# Patient Record
Sex: MALE | Race: WHITE | Employment: OTHER | URBAN - NONMETROPOLITAN AREA
[De-identification: names, ages, dates, MRNs, and addresses within clinical notes are randomized per-mention and may not be internally consistent; named-entity substitution may affect disease eponyms.]

---

## 2017-02-14 ENCOUNTER — OFFICE VISIT (OUTPATIENT)
Dept: PRIMARY CARE CLINIC | Age: 67
End: 2017-02-14
Payer: COMMERCIAL

## 2017-02-14 VITALS
OXYGEN SATURATION: 97 % | HEART RATE: 95 BPM | DIASTOLIC BLOOD PRESSURE: 82 MMHG | SYSTOLIC BLOOD PRESSURE: 122 MMHG | WEIGHT: 251 LBS | HEIGHT: 71 IN | TEMPERATURE: 98.4 F | BODY MASS INDEX: 35.14 KG/M2

## 2017-02-14 DIAGNOSIS — E11.9 TYPE 2 DIABETES MELLITUS WITHOUT COMPLICATION, WITHOUT LONG-TERM CURRENT USE OF INSULIN (HCC): ICD-10-CM

## 2017-02-14 DIAGNOSIS — J01.01 ACUTE RECURRENT MAXILLARY SINUSITIS: Primary | ICD-10-CM

## 2017-02-14 DIAGNOSIS — H66.005 RECURRENT ACUTE SUPPURATIVE OTITIS MEDIA WITHOUT SPONTANEOUS RUPTURE OF LEFT TYMPANIC MEMBRANE: ICD-10-CM

## 2017-02-14 PROCEDURE — 99213 OFFICE O/P EST LOW 20 MIN: CPT | Performed by: PEDIATRICS

## 2017-02-14 RX ORDER — CEFDINIR 300 MG/1
300 CAPSULE ORAL 2 TIMES DAILY
Qty: 20 CAPSULE | Refills: 0 | Status: SHIPPED | OUTPATIENT
Start: 2017-02-14 | End: 2017-02-24

## 2017-02-14 ASSESSMENT — ENCOUNTER SYMPTOMS
VOMITING: 1
VOICE CHANGE: 0
DIARRHEA: 0
COUGH: 1
NAUSEA: 0
SHORTNESS OF BREATH: 0
RHINORRHEA: 1
ABDOMINAL PAIN: 0
SINUS PRESSURE: 1
BACK PAIN: 0

## 2017-03-20 DIAGNOSIS — I10 ESSENTIAL HYPERTENSION: ICD-10-CM

## 2017-03-20 RX ORDER — SPIRONOLACTONE 25 MG/1
25 TABLET ORAL DAILY
Qty: 90 TABLET | Refills: 3 | Status: SHIPPED | OUTPATIENT
Start: 2017-03-20 | End: 2018-06-02 | Stop reason: SDUPTHER

## 2017-03-24 DIAGNOSIS — E78.5 HYPERLIPIDEMIA WITH TARGET LDL LESS THAN 100: ICD-10-CM

## 2017-03-24 DIAGNOSIS — I10 ESSENTIAL HYPERTENSION: ICD-10-CM

## 2017-03-24 DIAGNOSIS — E11.9 TYPE 2 DIABETES MELLITUS WITHOUT COMPLICATION, WITHOUT LONG-TERM CURRENT USE OF INSULIN (HCC): ICD-10-CM

## 2017-03-24 DIAGNOSIS — Z00.00 ROUTINE GENERAL MEDICAL EXAMINATION AT A HEALTH CARE FACILITY: ICD-10-CM

## 2017-03-24 DIAGNOSIS — Z00.00 ROUTINE GENERAL MEDICAL EXAMINATION AT A HEALTH CARE FACILITY: Primary | ICD-10-CM

## 2017-03-24 LAB
ALBUMIN SERPL-MCNC: 4.5 G/DL (ref 3.5–5.2)
ALP BLD-CCNC: 51 U/L (ref 40–130)
ALT SERPL-CCNC: 15 U/L (ref 5–41)
ANION GAP SERPL CALCULATED.3IONS-SCNC: 17 MMOL/L (ref 7–19)
AST SERPL-CCNC: 19 U/L (ref 5–40)
BASOPHILS ABSOLUTE: 0.1 K/UL (ref 0–0.2)
BASOPHILS RELATIVE PERCENT: 1.2 % (ref 0–1)
BILIRUB SERPL-MCNC: 0.4 MG/DL (ref 0.2–1.2)
BUN BLDV-MCNC: 18 MG/DL (ref 8–23)
CALCIUM SERPL-MCNC: 9.7 MG/DL (ref 8.8–10.2)
CHLORIDE BLD-SCNC: 100 MMOL/L (ref 98–111)
CHOLESTEROL, TOTAL: 122 MG/DL (ref 160–199)
CO2: 20 MMOL/L (ref 22–29)
CREAT SERPL-MCNC: 1 MG/DL (ref 0.5–1.2)
CREATININE URINE: 131 MG/DL (ref 4.2–622)
EOSINOPHILS ABSOLUTE: 1.2 K/UL (ref 0–0.6)
EOSINOPHILS RELATIVE PERCENT: 11.8 % (ref 0–5)
GFR NON-AFRICAN AMERICAN: >60
GLOBULIN: 2.8 G/DL
GLUCOSE BLD-MCNC: 136 MG/DL (ref 74–109)
HBA1C MFR BLD: 5.5 %
HCT VFR BLD CALC: 39.1 % (ref 42–52)
HDLC SERPL-MCNC: 28 MG/DL (ref 55–121)
HEMOGLOBIN: 13.8 G/DL (ref 14–18)
LDL CHOLESTEROL CALCULATED: 65 MG/DL
LYMPHOCYTES ABSOLUTE: 2.7 K/UL (ref 1.1–4.5)
LYMPHOCYTES RELATIVE PERCENT: 26.8 % (ref 20–40)
MCH RBC QN AUTO: 33.3 PG (ref 27–31)
MCHC RBC AUTO-ENTMCNC: 35.3 G/DL (ref 33–37)
MCV RBC AUTO: 94.2 FL (ref 80–94)
MICROALBUMIN UR-MCNC: 2.1 MG/DL (ref 0–19)
MICROALBUMIN/CREAT UR-RTO: 16 MG/G
MONOCYTES ABSOLUTE: 0.7 K/UL (ref 0–0.9)
MONOCYTES RELATIVE PERCENT: 6.8 % (ref 0–10)
NEUTROPHILS ABSOLUTE: 5.3 K/UL (ref 1.5–7.5)
NEUTROPHILS RELATIVE PERCENT: 53.4 % (ref 50–65)
PDW BLD-RTO: 13.7 % (ref 11.5–14.5)
PLATELET # BLD: 249 K/UL (ref 130–400)
PMV BLD AUTO: 11.2 FL (ref 7.4–10.4)
POTASSIUM SERPL-SCNC: 4.9 MMOL/L (ref 3.5–5)
RBC # BLD: 4.15 M/UL (ref 4.7–6.1)
SODIUM BLD-SCNC: 137 MMOL/L (ref 136–145)
TOTAL PROTEIN: 7.3 G/DL (ref 6.6–8.7)
TRIGL SERPL-MCNC: 147 MG/DL (ref 150–199)
WBC # BLD: 9.9 K/UL (ref 4.8–10.8)

## 2017-03-25 LAB
T4 FREE: 1 NG/ML (ref 0.9–1.7)
TSH SERPL DL<=0.05 MIU/L-ACNC: 4.44 UIU/ML (ref 0.27–4.2)

## 2017-03-27 ENCOUNTER — TELEPHONE (OUTPATIENT)
Dept: PRIMARY CARE CLINIC | Age: 67
End: 2017-03-27

## 2017-03-28 ENCOUNTER — OFFICE VISIT (OUTPATIENT)
Dept: PRIMARY CARE CLINIC | Age: 67
End: 2017-03-28
Payer: COMMERCIAL

## 2017-03-28 VITALS
SYSTOLIC BLOOD PRESSURE: 102 MMHG | DIASTOLIC BLOOD PRESSURE: 70 MMHG | OXYGEN SATURATION: 98 % | HEIGHT: 71 IN | HEART RATE: 84 BPM | WEIGHT: 254.8 LBS | BODY MASS INDEX: 35.67 KG/M2 | TEMPERATURE: 98.6 F

## 2017-03-28 DIAGNOSIS — E11.9 TYPE 2 DIABETES MELLITUS WITHOUT COMPLICATION, WITHOUT LONG-TERM CURRENT USE OF INSULIN (HCC): ICD-10-CM

## 2017-03-28 DIAGNOSIS — J30.2 SEASONAL ALLERGIC RHINITIS, UNSPECIFIED ALLERGIC RHINITIS TRIGGER: ICD-10-CM

## 2017-03-28 DIAGNOSIS — I65.29 CAROTID ARTERY OCCLUSION WITHOUT INFARCTION, UNSPECIFIED LATERALITY: ICD-10-CM

## 2017-03-28 DIAGNOSIS — E78.5 HYPERLIPIDEMIA WITH TARGET LDL LESS THAN 100: ICD-10-CM

## 2017-03-28 DIAGNOSIS — I25.10 CORONARY ARTERY DISEASE INVOLVING NATIVE HEART WITHOUT ANGINA PECTORIS, UNSPECIFIED VESSEL OR LESION TYPE: ICD-10-CM

## 2017-03-28 DIAGNOSIS — I10 ESSENTIAL HYPERTENSION: Primary | ICD-10-CM

## 2017-03-28 PROCEDURE — 99214 OFFICE O/P EST MOD 30 MIN: CPT | Performed by: PEDIATRICS

## 2017-03-28 ASSESSMENT — ENCOUNTER SYMPTOMS
COUGH: 0
NAUSEA: 0
RHINORRHEA: 0
ABDOMINAL PAIN: 0
VOICE CHANGE: 0
SHORTNESS OF BREATH: 0
BACK PAIN: 0
SINUS PRESSURE: 0
DIARRHEA: 0
VOMITING: 0

## 2017-05-30 RX ORDER — ATORVASTATIN CALCIUM 40 MG/1
40 TABLET, FILM COATED ORAL DAILY
Qty: 90 TABLET | Refills: 3 | Status: SHIPPED | OUTPATIENT
Start: 2017-05-30 | End: 2018-05-14 | Stop reason: SDUPTHER

## 2017-10-08 DIAGNOSIS — Z79.4 TYPE 2 DIABETES MELLITUS WITHOUT COMPLICATION, WITH LONG-TERM CURRENT USE OF INSULIN (HCC): ICD-10-CM

## 2017-10-08 DIAGNOSIS — E11.9 TYPE 2 DIABETES MELLITUS WITHOUT COMPLICATION, WITH LONG-TERM CURRENT USE OF INSULIN (HCC): ICD-10-CM

## 2017-10-09 NOTE — TELEPHONE ENCOUNTER
Received request from pt for refill on the following medication. Pt was last seen in the office on 9/14/17 and has a follow up appointment scheduled for 10/13/17. Will send request to physician for authorization.        Requested Prescriptions     Pending Prescriptions Disp Refills    metFORMIN (GLUCOPHAGE) 1000 MG tablet [Pharmacy Med Name: METFORMIN TAB 1000MG] 180 tablet 3     Sig: Take 1 tablet by mouth 2 times daily (with meals)

## 2017-10-23 ENCOUNTER — OFFICE VISIT (OUTPATIENT)
Dept: PRIMARY CARE CLINIC | Age: 67
End: 2017-10-23
Payer: COMMERCIAL

## 2017-10-23 VITALS
SYSTOLIC BLOOD PRESSURE: 118 MMHG | TEMPERATURE: 97.7 F | HEIGHT: 71 IN | DIASTOLIC BLOOD PRESSURE: 60 MMHG | BODY MASS INDEX: 35.56 KG/M2 | HEART RATE: 71 BPM | WEIGHT: 254 LBS | OXYGEN SATURATION: 97 %

## 2017-10-23 DIAGNOSIS — I10 ESSENTIAL HYPERTENSION: Primary | ICD-10-CM

## 2017-10-23 DIAGNOSIS — I25.10 CORONARY ARTERY DISEASE INVOLVING NATIVE HEART WITHOUT ANGINA PECTORIS, UNSPECIFIED VESSEL OR LESION TYPE: ICD-10-CM

## 2017-10-23 DIAGNOSIS — R39.11 BENIGN PROSTATIC HYPERPLASIA WITH URINARY HESITANCY: ICD-10-CM

## 2017-10-23 DIAGNOSIS — E11.9 TYPE 2 DIABETES MELLITUS WITHOUT COMPLICATION, WITHOUT LONG-TERM CURRENT USE OF INSULIN (HCC): ICD-10-CM

## 2017-10-23 DIAGNOSIS — E78.5 HYPERLIPIDEMIA WITH TARGET LDL LESS THAN 100: ICD-10-CM

## 2017-10-23 DIAGNOSIS — Z12.5 ENCOUNTER FOR PROSTATE CANCER SCREENING: ICD-10-CM

## 2017-10-23 DIAGNOSIS — N40.1 BENIGN PROSTATIC HYPERPLASIA WITH URINARY HESITANCY: ICD-10-CM

## 2017-10-23 PROCEDURE — G8417 CALC BMI ABV UP PARAM F/U: HCPCS | Performed by: PEDIATRICS

## 2017-10-23 PROCEDURE — 4040F PNEUMOC VAC/ADMIN/RCVD: CPT | Performed by: PEDIATRICS

## 2017-10-23 PROCEDURE — 1036F TOBACCO NON-USER: CPT | Performed by: PEDIATRICS

## 2017-10-23 PROCEDURE — G8427 DOCREV CUR MEDS BY ELIG CLIN: HCPCS | Performed by: PEDIATRICS

## 2017-10-23 PROCEDURE — 3017F COLORECTAL CA SCREEN DOC REV: CPT | Performed by: PEDIATRICS

## 2017-10-23 PROCEDURE — G8598 ASA/ANTIPLAT THER USED: HCPCS | Performed by: PEDIATRICS

## 2017-10-23 PROCEDURE — 3044F HG A1C LEVEL LT 7.0%: CPT | Performed by: PEDIATRICS

## 2017-10-23 PROCEDURE — 99214 OFFICE O/P EST MOD 30 MIN: CPT | Performed by: PEDIATRICS

## 2017-10-23 PROCEDURE — 1123F ACP DISCUSS/DSCN MKR DOCD: CPT | Performed by: PEDIATRICS

## 2017-10-23 PROCEDURE — G8484 FLU IMMUNIZE NO ADMIN: HCPCS | Performed by: PEDIATRICS

## 2017-10-23 RX ORDER — NITROGLYCERIN 400 UG/1
1 SPRAY ORAL EVERY 5 MIN PRN
Qty: 1 BOTTLE | Refills: 0 | Status: SHIPPED | OUTPATIENT
Start: 2017-10-23 | End: 2017-10-26 | Stop reason: SDUPTHER

## 2017-10-23 ASSESSMENT — ENCOUNTER SYMPTOMS
NAUSEA: 0
BACK PAIN: 0
DIARRHEA: 0
ABDOMINAL PAIN: 0
VOMITING: 0
CHEST TIGHTNESS: 0
COUGH: 0
WHEEZING: 0
SHORTNESS OF BREATH: 0
SORE THROAT: 0

## 2017-10-23 NOTE — PROGRESS NOTES
2017    LDLCALC 65 2017    LDLDIRECT 111 2013      Lab Results   Component Value Date    ALT 15 2017    AST 19 2017       Barriers To Success: none       height is 5' 11\" (1.803 m) and weight is 254 lb (115.2 kg). His temporal temperature is 97.7 °F (36.5 °C). His blood pressure is 118/60 and his pulse is 71. His oxygen saturation is 97%. Body mass index is 35.43 kg/m². Notes Recorded by Gigi Bhandari DO on 3/26/2017 at 6:31 AM  Thyroid is borderline on the low side.  Will need to recheck next blood draw. BG is 136.  Blood is slightly acidic, but otherwise normal.  No concern unless other abnormalities. Cholesterol is controlled with the following exceptions: Your HDL cholesterol is too low.  Exercise is the best way to get this level up.  The recommendations are to get the HDL >40 in males and >50 in females.  Niacin is an otc. B vitamin that can also increase the HDL.  Fish oil has also been shown to result in an increase of HDL cholesterol. I recommend that we recheck cholesterol in 3-6 months. A1C is 5.5 which indicates that BG has been excellently controlled over the past 3 months. There is a minimal amt. Of protein in the urine.  We can follow this.  It is not enough to be alarmed, but need to ensure no progression. Cbc shows borderline anemia.  The allergy marker is also elevated indicating the presence of allergies. I have reviewed the following with the Mr. Ballard    Lab Review   No visits with results within 6 Month(s) from this visit.    Latest known visit with results is:   Orders Only on 2017   Component Date Value    Sodium 2017 137     Potassium 2017 4.9     Chloride 2017 100     CO2 2017 20*    Anion Gap 2017 17     Glucose 2017 136*    BUN 2017 18     CREATININE 2017 1.0     GFR Non- 2017 >60     Calcium 2017 9.7     Total Protein 2017 7.3     Alb (PLAVIX) 75 MG tablet Take 1 tablet by mouth daily 90 tablet 3    triamcinolone (KENALOG) 0.1 % cream Apply topically 2 times daily. 80 g 5    Omega-3 Fatty Acids (FISH OIL) 1000 MG CAPS Take 3,000 mg by mouth daily      diphenhydrAMINE (BENADRYL) 25 MG tablet Take 50 mg by mouth nightly as needed for Itching      aspirin 325 MG tablet Take 325 mg by mouth daily.  niacin (NIASPAN) 1000 MG CR tablet Take 1,000 mg by mouth nightly.  cyanocobalamin 1000 MCG/ML injection Inject 1 mL into the muscle once for 1 dose 1 mL 5     No current facility-administered medications for this visit. Allergies: Codeine    Past Medical History:   Diagnosis Date    CAD (coronary artery disease) 1997 2009    MI    Cancer ([facility] [address]     skin BCC    Hyperlipidemia     Hypertension     Type II or unspecified type diabetes mellitus without mention of complication, not stated as uncontrolled     Vertebral artery insufficiency        Past Surgical History:   Procedure Laterality Date    CARPAL TUNNEL RELEASE      bilateral    CATARACT REMOVAL Bilateral     CHOLECYSTECTOMY      CORONARY ARTERY BYPASS GRAFT  1997    3v    HEEL SPUR SURGERY      left    PTCA  2009 [employer]    with stents    TONSILLECTOMY         Social History   Substance Use Topics    Smoking status: Former Smoker     Packs/day: 1.00     Years: 30.00     Types: Cigars     Quit date: 3/3/2009    Smokeless tobacco: Never Used      Comment: cigars occasionally    Alcohol use 0.0 oz/week      Comment: occasional       Review of Systems   Constitutional: Negative for chills, fatigue and fever. HENT: Negative for congestion, ear pain and sore throat. Eyes: Negative for visual disturbance. Respiratory: Negative for cough, chest tightness, shortness of breath and wheezing. Cardiovascular: Negative for chest pain, palpitations and leg swelling. Gastrointestinal: Negative for abdominal pain, diarrhea, nausea and vomiting.    Endocrine: Negative for polyuria. Genitourinary: Negative for frequency and urgency. Musculoskeletal: Negative for back pain and neck pain. Skin: Negative for rash. Neurological: Positive for dizziness and light-headedness. Negative for headaches. Psychiatric/Behavioral: Negative for self-injury. The patient is not nervous/anxious. Physical Exam   Constitutional: He is oriented to person, place, and time. He appears well-developed and well-nourished. No distress. HENT:   Head: Normocephalic and atraumatic. Right Ear: External ear normal.   Left Ear: External ear normal.   Nose: Nose normal.   Mouth/Throat: Oropharynx is clear and moist.   Eyes: Conjunctivae and EOM are normal. Pupils are equal, round, and reactive to light. No scleral icterus. Neck: Normal range of motion. Neck supple. No JVD present. Carotid bruit is not present. No thyromegaly present. Cardiovascular: Normal rate, regular rhythm, S1 normal, S2 normal and normal heart sounds. No extrasystoles are present. PMI is not displaced. Exam reveals no gallop and no friction rub. No murmur heard. Pulmonary/Chest: Effort normal and breath sounds normal. No respiratory distress. He has no wheezes. He has no rhonchi. He has no rales. Abdominal: Soft. Bowel sounds are normal. There is no hepatosplenomegaly. There is no tenderness. There is no rebound, no guarding and no CVA tenderness. Genitourinary:   Genitourinary Comments: Exam deferred   Musculoskeletal: Normal range of motion. He exhibits no edema or tenderness. Lymphadenopathy:     He has no cervical adenopathy. Neurological: He is alert and oriented to person, place, and time. He has normal strength. No sensory deficit (no numbness or tingling). Skin: Skin is warm and dry. No rash noted. Psychiatric: He has a normal mood and affect. ASSESSMENT      ICD-10-CM ICD-9-CM    1. Essential hypertension I10 401.9 CBC Auto Differential      Comprehensive Metabolic Panel   2.  Type 2 diabetes mellitus without complication, without long-term current use of insulin (HCC) E11.9 250.00 Hemoglobin A1C   3. Hyperlipidemia with target LDL less than 100 E78.5 272.4 Lipid Panel   4. Coronary artery disease involving native heart without angina pectoris, unspecified vessel or lesion type I25.10 414.01 nitroGLYCERIN (NITROLINGUAL) 0.4 MG/SPRAY 0.4 mg spray   5. Benign prostatic hyperplasia with urinary hesitancy N40.1 600.01 Psa screening    R39.11 788.64    6. Encounter for prostate cancer screening Z12.5 V76.44 Psa screening       PLAN      ICD-10-CM ICD-9-CM    1. Essential hypertension I10 401.9 CBC Auto Differential      Comprehensive Metabolic Panel   2. Type 2 diabetes mellitus without complication, without long-term current use of insulin (HCC) E11.9 250.00 Hemoglobin A1C   3. Hyperlipidemia with target LDL less than 100 E78.5 272.4 Lipid Panel   4. Coronary artery disease involving native heart without angina pectoris, unspecified vessel or lesion type I25.10 414.01 nitroGLYCERIN (NITROLINGUAL) 0.4 MG/SPRAY 0.4 mg spray   5. Benign prostatic hyperplasia with urinary hesitancy N40.1 600.01 Psa screening    R39.11 788.64    6. Encounter for prostate cancer screening Z12.5 V76.44        Orders Placed This Encounter   Procedures    CBC Auto Differential    Comprehensive Metabolic Panel    Hemoglobin A1C    Lipid Panel    Psa screening        Return in about 6 months (around 4/23/2018). There are no Patient Instructions on file for this visit. Additional Instructions: As always, patient is advised to bring in medication bottles in order to correctly reconcile with our current list.    Jody Hainer received counseling on the following healthy behaviors: take medications as rx. Patient given educational materials on diabetic diet. I have instructed Jodyantoine Tuttle to complete a self tracking handout on bp and bg and instructed them to bring it with them to his next appointment. Discussed use, benefit, and side effects of prescribed medications. Barriers to medication compliance addressed. All patient questions answered. Pt voiced understanding. If you need to reach us for an appointment or any other urgent   scheduling issue,   please press the (*) sign at the beginning of the phone tree prompt after   Dialing the normal office number.     Susi Houston, DO

## 2017-10-25 RX ORDER — CYANOCOBALAMIN 1000 UG/ML
1000 INJECTION INTRAMUSCULAR; SUBCUTANEOUS ONCE
Qty: 1 ML | Refills: 5 | Status: SHIPPED | OUTPATIENT
Start: 2017-10-25 | End: 2018-05-26 | Stop reason: SDUPTHER

## 2017-10-26 DIAGNOSIS — I25.10 CORONARY ARTERY DISEASE INVOLVING NATIVE HEART WITHOUT ANGINA PECTORIS, UNSPECIFIED VESSEL OR LESION TYPE: ICD-10-CM

## 2017-10-26 NOTE — TELEPHONE ENCOUNTER
Received fax from pharmacy requesting refill on pts medication(s). Pt was last seen in office on 10/23/2017 and has a follow up scheduled for 4/24/2017. [facility]

## 2017-10-27 RX ORDER — NITROGLYCERIN 400 UG/1
1 SPRAY ORAL EVERY 5 MIN PRN
Qty: 1 BOTTLE | Refills: 0 | Status: SHIPPED | OUTPATIENT
Start: 2017-10-27

## 2017-10-27 RX ORDER — CYANOCOBALAMIN 1000 UG/ML
1000 INJECTION INTRAMUSCULAR; SUBCUTANEOUS ONCE
Qty: 1 ML | Refills: 0 | Status: SHIPPED | OUTPATIENT
Start: 2017-10-27 | End: 2018-04-24 | Stop reason: SDUPTHER

## 2017-11-03 DIAGNOSIS — I10 ESSENTIAL HYPERTENSION: ICD-10-CM

## 2017-11-03 RX ORDER — RAMIPRIL 10 MG/1
CAPSULE ORAL
Qty: 180 CAPSULE | Refills: 3 | Status: SHIPPED | OUTPATIENT
Start: 2017-11-03 | End: 2018-10-25 | Stop reason: ALTCHOICE

## 2017-11-13 DIAGNOSIS — I25.10 CORONARY ARTERY DISEASE INVOLVING NATIVE HEART WITHOUT ANGINA PECTORIS, UNSPECIFIED VESSEL OR LESION TYPE: ICD-10-CM

## 2017-11-13 DIAGNOSIS — I10 ESSENTIAL HYPERTENSION: ICD-10-CM

## 2017-11-13 RX ORDER — METOPROLOL TARTRATE 50 MG/1
50 TABLET, FILM COATED ORAL 2 TIMES DAILY
Qty: 180 TABLET | Refills: 3 | Status: ON HOLD | OUTPATIENT
Start: 2017-11-13 | End: 2018-10-18 | Stop reason: SDUPTHER

## 2017-11-13 RX ORDER — CLOPIDOGREL BISULFATE 75 MG/1
75 TABLET ORAL DAILY
Qty: 90 TABLET | Refills: 3 | Status: ON HOLD | OUTPATIENT
Start: 2017-11-13 | End: 2018-10-18 | Stop reason: SDUPTHER

## 2017-12-14 ENCOUNTER — OFFICE VISIT (OUTPATIENT)
Dept: PRIMARY CARE CLINIC | Age: 67
End: 2017-12-14
Payer: COMMERCIAL

## 2017-12-14 VITALS
DIASTOLIC BLOOD PRESSURE: 78 MMHG | HEART RATE: 70 BPM | WEIGHT: 251.75 LBS | SYSTOLIC BLOOD PRESSURE: 110 MMHG | TEMPERATURE: 96.3 F | BODY MASS INDEX: 35.24 KG/M2 | OXYGEN SATURATION: 95 % | HEIGHT: 71 IN

## 2017-12-14 DIAGNOSIS — J01.00 ACUTE NON-RECURRENT MAXILLARY SINUSITIS: Primary | ICD-10-CM

## 2017-12-14 DIAGNOSIS — J40 BRONCHITIS: ICD-10-CM

## 2017-12-14 PROCEDURE — G8427 DOCREV CUR MEDS BY ELIG CLIN: HCPCS | Performed by: PEDIATRICS

## 2017-12-14 PROCEDURE — 3017F COLORECTAL CA SCREEN DOC REV: CPT | Performed by: PEDIATRICS

## 2017-12-14 PROCEDURE — 99213 OFFICE O/P EST LOW 20 MIN: CPT | Performed by: PEDIATRICS

## 2017-12-14 PROCEDURE — G8417 CALC BMI ABV UP PARAM F/U: HCPCS | Performed by: PEDIATRICS

## 2017-12-14 PROCEDURE — 4040F PNEUMOC VAC/ADMIN/RCVD: CPT | Performed by: PEDIATRICS

## 2017-12-14 PROCEDURE — G8598 ASA/ANTIPLAT THER USED: HCPCS | Performed by: PEDIATRICS

## 2017-12-14 PROCEDURE — G8484 FLU IMMUNIZE NO ADMIN: HCPCS | Performed by: PEDIATRICS

## 2017-12-14 PROCEDURE — 1036F TOBACCO NON-USER: CPT | Performed by: PEDIATRICS

## 2017-12-14 PROCEDURE — 1123F ACP DISCUSS/DSCN MKR DOCD: CPT | Performed by: PEDIATRICS

## 2017-12-14 RX ORDER — AMOXICILLIN AND CLAVULANATE POTASSIUM 875; 125 MG/1; MG/1
1 TABLET, FILM COATED ORAL 2 TIMES DAILY
Qty: 20 TABLET | Refills: 0 | Status: SHIPPED | OUTPATIENT
Start: 2017-12-14 | End: 2017-12-24

## 2017-12-14 ASSESSMENT — ENCOUNTER SYMPTOMS
BACK PAIN: 0
NAUSEA: 0
COUGH: 1
CHEST TIGHTNESS: 0
SORE THROAT: 0
WHEEZING: 0
DIARRHEA: 0
VOMITING: 0
SHORTNESS OF BREATH: 0
ABDOMINAL PAIN: 0

## 2017-12-14 NOTE — PATIENT INSTRUCTIONS
? · You are not getting better as expected. Where can you learn more? Go to [email or web address] and sign in to your [employer] account. Enter H333 in the [employer] box to learn more about \"Bronchitis: Care Instructions. \"     If you do not have an account, please click on the \"Sign Up Now\" link. Current as of: May 12, 2017  Content Version: 11.4  © 3682-1489 [employer], [employer]orporated. Care instructions adapted under license by [facility] ([facility]). If you have questions about a medical condition or this instruction, always ask your healthcare professional. [address] any warranty or liability for your use of this information.

## 2017-12-14 NOTE — PROGRESS NOTES
visit. Latest known visit with results is:   Orders Only on 03/24/2017   Component Date Value    Sodium 03/24/2017 137     Potassium 03/24/2017 4.9     Chloride 03/24/2017 100     CO2 03/24/2017 20*    Anion Gap 03/24/2017 17     Glucose 03/24/2017 136*    BUN 03/24/2017 18     CREATININE 03/24/2017 1.0     GFR Non- 03/24/2017 >60     Calcium 03/24/2017 9.7     Total Protein 03/24/2017 7.3     Alb 03/24/2017 4.5     Total Bilirubin 03/24/2017 0.4     Alkaline Phosphatase 03/24/2017 51     ALT 03/24/2017 15     AST 03/24/2017 19     Globulin 03/24/2017 2.8     Hemoglobin A1C 03/24/2017 5.5     Cholesterol, Total 03/24/2017 122*    Triglycerides 03/24/2017 147*    HDL 03/24/2017 28*    LDL Calculated 03/24/2017 65     T4 Free 03/25/2017 1.0     TSH 03/25/2017 4.44*    WBC 03/24/2017 9.9     RBC 03/24/2017 4.15*    Hemoglobin 03/24/2017 13.8*    Hematocrit 03/24/2017 39.1*    MCV 03/24/2017 94.2*    MCH 03/24/2017 33.3*    MCHC 03/24/2017 35.3     RDW 03/24/2017 13.7     Platelets 73/98/1628 249     MPV 03/24/2017 11.2*    Neutrophils % 03/24/2017 53.4     Lymphocytes % 03/24/2017 26.8     Monocytes % 03/24/2017 6.8     Eosinophils % 03/24/2017 11.8*    Basophils % 03/24/2017 1.2*    Neutrophils # 03/24/2017 5.3     Lymphocytes # 03/24/2017 2.7     Monocytes # 03/24/2017 0.70     Eosinophils # 03/24/2017 1.20*    Basophils # 03/24/2017 0.10     Microalbumin, Random Uri* 03/24/2017 2.10     Creatinine, Ur 03/24/2017 131.0     Microalbumin Creatinine * 03/24/2017 16.0      Copies of these are in the chart.     Current Outpatient Prescriptions   Medication Sig Dispense Refill    amoxicillin-clavulanate (AUGMENTIN) 875-125 MG per tablet Take 1 tablet by mouth 2 times daily for 10 days 20 tablet 0    clopidogrel (PLAVIX) 75 MG tablet Take 1 tablet by mouth daily 90 tablet 3    metoprolol tartrate (LOPRESSOR) 50 MG tablet Take 1 tablet by mouth 2 times daily 180 tablet 3    ramipril (ALTACE) 10 MG capsule TAKE 2 CAPSULES DAILY 180 capsule 3    nitroGLYCERIN (NITROLINGUAL) 0.4 MG/SPRAY 0.4 mg spray Place 1 spray under the tongue every 5 minutes as needed for Chest pain 1 Bottle 0    atorvastatin (LIPITOR) 40 MG tablet Take 1 tablet by mouth daily 90 tablet 3    spironolactone (ALDACTONE) 25 MG tablet Take 1 tablet by mouth daily 90 tablet 3    Dulaglutide 0.75 MG/0.5ML SOPN Inject 0.75 mg into the skin every 7 days 12 Pen 3    metFORMIN (GLUCOPHAGE) 1000 MG tablet Take 1 tablet by mouth 2 times daily (with meals) 180 tablet 3    triamcinolone (KENALOG) 0.1 % cream Apply topically 2 times daily. 80 g 5    Omega-3 Fatty Acids (FISH OIL) 1000 MG CAPS Take 3,000 mg by mouth daily      diphenhydrAMINE (BENADRYL) 25 MG tablet Take 50 mg by mouth nightly as needed for Itching      aspirin 325 MG tablet Take 325 mg by mouth daily.  niacin (NIASPAN) 1000 MG CR tablet Take 1,000 mg by mouth nightly.  cyanocobalamin 1000 MCG/ML injection Inject 1 mL into the muscle once for 1 dose 1 mL 0    cyanocobalamin 1000 MCG/ML injection Inject 1 mL into the muscle once for 1 dose 1 mL 5     No current facility-administered medications for this visit.         Allergies: Codeine    Past Medical History:   Diagnosis Date    CAD (coronary artery disease) 1997 2009    MI    Cancer ([facility] [address]     skin BCC    Hyperlipidemia     Hypertension     Type II or unspecified type diabetes mellitus without mention of complication, not stated as uncontrolled     Vertebral artery insufficiency        Past Surgical History:   Procedure Laterality Date    CARPAL TUNNEL RELEASE      bilateral    CATARACT REMOVAL Bilateral     CHOLECYSTECTOMY      CORONARY ARTERY BYPASS GRAFT  1997    3v    HEEL SPUR SURGERY      left    PTCA  2009 [employer]    with stents    TONSILLECTOMY         Social History   Substance Use Topics    Smoking status: Former Smoker     Packs/day: 1.00     Years: Motrin), or naproxen (Aleve). Read and follow all instructions on the label. · If the doctor prescribed antibiotics, take them as directed. Do not stop taking them just because you feel better. You need to take the full course of antibiotics. · Be careful when taking over-the-counter cold or flu medicines and Tylenol at the same time. Many of these medicines have acetaminophen, which is Tylenol. Read the labels to make sure that you are not taking more than the recommended dose. Too much acetaminophen (Tylenol) can be harmful. · Breathe warm, moist air from a steamy shower, a hot bath, or a sink filled with hot water. Avoid cold, dry air. Using a humidifier in your home may help. Follow the directions for cleaning the machine. · Use saline (saltwater) nasal washes to help keep your nasal passages open and wash out mucus and bacteria. You can buy saline nose drops at a grocery store or drugstore. Or you can make your own at home by adding 1 teaspoon of salt and 1 teaspoon of baking soda to 2 cups of distilled water. If you make your own, fill a bulb syringe with the solution, insert the tip into your nostril, and squeeze gently. [place] your nose. · Put a hot, wet towel or a warm gel pack on your face 3 or 4 times a day for 5 to 10 minutes each time. · Try a decongestant nasal spray like oxymetazoline (Afrin). Do not use it for more than 3 days in a row. Using it for more than 3 days can make your congestion worse. When should you call for help? Call your doctor now or seek immediate medical care if:  ? · You have new or worse swelling or redness in your face or around your eyes. ? · You have a new or higher fever. ? Watch closely for changes in your health, and be sure to contact your doctor if:  ? · You have new or worse facial pain. ? · The mucus from your nose becomes thicker (like pus) or has new blood in it. ? · You are not getting better as expected. Where can you learn more?   Go to [email or web address] and sign in to your [employer] account. Enter K084 in the [facility] box to learn more about \"Sinusitis: Care Instructions. \"     If you do not have an account, please click on the \"Sign Up Now\" link. Current as of: May 12, 2017  Content Version: 11.4  © 0122-7458 [employer]. Care instructions adapted under license by [facility] ([facility]). If you have questions about a medical condition or this instruction, always ask your healthcare professional. [address] any warranty or liability for your use of this information. Patient Education        Bronchitis: Care Instructions  Your Care Instructions    Bronchitis is inflammation of the bronchial tubes, which carry air to the lungs. The tubes swell and produce mucus, or phlegm. The mucus and inflamed bronchial tubes make you cough. You may have trouble breathing. Most cases of bronchitis are caused by viruses like those that cause colds. Antibiotics usually do not help and they may be harmful. Bronchitis usually develops rapidly and lasts about 2 to 3 weeks in otherwise healthy people. Follow-up care is a key part of your treatment and safety. Be sure to make and go to all appointments, and call your doctor if you are having problems. It's also a good idea to know your test results and keep a list of the medicines you take. How can you care for yourself at home? · Take all medicines exactly as prescribed. Call your doctor if you think you are having a problem with your medicine. · Get some extra rest.  · Take an over-the-counter pain medicine, such as acetaminophen (Tylenol), ibuprofen (Advil, Motrin), or naproxen (Aleve) to reduce fever and relieve body aches. Read and follow all instructions on the label. · Do not take two or more pain medicines at the same time unless the doctor told you to. Many pain medicines have acetaminophen, which is Tylenol.  Too much acetaminophen in medication bottles in order to correctly reconcile with our current list.    Saundra Curtis received counseling on the following healthy behaviors: Take antibodies to completion    Patient given educational materials on sinusitis and bronchitis    I have instructed Saundra Curtis to complete a self tracking handout on bp and instructed them to bring it with them to his next appointment. Discussed use, benefit, and side effects of prescribed medications. Barriers to medication compliance addressed. All patient questions answered. Pt voiced understanding. If you need to reach us for an appointment or any other urgent   scheduling issue,   please press the (*) sign at the beginning of the phone tree prompt after   Dialing the normal office number.     Warden Guerrero, DO

## 2018-01-15 DIAGNOSIS — E11.9 TYPE 2 DIABETES MELLITUS WITHOUT COMPLICATION, WITHOUT LONG-TERM CURRENT USE OF INSULIN (HCC): ICD-10-CM

## 2018-01-15 NOTE — TELEPHONE ENCOUNTER
Adelaida Fields would like a refill of the following medications:   Dulaglutide 0.75 MG/0.5ML SOPN [David Lopez DO]     Preferred pharmacy: [address] [place] [address] [phone] F [phone]     Comment:

## 2018-01-31 ENCOUNTER — TELEPHONE (OUTPATIENT)
Dept: PRIMARY CARE CLINIC | Age: 68
End: 2018-01-31

## 2018-02-02 ENCOUNTER — TELEPHONE (OUTPATIENT)
Dept: PRIMARY CARE CLINIC | Age: 68
End: 2018-02-02

## 2018-02-05 ENCOUNTER — TELEPHONE (OUTPATIENT)
Dept: PRIMARY CARE CLINIC | Age: 68
End: 2018-02-05

## 2018-02-05 DIAGNOSIS — R06.02 SHORTNESS OF BREATH: ICD-10-CM

## 2018-02-05 DIAGNOSIS — I25.10 CORONARY ARTERY DISEASE INVOLVING NATIVE HEART WITHOUT ANGINA PECTORIS, UNSPECIFIED VESSEL OR LESION TYPE: ICD-10-CM

## 2018-02-05 DIAGNOSIS — E78.5 HYPERLIPIDEMIA WITH TARGET LDL LESS THAN 100: ICD-10-CM

## 2018-02-05 DIAGNOSIS — I10 ESSENTIAL HYPERTENSION: ICD-10-CM

## 2018-02-05 DIAGNOSIS — E11.9 TYPE 2 DIABETES MELLITUS WITHOUT COMPLICATION, WITHOUT LONG-TERM CURRENT USE OF INSULIN (HCC): ICD-10-CM

## 2018-02-05 DIAGNOSIS — I65.29 OBSTRUCTION OF CAROTID ARTERY WITHOUT CEREBRAL INFARCTION, UNSPECIFIED LATERALITY: ICD-10-CM

## 2018-02-06 NOTE — TELEPHONE ENCOUNTER
Pt has an appt tomorrow am. He has a copy of his ECHO and knows his results.  Will discuss at his appt tomorrow per call to pt yesterday

## 2018-02-07 ENCOUNTER — OFFICE VISIT (OUTPATIENT)
Dept: PRIMARY CARE CLINIC | Age: 68
End: 2018-02-07
Payer: COMMERCIAL

## 2018-02-07 VITALS
OXYGEN SATURATION: 98 % | HEIGHT: 71 IN | WEIGHT: 245.5 LBS | DIASTOLIC BLOOD PRESSURE: 78 MMHG | HEART RATE: 76 BPM | SYSTOLIC BLOOD PRESSURE: 118 MMHG | BODY MASS INDEX: 34.37 KG/M2 | TEMPERATURE: 97.3 F

## 2018-02-07 DIAGNOSIS — I10 ESSENTIAL HYPERTENSION: ICD-10-CM

## 2018-02-07 DIAGNOSIS — E78.5 HYPERLIPIDEMIA WITH TARGET LDL LESS THAN 100: ICD-10-CM

## 2018-02-07 DIAGNOSIS — E11.9 TYPE 2 DIABETES MELLITUS WITHOUT COMPLICATION, WITHOUT LONG-TERM CURRENT USE OF INSULIN (HCC): ICD-10-CM

## 2018-02-07 DIAGNOSIS — I25.10 CORONARY ARTERY DISEASE INVOLVING NATIVE HEART WITHOUT ANGINA PECTORIS, UNSPECIFIED VESSEL OR LESION TYPE: ICD-10-CM

## 2018-02-07 DIAGNOSIS — I50.23 ACUTE ON CHRONIC SYSTOLIC CONGESTIVE HEART FAILURE (HCC): Primary | ICD-10-CM

## 2018-02-07 PROCEDURE — 1036F TOBACCO NON-USER: CPT | Performed by: PEDIATRICS

## 2018-02-07 PROCEDURE — G8484 FLU IMMUNIZE NO ADMIN: HCPCS | Performed by: PEDIATRICS

## 2018-02-07 PROCEDURE — 4040F PNEUMOC VAC/ADMIN/RCVD: CPT | Performed by: PEDIATRICS

## 2018-02-07 PROCEDURE — 1123F ACP DISCUSS/DSCN MKR DOCD: CPT | Performed by: PEDIATRICS

## 2018-02-07 PROCEDURE — 3017F COLORECTAL CA SCREEN DOC REV: CPT | Performed by: PEDIATRICS

## 2018-02-07 PROCEDURE — G8427 DOCREV CUR MEDS BY ELIG CLIN: HCPCS | Performed by: PEDIATRICS

## 2018-02-07 PROCEDURE — 99214 OFFICE O/P EST MOD 30 MIN: CPT | Performed by: PEDIATRICS

## 2018-02-07 PROCEDURE — 3046F HEMOGLOBIN A1C LEVEL >9.0%: CPT | Performed by: PEDIATRICS

## 2018-02-07 PROCEDURE — G8598 ASA/ANTIPLAT THER USED: HCPCS | Performed by: PEDIATRICS

## 2018-02-07 PROCEDURE — G8417 CALC BMI ABV UP PARAM F/U: HCPCS | Performed by: PEDIATRICS

## 2018-02-07 RX ORDER — METOLAZONE 2.5 MG/1
2.5 TABLET ORAL DAILY
Qty: 30 TABLET | Refills: 5 | Status: SHIPPED | OUTPATIENT
Start: 2018-02-07 | End: 2018-04-24

## 2018-02-07 ASSESSMENT — ENCOUNTER SYMPTOMS
NAUSEA: 0
COUGH: 0
BACK PAIN: 0
SORE THROAT: 0
DIARRHEA: 0
CHEST TIGHTNESS: 0
ABDOMINAL PAIN: 0
SHORTNESS OF BREATH: 0
VOMITING: 0
WHEEZING: 0

## 2018-02-07 NOTE — PROGRESS NOTES
visit. Latest known visit with results is:   Orders Only on 03/24/2017   Component Date Value    Sodium 03/24/2017 137     Potassium 03/24/2017 4.9     Chloride 03/24/2017 100     CO2 03/24/2017 20*    Anion Gap 03/24/2017 17     Glucose 03/24/2017 136*    BUN 03/24/2017 18     CREATININE 03/24/2017 1.0     GFR Non- 03/24/2017 >60     Calcium 03/24/2017 9.7     Total Protein 03/24/2017 7.3     Alb 03/24/2017 4.5     Total Bilirubin 03/24/2017 0.4     Alkaline Phosphatase 03/24/2017 51     ALT 03/24/2017 15     AST 03/24/2017 19     Globulin 03/24/2017 2.8     Hemoglobin A1C 03/24/2017 5.5     Cholesterol, Total 03/24/2017 122*    Triglycerides 03/24/2017 147*    HDL 03/24/2017 28*    LDL Calculated 03/24/2017 65     T4 Free 03/25/2017 1.0     TSH 03/25/2017 4.44*    WBC 03/24/2017 9.9     RBC 03/24/2017 4.15*    Hemoglobin 03/24/2017 13.8*    Hematocrit 03/24/2017 39.1*    MCV 03/24/2017 94.2*    MCH 03/24/2017 33.3*    MCHC 03/24/2017 35.3     RDW 03/24/2017 13.7     Platelets 27/69/2599 249     MPV 03/24/2017 11.2*    Neutrophils % 03/24/2017 53.4     Lymphocytes % 03/24/2017 26.8     Monocytes % 03/24/2017 6.8     Eosinophils % 03/24/2017 11.8*    Basophils % 03/24/2017 1.2*    Neutrophils # 03/24/2017 5.3     Lymphocytes # 03/24/2017 2.7     Monocytes # 03/24/2017 0.70     Eosinophils # 03/24/2017 1.20*    Basophils # 03/24/2017 0.10     Microalbumin, Random Uri* 03/24/2017 2.10     Creatinine, Ur 03/24/2017 131.0     Microalbumin Creatinine * 03/24/2017 16.0      Copies of these are in the chart.     Current Outpatient Prescriptions   Medication Sig Dispense Refill    metolazone (ZAROXOLYN) 2.5 MG tablet Take 1 tablet by mouth daily 1/2 hr before lasix 30 tablet 5    clopidogrel (PLAVIX) 75 MG tablet Take 1 tablet by mouth daily 90 tablet 3    metoprolol tartrate (LOPRESSOR) 50 MG tablet Take 1 tablet by mouth 2 times daily 180 tablet 3   

## 2018-02-07 NOTE — PATIENT INSTRUCTIONS
Patient Education        Learning About Heart Failure Zones  What are heart failure zones? Heart failure zones give you an easy way to see changes in your heart failure symptoms. They also tell you when you need to get help. Check every day to see which zone you are in. Green zone. You are doing well. This is where you want to be. · Your weight is stable. This means it is not going up or down. · You breathe easily. · You are sleeping well. You are able to lie flat without shortness of breath. · You can do your usual activities. Yellow zone. Be careful. Your symptoms are changing. Call your doctor. · You have new or increased shortness of breath. · You are dizzy or lightheaded, or you feel like you may faint. · You have sudden weight gain, such as more than 2 to 3 pounds in a day or 5 pounds in a week. (Your doctor may suggest a different range of weight gain.)  · You have increased swelling in your legs, ankles, or feet. · You are so tired or weak that you cannot do your usual activities. · You are not sleeping well. Shortness of breath wakes you up at night. You need extra pillows. Your doctor's name: ____________________________________________________________  Your doctor's contact information: _________________________________________________  Red zone. This is an emergency. Call 911. You have symptoms of sudden heart failure, such as:  · You have severe trouble breathing. · You cough up pink, foamy mucus. · You have a new irregular or fast heartbeat. You have symptoms of a heart attack. These may include:  · Chest pain or pressure, or a strange feeling in the chest.  · Sweating. · Shortness of breath. · Nausea or vomiting. · Pain, pressure, or a strange feeling in the back, neck, jaw, or upper belly or in one or both shoulders or arms. · Lightheadedness or sudden weakness. · A fast or irregular heartbeat.   If you have symptoms of a heart attack: After you call 911, the  may tell you to chew 1 adult-strength or 2 to 4 low-dose aspirin. Wait for an ambulance. Do not try to drive yourself. Follow-up care is a key part of your treatment and safety. Be sure to make and go to all appointments, and call your doctor if you are having problems. It's also a good idea to know your test results and keep a list of the medicines you take. Where can you learn more? Go to https://MayvennpeGiveo.1Ring. org and sign in to your [employer] account. Enter T174 in the [employer] Decisions box to learn more about \"Learning About Heart Failure Zones. \"     If you do not have an account, please click on the \"Sign Up Now\" link. Current as of: September 21, 2016  Content Version: 11.5  © 0918-8642 [employer] Care instructions adapted under license by [facility] ([facility]). If you have questions about a medical condition or this instruction, always ask your healthcare professional. [place] [address] any warranty or liability for your use of this information. Patient Education        Learning About Heart Failure  What is heart failure? Heart failure means that your heart muscle does not pump as much blood as your body needs. Failure does not mean that your heart has stopped. It means that your heart is not pumping as well as it should. Your body has an amazing ability to make up for heart failure. It may do such a good job that you don't know you have a disease. But at some point, your heart and body will no longer be able to keep up. Then fluid starts to build up in your lungs and other parts of your body. What can you expect when you have heart failure? Heart failure is a lifelong (chronic) disease. Treatment may be able to slow the disease and help you feel better. But heart failure tends to get worse over time. Despite this, there are many steps you can take to feel better and stay healthy longer. Early on, your symptoms may not be too bad.  As heart failure gets cooking. Take the salt shaker off the table. · Flavor your food with garlic, lemon juice, onion, vinegar, herbs, and spices instead of salt. Do not use soy sauce, steak sauce, onion salt, garlic salt, mustard, or ketchup on your food. · Make your own salad dressings, sauces, and ketchup without adding salt. · Use less salt (or none) when recipes call for it. You can often use half the salt a recipe calls for without losing flavor. Other dishes like rice, pasta, and grains do not need added salt. · Rinse canned vegetables. This removes some-but not all-of the salt. · Avoid water that has a naturally high sodium content or that has been treated with water softeners, which add sodium. Call your local water company to find out the sodium content of your water supply. If you buy bottled water, read the label and choose a sodium-free brand. Avoid high-sodium foods, such as:  · Smoked, cured, salted, and canned meat, fish, and poultry. · Ham, mejias, hot dogs, and luncheon meats. · Regular, hard, and processed cheese and regular peanut butter. · Crackers with salted tops. · Frozen prepared meals. · Canned and dried soups, broths, and bouillon, unless labeled sodium-free or low-sodium. · Canned vegetables, unless labeled sodium-free or low-sodium. · Salted snack foods such as chips and pretzels. · [place] fries, pizza, tacos, and other fast foods. · Pickles, olives, ketchup, and other condiments, especially soy sauce, unless labeled sodium-free or low-sodium. If you cannot cook for yourself  · Have family members or friends help you, or have someone cook low-sodium meals. · Check with your local senior nutrition program to find out where meals are served and whether they offer a low-sodium option. You can often find these programs through your local health department or hospital.  · Have meals delivered to your home.  Most [facility] have a Meals on [employer]. These programs provide one hot meal a day for older adults, delivered to their homes. Ask whether these meals are low-sodium. Let them know that you are on a low-sodium diet. Limiting fluid intake  · Find a method that works for you. You might simply write down how much you drink every time you do. Some people keep a container filled with the amount of fluid allowed for that day. If they drink from a source other than the container, then they pour out that amount. · Measure your regular drinking glasses to find out how much fluid each one holds. Once you know this, you will not have to measure every time. · Besides water, milk, juices, and other drinks, some foods have a lot of fluid. Count any foods that will melt (such as ice cream or gelatin dessert) or liquid foods (such as soup) as part of your fluid intake for the day. Where can you learn more? Go to https://DigitalMRperylaneweb.ViewReple. org and sign in to your [employer] account. Enter A166 in the [employer] box to learn more about \"Limiting Sodium and Fluids With Heart Failure: Care Instructions. \"     If you do not have an account, please click on the \"Sign Up Now\" link. Current as of: September 21, 2016  Content Version: 11.5  © 9060-3207 [employer]. Care instructions adapted under license by [facility] ([facility]). If you have questions about a medical condition or this instruction, always ask your healthcare professional. [place] [address] any warranty or liability for your use of this information.

## 2018-03-24 DIAGNOSIS — E11.9 TYPE 2 DIABETES MELLITUS WITHOUT COMPLICATION, WITHOUT LONG-TERM CURRENT USE OF INSULIN (HCC): ICD-10-CM

## 2018-03-24 DIAGNOSIS — R60.9 FLUID RETENTION: ICD-10-CM

## 2018-03-24 DIAGNOSIS — R06.02 SHORTNESS OF BREATH: ICD-10-CM

## 2018-03-24 DIAGNOSIS — I25.10 CORONARY ARTERY DISEASE INVOLVING NATIVE HEART WITHOUT ANGINA PECTORIS, UNSPECIFIED VESSEL OR LESION TYPE: ICD-10-CM

## 2018-03-24 DIAGNOSIS — I65.29 OBSTRUCTION OF CAROTID ARTERY WITHOUT CEREBRAL INFARCTION, UNSPECIFIED LATERALITY: ICD-10-CM

## 2018-03-24 DIAGNOSIS — E78.5 HYPERLIPIDEMIA WITH TARGET LDL LESS THAN 100: ICD-10-CM

## 2018-03-24 DIAGNOSIS — I10 ESSENTIAL HYPERTENSION: ICD-10-CM

## 2018-03-26 RX ORDER — FUROSEMIDE 40 MG/1
40 TABLET ORAL 2 TIMES DAILY
Qty: 60 TABLET | Refills: 2 | Status: SHIPPED | OUTPATIENT
Start: 2018-03-26 | End: 2018-07-13 | Stop reason: SDUPTHER

## 2018-04-20 DIAGNOSIS — I10 ESSENTIAL HYPERTENSION: ICD-10-CM

## 2018-04-20 DIAGNOSIS — R39.11 BENIGN PROSTATIC HYPERPLASIA WITH URINARY HESITANCY: ICD-10-CM

## 2018-04-20 DIAGNOSIS — N40.1 BENIGN PROSTATIC HYPERPLASIA WITH URINARY HESITANCY: ICD-10-CM

## 2018-04-20 DIAGNOSIS — Z12.5 ENCOUNTER FOR PROSTATE CANCER SCREENING: ICD-10-CM

## 2018-04-20 DIAGNOSIS — E78.5 HYPERLIPIDEMIA WITH TARGET LDL LESS THAN 100: ICD-10-CM

## 2018-04-20 DIAGNOSIS — E11.9 TYPE 2 DIABETES MELLITUS WITHOUT COMPLICATION, WITHOUT LONG-TERM CURRENT USE OF INSULIN (HCC): ICD-10-CM

## 2018-04-20 LAB
ALBUMIN SERPL-MCNC: 4.3 G/DL (ref 3.5–5.2)
ALP BLD-CCNC: 66 U/L (ref 40–130)
ALT SERPL-CCNC: 16 U/L (ref 5–41)
ANION GAP SERPL CALCULATED.3IONS-SCNC: 19 MMOL/L (ref 7–19)
AST SERPL-CCNC: 18 U/L (ref 5–40)
BASOPHILS ABSOLUTE: 0.1 K/UL (ref 0–0.2)
BASOPHILS RELATIVE PERCENT: 1 % (ref 0–1)
BILIRUB SERPL-MCNC: 0.4 MG/DL (ref 0.2–1.2)
BUN BLDV-MCNC: 18 MG/DL (ref 8–23)
CALCIUM SERPL-MCNC: 9.8 MG/DL (ref 8.8–10.2)
CHLORIDE BLD-SCNC: 99 MMOL/L (ref 98–111)
CHOLESTEROL, TOTAL: 119 MG/DL (ref 160–199)
CO2: 22 MMOL/L (ref 22–29)
CREAT SERPL-MCNC: 1.1 MG/DL (ref 0.5–1.2)
EOSINOPHILS ABSOLUTE: 0.8 K/UL (ref 0–0.6)
EOSINOPHILS RELATIVE PERCENT: 8.1 % (ref 0–5)
GFR NON-AFRICAN AMERICAN: >60
GLUCOSE BLD-MCNC: 122 MG/DL (ref 74–109)
HBA1C MFR BLD: 5.6 %
HCT VFR BLD CALC: 37.4 % (ref 42–52)
HDLC SERPL-MCNC: 27 MG/DL (ref 55–121)
HEMOGLOBIN: 12.6 G/DL (ref 14–18)
LDL CHOLESTEROL CALCULATED: 62 MG/DL
LYMPHOCYTES ABSOLUTE: 2.8 K/UL (ref 1.1–4.5)
LYMPHOCYTES RELATIVE PERCENT: 29.9 % (ref 20–40)
MCH RBC QN AUTO: 32.4 PG (ref 27–31)
MCHC RBC AUTO-ENTMCNC: 33.7 G/DL (ref 33–37)
MCV RBC AUTO: 96.1 FL (ref 80–94)
MONOCYTES ABSOLUTE: 0.8 K/UL (ref 0–0.9)
MONOCYTES RELATIVE PERCENT: 8.7 % (ref 0–10)
NEUTROPHILS ABSOLUTE: 4.8 K/UL (ref 1.5–7.5)
NEUTROPHILS RELATIVE PERCENT: 52 % (ref 50–65)
PDW BLD-RTO: 13.8 % (ref 11.5–14.5)
PLATELET # BLD: 270 K/UL (ref 130–400)
PMV BLD AUTO: 10.6 FL (ref 9.4–12.4)
POTASSIUM SERPL-SCNC: 5 MMOL/L (ref 3.5–5)
PROSTATE SPECIFIC ANTIGEN: 0.88 NG/ML (ref 0–4)
RBC # BLD: 3.89 M/UL (ref 4.7–6.1)
SODIUM BLD-SCNC: 140 MMOL/L (ref 136–145)
TOTAL PROTEIN: 7.2 G/DL (ref 6.6–8.7)
TRIGL SERPL-MCNC: 150 MG/DL (ref 0–149)
WBC # BLD: 9.3 K/UL (ref 4.8–10.8)

## 2018-04-24 ENCOUNTER — OFFICE VISIT (OUTPATIENT)
Dept: PRIMARY CARE CLINIC | Age: 68
End: 2018-04-24
Payer: COMMERCIAL

## 2018-04-24 ENCOUNTER — TELEPHONE (OUTPATIENT)
Dept: PRIMARY CARE CLINIC | Age: 68
End: 2018-04-24

## 2018-04-24 VITALS
OXYGEN SATURATION: 98 % | WEIGHT: 246.12 LBS | SYSTOLIC BLOOD PRESSURE: 118 MMHG | DIASTOLIC BLOOD PRESSURE: 70 MMHG | HEIGHT: 71 IN | BODY MASS INDEX: 34.46 KG/M2 | TEMPERATURE: 98.2 F | HEART RATE: 72 BPM

## 2018-04-24 DIAGNOSIS — I50.23 ACUTE ON CHRONIC SYSTOLIC CONGESTIVE HEART FAILURE (HCC): ICD-10-CM

## 2018-04-24 DIAGNOSIS — I10 ESSENTIAL HYPERTENSION: ICD-10-CM

## 2018-04-24 DIAGNOSIS — E11.9 TYPE 2 DIABETES MELLITUS WITHOUT COMPLICATION, WITHOUT LONG-TERM CURRENT USE OF INSULIN (HCC): Primary | ICD-10-CM

## 2018-04-24 DIAGNOSIS — E78.5 HYPERLIPIDEMIA WITH TARGET LDL LESS THAN 100: ICD-10-CM

## 2018-04-24 DIAGNOSIS — I25.10 CORONARY ARTERY DISEASE INVOLVING NATIVE HEART WITHOUT ANGINA PECTORIS, UNSPECIFIED VESSEL OR LESION TYPE: ICD-10-CM

## 2018-04-24 DIAGNOSIS — I65.29 OBSTRUCTION OF CAROTID ARTERY WITHOUT CEREBRAL INFARCTION, UNSPECIFIED LATERALITY: ICD-10-CM

## 2018-04-24 PROCEDURE — G8598 ASA/ANTIPLAT THER USED: HCPCS | Performed by: PEDIATRICS

## 2018-04-24 PROCEDURE — 99214 OFFICE O/P EST MOD 30 MIN: CPT | Performed by: PEDIATRICS

## 2018-04-24 PROCEDURE — 3044F HG A1C LEVEL LT 7.0%: CPT | Performed by: PEDIATRICS

## 2018-04-24 PROCEDURE — 4040F PNEUMOC VAC/ADMIN/RCVD: CPT | Performed by: PEDIATRICS

## 2018-04-24 PROCEDURE — 1123F ACP DISCUSS/DSCN MKR DOCD: CPT | Performed by: PEDIATRICS

## 2018-04-24 PROCEDURE — 2022F DILAT RTA XM EVC RTNOPTHY: CPT | Performed by: PEDIATRICS

## 2018-04-24 PROCEDURE — G8417 CALC BMI ABV UP PARAM F/U: HCPCS | Performed by: PEDIATRICS

## 2018-04-24 PROCEDURE — 3017F COLORECTAL CA SCREEN DOC REV: CPT | Performed by: PEDIATRICS

## 2018-04-24 PROCEDURE — 1036F TOBACCO NON-USER: CPT | Performed by: PEDIATRICS

## 2018-04-24 PROCEDURE — G8427 DOCREV CUR MEDS BY ELIG CLIN: HCPCS | Performed by: PEDIATRICS

## 2018-04-24 ASSESSMENT — PATIENT HEALTH QUESTIONNAIRE - PHQ9
SUM OF ALL RESPONSES TO PHQ QUESTIONS 1-9: 0
2. FEELING DOWN, DEPRESSED OR HOPELESS: 0
SUM OF ALL RESPONSES TO PHQ9 QUESTIONS 1 & 2: 0
1. LITTLE INTEREST OR PLEASURE IN DOING THINGS: 0

## 2018-04-24 ASSESSMENT — ENCOUNTER SYMPTOMS
CHEST TIGHTNESS: 0
COUGH: 0
DIARRHEA: 0
SORE THROAT: 0
BACK PAIN: 0
VOMITING: 0
WHEEZING: 0
NAUSEA: 0
ABDOMINAL PAIN: 0
SHORTNESS OF BREATH: 0

## 2018-05-15 RX ORDER — ATORVASTATIN CALCIUM 40 MG/1
40 TABLET, FILM COATED ORAL DAILY
Qty: 90 TABLET | Refills: 3 | Status: SHIPPED | OUTPATIENT
Start: 2018-05-15 | End: 2020-08-27 | Stop reason: ALTCHOICE

## 2018-05-29 RX ORDER — CYANOCOBALAMIN 1000 UG/ML
1000 INJECTION INTRAMUSCULAR; SUBCUTANEOUS
Qty: 1 ML | Refills: 2 | Status: SHIPPED | OUTPATIENT
Start: 2018-05-29 | End: 2018-09-11 | Stop reason: SDUPTHER

## 2018-06-02 DIAGNOSIS — I10 ESSENTIAL HYPERTENSION: ICD-10-CM

## 2018-06-04 RX ORDER — SPIRONOLACTONE 25 MG/1
TABLET ORAL
Qty: 90 TABLET | Refills: 3 | Status: SHIPPED | OUTPATIENT
Start: 2018-06-04 | End: 2018-10-31 | Stop reason: SDUPTHER

## 2018-07-13 DIAGNOSIS — R60.9 FLUID RETENTION: ICD-10-CM

## 2018-07-13 DIAGNOSIS — R06.02 SHORTNESS OF BREATH: ICD-10-CM

## 2018-07-13 DIAGNOSIS — I25.10 CORONARY ARTERY DISEASE INVOLVING NATIVE HEART WITHOUT ANGINA PECTORIS, UNSPECIFIED VESSEL OR LESION TYPE: ICD-10-CM

## 2018-07-13 DIAGNOSIS — I65.29 OBSTRUCTION OF CAROTID ARTERY WITHOUT CEREBRAL INFARCTION, UNSPECIFIED LATERALITY: ICD-10-CM

## 2018-07-13 DIAGNOSIS — E78.5 HYPERLIPIDEMIA WITH TARGET LDL LESS THAN 100: ICD-10-CM

## 2018-07-13 DIAGNOSIS — I10 ESSENTIAL HYPERTENSION: ICD-10-CM

## 2018-07-13 DIAGNOSIS — E11.9 TYPE 2 DIABETES MELLITUS WITHOUT COMPLICATION, WITHOUT LONG-TERM CURRENT USE OF INSULIN (HCC): ICD-10-CM

## 2018-07-13 RX ORDER — FUROSEMIDE 40 MG/1
TABLET ORAL
Qty: 60 TABLET | Refills: 5 | Status: SHIPPED | OUTPATIENT
Start: 2018-07-13 | End: 2018-10-25 | Stop reason: ALTCHOICE

## 2018-09-11 RX ORDER — CYANOCOBALAMIN 1000 UG/ML
1000 INJECTION INTRAMUSCULAR; SUBCUTANEOUS
Qty: 1 ML | Refills: 5 | Status: SHIPPED | OUTPATIENT
Start: 2018-09-11 | End: 2019-05-12 | Stop reason: SDUPTHER

## 2018-09-11 NOTE — TELEPHONE ENCOUNTER
Received fax from pharmacy requesting refill on pts medication(s). Pt was last seen in office on 4/24/2018  and has a follow up scheduled for 10/24/2018. Will send request to  [facility]  for authorization.      Requested Prescriptions     Pending Prescriptions Disp Refills    cyanocobalamin 1000 MCG/ML injection [Pharmacy Med Name: Taiwo Seals 1000MCG/ML INJ, 1ML] 1 mL 5     Sig: Inject 1 mL into the muscle every 30 days

## 2018-09-23 DIAGNOSIS — Z79.4 TYPE 2 DIABETES MELLITUS WITHOUT COMPLICATION, WITH LONG-TERM CURRENT USE OF INSULIN (HCC): ICD-10-CM

## 2018-09-23 DIAGNOSIS — E11.9 TYPE 2 DIABETES MELLITUS WITHOUT COMPLICATION, WITH LONG-TERM CURRENT USE OF INSULIN (HCC): ICD-10-CM

## 2018-10-01 ENCOUNTER — PATIENT MESSAGE (OUTPATIENT)
Dept: PRIMARY CARE CLINIC | Age: 68
End: 2018-10-01

## 2018-10-01 DIAGNOSIS — Z00.00 ROUTINE GENERAL MEDICAL EXAMINATION AT A HEALTH CARE FACILITY: Primary | ICD-10-CM

## 2018-10-01 DIAGNOSIS — I10 ESSENTIAL HYPERTENSION: ICD-10-CM

## 2018-10-01 DIAGNOSIS — E11.9 TYPE 2 DIABETES MELLITUS WITHOUT COMPLICATION, WITHOUT LONG-TERM CURRENT USE OF INSULIN (HCC): ICD-10-CM

## 2018-10-01 DIAGNOSIS — Z79.899 MEDICATION MANAGEMENT: ICD-10-CM

## 2018-10-01 DIAGNOSIS — E78.5 HYPERLIPIDEMIA WITH TARGET LDL LESS THAN 100: ICD-10-CM

## 2018-10-16 ENCOUNTER — ANESTHESIA EVENT (OUTPATIENT)
Dept: ENDOSCOPY | Age: 68
DRG: 378 | End: 2018-10-16
Payer: COMMERCIAL

## 2018-10-16 ENCOUNTER — HOSPITAL ENCOUNTER (INPATIENT)
Age: 68
LOS: 8 days | Discharge: HOME | DRG: 378 | End: 2018-10-24
Attending: EMERGENCY MEDICINE | Admitting: INTERNAL MEDICINE
Payer: COMMERCIAL

## 2018-10-16 ENCOUNTER — ANESTHESIA (OUTPATIENT)
Dept: ENDOSCOPY | Age: 68
DRG: 378 | End: 2018-10-16
Payer: COMMERCIAL

## 2018-10-16 ENCOUNTER — APPOINTMENT (OUTPATIENT)
Dept: GENERAL RADIOLOGY | Age: 68
DRG: 378 | End: 2018-10-16
Payer: COMMERCIAL

## 2018-10-16 VITALS
OXYGEN SATURATION: 100 % | RESPIRATION RATE: 17 BRPM | DIASTOLIC BLOOD PRESSURE: 61 MMHG | SYSTOLIC BLOOD PRESSURE: 123 MMHG

## 2018-10-16 DIAGNOSIS — N17.9 ACUTE RENAL FAILURE, UNSPECIFIED ACUTE RENAL FAILURE TYPE (HCC): ICD-10-CM

## 2018-10-16 DIAGNOSIS — I95.9 HYPOTENSION, UNSPECIFIED HYPOTENSION TYPE: ICD-10-CM

## 2018-10-16 DIAGNOSIS — K92.2 GASTROINTESTINAL HEMORRHAGE, UNSPECIFIED GASTROINTESTINAL HEMORRHAGE TYPE: ICD-10-CM

## 2018-10-16 DIAGNOSIS — E87.1 HYPONATREMIA: ICD-10-CM

## 2018-10-16 DIAGNOSIS — R55 SYNCOPE AND COLLAPSE: Primary | ICD-10-CM

## 2018-10-16 DIAGNOSIS — R07.9 CHEST PAIN, UNSPECIFIED TYPE: ICD-10-CM

## 2018-10-16 DIAGNOSIS — R79.89 ELEVATED LACTIC ACID LEVEL: ICD-10-CM

## 2018-10-16 PROBLEM — D62 ACUTE BLOOD LOSS ANEMIA: Status: ACTIVE | Noted: 2018-10-16

## 2018-10-16 PROBLEM — E86.1 HYPOTENSION DUE TO HYPOVOLEMIA: Status: ACTIVE | Noted: 2018-10-16

## 2018-10-16 PROBLEM — Z79.1 NSAID LONG-TERM USE: Status: ACTIVE | Noted: 2018-10-16

## 2018-10-16 PROBLEM — E87.20 LACTIC ACIDOSIS: Status: ACTIVE | Noted: 2018-10-16

## 2018-10-16 PROBLEM — I95.89 HYPOTENSION DUE TO HYPOVOLEMIA: Status: ACTIVE | Noted: 2018-10-16

## 2018-10-16 LAB
ABO/RH: NORMAL
ALBUMIN SERPL-MCNC: 3.6 G/DL (ref 3.5–5.2)
ALP BLD-CCNC: 54 U/L (ref 40–130)
ALT SERPL-CCNC: 10 U/L (ref 5–41)
ANION GAP SERPL CALCULATED.3IONS-SCNC: 15 MMOL/L (ref 7–19)
ANION GAP SERPL CALCULATED.3IONS-SCNC: 18 MMOL/L (ref 7–19)
ANTIBODY SCREEN: NORMAL
APTT: 29 SEC (ref 26–36.2)
AST SERPL-CCNC: 12 U/L (ref 5–40)
BASOPHILS ABSOLUTE: 0.1 K/UL (ref 0–0.2)
BASOPHILS RELATIVE PERCENT: 0.5 % (ref 0–1)
BILIRUB SERPL-MCNC: <0.2 MG/DL (ref 0.2–1.2)
BILIRUBIN URINE: NEGATIVE
BLOOD BANK DISPENSE STATUS: NORMAL
BLOOD BANK PRODUCT CODE: NORMAL
BLOOD, URINE: NEGATIVE
BPU ID: NORMAL
BUN BLDV-MCNC: 51 MG/DL (ref 8–23)
BUN BLDV-MCNC: 59 MG/DL (ref 8–23)
CALCIUM SERPL-MCNC: 9.5 MG/DL (ref 8.8–10.2)
CALCIUM SERPL-MCNC: 9.5 MG/DL (ref 8.8–10.2)
CHLORIDE BLD-SCNC: 92 MMOL/L (ref 98–111)
CHLORIDE BLD-SCNC: 99 MMOL/L (ref 98–111)
CLARITY: CLEAR
CO2: 18 MMOL/L (ref 22–29)
CO2: 19 MMOL/L (ref 22–29)
COLOR: YELLOW
CREAT SERPL-MCNC: 1.1 MG/DL (ref 0.5–1.2)
CREAT SERPL-MCNC: 1.3 MG/DL (ref 0.5–1.2)
DESCRIPTION BLOOD BANK: NORMAL
EOSINOPHILS ABSOLUTE: 0.1 K/UL (ref 0–0.6)
EOSINOPHILS RELATIVE PERCENT: 1.1 % (ref 0–5)
FERRITIN: 21.1 NG/ML (ref 30–400)
FOLATE: >20 NG/ML (ref 4.5–32.2)
GFR NON-AFRICAN AMERICAN: 55
GFR NON-AFRICAN AMERICAN: >60
GLUCOSE BLD-MCNC: 146 MG/DL (ref 74–109)
GLUCOSE BLD-MCNC: 155 MG/DL (ref 74–109)
GLUCOSE URINE: NEGATIVE MG/DL
HCT VFR BLD CALC: 19.5 % (ref 42–52)
HCT VFR BLD CALC: 23.5 % (ref 42–52)
HCT VFR BLD CALC: 24.6 % (ref 42–52)
HEMOGLOBIN: 6.2 G/DL (ref 14–18)
HEMOGLOBIN: 7.5 G/DL (ref 14–18)
HEMOGLOBIN: 7.8 G/DL (ref 14–18)
INR BLD: 1.17 (ref 0.88–1.18)
IRON SATURATION: 10 % (ref 14–50)
IRON: 35 UG/DL (ref 59–158)
KETONES, URINE: ABNORMAL MG/DL
LACTIC ACID: 2.1 MMOL/L (ref 0.5–1.9)
LACTIC ACID: 5 MMOL/L (ref 0.5–1.9)
LACTIC ACID: 6.3 MMOL/L (ref 0.5–1.9)
LEUKOCYTE ESTERASE, URINE: NEGATIVE
LIPASE: 58 U/L (ref 13–60)
LV EF: 20 %
LVEF MODALITY: NORMAL
LYMPHOCYTES ABSOLUTE: 2.6 K/UL (ref 1.1–4.5)
LYMPHOCYTES RELATIVE PERCENT: 21.3 % (ref 20–40)
MAGNESIUM: 1.8 MG/DL (ref 1.6–2.4)
MCH RBC QN AUTO: 29.1 PG (ref 27–31)
MCH RBC QN AUTO: 29.5 PG (ref 27–31)
MCH RBC QN AUTO: 29.9 PG (ref 27–31)
MCHC RBC AUTO-ENTMCNC: 31.7 G/DL (ref 33–37)
MCHC RBC AUTO-ENTMCNC: 31.8 G/DL (ref 33–37)
MCHC RBC AUTO-ENTMCNC: 31.9 G/DL (ref 33–37)
MCV RBC AUTO: 91.1 FL (ref 80–94)
MCV RBC AUTO: 92.9 FL (ref 80–94)
MCV RBC AUTO: 94.3 FL (ref 80–94)
MONOCYTES ABSOLUTE: 1 K/UL (ref 0–0.9)
MONOCYTES RELATIVE PERCENT: 7.8 % (ref 0–10)
NEUTROPHILS ABSOLUTE: 8.5 K/UL (ref 1.5–7.5)
NEUTROPHILS RELATIVE PERCENT: 68.9 % (ref 50–65)
NITRITE, URINE: NEGATIVE
PDW BLD-RTO: 14.5 % (ref 11.5–14.5)
PDW BLD-RTO: 14.7 % (ref 11.5–14.5)
PDW BLD-RTO: 15.1 % (ref 11.5–14.5)
PH UA: 5.5
PLATELET # BLD: 233 K/UL (ref 130–400)
PLATELET # BLD: 255 K/UL (ref 130–400)
PLATELET # BLD: 264 K/UL (ref 130–400)
PMV BLD AUTO: 10.3 FL (ref 9.4–12.4)
PMV BLD AUTO: 10.5 FL (ref 9.4–12.4)
PMV BLD AUTO: 9.9 FL (ref 9.4–12.4)
POTASSIUM SERPL-SCNC: 4.3 MMOL/L (ref 3.5–5)
POTASSIUM SERPL-SCNC: 4.5 MMOL/L (ref 3.5–5)
PROTEIN UA: NEGATIVE MG/DL
PROTHROMBIN TIME: 14.8 SEC (ref 12–14.6)
RBC # BLD: 2.1 M/UL (ref 4.7–6.1)
RBC # BLD: 2.58 M/UL (ref 4.7–6.1)
RBC # BLD: 2.61 M/UL (ref 4.7–6.1)
SODIUM BLD-SCNC: 128 MMOL/L (ref 136–145)
SODIUM BLD-SCNC: 133 MMOL/L (ref 136–145)
SPECIFIC GRAVITY UA: 1.02
TOTAL IRON BINDING CAPACITY: 339 UG/DL (ref 250–400)
TOTAL PROTEIN: 6.4 G/DL (ref 6.6–8.7)
TROPONIN: 0.17 NG/ML (ref 0–0.03)
TROPONIN: 0.52 NG/ML (ref 0–0.03)
TROPONIN: <0.01 NG/ML (ref 0–0.03)
UROBILINOGEN, URINE: 0.2 E.U./DL
VITAMIN B-12: 284 PG/ML (ref 211–946)
WBC # BLD: 12.4 K/UL (ref 4.8–10.8)
WBC # BLD: 12.5 K/UL (ref 4.8–10.8)
WBC # BLD: 17.3 K/UL (ref 4.8–10.8)

## 2018-10-16 PROCEDURE — 74022 RADEX COMPL AQT ABD SERIES: CPT

## 2018-10-16 PROCEDURE — 83735 ASSAY OF MAGNESIUM: CPT

## 2018-10-16 PROCEDURE — 86901 BLOOD TYPING SEROLOGIC RH(D): CPT

## 2018-10-16 PROCEDURE — C9113 INJ PANTOPRAZOLE SODIUM, VIA: HCPCS | Performed by: EMERGENCY MEDICINE

## 2018-10-16 PROCEDURE — 82746 ASSAY OF FOLIC ACID SERUM: CPT

## 2018-10-16 PROCEDURE — 2580000003 HC RX 258: Performed by: HOSPITALIST

## 2018-10-16 PROCEDURE — C8929 TTE W OR WO FOL WCON,DOPPLER: HCPCS

## 2018-10-16 PROCEDURE — 83690 ASSAY OF LIPASE: CPT

## 2018-10-16 PROCEDURE — 83605 ASSAY OF LACTIC ACID: CPT

## 2018-10-16 PROCEDURE — P9040 RBC LEUKOREDUCED IRRADIATED: HCPCS

## 2018-10-16 PROCEDURE — 99291 CRITICAL CARE FIRST HOUR: CPT | Performed by: EMERGENCY MEDICINE

## 2018-10-16 PROCEDURE — 2580000003 HC RX 258: Performed by: NURSE ANESTHETIST, CERTIFIED REGISTERED

## 2018-10-16 PROCEDURE — 36415 COLL VENOUS BLD VENIPUNCTURE: CPT

## 2018-10-16 PROCEDURE — 6360000002 HC RX W HCPCS: Performed by: HOSPITALIST

## 2018-10-16 PROCEDURE — 0W3P8ZZ CONTROL BLEEDING IN GASTROINTESTINAL TRACT, VIA NATURAL OR ARTIFICIAL OPENING ENDOSCOPIC: ICD-10-PCS | Performed by: INTERNAL MEDICINE

## 2018-10-16 PROCEDURE — 6370000000 HC RX 637 (ALT 250 FOR IP): Performed by: HOSPITALIST

## 2018-10-16 PROCEDURE — 99223 1ST HOSP IP/OBS HIGH 75: CPT | Performed by: INTERNAL MEDICINE

## 2018-10-16 PROCEDURE — 93005 ELECTROCARDIOGRAM TRACING: CPT

## 2018-10-16 PROCEDURE — 3609012400 HC EGD TRANSORAL BIOPSY SINGLE/MULTIPLE: Performed by: INTERNAL MEDICINE

## 2018-10-16 PROCEDURE — 86900 BLOOD TYPING SEROLOGIC ABO: CPT

## 2018-10-16 PROCEDURE — 2500000003 HC RX 250 WO HCPCS: Performed by: NURSE ANESTHETIST, CERTIFIED REGISTERED

## 2018-10-16 PROCEDURE — 6360000002 HC RX W HCPCS: Performed by: NURSE ANESTHETIST, CERTIFIED REGISTERED

## 2018-10-16 PROCEDURE — 3700000001 HC ADD 15 MINUTES (ANESTHESIA): Performed by: INTERNAL MEDICINE

## 2018-10-16 PROCEDURE — 87086 URINE CULTURE/COLONY COUNT: CPT

## 2018-10-16 PROCEDURE — 85730 THROMBOPLASTIN TIME PARTIAL: CPT

## 2018-10-16 PROCEDURE — 99291 CRITICAL CARE FIRST HOUR: CPT

## 2018-10-16 PROCEDURE — 83540 ASSAY OF IRON: CPT

## 2018-10-16 PROCEDURE — 80053 COMPREHEN METABOLIC PANEL: CPT

## 2018-10-16 PROCEDURE — 81003 URINALYSIS AUTO W/O SCOPE: CPT

## 2018-10-16 PROCEDURE — 96375 TX/PRO/DX INJ NEW DRUG ADDON: CPT

## 2018-10-16 PROCEDURE — 36430 TRANSFUSION BLD/BLD COMPNT: CPT

## 2018-10-16 PROCEDURE — 3700000000 HC ANESTHESIA ATTENDED CARE: Performed by: INTERNAL MEDICINE

## 2018-10-16 PROCEDURE — 87040 BLOOD CULTURE FOR BACTERIA: CPT

## 2018-10-16 PROCEDURE — 86850 RBC ANTIBODY SCREEN: CPT

## 2018-10-16 PROCEDURE — 2720000010 HC SURG SUPPLY STERILE: Performed by: INTERNAL MEDICINE

## 2018-10-16 PROCEDURE — 6360000002 HC RX W HCPCS: Performed by: EMERGENCY MEDICINE

## 2018-10-16 PROCEDURE — APPSS60 APP SPLIT SHARED TIME 46-60 MINUTES: Performed by: PHYSICIAN ASSISTANT

## 2018-10-16 PROCEDURE — 2580000003 HC RX 258: Performed by: EMERGENCY MEDICINE

## 2018-10-16 PROCEDURE — 84484 ASSAY OF TROPONIN QUANT: CPT

## 2018-10-16 PROCEDURE — 85027 COMPLETE CBC AUTOMATED: CPT

## 2018-10-16 PROCEDURE — 83550 IRON BINDING TEST: CPT

## 2018-10-16 PROCEDURE — 82607 VITAMIN B-12: CPT

## 2018-10-16 PROCEDURE — 2100000000 HC CCU R&B

## 2018-10-16 PROCEDURE — 82728 ASSAY OF FERRITIN: CPT

## 2018-10-16 PROCEDURE — 85025 COMPLETE CBC W/AUTO DIFF WBC: CPT

## 2018-10-16 PROCEDURE — 96374 THER/PROPH/DIAG INJ IV PUSH: CPT

## 2018-10-16 PROCEDURE — 6360000004 HC RX CONTRAST MEDICATION: Performed by: HOSPITALIST

## 2018-10-16 PROCEDURE — 85610 PROTHROMBIN TIME: CPT

## 2018-10-16 PROCEDURE — 43255 EGD CONTROL BLEEDING ANY: CPT | Performed by: INTERNAL MEDICINE

## 2018-10-16 PROCEDURE — 99223 1ST HOSP IP/OBS HIGH 75: CPT | Performed by: HOSPITALIST

## 2018-10-16 RX ORDER — 0.9 % SODIUM CHLORIDE 0.9 %
1000 INTRAVENOUS SOLUTION INTRAVENOUS ONCE
Status: DISCONTINUED | OUTPATIENT
Start: 2018-10-16 | End: 2018-10-20

## 2018-10-16 RX ORDER — MORPHINE SULFATE/0.9% NACL/PF 1 MG/ML
2 SYRINGE (ML) INJECTION
Status: DISCONTINUED | OUTPATIENT
Start: 2018-10-16 | End: 2018-10-24 | Stop reason: HOSPADM

## 2018-10-16 RX ORDER — MORPHINE SULFATE/0.9% NACL/PF 1 MG/ML
2 SYRINGE (ML) INJECTION ONCE
Status: COMPLETED | OUTPATIENT
Start: 2018-10-16 | End: 2018-10-16

## 2018-10-16 RX ORDER — PROPOFOL 10 MG/ML
INJECTION, EMULSION INTRAVENOUS PRN
Status: DISCONTINUED | OUTPATIENT
Start: 2018-10-16 | End: 2018-10-16 | Stop reason: SDUPTHER

## 2018-10-16 RX ORDER — ONDANSETRON 2 MG/ML
4 INJECTION INTRAMUSCULAR; INTRAVENOUS EVERY 6 HOURS PRN
Status: DISCONTINUED | OUTPATIENT
Start: 2018-10-16 | End: 2018-10-24 | Stop reason: HOSPADM

## 2018-10-16 RX ORDER — 0.9 % SODIUM CHLORIDE 0.9 %
1000 INTRAVENOUS SOLUTION INTRAVENOUS ONCE
Status: COMPLETED | OUTPATIENT
Start: 2018-10-16 | End: 2018-10-16

## 2018-10-16 RX ORDER — SODIUM CHLORIDE, SODIUM LACTATE, POTASSIUM CHLORIDE, CALCIUM CHLORIDE 600; 310; 30; 20 MG/100ML; MG/100ML; MG/100ML; MG/100ML
INJECTION, SOLUTION INTRAVENOUS CONTINUOUS PRN
Status: DISCONTINUED | OUTPATIENT
Start: 2018-10-16 | End: 2018-10-16 | Stop reason: SDUPTHER

## 2018-10-16 RX ORDER — LABETALOL HYDROCHLORIDE 5 MG/ML
5 INJECTION, SOLUTION INTRAVENOUS EVERY 4 HOURS PRN
Status: DISCONTINUED | OUTPATIENT
Start: 2018-10-16 | End: 2018-10-24 | Stop reason: HOSPADM

## 2018-10-16 RX ORDER — PANTOPRAZOLE SODIUM 40 MG/10ML
80 INJECTION, POWDER, LYOPHILIZED, FOR SOLUTION INTRAVENOUS ONCE
Status: COMPLETED | OUTPATIENT
Start: 2018-10-16 | End: 2018-10-16

## 2018-10-16 RX ORDER — ACETAMINOPHEN 650 MG/1
650 SUPPOSITORY RECTAL EVERY 4 HOURS PRN
Status: DISCONTINUED | OUTPATIENT
Start: 2018-10-16 | End: 2018-10-24 | Stop reason: HOSPADM

## 2018-10-16 RX ORDER — PROMETHAZINE HYDROCHLORIDE 25 MG/ML
6.25 INJECTION, SOLUTION INTRAMUSCULAR; INTRAVENOUS EVERY 6 HOURS PRN
Status: DISCONTINUED | OUTPATIENT
Start: 2018-10-16 | End: 2018-10-24 | Stop reason: HOSPADM

## 2018-10-16 RX ORDER — SODIUM CHLORIDE 9 MG/ML
INJECTION, SOLUTION INTRAVENOUS CONTINUOUS
Status: DISCONTINUED | OUTPATIENT
Start: 2018-10-16 | End: 2018-10-16

## 2018-10-16 RX ORDER — 0.9 % SODIUM CHLORIDE 0.9 %
500 INTRAVENOUS SOLUTION INTRAVENOUS ONCE
Status: COMPLETED | OUTPATIENT
Start: 2018-10-16 | End: 2018-10-16

## 2018-10-16 RX ORDER — 0.9 % SODIUM CHLORIDE 0.9 %
250 INTRAVENOUS SOLUTION INTRAVENOUS ONCE
Status: COMPLETED | OUTPATIENT
Start: 2018-10-16 | End: 2018-10-16

## 2018-10-16 RX ORDER — SODIUM CHLORIDE 9 MG/ML
INJECTION, SOLUTION INTRAVENOUS CONTINUOUS
Status: DISCONTINUED | OUTPATIENT
Start: 2018-10-16 | End: 2018-10-17

## 2018-10-16 RX ORDER — LIDOCAINE HYDROCHLORIDE 10 MG/ML
INJECTION, SOLUTION INFILTRATION; PERINEURAL PRN
Status: DISCONTINUED | OUTPATIENT
Start: 2018-10-16 | End: 2018-10-16 | Stop reason: SDUPTHER

## 2018-10-16 RX ORDER — 0.9 % SODIUM CHLORIDE 0.9 %
500 INTRAVENOUS SOLUTION INTRAVENOUS ONCE
Status: DISCONTINUED | OUTPATIENT
Start: 2018-10-16 | End: 2018-10-20

## 2018-10-16 RX ORDER — CYANOCOBALAMIN 1000 UG/ML
1000 INJECTION INTRAMUSCULAR; SUBCUTANEOUS ONCE
Status: COMPLETED | OUTPATIENT
Start: 2018-10-16 | End: 2018-10-16

## 2018-10-16 RX ADMIN — SODIUM CHLORIDE 250 ML: 9 INJECTION, SOLUTION INTRAVENOUS at 09:29

## 2018-10-16 RX ADMIN — Medication 2 MG: at 09:16

## 2018-10-16 RX ADMIN — MEROPENEM 1 G: 1 INJECTION, POWDER, FOR SOLUTION INTRAVENOUS at 19:32

## 2018-10-16 RX ADMIN — CYANOCOBALAMIN 1000 MCG: 1000 INJECTION INTRAMUSCULAR; SUBCUTANEOUS at 16:05

## 2018-10-16 RX ADMIN — PERFLUTREN 2.2 MG: 6.52 INJECTION, SUSPENSION INTRAVENOUS at 16:10

## 2018-10-16 RX ADMIN — METOPROLOL TARTRATE 25 MG: 25 TABLET ORAL at 21:42

## 2018-10-16 RX ADMIN — SODIUM CHLORIDE 8 MG/HR: 9 INJECTION, SOLUTION INTRAVENOUS at 07:52

## 2018-10-16 RX ADMIN — SODIUM CHLORIDE: 9 INJECTION, SOLUTION INTRAVENOUS at 09:39

## 2018-10-16 RX ADMIN — Medication 2 MG: at 16:05

## 2018-10-16 RX ADMIN — SODIUM CHLORIDE 1000 ML: 9 INJECTION, SOLUTION INTRAVENOUS at 07:45

## 2018-10-16 RX ADMIN — PROPOFOL 50 MG: 10 INJECTION, EMULSION INTRAVENOUS at 17:24

## 2018-10-16 RX ADMIN — SODIUM CHLORIDE 8 MG/HR: 9 INJECTION, SOLUTION INTRAVENOUS at 19:24

## 2018-10-16 RX ADMIN — IRON SUCROSE 200 MG: 20 INJECTION, SOLUTION INTRAVENOUS at 14:54

## 2018-10-16 RX ADMIN — MEROPENEM 1 G: 1 INJECTION, POWDER, FOR SOLUTION INTRAVENOUS at 12:59

## 2018-10-16 RX ADMIN — ONDANSETRON HYDROCHLORIDE 4 MG: 2 INJECTION, SOLUTION INTRAMUSCULAR; INTRAVENOUS at 19:23

## 2018-10-16 RX ADMIN — PANTOPRAZOLE SODIUM 80 MG: 40 INJECTION, POWDER, FOR SOLUTION INTRAVENOUS at 07:42

## 2018-10-16 RX ADMIN — SODIUM CHLORIDE, SODIUM LACTATE, POTASSIUM CHLORIDE, AND CALCIUM CHLORIDE: 600; 310; 30; 20 INJECTION, SOLUTION INTRAVENOUS at 17:24

## 2018-10-16 RX ADMIN — LIDOCAINE HYDROCHLORIDE 50 MG: 10 INJECTION, SOLUTION INFILTRATION; PERINEURAL at 17:24

## 2018-10-16 RX ADMIN — SODIUM CHLORIDE: 9 INJECTION, SOLUTION INTRAVENOUS at 19:33

## 2018-10-16 RX ADMIN — SODIUM CHLORIDE 500 ML: 9 INJECTION, SOLUTION INTRAVENOUS at 16:18

## 2018-10-16 ASSESSMENT — PAIN DESCRIPTION - ONSET: ONSET: ON-GOING

## 2018-10-16 ASSESSMENT — PAIN DESCRIPTION - FREQUENCY: FREQUENCY: INTERMITTENT

## 2018-10-16 ASSESSMENT — PAIN DESCRIPTION - PROGRESSION
CLINICAL_PROGRESSION: GRADUALLY WORSENING
CLINICAL_PROGRESSION: GRADUALLY WORSENING

## 2018-10-16 ASSESSMENT — ENCOUNTER SYMPTOMS
BLOOD IN STOOL: 1
SHORTNESS OF BREATH: 0
ABDOMINAL PAIN: 0
EYE PAIN: 0
NAUSEA: 1
DIARRHEA: 0
RECTAL PAIN: 0
VOMITING: 1

## 2018-10-16 ASSESSMENT — PAIN SCALES - GENERAL
PAINLEVEL_OUTOF10: 6
PAINLEVEL_OUTOF10: 7
PAINLEVEL_OUTOF10: 2
PAINLEVEL_OUTOF10: 2

## 2018-10-16 ASSESSMENT — PAIN DESCRIPTION - ORIENTATION: ORIENTATION: ANTERIOR

## 2018-10-16 ASSESSMENT — PAIN DESCRIPTION - PAIN TYPE: TYPE: ACUTE PAIN

## 2018-10-16 ASSESSMENT — LIFESTYLE VARIABLES: SMOKING_STATUS: 1

## 2018-10-16 ASSESSMENT — PAIN DESCRIPTION - LOCATION: LOCATION: HEAD

## 2018-10-16 ASSESSMENT — PAIN DESCRIPTION - DESCRIPTORS: DESCRIPTORS: ACHING

## 2018-10-16 NOTE — CONSULTS
Last EGD:  Never  Last Colonoscopy:  7-8 years ago, colon polyps noted. Past Medical History:        Diagnosis Date    Basilar artery stenosis     CAD (coronary artery disease) 1997 2009    MI    Cancer ([address]     skin BCC    Heel spur     Hyperlipidemia     Hypertension     Type II or unspecified type diabetes mellitus without mention of complication, not stated as uncontrolled     Vertebral artery insufficiency      Past Surgical History:        Procedure Laterality Date    ANGIOPLASTY Bilateral     basilar artery angioplasty    CARPAL TUNNEL RELEASE      bilateral    CATARACT REMOVAL Bilateral     CHOLECYSTECTOMY      CORONARY ARTERY BYPASS GRAFT  1997    3v    HEEL SPUR SURGERY      left    PTCA  2009 [place]    with stents    TONSILLECTOMY       Allergies:  Codeine  Home Meds:  Prior to Admission medications    Medication Sig Start Date End Date Taking?  Authorizing Provider   metFORMIN (GLUCOPHAGE) 1000 MG tablet Take 1 tablet by mouth 2 times daily (with meals) 9/24/18  Yes Codi Ponce APRJOY   cyanocobalamin 1000 MCG/ML injection Inject 1 mL into the muscle every 30 days 9/11/18  Yes RSOS Paul DO   furosemide (LASIX) 40 MG tablet TAKE 1 TABLET BY MOUTH TWICE DAILY 7/13/18  Yes JOSE ELIAS Lovell   spironolactone (ALDACTONE) 25 MG tablet TAKE 1 TABLET DAILY 6/4/18  Yes JOSE ELIAS Mathur   atorvastatin (LIPITOR) 40 MG tablet Take 1 tablet by mouth daily 5/15/18  Yes JOSE ELIAS Lovell   Dulaglutide 0.75 MG/0.5ML SOPN Inject 0.75 mg into the skin every 7 days 1/15/18  Yes ROSS Paul DO   clopidogrel (PLAVIX) 75 MG tablet Take 1 tablet by mouth daily 11/13/17  Yes ROSS Paul DO   metoprolol tartrate (LOPRESSOR) 50 MG tablet Take 1 tablet by mouth 2 times daily 11/13/17  Yes ROSS Paul DO   ramipril (ALTACE) 10 MG capsule TAKE 2 CAPSULES DAILY  Patient taking differently: ONE TABLET NIGHTLY 11/3/17  Yes JOSE ELIAS Mathur chills. NEURO: No headache or seizure. EYES: No diplopia or vision loss. CARDIOVASCULAR: No chest pain or palpitations. RESPIRATORY: No dyspnea or cough. GI: + melena, hematemesis. No hematochezia, No abdominal pain, + nausea/vomiting  : No dysuria or hematuria. MUSCULOSKELETAL: No new arthralgias or myalgias  DERM: No rash or jaundice. ENDOCRINE: No polyuria or polydipsia. PSYCH: No anxiety or depression. Physical Exam:  VITALS:   Vitals:    10/16/18 0942 10/16/18 0943 10/16/18 0952 10/16/18 1002   BP: (!) 87/53 (!) 85/52 97/60 (!) 91/49   Pulse: 105 104 103 106   Resp:    19   Temp:    98.4 °F (36.9 °C)   TempSrc:    Temporal   SpO2: 100% 100% 100% 99%   Weight:       Height:           General appearance: alert and cooperative with exam, appears stated age, no acute distress   Head: normal cephalic, atraumatic. EOMI bilaterally, no neck lymphadenopathy appreciated, no carotid bruits noted  Lungs: clear to auscultation bilaterally  Heart: regular rate and rhythm, S1, S2 normal, no murmur, click, rub or gallop  Abdomen: soft, non-tender; bowel sounds normal; no masses,  no organomegaly  Skin: warm, pale. Extremities: extremities normal, atraumatic, no cyanosis or edema  Neurologic: No obvious focal neurologic deficits.  CN II-XII grossly intact      Labs:     Recent Labs      10/16/18   0725   WBC  12.4*   RBC  2.10*   HGB  6.2*   HCT  19.5*   MCV  92.9   MCH  29.5   MCHC  31.8*   PLT  255     Recent Labs      10/16/18   0725   NA  128*   K  4.3   ANIONGAP  18   CL  92*   CO2  18*   BUN  59*   CREATININE  1.3*   GLUCOSE  155*   CALCIUM  9.5       Recent Labs      10/16/18   0725   AST  12   ALT  10   BILITOT  <0.2   ALKPHOS  54     FLP:    Lab Results   Component Value Date    TRIG 150 04/20/2018    HDL 27 04/20/2018    LDLCALC 62 04/20/2018    LDLDIRECT 111 02/14/2013     TSH:    Lab Results   Component Value Date    TSH 4.44 03/24/2017     Troponin T:   Recent Labs      10/16/18   0725

## 2018-10-16 NOTE — PROGRESS NOTES
Nutrition Assessment    Type and Reason for Visit: Initial, Positive Nutrition Screen    Nutrition Recommendations: follow for increased diet    Malnutrition Assessment:  · Malnutrition Status: At risk for malnutrition  · Context: Acute illness or injury    Nutrition Diagnosis:   · Problem: Inadequate oral intake  · Etiology: related to Alteration in GI function     Signs and symptoms:  as evidenced by NPO status due to medical condition    Nutrition Assessment:  · Subjective Assessment: Positive nutrition screen for decreased weight and po intake. At present time pt is NPO. Aware for EGD  · Nutrition-Focused Physical Findings:    · Wound Type: None  · Current Nutrition Therapies:  · Oral Diet Orders: NPO   · Oral Diet intake: NPO  · Oral Nutrition Supplement (ONS) Orders: None  · ONS intake: NPO     · Anthropometric Measures:  · Ht: 5' 11\" (180.3 cm)   · Current Body Wt: 237 lb 7 oz (107.7 kg)  · Admission Body Wt: 220 lb (99.8 kg)  · Ideal Body Wt: 172 lb (78 kg),   · BMI Classification: BMI 30.0 - 34.9 Obese Class I    Estimated Intake vs Estimated Needs: Intake Less Than Needs    Nutrition Risk Level: High    Nutrition Interventions:   Continue NPO  Continued Inpatient Monitoring    Nutrition Evaluation:   · Evaluation: Goals set   · Goals: meet nutritional needs through po intake    · Monitoring: NPO Status, Diet Progression, Diet Tolerance, Skin Integrity, Weight, Pertinent Labs    See Adult Nutrition Doc Flowsheet for more detail.      Electronically signed by Jorge Vogel MS, RD, LD on 10/16/18 at 1:51 PM    Contact Number: [phone]

## 2018-10-16 NOTE — PROGRESS NOTES
Report given to jojo face to face on the floor simultaniously as a nurse was loudy complaining while sitting at the desk about not being called.   Family notified

## 2018-10-17 ENCOUNTER — TELEPHONE (OUTPATIENT)
Dept: GASTROENTEROLOGY | Age: 68
End: 2018-10-17

## 2018-10-17 ENCOUNTER — APPOINTMENT (OUTPATIENT)
Dept: GENERAL RADIOLOGY | Age: 68
DRG: 378 | End: 2018-10-17
Payer: COMMERCIAL

## 2018-10-17 PROBLEM — Z51.5 PALLIATIVE CARE PATIENT: Status: ACTIVE | Noted: 2018-10-17

## 2018-10-17 LAB
ALBUMIN SERPL-MCNC: 3.7 G/DL (ref 3.5–5.2)
ALP BLD-CCNC: 58 U/L (ref 40–130)
ALT SERPL-CCNC: 169 U/L (ref 5–41)
ANION GAP SERPL CALCULATED.3IONS-SCNC: 19 MMOL/L (ref 7–19)
AST SERPL-CCNC: 289 U/L (ref 5–40)
BILIRUB SERPL-MCNC: 0.6 MG/DL (ref 0.2–1.2)
BLOOD BANK DISPENSE STATUS: NORMAL
BLOOD BANK DISPENSE STATUS: NORMAL
BLOOD BANK PRODUCT CODE: NORMAL
BLOOD BANK PRODUCT CODE: NORMAL
BPU ID: NORMAL
BPU ID: NORMAL
BUN BLDV-MCNC: 38 MG/DL (ref 8–23)
CALCIUM SERPL-MCNC: 8 MG/DL (ref 8.8–10.2)
CHLORIDE BLD-SCNC: 98 MMOL/L (ref 98–111)
CO2: 14 MMOL/L (ref 22–29)
CREAT SERPL-MCNC: 1.2 MG/DL (ref 0.5–1.2)
DESCRIPTION BLOOD BANK: NORMAL
DESCRIPTION BLOOD BANK: NORMAL
EKG P AXIS: 72 DEGREES
EKG P AXIS: 72 DEGREES
EKG P AXIS: 85 DEGREES
EKG P-R INTERVAL: 196 MS
EKG P-R INTERVAL: 200 MS
EKG P-R INTERVAL: 208 MS
EKG Q-T INTERVAL: 350 MS
EKG Q-T INTERVAL: 364 MS
EKG Q-T INTERVAL: 380 MS
EKG QRS DURATION: 128 MS
EKG QRS DURATION: 132 MS
EKG QRS DURATION: 140 MS
EKG QTC CALCULATION (BAZETT): 425 MS
EKG QTC CALCULATION (BAZETT): 457 MS
EKG QTC CALCULATION (BAZETT): 460 MS
EKG T AXIS: 103 DEGREES
EKG T AXIS: 105 DEGREES
EKG T AXIS: 97 DEGREES
GFR NON-AFRICAN AMERICAN: >60
GLUCOSE BLD-MCNC: 209 MG/DL (ref 74–109)
HCT VFR BLD CALC: 20.2 % (ref 42–52)
HCT VFR BLD CALC: 23.9 % (ref 42–52)
HCT VFR BLD CALC: 24.2 % (ref 42–52)
HCT VFR BLD CALC: 24.7 % (ref 42–52)
HCT VFR BLD CALC: 26.7 % (ref 42–52)
HEMOGLOBIN: 6.3 G/DL (ref 14–18)
HEMOGLOBIN: 7.1 G/DL (ref 14–18)
HEMOGLOBIN: 8 G/DL (ref 14–18)
HEMOGLOBIN: 8 G/DL (ref 14–18)
HEMOGLOBIN: 8.5 G/DL (ref 14–18)
LACTIC ACID: 3.3 MMOL/L (ref 0.5–1.9)
LACTIC ACID: 4.4 MMOL/L (ref 0.5–1.9)
LACTIC ACID: 6.4 MMOL/L (ref 0.5–1.9)
MAGNESIUM: 1.7 MG/DL (ref 1.6–2.4)
MCH RBC QN AUTO: 29.1 PG (ref 27–31)
MCH RBC QN AUTO: 29.2 PG (ref 27–31)
MCH RBC QN AUTO: 29.5 PG (ref 27–31)
MCH RBC QN AUTO: 29.9 PG (ref 27–31)
MCHC RBC AUTO-ENTMCNC: 29.7 G/DL (ref 33–37)
MCHC RBC AUTO-ENTMCNC: 31.2 G/DL (ref 33–37)
MCHC RBC AUTO-ENTMCNC: 31.8 G/DL (ref 33–37)
MCHC RBC AUTO-ENTMCNC: 33.1 G/DL (ref 33–37)
MCV RBC AUTO: 90.3 FL (ref 80–94)
MCV RBC AUTO: 92.7 FL (ref 80–94)
MCV RBC AUTO: 93.5 FL (ref 80–94)
MCV RBC AUTO: 98 FL (ref 80–94)
PDW BLD-RTO: 15.2 % (ref 11.5–14.5)
PDW BLD-RTO: 15.3 % (ref 11.5–14.5)
PDW BLD-RTO: 15.4 % (ref 11.5–14.5)
PDW BLD-RTO: 15.8 % (ref 11.5–14.5)
PHOSPHORUS: 3.5 MG/DL (ref 2.5–4.5)
PLATELET # BLD: 203 K/UL (ref 130–400)
PLATELET # BLD: 217 K/UL (ref 130–400)
PLATELET # BLD: 221 K/UL (ref 130–400)
PLATELET # BLD: 238 K/UL (ref 130–400)
PMV BLD AUTO: 10.1 FL (ref 9.4–12.4)
PMV BLD AUTO: 10.5 FL (ref 9.4–12.4)
PMV BLD AUTO: 10.5 FL (ref 9.4–12.4)
PMV BLD AUTO: 10.6 FL (ref 9.4–12.4)
POTASSIUM SERPL-SCNC: 4.8 MMOL/L (ref 3.5–5)
PRO-BNP: 3370 PG/ML (ref 0–900)
RBC # BLD: 2.16 M/UL (ref 4.7–6.1)
RBC # BLD: 2.44 M/UL (ref 4.7–6.1)
RBC # BLD: 2.68 M/UL (ref 4.7–6.1)
RBC # BLD: 2.88 M/UL (ref 4.7–6.1)
SODIUM BLD-SCNC: 131 MMOL/L (ref 136–145)
TOTAL CK: 1186 U/L (ref 39–308)
TOTAL PROTEIN: 6.1 G/DL (ref 6.6–8.7)
TROPONIN: 0.87 NG/ML (ref 0–0.03)
TROPONIN: 1.66 NG/ML (ref 0–0.03)
TROPONIN: 1.7 NG/ML (ref 0–0.03)
TROPONIN: 2.01 NG/ML (ref 0–0.03)
WBC # BLD: 16.2 K/UL (ref 4.8–10.8)
WBC # BLD: 16.3 K/UL (ref 4.8–10.8)
WBC # BLD: 17.5 K/UL (ref 4.8–10.8)
WBC # BLD: 20.2 K/UL (ref 4.8–10.8)

## 2018-10-17 PROCEDURE — 83880 ASSAY OF NATRIURETIC PEPTIDE: CPT

## 2018-10-17 PROCEDURE — 84484 ASSAY OF TROPONIN QUANT: CPT

## 2018-10-17 PROCEDURE — 80053 COMPREHEN METABOLIC PANEL: CPT

## 2018-10-17 PROCEDURE — 2580000003 HC RX 258: Performed by: EMERGENCY MEDICINE

## 2018-10-17 PROCEDURE — 83735 ASSAY OF MAGNESIUM: CPT

## 2018-10-17 PROCEDURE — 99232 SBSQ HOSP IP/OBS MODERATE 35: CPT | Performed by: INTERNAL MEDICINE

## 2018-10-17 PROCEDURE — 93005 ELECTROCARDIOGRAM TRACING: CPT

## 2018-10-17 PROCEDURE — 2580000003 HC RX 258: Performed by: NURSE PRACTITIONER

## 2018-10-17 PROCEDURE — 71045 X-RAY EXAM CHEST 1 VIEW: CPT

## 2018-10-17 PROCEDURE — 99233 SBSQ HOSP IP/OBS HIGH 50: CPT | Performed by: HOSPITALIST

## 2018-10-17 PROCEDURE — 2580000003 HC RX 258: Performed by: INTERNAL MEDICINE

## 2018-10-17 PROCEDURE — APPSS30 APP SPLIT SHARED TIME 16-30 MINUTES: Performed by: PHYSICIAN ASSISTANT

## 2018-10-17 PROCEDURE — 2580000003 HC RX 258: Performed by: HOSPITALIST

## 2018-10-17 PROCEDURE — 6370000000 HC RX 637 (ALT 250 FOR IP): Performed by: HOSPITALIST

## 2018-10-17 PROCEDURE — APPSS15 APP SPLIT SHARED TIME 0-15 MINUTES: Performed by: PHYSICIAN ASSISTANT

## 2018-10-17 PROCEDURE — 85027 COMPLETE CBC AUTOMATED: CPT

## 2018-10-17 PROCEDURE — 2500000003 HC RX 250 WO HCPCS: Performed by: HOSPITALIST

## 2018-10-17 PROCEDURE — P9016 RBC LEUKOCYTES REDUCED: HCPCS

## 2018-10-17 PROCEDURE — 6360000002 HC RX W HCPCS: Performed by: EMERGENCY MEDICINE

## 2018-10-17 PROCEDURE — 36430 TRANSFUSION BLD/BLD COMPNT: CPT

## 2018-10-17 PROCEDURE — C9113 INJ PANTOPRAZOLE SODIUM, VIA: HCPCS | Performed by: EMERGENCY MEDICINE

## 2018-10-17 PROCEDURE — 2100000000 HC CCU R&B

## 2018-10-17 PROCEDURE — 36415 COLL VENOUS BLD VENIPUNCTURE: CPT

## 2018-10-17 PROCEDURE — 6360000002 HC RX W HCPCS: Performed by: HOSPITALIST

## 2018-10-17 PROCEDURE — 85014 HEMATOCRIT: CPT

## 2018-10-17 PROCEDURE — 85018 HEMOGLOBIN: CPT

## 2018-10-17 PROCEDURE — 82550 ASSAY OF CK (CPK): CPT

## 2018-10-17 PROCEDURE — 84100 ASSAY OF PHOSPHORUS: CPT

## 2018-10-17 PROCEDURE — 83605 ASSAY OF LACTIC ACID: CPT

## 2018-10-17 PROCEDURE — 99223 1ST HOSP IP/OBS HIGH 75: CPT | Performed by: INTERNAL MEDICINE

## 2018-10-17 RX ORDER — LORAZEPAM 0.5 MG/1
0.5 TABLET ORAL EVERY EVENING
Status: DISCONTINUED | OUTPATIENT
Start: 2018-10-17 | End: 2018-10-18

## 2018-10-17 RX ORDER — 0.9 % SODIUM CHLORIDE 0.9 %
500 INTRAVENOUS SOLUTION INTRAVENOUS ONCE
Status: COMPLETED | OUTPATIENT
Start: 2018-10-17 | End: 2018-10-17

## 2018-10-17 RX ORDER — 0.9 % SODIUM CHLORIDE 0.9 %
250 INTRAVENOUS SOLUTION INTRAVENOUS ONCE
Status: COMPLETED | OUTPATIENT
Start: 2018-10-17 | End: 2018-10-17

## 2018-10-17 RX ORDER — SODIUM BICARBONATE 650 MG/1
650 TABLET ORAL 4 TIMES DAILY
Status: DISCONTINUED | OUTPATIENT
Start: 2018-10-17 | End: 2018-10-22

## 2018-10-17 RX ORDER — 0.9 % SODIUM CHLORIDE 0.9 %
250 INTRAVENOUS SOLUTION INTRAVENOUS ONCE
Status: DISCONTINUED | OUTPATIENT
Start: 2018-10-17 | End: 2018-10-17 | Stop reason: SDUPTHER

## 2018-10-17 RX ADMIN — SODIUM CHLORIDE 500 ML: 9 INJECTION, SOLUTION INTRAVENOUS at 10:45

## 2018-10-17 RX ADMIN — Medication: at 19:01

## 2018-10-17 RX ADMIN — SODIUM CHLORIDE 250 ML: 9 INJECTION, SOLUTION INTRAVENOUS at 07:35

## 2018-10-17 RX ADMIN — SODIUM BICARBONATE 650 MG: 650 TABLET ORAL at 21:04

## 2018-10-17 RX ADMIN — SODIUM CHLORIDE 500 ML: 9 INJECTION, SOLUTION INTRAVENOUS at 00:19

## 2018-10-17 RX ADMIN — MEROPENEM 1 G: 1 INJECTION, POWDER, FOR SOLUTION INTRAVENOUS at 21:04

## 2018-10-17 RX ADMIN — METOPROLOL TARTRATE 12.5 MG: 25 TABLET ORAL at 21:04

## 2018-10-17 RX ADMIN — SODIUM CHLORIDE 8 MG/HR: 9 INJECTION, SOLUTION INTRAVENOUS at 16:34

## 2018-10-17 RX ADMIN — MEROPENEM 1 G: 1 INJECTION, POWDER, FOR SOLUTION INTRAVENOUS at 12:39

## 2018-10-17 RX ADMIN — SODIUM CHLORIDE 8 MG/HR: 9 INJECTION, SOLUTION INTRAVENOUS at 04:07

## 2018-10-17 RX ADMIN — MEROPENEM 1 G: 1 INJECTION, POWDER, FOR SOLUTION INTRAVENOUS at 04:07

## 2018-10-17 RX ADMIN — LORAZEPAM 0.5 MG: 0.5 TABLET ORAL at 21:04

## 2018-10-17 RX ADMIN — SODIUM CHLORIDE: 9 INJECTION, SOLUTION INTRAVENOUS at 16:49

## 2018-10-17 ASSESSMENT — ENCOUNTER SYMPTOMS
GASTROINTESTINAL NEGATIVE: 1
EYES NEGATIVE: 1
NAUSEA: 0
VOMITING: 0
SHORTNESS OF BREATH: 0
RESPIRATORY NEGATIVE: 1
DIARRHEA: 0

## 2018-10-17 ASSESSMENT — PAIN SCALES - GENERAL
PAINLEVEL_OUTOF10: 0
PAINLEVEL_OUTOF10: 0
PAINLEVEL_OUTOF10: 1

## 2018-10-17 NOTE — OP NOTE
Endoscopic Procedure Note    Patient: Candelaria Caro : 1950  Med Rec#: [id number] Acc#: [id number]     Primary Care Provider MIKE Phelps   Referring Provider: Gualberto Molina MD    Endoscopist: Isabella Bangura MD    Date of Procedure:  10/16/2018    Procedure:   1. EGD with control of bleeding    Indications:   1. Melena, hematemesis   2. Acute blood loss anemia    Anesthesia:  Sedation was administered by anesthesia who monitored the patient during the procedure. Estimated Blood Loss: minimal    Procedure:   After reviewing the patient's chart and obtaining informed consent, the patient was placed in the left lateral decubitus position. A forward-viewing Olympus endoscope was lubricated and inserted through the mouth into the oropharynx. Under direct visualization, the upper esophagus was intubated. The scope was advanced to the level of the third portion of duodenum. Scope was slowly withdrawn with careful inspection of the mucosal surfaces. The scope was retroflexed for inspection of the gastric fundus and incisura. Findings and maneuvers are listed in impression below. The patient tolerated the procedure well. The scope was removed. There were no immediate complications. Findings:   Esophagus: normal  There is no hiatal hernia present. Stomach:  abnormal: in the mid-body of the stomach there was evidence of old oozing blood. This was lavaged. Active bleeding was noted from a flat lesion consistent with a Dieulafoy lesion. This was treated with submucosal injection of 4ml of epinephrine injection (1:10,000 dilution). There was mild oozing after this and two hemostatic clips were placed across the area with successful treatment and hemostasis. No other gastric lesions were seen. Duodenum: normal      IMPRESSION:  1. Active bleeding in the stomach from Dieulafoy lesion - treated as above. RECOMMENDATIONS:    1. PPI gtt x 24 hrs  2. Clear liquids   3.  Repeat EGD in 8

## 2018-10-17 NOTE — PROGRESS NOTES
CREATININE  1.3*  1.1   GLUCOSE  155*  146*   CALCIUM  9.5  9.5     Recent Labs      10/16/18   1606  10/17/18   0621   MG  1.8  1.7   PHOS   --   3.5     Recent Labs      10/16/18   0725   AST  12   ALT  10   BILITOT  <0.2   ALKPHOS  54     HgBA1c:  No components found for: HGBA1C  FLP:  Lab Results   Component Value Date    TRIG 150 04/20/2018    HDL 27 04/20/2018    LDLCALC 62 04/20/2018    LDLDIRECT 111 02/14/2013     TSH:    Lab Results   Component Value Date    TSH 4.44 03/24/2017     Troponin T:   Recent Labs      10/16/18   1840  10/17/18   0013  10/17/18   0621   TROPONINI  0.52*  0.87*  1.66*     INR:   Recent Labs      10/16/18   0725   INR  1.17       Recent Labs      10/16/18   0725   LIPASE  58     -----------------------------------------------------------------  RAD:       Physical Exam:     Vitals:    10/17/18 0815 10/17/18 0900 10/17/18 1000 10/17/18 1100   BP: (!) 98/57 102/61 104/67 (!) 101/56   Pulse: 103 104 106 105   Resp: 11 14 23 15   Temp: 97.4 °F (36.3 °C) 97.8 °F (36.6 °C)     TempSrc: Temporal Temporal     SpO2: 100% 99% 98% 97%   Weight:       Height:         24HR INTAKE/OUTPUT:    Intake/Output Summary (Last 24 hours) at 10/17/18 1117  Last data filed at 10/17/18 0600   Gross per 24 hour   Intake             4220 ml   Output             1250 ml   Net             2970 ml     General appearance: alert and cooperative with exam  Lungs: clear to auscultation bilaterally, no wheezes, rales, or rhonchi. Heart: regular rate and rhythm, S1, S2 normal, no murmur, click, rub or gallop  Abdomen: soft, non-tender; bowel sounds normal; no masses,  no organomegaly  Extremities: extremities normal, atraumatic, no cyanosis or edema  Neurologic: No obvious focal neurologic deficits. Impression:       1. Active bleeding in stomach from Dieulafoy lesion - s/p submucosal injection of 4 mL of epinephrine and placement of two hemostatic clips. 2.  Acute Blood Loss Anemia  3.   Elevated

## 2018-10-17 NOTE — CONSULTS
History:  Family History   Problem Relation Age of Onset    Heart Disease Father     High Blood Pressure Father     Other Father         Esophageal Varices       Past Social History:  Social History     Social History    Marital status:      Spouse name: N/A    Number of children: N/A    Years of education: N/A     Occupational History    Not on file. Social History Main Topics    Smoking status: Current Some Day Smoker     Packs/day: 1.00     Years: 30.00     Types: Cigars     Last attempt to quit: 3/3/2009    Smokeless tobacco: Never Used      Comment: cigars occasionally    Alcohol use 0.0 oz/week      Comment: occasional    Drug use: No    Sexual activity: Not on file     Other Topics Concern    Not on file     Social History Narrative    No narrative on file       Allergies: Allergies   Allergen Reactions    Codeine Itching       Home Meds:  Prior to Admission medications    Medication Sig Start Date End Date Taking?  Authorizing Provider   metFORMIN (GLUCOPHAGE) 1000 MG tablet Take 1 tablet by mouth 2 times daily (with meals) 9/24/18  Yes JOSE ELIAS Jasso   cyanocobalamin 1000 MCG/ML injection Inject 1 mL into the muscle every 30 days 9/11/18  Yes ROSS Prakash DO   furosemide (LASIX) 40 MG tablet TAKE 1 TABLET BY MOUTH TWICE DAILY 7/13/18  Yes JOSE ELIAS Lovell   spironolactone (ALDACTONE) 25 MG tablet TAKE 1 TABLET DAILY 6/4/18  Yes JOSE ELIAS Casey   atorvastatin (LIPITOR) 40 MG tablet Take 1 tablet by mouth daily 5/15/18  Yes JOSE ELIAS Lovell   Dulaglutide 0.75 MG/0.5ML SOPN Inject 0.75 mg into the skin every 7 days 1/15/18  Yes ROSS Prakash DO   clopidogrel (PLAVIX) 75 MG tablet Take 1 tablet by mouth daily 11/13/17  Yes ROSS Prakash DO   metoprolol tartrate (LOPRESSOR) 50 MG tablet Take 1 tablet by mouth 2 times daily 11/13/17  Yes ROSS Prakash DO   ramipril (ALTACE) 10 MG capsule TAKE 2 CAPSULES DAILY  Patient taking differently: ONE TABLET NIGHTLY 11/3/17  Yes JOSE ELIAS Sheldon   nitroGLYCERIN (NITROLINGUAL) 0.4 MG/SPRAY 0.4 mg spray Place 1 spray under the tongue every 5 minutes as needed for Chest pain 10/27/17  Yes ROSS Charles DO   metFORMIN (GLUCOPHAGE) 1000 MG tablet Take 1 tablet by mouth 2 times daily (with meals) 9/29/16  Yes ROSS Charles DO   Omega-3 Fatty Acids (FISH OIL) 1000 MG CAPS Take 3,000 mg by mouth daily   Yes Historical Provider, MD   diphenhydrAMINE (BENADRYL) 25 MG tablet Take 50 mg by mouth nightly as needed for Itching   Yes Historical Provider, MD   aspirin 325 MG tablet Take 325 mg by mouth daily. Yes Historical Provider, MD   niacin (NIASPAN) 1000 MG CR tablet Take 1,000 mg by mouth nightly. Yes Historical Provider, MD   triamcinolone (KENALOG) 0.1 % cream Apply topically 2 times daily. 3/30/16   ROSS Charles DO       Current Meds:   sodium chloride  250 mL Intravenous Once    sodium chloride  500 mL Intravenous Once    sodium chloride  1,000 mL Intravenous Once    meropenem  1 g Intravenous Q8H    sodium chloride  1,000 mL Intravenous Once    sodium chloride  500 mL Intravenous Once       Current Infused Meds:   pantoprozole (PROTONIX) infusion 8 mg/hr (10/17/18 1100)    sodium chloride 150 mL/hr at 10/16/18 1933       Physical Exam:  Vitals:    10/17/18 1100   BP: (!) 101/56   Pulse: 105   Resp: 15   Temp:    SpO2: 97%       Intake/Output Summary (Last 24 hours) at 10/17/18 1143  Last data filed at 10/17/18 1100   Gross per 24 hour   Intake             4220 ml   Output             1075 ml   Net             3145 ml     Estimated body mass index is 34.02 kg/m² as calculated from the following:    Height as of this encounter: 5' 11\" (1.803 m). Weight as of this encounter: 243 lb 14.4 oz (110.6 kg). Physical Exam   Constitutional: He is oriented to person, place, and time. He appears well-developed and well-nourished.    HENT:   Head: Normocephalic and

## 2018-10-17 NOTE — PROGRESS NOTES
4220 ml   Output             1250 ml   Net             2970 ml       EXAM:  General: No distress. Alert. Eyes: PERRL. No sclera icterus. No conjunctival injection. ENT: No discharge. Pharynx clear. Neck: Trachea midline. Normal thyroid. Resp: No accessory muscle use. No crackles. No wheezing. No rhonchi. No dullness on percussion. CV: Regular rate. Regular rhythm. No mumur or rub. No edema. GI: Non-tender. Non-distended. No masses. No organmegaly. Normal bowel sounds. No hernia. Skin: Warm and dry. No nodule on exposed extremities. No rash on exposed extremities. Lymph: No cervical LAD. No supraclavicular LAD. M/S: No cyanosis. No joint deformity. No clubbing. Neuro: Awake. Follows commands. Positive pupils/gag/corneals. Normal pain response. Psych: Oriented to person, place, time. No anxiety or agitation.      Medications:  Scheduled Meds:   sodium chloride  250 mL Intravenous Once    sodium chloride  500 mL Intravenous Once    sodium chloride  1,000 mL Intravenous Once    meropenem  1 g Intravenous Q8H    sodium chloride  1,000 mL Intravenous Once    sodium chloride  500 mL Intravenous Once       PRN Meds:  labetalol, promethazine, acetaminophen, morphine, ondansetron, perflutren lipid microspheres    Results:  CBC: Recent Labs      10/16/18   1840  10/17/18   0013  10/17/18   0245  10/17/18   0621   WBC  17.3*  16.3*   --   17.5*   HGB  7.8*  6.3*  8.0*  7.1*   HCT  24.6*  20.2*  24.7*  23.9*   MCV  94.3*  93.5   --   98.0*   PLT  264  238   --   217     BMP: Recent Labs      10/16/18   0725  10/16/18   1606   NA  128*  133*   K  4.3  4.5   CL  92*  99   CO2  18*  19*   BUN  59*  51*   CREATININE  1.3*  1.1     LIVER PROFILE:   Recent Labs      10/16/18   0725   AST  12   ALT  10   LIPASE  58   BILITOT  <0.2   ALKPHOS  54     PT/INR:   Recent Labs      10/16/18   0725   PROTIME  14.8*   INR  1.17     APTT:   Recent Labs      10/16/18   0725   APTT  29.0     UA:  Recent Labs

## 2018-10-18 ENCOUNTER — APPOINTMENT (OUTPATIENT)
Dept: GENERAL RADIOLOGY | Age: 68
DRG: 378 | End: 2018-10-18
Payer: COMMERCIAL

## 2018-10-18 DIAGNOSIS — I10 ESSENTIAL HYPERTENSION: ICD-10-CM

## 2018-10-18 DIAGNOSIS — I25.10 CORONARY ARTERY DISEASE INVOLVING NATIVE HEART WITHOUT ANGINA PECTORIS, UNSPECIFIED VESSEL OR LESION TYPE: ICD-10-CM

## 2018-10-18 LAB
BLOOD BANK DISPENSE STATUS: NORMAL
BLOOD BANK PRODUCT CODE: NORMAL
BPU ID: NORMAL
DESCRIPTION BLOOD BANK: NORMAL
HCT VFR BLD CALC: 21 % (ref 42–52)
HCT VFR BLD CALC: 27.4 % (ref 42–52)
HCT VFR BLD CALC: 29.3 % (ref 42–52)
HEMOGLOBIN: 10.1 G/DL (ref 14–18)
HEMOGLOBIN: 6.9 G/DL (ref 14–18)
HEMOGLOBIN: 8.9 G/DL (ref 14–18)
MCH RBC QN AUTO: 29.2 PG (ref 27–31)
MCH RBC QN AUTO: 29.8 PG (ref 27–31)
MCH RBC QN AUTO: 30.4 PG (ref 27–31)
MCHC RBC AUTO-ENTMCNC: 32.5 G/DL (ref 33–37)
MCHC RBC AUTO-ENTMCNC: 32.9 G/DL (ref 33–37)
MCHC RBC AUTO-ENTMCNC: 34.5 G/DL (ref 33–37)
MCV RBC AUTO: 88.3 FL (ref 80–94)
MCV RBC AUTO: 89 FL (ref 80–94)
MCV RBC AUTO: 91.6 FL (ref 80–94)
PDW BLD-RTO: 15.5 % (ref 11.5–14.5)
PDW BLD-RTO: 15.6 % (ref 11.5–14.5)
PDW BLD-RTO: 15.6 % (ref 11.5–14.5)
PLATELET # BLD: 186 K/UL (ref 130–400)
PLATELET # BLD: 203 K/UL (ref 130–400)
PLATELET # BLD: 210 K/UL (ref 130–400)
PMV BLD AUTO: 10.5 FL (ref 9.4–12.4)
PMV BLD AUTO: 10.5 FL (ref 9.4–12.4)
PMV BLD AUTO: 11.3 FL (ref 9.4–12.4)
RBC # BLD: 2.36 M/UL (ref 4.7–6.1)
RBC # BLD: 2.99 M/UL (ref 4.7–6.1)
RBC # BLD: 3.32 M/UL (ref 4.7–6.1)
TROPONIN: 0.59 NG/ML (ref 0–0.03)
TROPONIN: 1.43 NG/ML (ref 0–0.03)
URINE CULTURE, ROUTINE: NORMAL
WBC # BLD: 14.1 K/UL (ref 4.8–10.8)
WBC # BLD: 15 K/UL (ref 4.8–10.8)
WBC # BLD: 21 K/UL (ref 4.8–10.8)

## 2018-10-18 PROCEDURE — 36415 COLL VENOUS BLD VENIPUNCTURE: CPT

## 2018-10-18 PROCEDURE — 2500000003 HC RX 250 WO HCPCS: Performed by: HOSPITALIST

## 2018-10-18 PROCEDURE — 2580000003 HC RX 258: Performed by: HOSPITALIST

## 2018-10-18 PROCEDURE — P9016 RBC LEUKOCYTES REDUCED: HCPCS

## 2018-10-18 PROCEDURE — 6360000002 HC RX W HCPCS: Performed by: HOSPITALIST

## 2018-10-18 PROCEDURE — 71045 X-RAY EXAM CHEST 1 VIEW: CPT

## 2018-10-18 PROCEDURE — 99232 SBSQ HOSP IP/OBS MODERATE 35: CPT | Performed by: INTERNAL MEDICINE

## 2018-10-18 PROCEDURE — 6360000002 HC RX W HCPCS: Performed by: EMERGENCY MEDICINE

## 2018-10-18 PROCEDURE — 85027 COMPLETE CBC AUTOMATED: CPT

## 2018-10-18 PROCEDURE — 2580000003 HC RX 258: Performed by: EMERGENCY MEDICINE

## 2018-10-18 PROCEDURE — 2580000003 HC RX 258: Performed by: INTERNAL MEDICINE

## 2018-10-18 PROCEDURE — 93005 ELECTROCARDIOGRAM TRACING: CPT

## 2018-10-18 PROCEDURE — 36430 TRANSFUSION BLD/BLD COMPNT: CPT

## 2018-10-18 PROCEDURE — 2100000000 HC CCU R&B

## 2018-10-18 PROCEDURE — C9113 INJ PANTOPRAZOLE SODIUM, VIA: HCPCS | Performed by: EMERGENCY MEDICINE

## 2018-10-18 PROCEDURE — 84484 ASSAY OF TROPONIN QUANT: CPT

## 2018-10-18 PROCEDURE — 6370000000 HC RX 637 (ALT 250 FOR IP): Performed by: HOSPITALIST

## 2018-10-18 RX ORDER — LORAZEPAM 0.5 MG/1
0.5 TABLET ORAL EVERY EVENING
Status: DISCONTINUED | OUTPATIENT
Start: 2018-10-18 | End: 2018-10-24 | Stop reason: HOSPADM

## 2018-10-18 RX ORDER — METOPROLOL TARTRATE 50 MG/1
50 TABLET, FILM COATED ORAL 2 TIMES DAILY
Qty: 180 TABLET | Refills: 3 | Status: SHIPPED | OUTPATIENT
Start: 2018-10-18 | End: 2018-10-31 | Stop reason: ALTCHOICE

## 2018-10-18 RX ORDER — 0.9 % SODIUM CHLORIDE 0.9 %
250 INTRAVENOUS SOLUTION INTRAVENOUS ONCE
Status: COMPLETED | OUTPATIENT
Start: 2018-10-18 | End: 2018-10-18

## 2018-10-18 RX ORDER — CLOPIDOGREL BISULFATE 75 MG/1
75 TABLET ORAL DAILY
Qty: 90 TABLET | Refills: 3 | Status: SHIPPED | OUTPATIENT
Start: 2018-10-18

## 2018-10-18 RX ORDER — RAMIPRIL 10 MG/1
20 CAPSULE ORAL DAILY
Qty: 180 CAPSULE | Refills: 3 | Status: SHIPPED | OUTPATIENT
Start: 2018-10-18 | End: 2018-10-24 | Stop reason: HOSPADM

## 2018-10-18 RX ORDER — DEXTROMETHORPHAN POLISTIREX 30 MG/5ML
30 SUSPENSION ORAL EVERY 12 HOURS SCHEDULED
Status: DISCONTINUED | OUTPATIENT
Start: 2018-10-18 | End: 2018-10-20

## 2018-10-18 RX ORDER — DEXTROMETHORPHAN POLISTIREX 30 MG/5ML
30 SUSPENSION ORAL EVERY 12 HOURS SCHEDULED
Status: DISCONTINUED | OUTPATIENT
Start: 2018-10-18 | End: 2018-10-18

## 2018-10-18 RX ADMIN — Medication: at 10:31

## 2018-10-18 RX ADMIN — METOPROLOL TARTRATE 25 MG: 25 TABLET ORAL at 10:31

## 2018-10-18 RX ADMIN — MEROPENEM 1 G: 1 INJECTION, POWDER, FOR SOLUTION INTRAVENOUS at 20:31

## 2018-10-18 RX ADMIN — SODIUM BICARBONATE 650 MG: 650 TABLET ORAL at 20:31

## 2018-10-18 RX ADMIN — Medication 30 MG: at 17:54

## 2018-10-18 RX ADMIN — SODIUM CHLORIDE 8 MG/HR: 9 INJECTION, SOLUTION INTRAVENOUS at 11:59

## 2018-10-18 RX ADMIN — SODIUM BICARBONATE 650 MG: 650 TABLET ORAL at 10:31

## 2018-10-18 RX ADMIN — METOPROLOL TARTRATE 12.5 MG: 25 TABLET ORAL at 20:31

## 2018-10-18 RX ADMIN — SODIUM CHLORIDE 8 MG/HR: 9 INJECTION, SOLUTION INTRAVENOUS at 23:02

## 2018-10-18 RX ADMIN — Medication: at 17:54

## 2018-10-18 RX ADMIN — SODIUM CHLORIDE 8 MG/HR: 9 INJECTION, SOLUTION INTRAVENOUS at 01:51

## 2018-10-18 RX ADMIN — SODIUM BICARBONATE 650 MG: 650 TABLET ORAL at 13:18

## 2018-10-18 RX ADMIN — MEROPENEM 1 G: 1 INJECTION, POWDER, FOR SOLUTION INTRAVENOUS at 11:59

## 2018-10-18 RX ADMIN — MEROPENEM 1 G: 1 INJECTION, POWDER, FOR SOLUTION INTRAVENOUS at 04:34

## 2018-10-18 RX ADMIN — SODIUM BICARBONATE 650 MG: 650 TABLET ORAL at 17:54

## 2018-10-18 RX ADMIN — SODIUM CHLORIDE 250 ML: 9 INJECTION, SOLUTION INTRAVENOUS at 04:35

## 2018-10-18 RX ADMIN — Medication 2 MG: at 10:36

## 2018-10-18 RX ADMIN — Medication 2 MG: at 01:51

## 2018-10-18 ASSESSMENT — PAIN DESCRIPTION - PAIN TYPE
TYPE: ACUTE PAIN
TYPE: ACUTE PAIN

## 2018-10-18 ASSESSMENT — PAIN DESCRIPTION - ONSET
ONSET: GRADUAL
ONSET: ON-GOING

## 2018-10-18 ASSESSMENT — PAIN SCALES - GENERAL
PAINLEVEL_OUTOF10: 0
PAINLEVEL_OUTOF10: 6
PAINLEVEL_OUTOF10: 0
PAINLEVEL_OUTOF10: 3
PAINLEVEL_OUTOF10: 1
PAINLEVEL_OUTOF10: 1
PAINLEVEL_OUTOF10: 0
PAINLEVEL_OUTOF10: 0
PAINLEVEL_OUTOF10: 8
PAINLEVEL_OUTOF10: 3
PAINLEVEL_OUTOF10: 3

## 2018-10-18 ASSESSMENT — PAIN DESCRIPTION - DESCRIPTORS
DESCRIPTORS: ACHING
DESCRIPTORS: ACHING

## 2018-10-18 ASSESSMENT — PAIN DESCRIPTION - PROGRESSION
CLINICAL_PROGRESSION: GRADUALLY WORSENING
CLINICAL_PROGRESSION: GRADUALLY WORSENING

## 2018-10-18 ASSESSMENT — PAIN DESCRIPTION - ORIENTATION
ORIENTATION: ANTERIOR
ORIENTATION: ANTERIOR

## 2018-10-18 ASSESSMENT — PAIN DESCRIPTION - FREQUENCY
FREQUENCY: INTERMITTENT
FREQUENCY: INTERMITTENT

## 2018-10-18 ASSESSMENT — PAIN DESCRIPTION - LOCATION
LOCATION: HEAD
LOCATION: HEAD

## 2018-10-18 NOTE — PROGRESS NOTES
Call returned by Dr. Lauri Eugene with [facility]. Dr updated on pt's history and current condition with HgB of 6.9. Received orders for 1 unit PRBC and Clear Liquid Diet with no carbonated beverages.  Electronically signed by Divya Coats RN on 10/18/2018 at 4:27 AM

## 2018-10-18 NOTE — PROGRESS NOTES
Call placed to [facility] regarding pt's recent HgB of 6.9. Answering service currently unable to get a hold of on-call, Keira De La Cruz. States she will call again in about 10 minutes. Awaiting for call back.  Electronically signed by Bassam Green RN on 10/18/2018 at 4:17 AM

## 2018-10-18 NOTE — PROGRESS NOTES
Culture Recent:   Recent Labs      10/16/18   0725   BC  No Growth to date. Any change in status will be called. Gram Stain Recent: No results for input(s): LABGRAM in the last 720 hours. Resp CultureRecent: No results for input(s): CULTRESP in the last 720 hours. Body Fluid Recent :No results for input(s): BFCX in the last 720 hours. MRSA Recent : No results for input(s): [address] in the last 720 hours. Urine Culture Recent : Recent Labs      10/16/18   1626   LABURIN  No growth at 36-48 hours     Organism Recent : No results for input(s): ORG in the last 720 hours. PATIENT SUMMARY         ASSESSMENT/PLAN:      Principal Problem:    GI bleed  Active Problems:    CAD (coronary artery disease)    DM2 (diabetes mellitus, type 2) ([facility])    NSAID long-term use    Hypotension due to hypovolemia    Acute blood loss anemia    Lactic acidosis    Syncope and collapse    SIRS (systemic inflammatory response syndrome) ([facility])    TONY (acute kidney injury) ([facility] [address]    Dieulafoy lesion of stomach    Palliative care patient    Elevated troponin  Resolved Problems:    * No resolved hospital problems. *      UGIB, secondary to Dieulafoy's Lesion, s/p EGD (10/16/2018), S/p Epi injection and Clipping  - S/p PRBC Transfusion   - GI on Board   - Monitor H&H  - Continue Pantoprazole  ggt      Hx of CAD, Previous hx of  PCI and CABG   Elevated Troponin  - Cardiology on Board  - Suspect, likely Demand Ischemia  - Continue Current management per Cardiology recommendation  - Consider diagnostic cardiac cath    Echo:   Severe global hypokinesis   Severely reduced LV function Estimated EF: 20%. Segmental wall motion abnormalities present including inferior wall and apex  Grade II diastolic dysfunction.       Severe sepsis  - Leukocytosis, with Persistent Tachycardia  - Lactic acidosis  - Source of infection unclear  - continue meropenem  - Blood culture no growth so far     Prophylaxis Orders:   Antibiotic: Meropenem  DVT

## 2018-10-18 NOTE — CARE COORDINATION
Independent  Ability to manage finances: Independent  Is patient able to live independently?:  Yes  Hearing and Vision  Visual Impairment:  Reading glasses  Hearing Impairment:  None  Care Transitions Interventions         Follow Up: Met at bedside with patient and wife. Discussed CTC process and he is agreeable with follow up calls post discharge. He lives at home with his wife. He works at an [employer] in [place]. He denied any issues with obtaining meds, supplies, DME, etc.  No immediate needs noted. Contact info given. He gave verbal consent for me to talk with wife if needed. Will follow up after discharge as indicated. Future Appointments  Date Time Provider Ella Cloud   10/24/2018 7:00  [facility],[address],  [facility] Maintenance  There are no preventive care reminders to display for this patient.     Hope Olmedo RN

## 2018-10-18 NOTE — PROGRESS NOTES
Yes ROSS Martínez, DO   metoprolol tartrate (LOPRESSOR) 50 MG tablet Take 1 tablet by mouth 2 times daily 11/13/17  Yes ROSS Martínez, DO   ramipril (ALTACE) 10 MG capsule TAKE 2 CAPSULES DAILY  Patient taking differently: ONE TABLET NIGHTLY 11/3/17  Yes JOSE ELIAS Ibarra   nitroGLYCERIN (NITROLINGUAL) 0.4 MG/SPRAY 0.4 mg spray Place 1 spray under the tongue every 5 minutes as needed for Chest pain 10/27/17  Yes ROSS Martínez DO   metFORMIN (GLUCOPHAGE) 1000 MG tablet Take 1 tablet by mouth 2 times daily (with meals) 9/29/16  Yes ROSS Martínez, DO   Omega-3 Fatty Acids (FISH OIL) 1000 MG CAPS Take 3,000 mg by mouth daily   Yes Historical Provider, MD   diphenhydrAMINE (BENADRYL) 25 MG tablet Take 50 mg by mouth nightly as needed for Itching   Yes Historical Provider, MD   aspirin 325 MG tablet Take 325 mg by mouth daily. Yes Historical Provider, MD   niacin (NIASPAN) 1000 MG CR tablet Take 1,000 mg by mouth nightly. Yes Historical Provider, MD   triamcinolone (KENALOG) 0.1 % cream Apply topically 2 times daily.  3/30/16   ROSS Martínez DO        sodium chloride  250 mL Intravenous Once    sodium bicarbonate  650 mg Oral 4x Daily    metoprolol tartrate  12.5 mg Oral BID    LORazepam  0.5 mg Oral QPM    sodium chloride  1,000 mL Intravenous Once    meropenem  1 g Intravenous Q8H    sodium chloride  1,000 mL Intravenous Once    sodium chloride  500 mL Intravenous Once       TELEMETRY: Sinus     Physical Exam:      Physical Exam      General:  Awake, alert, NAD  Skin:  Warm and dry  Neck:  no jvd , no carotid bruits  Chest:  Clear to auscultation, no wheezing or rales  Cardiovascular:  RRR N0H0 no murmurs, clicks, gallups, or rubs  Abdomen:  Soft nontender, nondistended, bowel sounds present  Extremities:  Edema: none      Lab Data:  CBC: Recent Labs      10/17/18   1406  10/17/18   2110  10/18/18   0244   WBC  20.2*  16.2*  14.1*   HGB  8.5*  8.0*  6.9*   HCT  26.7*

## 2018-10-18 NOTE — TELEPHONE ENCOUNTER
Received fax from pharmacy requesting refill on pts medication(s). Pt was last seen in office on 4/24/2018  and has a follow up scheduled for 10/24/2018. Will send request to  Dr. Gracie Wheeler  for patient.      Requested Prescriptions     Pending Prescriptions Disp Refills    metoprolol tartrate (LOPRESSOR) 50 MG tablet [Pharmacy Med Name: METOPROLOL TAB TAR 50MG] 180 tablet 3     Sig: Take 1 tablet by mouth 2 times daily    clopidogrel (PLAVIX) 75 MG tablet [Pharmacy Med Name: CLOPIDOGREL  TAB 75MG] 90 tablet 3     Sig: Take 1 tablet by mouth daily

## 2018-10-19 ENCOUNTER — APPOINTMENT (OUTPATIENT)
Dept: CT IMAGING | Age: 68
DRG: 378 | End: 2018-10-19
Payer: COMMERCIAL

## 2018-10-19 PROBLEM — J90 PLEURAL EFFUSION, BILATERAL: Status: ACTIVE | Noted: 2018-10-19

## 2018-10-19 PROBLEM — E83.39 HYPOPHOSPHATEMIA: Status: ACTIVE | Noted: 2018-10-19

## 2018-10-19 LAB
ALBUMIN SERPL-MCNC: 3.1 G/DL (ref 3.5–5.2)
ALP BLD-CCNC: 70 U/L (ref 40–130)
ALT SERPL-CCNC: 345 U/L (ref 5–41)
ANION GAP SERPL CALCULATED.3IONS-SCNC: 12 MMOL/L (ref 7–19)
AST SERPL-CCNC: 209 U/L (ref 5–40)
BILIRUB SERPL-MCNC: 0.8 MG/DL (ref 0.2–1.2)
BUN BLDV-MCNC: 19 MG/DL (ref 8–23)
CALCIUM SERPL-MCNC: 7.9 MG/DL (ref 8.8–10.2)
CHLORIDE BLD-SCNC: 99 MMOL/L (ref 98–111)
CO2: 21 MMOL/L (ref 22–29)
CREAT SERPL-MCNC: 0.9 MG/DL (ref 0.5–1.2)
EKG P AXIS: NORMAL DEGREES
EKG P AXIS: NORMAL DEGREES
EKG P-R INTERVAL: NORMAL MS
EKG P-R INTERVAL: NORMAL MS
EKG Q-T INTERVAL: 370 MS
EKG Q-T INTERVAL: 374 MS
EKG QRS DURATION: 126 MS
EKG QRS DURATION: 128 MS
EKG QTC CALCULATION (BAZETT): 454 MS
EKG QTC CALCULATION (BAZETT): 457 MS
EKG T AXIS: 94 DEGREES
EKG T AXIS: 95 DEGREES
GFR NON-AFRICAN AMERICAN: >60
GLUCOSE BLD-MCNC: 176 MG/DL (ref 74–109)
HCT VFR BLD CALC: 24.8 % (ref 42–52)
HCT VFR BLD CALC: 25.8 % (ref 42–52)
HCT VFR BLD CALC: 28 % (ref 42–52)
HEMOGLOBIN: 8.1 G/DL (ref 14–18)
HEMOGLOBIN: 8.3 G/DL (ref 14–18)
HEMOGLOBIN: 9 G/DL (ref 14–18)
LACTIC ACID: 3.8 MMOL/L (ref 0.5–1.9)
MAGNESIUM: 1.8 MG/DL (ref 1.6–2.4)
MCH RBC QN AUTO: 29.9 PG (ref 27–31)
MCH RBC QN AUTO: 30.1 PG (ref 27–31)
MCH RBC QN AUTO: 30.9 PG (ref 27–31)
MCHC RBC AUTO-ENTMCNC: 32.1 G/DL (ref 33–37)
MCHC RBC AUTO-ENTMCNC: 32.2 G/DL (ref 33–37)
MCHC RBC AUTO-ENTMCNC: 32.7 G/DL (ref 33–37)
MCV RBC AUTO: 92.8 FL (ref 80–94)
MCV RBC AUTO: 93.6 FL (ref 80–94)
MCV RBC AUTO: 94.7 FL (ref 80–94)
PDW BLD-RTO: 15.9 % (ref 11.5–14.5)
PDW BLD-RTO: 16.8 % (ref 11.5–14.5)
PDW BLD-RTO: 16.9 % (ref 11.5–14.5)
PHOSPHORUS: 1.7 MG/DL (ref 2.5–4.5)
PLATELET # BLD: 186 K/UL (ref 130–400)
PLATELET # BLD: 186 K/UL (ref 130–400)
PLATELET # BLD: 196 K/UL (ref 130–400)
PMV BLD AUTO: 10.1 FL (ref 9.4–12.4)
PMV BLD AUTO: 10.4 FL (ref 9.4–12.4)
PMV BLD AUTO: 10.5 FL (ref 9.4–12.4)
POTASSIUM SERPL-SCNC: 4.2 MMOL/L (ref 3.5–5)
PRO-BNP: 4073 PG/ML (ref 0–900)
RBC # BLD: 2.62 M/UL (ref 4.7–6.1)
RBC # BLD: 2.78 M/UL (ref 4.7–6.1)
RBC # BLD: 2.99 M/UL (ref 4.7–6.1)
SODIUM BLD-SCNC: 132 MMOL/L (ref 136–145)
TOTAL CK: 181 U/L (ref 39–308)
TOTAL PROTEIN: 5.6 G/DL (ref 6.6–8.7)
TROPONIN: 1.55 NG/ML (ref 0–0.03)
TROPONIN: 1.68 NG/ML (ref 0–0.03)
TROPONIN: 1.98 NG/ML (ref 0–0.03)
TROPONIN: 2.08 NG/ML (ref 0–0.03)
WBC # BLD: 11.9 K/UL (ref 4.8–10.8)
WBC # BLD: 12.5 K/UL (ref 4.8–10.8)
WBC # BLD: 12.5 K/UL (ref 4.8–10.8)

## 2018-10-19 PROCEDURE — 2500000003 HC RX 250 WO HCPCS: Performed by: INTERNAL MEDICINE

## 2018-10-19 PROCEDURE — 84100 ASSAY OF PHOSPHORUS: CPT

## 2018-10-19 PROCEDURE — 85027 COMPLETE CBC AUTOMATED: CPT

## 2018-10-19 PROCEDURE — 2580000003 HC RX 258: Performed by: INTERNAL MEDICINE

## 2018-10-19 PROCEDURE — 99232 SBSQ HOSP IP/OBS MODERATE 35: CPT | Performed by: INTERNAL MEDICINE

## 2018-10-19 PROCEDURE — 2500000003 HC RX 250 WO HCPCS: Performed by: HOSPITALIST

## 2018-10-19 PROCEDURE — 6360000004 HC RX CONTRAST MEDICATION: Performed by: INTERNAL MEDICINE

## 2018-10-19 PROCEDURE — 82550 ASSAY OF CK (CPK): CPT

## 2018-10-19 PROCEDURE — 80053 COMPREHEN METABOLIC PANEL: CPT

## 2018-10-19 PROCEDURE — 6360000002 HC RX W HCPCS: Performed by: HOSPITALIST

## 2018-10-19 PROCEDURE — 84145 PROCALCITONIN (PCT): CPT

## 2018-10-19 PROCEDURE — 71275 CT ANGIOGRAPHY CHEST: CPT

## 2018-10-19 PROCEDURE — 6370000000 HC RX 637 (ALT 250 FOR IP): Performed by: HOSPITALIST

## 2018-10-19 PROCEDURE — 83605 ASSAY OF LACTIC ACID: CPT

## 2018-10-19 PROCEDURE — 93005 ELECTROCARDIOGRAM TRACING: CPT

## 2018-10-19 PROCEDURE — 6360000002 HC RX W HCPCS: Performed by: EMERGENCY MEDICINE

## 2018-10-19 PROCEDURE — 84484 ASSAY OF TROPONIN QUANT: CPT

## 2018-10-19 PROCEDURE — 2700000000 HC OXYGEN THERAPY PER DAY

## 2018-10-19 PROCEDURE — C9113 INJ PANTOPRAZOLE SODIUM, VIA: HCPCS | Performed by: EMERGENCY MEDICINE

## 2018-10-19 PROCEDURE — 6370000000 HC RX 637 (ALT 250 FOR IP): Performed by: INTERNAL MEDICINE

## 2018-10-19 PROCEDURE — 2580000003 HC RX 258: Performed by: HOSPITALIST

## 2018-10-19 PROCEDURE — 2580000003 HC RX 258: Performed by: EMERGENCY MEDICINE

## 2018-10-19 PROCEDURE — 6360000002 HC RX W HCPCS: Performed by: INTERNAL MEDICINE

## 2018-10-19 PROCEDURE — 83735 ASSAY OF MAGNESIUM: CPT

## 2018-10-19 PROCEDURE — 83880 ASSAY OF NATRIURETIC PEPTIDE: CPT

## 2018-10-19 PROCEDURE — 2140000000 HC CCU INTERMEDIATE R&B

## 2018-10-19 PROCEDURE — 36415 COLL VENOUS BLD VENIPUNCTURE: CPT

## 2018-10-19 RX ORDER — ASPIRIN 325 MG
325 TABLET ORAL DAILY
Status: DISCONTINUED | OUTPATIENT
Start: 2018-10-19 | End: 2018-10-24 | Stop reason: HOSPADM

## 2018-10-19 RX ORDER — FUROSEMIDE 40 MG/1
40 TABLET ORAL 2 TIMES DAILY
Status: DISCONTINUED | OUTPATIENT
Start: 2018-10-20 | End: 2018-10-21

## 2018-10-19 RX ORDER — FUROSEMIDE 10 MG/ML
60 INJECTION INTRAMUSCULAR; INTRAVENOUS ONCE
Status: COMPLETED | OUTPATIENT
Start: 2018-10-19 | End: 2018-10-19

## 2018-10-19 RX ORDER — HEPARIN SODIUM 5000 [USP'U]/ML
5000 INJECTION, SOLUTION INTRAVENOUS; SUBCUTANEOUS EVERY 8 HOURS SCHEDULED
Status: DISCONTINUED | OUTPATIENT
Start: 2018-10-19 | End: 2018-10-20

## 2018-10-19 RX ORDER — CLOPIDOGREL BISULFATE 75 MG/1
75 TABLET ORAL DAILY
Status: DISCONTINUED | OUTPATIENT
Start: 2018-10-19 | End: 2018-10-24 | Stop reason: HOSPADM

## 2018-10-19 RX ADMIN — SODIUM CHLORIDE 8 MG/HR: 9 INJECTION, SOLUTION INTRAVENOUS at 08:36

## 2018-10-19 RX ADMIN — CLOPIDOGREL BISULFATE 75 MG: 75 TABLET ORAL at 18:47

## 2018-10-19 RX ADMIN — METOPROLOL TARTRATE 12.5 MG: 25 TABLET ORAL at 21:47

## 2018-10-19 RX ADMIN — MEROPENEM 1 G: 1 INJECTION, POWDER, FOR SOLUTION INTRAVENOUS at 03:49

## 2018-10-19 RX ADMIN — Medication 30 MG: at 21:50

## 2018-10-19 RX ADMIN — Medication: at 08:28

## 2018-10-19 RX ADMIN — IOPAMIDOL 90 ML: 755 INJECTION, SOLUTION INTRAVENOUS at 09:00

## 2018-10-19 RX ADMIN — Medication 30 MG: at 09:45

## 2018-10-19 RX ADMIN — FUROSEMIDE 60 MG: 10 INJECTION, SOLUTION INTRAMUSCULAR; INTRAVENOUS at 18:47

## 2018-10-19 RX ADMIN — HEPARIN SODIUM 5000 UNITS: 5000 INJECTION, SOLUTION INTRAVENOUS; SUBCUTANEOUS at 21:51

## 2018-10-19 RX ADMIN — SODIUM BICARBONATE 650 MG: 650 TABLET ORAL at 18:47

## 2018-10-19 RX ADMIN — SODIUM BICARBONATE 650 MG: 650 TABLET ORAL at 21:50

## 2018-10-19 RX ADMIN — SODIUM BICARBONATE 650 MG: 650 TABLET ORAL at 09:44

## 2018-10-19 RX ADMIN — MEROPENEM 1 G: 1 INJECTION, POWDER, FOR SOLUTION INTRAVENOUS at 18:47

## 2018-10-19 RX ADMIN — ASPIRIN 325 MG ORAL TABLET 325 MG: 325 PILL ORAL at 18:23

## 2018-10-19 RX ADMIN — METOPROLOL TARTRATE 12.5 MG: 25 TABLET ORAL at 09:44

## 2018-10-19 RX ADMIN — POTASSIUM PHOSPHATE, MONOBASIC AND POTASSIUM PHOSPHATE, DIBASIC 15 MMOL: 224; 236 INJECTION, SOLUTION INTRAVENOUS at 18:48

## 2018-10-19 RX ADMIN — POTASSIUM PHOSPHATE, MONOBASIC AND POTASSIUM PHOSPHATE, DIBASIC 15 MMOL: 224; 236 INJECTION, SOLUTION INTRAVENOUS at 20:19

## 2018-10-19 ASSESSMENT — PAIN SCALES - GENERAL
PAINLEVEL_OUTOF10: 0

## 2018-10-19 NOTE — PROGRESS NOTES
GI  - PROGRESS NOTE    Admit Date: 10/16/2018             Chief Complaint: no further bleeding. H/H stable 8-10-9    Patient being seen for:  Gi bleeding     DIET FULL LIQUID;                         Bowel movement: no black or bloody stools    Pain:None  Nausea:None    Intake/Output Summary (Last 24 hours) at 10/19/18 1311  Last data filed at 10/19/18 0842   Gross per 24 hour   Intake          3015.46 ml   Output             1470 ml   Net          1545.46 ml               Lab /Radiology:   Scheduled Meds: Reviewed          -----------------------------------------------------------------          Recent Labs      10/18/18   1820  10/19/18   0649  10/19/18   1136   WBC  21.0*  11.9*  12.5*   RBC  3.32*  2.78*  2.99*   HGB  10.1*  8.3*  9.0*   HCT  29.3*  25.8*  28.0*   PLT  203  186  196     Recent Labs      10/16/18   1606  10/17/18   1406  10/19/18   0649   NA  133*  131*  132*   K  4.5  4.8  4.2   ANIONGAP  15  19  12   CL  99  98  99   CO2  19*  14*  21*   BUN  51*  38*  19   CREATININE  1.1  1.2  0.9   GLUCOSE  146*  209*  176*   CALCIUM  9.5  8.0*  7.9*     Recent Labs      10/17/18   1406  10/19/18   0649   AST  289*  209*   ALT  169*  345*   BILITOT  0.6  0.8   ALKPHOS  58  70     No results for input(s): AMYLASE, LIPASE in the last 72 hours. Troponin T:   Recent Labs      10/18/18   2100  10/19/18   0228  10/19/18   0649   TROPONINI  0.59*  1.55*  1.68*     Pro-BNP: No results for input(s): BNP in the last 72 hours. INR: No results for input(s): INR in the last 72 hours. Physical Exam:   Vitals: /63   Pulse 106   Temp 98.2 °F (36.8 °C) (Temporal)   Resp 16   Ht 5' 11\" (1.803 m)   Wt 244 lb 6.4 oz (110.9 kg)   SpO2 100%   BMI 34.09 kg/m²     HEENT: Pupils equal, round, reactive to light       Neck supple. Trachea midline. No crepitus  Lungs: breath sounds bilaterally.   Normal excursion  Heart[de-identified]    RRR without heave or thrill  Abdomen: soft, non-tender; bowel sounds normal; no

## 2018-10-19 NOTE — PROGRESS NOTES
Acute on chronic systolic congestive heart failure (HCC)        Dieulafoy lesion of stomach        Hypotension due to hypovolemia        Syncope and collapse        Hypertension              Subjective:  Mr. Lilo Lomax seen today for follow-up assessment. A little bit dyspneic. He had a CTA of the chest earlier today showed no evidence of pulmonary embolism. Denies chest pain. Hemoglobin 8.3 earlier. No other complaints. Objective:   /63   Pulse 106   Temp 98.2 °F (36.8 °C) (Temporal)   Resp 16   Ht 5' 11\" (1.803 m)   Wt 244 lb 6.4 oz (110.9 kg)   SpO2 100%   BMI 34.09 kg/m²       Intake/Output Summary (Last 24 hours) at 10/19/18 1313  Last data filed at 10/19/18 0842   Gross per 24 hour   Intake          3015.46 ml   Output             1470 ml   Net          1545.46 ml       Prior to Admission medications    Medication Sig Start Date End Date Taking?  Authorizing Provider   metFORMIN (GLUCOPHAGE) 1000 MG tablet Take 1 tablet by mouth 2 times daily (with meals) 9/24/18  Yes JOSE ELIAS Reynaga   cyanocobalamin 1000 MCG/ML injection Inject 1 mL into the muscle every 30 days 9/11/18  Yes ROSS Jimenez DO   furosemide (LASIX) 40 MG tablet TAKE 1 TABLET BY MOUTH TWICE DAILY 7/13/18  Yes JOSE ELIAS Lovell   spironolactone (ALDACTONE) 25 MG tablet TAKE 1 TABLET DAILY 6/4/18  Yes Myrna Boas, APRN   atorvastatin (LIPITOR) 40 MG tablet Take 1 tablet by mouth daily 5/15/18  Yes JOSE ELIAS Lovell   Dulaglutide 0.75 MG/0.5ML SOPN Inject 0.75 mg into the skin every 7 days 1/15/18  Yes ROSS Jimenez DO   ramipril (ALTACE) 10 MG capsule TAKE 2 CAPSULES DAILY  Patient taking differently: ONE TABLET NIGHTLY 11/3/17  Yes Myrna Boas, APRN   nitroGLYCERIN (NITROLINGUAL) 0.4 MG/SPRAY 0.4 mg spray Place 1 spray under the tongue every 5 minutes as needed for Chest pain 10/27/17  Yes ROSS Jimenez DO   metFORMIN (GLUCOPHAGE) 1000 MG tablet Take 1 tablet by mouth 2 times daily (with meals) 9/29/16  Yes B [place] December, DO   Omega-3 Fatty Acids (FISH OIL) 1000 MG CAPS Take 3,000 mg by mouth daily   Yes Historical Provider, MD   diphenhydrAMINE (BENADRYL) 25 MG tablet Take 50 mg by mouth nightly as needed for Itching   Yes Historical Provider, MD   aspirin 325 MG tablet Take 325 mg by mouth daily. Yes Historical Provider, MD   niacin (NIASPAN) 1000 MG CR tablet Take 1,000 mg by mouth nightly. Yes Historical Provider, MD   metoprolol tartrate (LOPRESSOR) 50 MG tablet Take 1 tablet by mouth 2 times daily 10/18/18   B [place] December, DO   clopidogrel (PLAVIX) 75 MG tablet Take 1 tablet by mouth daily 10/18/18   B [place] December, DO   ramipril (ALTACE) 10 MG capsule Take 2 capsules by mouth daily 10/18/18   B [place] December, DO   triamcinolone (KENALOG) 0.1 % cream Apply topically 2 times daily.  3/30/16   ROSS [place] December, DO        LORazepam  0.5 mg Oral QPM    dextromethorphan  30 mg Oral 2 times per day    sodium bicarbonate  650 mg Oral 4x Daily    metoprolol tartrate  12.5 mg Oral BID    sodium chloride  1,000 mL Intravenous Once    meropenem  1 g Intravenous Q8H    sodium chloride  1,000 mL Intravenous Once    sodium chloride  500 mL Intravenous Once       TELEMETRY: Sinus     Physical Exam:      Physical Exam      General:  Awake, alert, NAD  Skin:  Warm and dry  Neck:  no jvd , no carotid bruits  Chest:  Clear to auscultation, no wheezing or rales  Cardiovascular:  RRR B9D3 no murmurs, clicks, gallups, or rubs  Abdomen:  Soft nontender, nondistended, bowel sounds present  Extremities:  Edema: none      Lab Data:  CBC: Recent Labs      10/18/18   1820  10/19/18   0649  10/19/18   1136   WBC  21.0*  11.9*  12.5*   HGB  10.1*  8.3*  9.0*   HCT  29.3*  25.8*  28.0*   MCV  88.3  92.8  93.6   PLT  203  186  196     BMP: Recent Labs      10/16/18   1606  10/17/18   0621  10/17/18   1406  10/19/18   0649   NA  133*   --   131*  132*   K  4.5   --   4.8  4.2   CL  99 effusion. No pneumothorax. The cardiomediastinal silhouette and pulmonary vascularity are within normal limits. The osseous structures and surrounding soft tissues demonstrate no acute abnormality. 1. Reidentified cardiomegaly which is stable. Central pulmonary congestion with what appears to be mild interstitial edema. No dense consolidations. Signed by Dr Desmond Whelan on 10/18/2018 7:50 AM    Xr Chest Portable    Result Date: 10/17/2018  EXAMINATION: XR CHEST PORTABLE 10/17/2018 12:07 PM HISTORY: Sepsis. Report: Comparison is made with the chest x-ray 10/16/2018 0800 hours. Inspiratory effort is suboptimal, the lungs are hypoaerated and there is mild central vascular congestion and basilar vascular crowding, mild cardiomegaly is present. There is no focal infiltrate or consolidation. No pneumothorax or pleural effusion is seen. Previous median sternotomy is evident. The osseous structures show nothing acute. Pulmonary hypoventilation with mild cardiomegaly and central vascular congestion, no evidence of pulmonary edema or consolidating pneumonia. Signed by Dr Compa Parks on 10/17/2018 12:10 PM    Cta Pulmonary W Contrast    Result Date: 10/19/2018  History: 55-year-old with CHF. Reference: None. Technique: Following the administration of intravenous contrast, helical acquisition was obtained from the thoracic inlet through the diaphragm during the arterial phase. Multiplanar reconstructed images including 3D MIP were obtained. For this CT exam, one or more of the following dose reduction techniques was employed: -automated exposure control -mA and/or kVp adjustment for patient size -iterative reconstruction  mGy-cm Findings: Vascular findings: Satisfactory opacification of the pulmonary arteries without embolus. No thoracic aortic aneurysm or dissection. Prior median sternotomy/CABG. Heart size upper normal. No pericardial effusion. Mild stenosis of the proximal left subclavian artery.

## 2018-10-20 ENCOUNTER — APPOINTMENT (OUTPATIENT)
Dept: NUCLEAR MEDICINE | Age: 68
DRG: 378 | End: 2018-10-20
Payer: COMMERCIAL

## 2018-10-20 LAB
ALBUMIN SERPL-MCNC: 2.8 G/DL (ref 3.5–5.2)
ALP BLD-CCNC: 75 U/L (ref 40–130)
ALT SERPL-CCNC: 271 U/L (ref 5–41)
ANION GAP SERPL CALCULATED.3IONS-SCNC: 13 MMOL/L (ref 7–19)
AST SERPL-CCNC: 109 U/L (ref 5–40)
BILIRUB SERPL-MCNC: 0.7 MG/DL (ref 0.2–1.2)
BUN BLDV-MCNC: 16 MG/DL (ref 8–23)
CALCIUM IONIZED: 0.95 MMOL/L (ref 1.12–1.32)
CALCIUM SERPL-MCNC: 7.6 MG/DL (ref 8.8–10.2)
CHLORIDE BLD-SCNC: 95 MMOL/L (ref 98–111)
CO2: 19 MMOL/L (ref 22–29)
CREAT SERPL-MCNC: 0.9 MG/DL (ref 0.5–1.2)
GFR NON-AFRICAN AMERICAN: >60
GLUCOSE BLD-MCNC: 161 MG/DL (ref 74–109)
HCT VFR BLD CALC: 24.7 % (ref 42–52)
HCT VFR BLD CALC: 25.5 % (ref 42–52)
HCT VFR BLD CALC: 26.3 % (ref 42–52)
HCT VFR BLD CALC: 29.3 % (ref 42–52)
HEMOGLOBIN: 8.1 G/DL (ref 14–18)
HEMOGLOBIN: 8.3 G/DL (ref 14–18)
HEMOGLOBIN: 8.4 G/DL (ref 14–18)
HEMOGLOBIN: 8.4 G/DL (ref 14–18)
MAGNESIUM: 1.9 MG/DL (ref 1.6–2.4)
MCH RBC QN AUTO: 30.1 PG (ref 27–31)
MCH RBC QN AUTO: 30.3 PG (ref 27–31)
MCH RBC QN AUTO: 30.3 PG (ref 27–31)
MCH RBC QN AUTO: 30.6 PG (ref 27–31)
MCHC RBC AUTO-ENTMCNC: 28.3 G/DL (ref 33–37)
MCHC RBC AUTO-ENTMCNC: 31.9 G/DL (ref 33–37)
MCHC RBC AUTO-ENTMCNC: 32.8 G/DL (ref 33–37)
MCHC RBC AUTO-ENTMCNC: 32.9 G/DL (ref 33–37)
MCV RBC AUTO: 106.9 FL (ref 80–94)
MCV RBC AUTO: 92.1 FL (ref 80–94)
MCV RBC AUTO: 93.2 FL (ref 80–94)
MCV RBC AUTO: 94.3 FL (ref 80–94)
PARATHYROID HORMONE INTACT: 136.4 PG/ML (ref 15–65)
PDW BLD-RTO: 16.9 % (ref 11.5–14.5)
PDW BLD-RTO: 17 % (ref 11.5–14.5)
PDW BLD-RTO: 17.2 % (ref 11.5–14.5)
PDW BLD-RTO: 17.6 % (ref 11.5–14.5)
PHOSPHORUS  URINE: 9.2 MG/DL
PHOSPHORUS: 1.8 MG/DL (ref 2.5–4.5)
PLATELET # BLD: 191 K/UL (ref 130–400)
PLATELET # BLD: 200 K/UL (ref 130–400)
PLATELET # BLD: 201 K/UL (ref 130–400)
PLATELET # BLD: 209 K/UL (ref 130–400)
PMV BLD AUTO: 10.5 FL (ref 9.4–12.4)
PMV BLD AUTO: 10.6 FL (ref 9.4–12.4)
PMV BLD AUTO: 10.7 FL (ref 9.4–12.4)
PMV BLD AUTO: 10.8 FL (ref 9.4–12.4)
POTASSIUM SERPL-SCNC: 4.4 MMOL/L (ref 3.5–5)
RBC # BLD: 2.65 M/UL (ref 4.7–6.1)
RBC # BLD: 2.74 M/UL (ref 4.7–6.1)
RBC # BLD: 2.77 M/UL (ref 4.7–6.1)
RBC # BLD: 2.79 M/UL (ref 4.7–6.1)
SODIUM BLD-SCNC: 127 MMOL/L (ref 136–145)
TOTAL CK: 120 U/L (ref 39–308)
TOTAL PROTEIN: 5.3 G/DL (ref 6.6–8.7)
TROPONIN: 2.24 NG/ML (ref 0–0.03)
TROPONIN: 2.29 NG/ML (ref 0–0.03)
TROPONIN: 2.4 NG/ML (ref 0–0.03)
TROPONIN: 2.57 NG/ML (ref 0–0.03)
TROPONIN: 2.57 NG/ML (ref 0–0.03)
WBC # BLD: 10.4 K/UL (ref 4.8–10.8)
WBC # BLD: 10.5 K/UL (ref 4.8–10.8)
WBC # BLD: 10.7 K/UL (ref 4.8–10.8)
WBC # BLD: 12.5 K/UL (ref 4.8–10.8)

## 2018-10-20 PROCEDURE — 93017 CV STRESS TEST TRACING ONLY: CPT

## 2018-10-20 PROCEDURE — 84100 ASSAY OF PHOSPHORUS: CPT

## 2018-10-20 PROCEDURE — 80053 COMPREHEN METABOLIC PANEL: CPT

## 2018-10-20 PROCEDURE — 84105 ASSAY OF URINE PHOSPHORUS: CPT

## 2018-10-20 PROCEDURE — 93005 ELECTROCARDIOGRAM TRACING: CPT

## 2018-10-20 PROCEDURE — 6360000002 HC RX W HCPCS: Performed by: INTERNAL MEDICINE

## 2018-10-20 PROCEDURE — 6360000002 HC RX W HCPCS: Performed by: EMERGENCY MEDICINE

## 2018-10-20 PROCEDURE — 99232 SBSQ HOSP IP/OBS MODERATE 35: CPT | Performed by: INTERNAL MEDICINE

## 2018-10-20 PROCEDURE — 82330 ASSAY OF CALCIUM: CPT

## 2018-10-20 PROCEDURE — 2580000003 HC RX 258: Performed by: HOSPITALIST

## 2018-10-20 PROCEDURE — 84484 ASSAY OF TROPONIN QUANT: CPT

## 2018-10-20 PROCEDURE — 2700000000 HC OXYGEN THERAPY PER DAY

## 2018-10-20 PROCEDURE — 78452 HT MUSCLE IMAGE SPECT MULT: CPT

## 2018-10-20 PROCEDURE — 6360000002 HC RX W HCPCS: Performed by: HOSPITALIST

## 2018-10-20 PROCEDURE — 82550 ASSAY OF CK (CPK): CPT

## 2018-10-20 PROCEDURE — C9113 INJ PANTOPRAZOLE SODIUM, VIA: HCPCS | Performed by: EMERGENCY MEDICINE

## 2018-10-20 PROCEDURE — 85027 COMPLETE CBC AUTOMATED: CPT

## 2018-10-20 PROCEDURE — 6370000000 HC RX 637 (ALT 250 FOR IP): Performed by: HOSPITALIST

## 2018-10-20 PROCEDURE — 36415 COLL VENOUS BLD VENIPUNCTURE: CPT

## 2018-10-20 PROCEDURE — 83735 ASSAY OF MAGNESIUM: CPT

## 2018-10-20 PROCEDURE — 83970 ASSAY OF PARATHORMONE: CPT

## 2018-10-20 PROCEDURE — 3430000000 HC RX DIAGNOSTIC RADIOPHARMACEUTICAL: Performed by: INTERNAL MEDICINE

## 2018-10-20 PROCEDURE — 6370000000 HC RX 637 (ALT 250 FOR IP): Performed by: INTERNAL MEDICINE

## 2018-10-20 PROCEDURE — 2580000003 HC RX 258: Performed by: EMERGENCY MEDICINE

## 2018-10-20 PROCEDURE — A9500 TC99M SESTAMIBI: HCPCS | Performed by: INTERNAL MEDICINE

## 2018-10-20 PROCEDURE — 2140000000 HC CCU INTERMEDIATE R&B

## 2018-10-20 RX ORDER — DEXTROMETHORPHAN POLISTIREX 30 MG/5ML
30 SUSPENSION ORAL EVERY 8 HOURS PRN
Status: DISCONTINUED | OUTPATIENT
Start: 2018-10-20 | End: 2018-10-24 | Stop reason: HOSPADM

## 2018-10-20 RX ADMIN — MEROPENEM 1 G: 1 INJECTION, POWDER, FOR SOLUTION INTRAVENOUS at 14:52

## 2018-10-20 RX ADMIN — SODIUM BICARBONATE 650 MG: 650 TABLET ORAL at 19:59

## 2018-10-20 RX ADMIN — MEROPENEM 1 G: 1 INJECTION, POWDER, FOR SOLUTION INTRAVENOUS at 04:33

## 2018-10-20 RX ADMIN — SODIUM CHLORIDE 8 MG/HR: 9 INJECTION, SOLUTION INTRAVENOUS at 01:13

## 2018-10-20 RX ADMIN — SODIUM BICARBONATE 650 MG: 650 TABLET ORAL at 13:35

## 2018-10-20 RX ADMIN — FUROSEMIDE 40 MG: 40 TABLET ORAL at 20:00

## 2018-10-20 RX ADMIN — TETRAKIS(2-METHOXYISOBUTYLISOCYANIDE)COPPER(I) TETRAFLUOROBORATE 30 MILLICURIE: 1 INJECTION, POWDER, LYOPHILIZED, FOR SOLUTION INTRAVENOUS at 13:23

## 2018-10-20 RX ADMIN — SODIUM CHLORIDE 8 MG/HR: 9 INJECTION, SOLUTION INTRAVENOUS at 22:12

## 2018-10-20 RX ADMIN — MEROPENEM 1 G: 1 INJECTION, POWDER, FOR SOLUTION INTRAVENOUS at 19:59

## 2018-10-20 RX ADMIN — SODIUM BICARBONATE 650 MG: 650 TABLET ORAL at 16:48

## 2018-10-20 RX ADMIN — Medication 30 MG: at 23:57

## 2018-10-20 RX ADMIN — REGADENOSON 0.4 MG: 0.08 INJECTION, SOLUTION INTRAVENOUS at 12:45

## 2018-10-20 RX ADMIN — ASPIRIN 325 MG ORAL TABLET 325 MG: 325 PILL ORAL at 09:44

## 2018-10-20 RX ADMIN — CLOPIDOGREL BISULFATE 75 MG: 75 TABLET ORAL at 09:44

## 2018-10-20 RX ADMIN — FUROSEMIDE 40 MG: 40 TABLET ORAL at 09:44

## 2018-10-20 RX ADMIN — METOPROLOL TARTRATE 25 MG: 25 TABLET ORAL at 20:00

## 2018-10-20 RX ADMIN — SODIUM CHLORIDE 8 MG/HR: 9 INJECTION, SOLUTION INTRAVENOUS at 09:43

## 2018-10-20 RX ADMIN — METOPROLOL TARTRATE 12.5 MG: 25 TABLET ORAL at 09:44

## 2018-10-20 RX ADMIN — SODIUM BICARBONATE 650 MG: 650 TABLET ORAL at 09:44

## 2018-10-20 RX ADMIN — TETRAKIS(2-METHOXYISOBUTYLISOCYANIDE)COPPER(I) TETRAFLUOROBORATE 10 MILLICURIE: 1 INJECTION, POWDER, LYOPHILIZED, FOR SOLUTION INTRAVENOUS at 13:23

## 2018-10-20 ASSESSMENT — PAIN SCALES - GENERAL
PAINLEVEL_OUTOF10: 0

## 2018-10-20 NOTE — PROGRESS NOTES
injury) ([facility] [address]    Dieulafoy lesion of stomach    Palliative care patient    Elevated troponin    Hypertension    Hypophosphatemia    Pleural effusion, bilateral  Resolved Problems:    * No resolved hospital problems. *      UGIB, secondary to Dieulafoy's Lesion, s/p EGD (10/16/2018), S/p Epi injection and Clipping  - S/p PRBC Transfusion   - GI on Board   - H& H now stable  - Monitor H&H Q8H  - Continue Pantoprazole  ggt    # History of combined systolic and diastolic heart failure  - CTA reports bilateral small to moderate pleural effusion   - Exertional shortness of breath clinically with the patient  - Continue to require oxygen  --> Furosemide 60 mg IV ×1 dose given now  - Will Restart patient's home furosemide 40 mg by mouth twice a day  - Monitor I's and O's  - Fluid restriction: 1 L  - Continue to monitor    # Hx of CAD, Previous hx of  PCI and CABG   - Okay to restart Aspirin and Clopidogrel (Plavix)  - Elevated Troponin -- Trending Up  - Cardiology on Board  - Suspect, likely Demand Ischemia  - Continue Current management per Cardiology recommendation  - Consider diagnostic cardiac cath    Echo:   Severe global hypokinesis   Severely reduced LV function Estimated EF: 20%. Segmental wall motion abnormalities present including inferior wall and apex  Grade II diastolic dysfunction.       Severe sepsis -   - Leukocytosis (Improving), with Persistent Tachycardia  - Lactic acidosis  - Source of infection unclear  - continue Meropenem  - Blood culture no growth so far   - Follow up Procalcitonin level    Hypophospatemia   - Phosphate level: 1.7 mg/dL yesterday (10/19/2018)  - Replaced --> K-phos 15 mmol IV x 2 dose - to raise his phosphate level by about 1.4 mg/dL  - Phos level: 1.8 mg/dL today (10/20/2018)    - Will check;  --> PTH level and   --> Urine Phosphorous Level    Prophylaxis Orders:   Antibiotic: Meropenem  DVT Prophylaxis: Heparin  GI prophylaxis:  Pantoprazole for UGIB      NutritionOrders: DIET FULL LIQUID;        Consults  IP CONSULT TO GI  IP CONSULT TO CARDIOLOGY  PALLIATIVE CARE EVAL      DISCHARGE PLAN: TBD           Electronically signed by   Tammy Kirkpatrick MD   10/20/2018 7:51 AM

## 2018-10-20 NOTE — PROGRESS NOTES
Cardiology Daily Note Alexa Arriola MD      Patient:  Javier Dan  [id number]    Patient Active Problem List    Diagnosis Date Noted    Hypophosphatemia 10/19/2018     Priority: Low    Pleural effusion, bilateral 10/19/2018     Priority: Low    Hypertension      Priority: Low    Palliative care patient 10/17/2018     Priority: Low    Elevated troponin      Priority: Low    GI bleed 10/16/2018     Priority: Low    NSAID long-term use 10/16/2018     Priority: Low    Hypotension due to hypovolemia 10/16/2018     Priority: Low    Acute blood loss anemia 10/16/2018     Priority: Low    Lactic acidosis 10/16/2018     Priority: Low    Chest pain      Priority: Low    Elevated lactic acid level      Priority: Low    Syncope and collapse      Priority: Low    SIRS (systemic inflammatory response syndrome) ([facility])      Priority: Low    TONY (acute kidney injury) ([facility] [address]      Priority: Low    Dieulafoy lesion of stomach      Priority: Low    Acute on chronic systolic congestive heart failure ([facility] [address] 02/07/2018     Priority: Low    CAD (coronary artery disease) 01/20/2015     Priority: Low    DM2 (diabetes mellitus, type 2) ([facility] [address] 01/20/2015     Priority: Low    HTN (hypertension) 01/20/2015     Priority: Low    Hyperlipidemia with target LDL less than 100 01/20/2015     Priority: Low    Carotid artery occlusion without infarction 03/11/2011     Priority: Low       Admit Date:  10/16/2018    Admission Problem List: Present on Admission:   GI bleed   NSAID long-term use   CAD (coronary artery disease)   DM2 (diabetes mellitus, type 2) ([facility])   Hypotension due to hypovolemia   Acute blood loss anemia   Lactic acidosis   Syncope and collapse   SIRS (systemic inflammatory response syndrome) ([facility] [address]   TONY (acute kidney injury) ([facility] [address]   Dieulafoy lesion of stomach   Palliative care patient   Elevated troponin   Hypertension   Hypophosphatemia   Pleural effusion, bilateral   Elevated lactic acid level      Cardiac Specific Data:  Specialty Problems        Cardiology Problems    Carotid artery occlusion without infarction        CAD (coronary artery disease)        HTN (hypertension)        Acute on chronic systolic congestive heart failure (HCC)        Dieulafoy lesion of stomach        Hypotension due to hypovolemia        Syncope and collapse        Hypertension              Subjective:  Mr. Rona Wu seen today for follow-up assessments. Still mildly dyspneic denies chest pain. No new complaints. Last hemoglobin 8.3. Objective:   /70   Pulse 103   Temp 97 °F (36.1 °C) (Temporal)   Resp 20   Ht 5' 11\" (1.803 m)   Wt 251 lb 14.4 oz (114.3 kg)   SpO2 99%   BMI 35.13 kg/m²       Intake/Output Summary (Last 24 hours) at 10/20/18 1121  Last data filed at 10/20/18 0847   Gross per 24 hour   Intake              320 ml   Output             2300 ml   Net            -1980 ml       Prior to Admission medications    Medication Sig Start Date End Date Taking?  Authorizing Provider   metFORMIN (GLUCOPHAGE) 1000 MG tablet Take 1 tablet by mouth 2 times daily (with meals) 9/24/18  Yes JOSE ELIAS Butcher   cyanocobalamin 1000 MCG/ML injection Inject 1 mL into the muscle every 30 days 9/11/18  Yes ROSS Hernandez DO   furosemide (LASIX) 40 MG tablet TAKE 1 TABLET BY MOUTH TWICE DAILY 7/13/18  Yes JOSE ELIAS Lovell   spironolactone (ALDACTONE) 25 MG tablet TAKE 1 TABLET DAILY 6/4/18  Yes JOSE ELIAS Chow   atorvastatin (LIPITOR) 40 MG tablet Take 1 tablet by mouth daily 5/15/18  Yes JOSE ELIAS Lovell   Dulaglutide 0.75 MG/0.5ML SOPN Inject 0.75 mg into the skin every 7 days 1/15/18  Yes ROSS Hernandez DO   ramipril (ALTACE) 10 MG capsule TAKE 2 CAPSULES DAILY  Patient taking differently: ONE TABLET NIGHTLY 11/3/17  Yes JOSE ELIAS Chow   nitroGLYCERIN (NITROLINGUAL) 0.4 MG/SPRAY 0.4 mg spray Place 1 spray under the tongue every 5 minutes as needed for Chest pain 10/27/17

## 2018-10-21 LAB
ALBUMIN SERPL-MCNC: 3 G/DL (ref 3.5–5.2)
ALP BLD-CCNC: 79 U/L (ref 40–130)
ALT SERPL-CCNC: 192 U/L (ref 5–41)
ANION GAP SERPL CALCULATED.3IONS-SCNC: 14 MMOL/L (ref 7–19)
AST SERPL-CCNC: 46 U/L (ref 5–40)
BILIRUB SERPL-MCNC: 0.7 MG/DL (ref 0.2–1.2)
BLOOD CULTURE, ROUTINE: NORMAL
BUN BLDV-MCNC: 13 MG/DL (ref 8–23)
CALCIUM SERPL-MCNC: 7.9 MG/DL (ref 8.8–10.2)
CHLORIDE BLD-SCNC: 95 MMOL/L (ref 98–111)
CO2: 24 MMOL/L (ref 22–29)
CREAT SERPL-MCNC: 0.9 MG/DL (ref 0.5–1.2)
CULTURE, BLOOD 2: NORMAL
EKG P AXIS: NORMAL DEGREES
EKG P-R INTERVAL: NORMAL MS
EKG Q-T INTERVAL: 360 MS
EKG QRS DURATION: 128 MS
EKG QTC CALCULATION (BAZETT): 454 MS
EKG T AXIS: 110 DEGREES
GFR NON-AFRICAN AMERICAN: >60
GLUCOSE BLD-MCNC: 147 MG/DL (ref 74–109)
HCT VFR BLD CALC: 25 % (ref 42–52)
HCT VFR BLD CALC: 25.5 % (ref 42–52)
HCT VFR BLD CALC: 26 % (ref 42–52)
HCT VFR BLD CALC: 26.3 % (ref 42–52)
HEMOGLOBIN: 8 G/DL (ref 14–18)
HEMOGLOBIN: 8.1 G/DL (ref 14–18)
HEMOGLOBIN: 8.2 G/DL (ref 14–18)
HEMOGLOBIN: 8.5 G/DL (ref 14–18)
MAGNESIUM: 1.6 MG/DL (ref 1.6–2.4)
MCH RBC QN AUTO: 29.5 PG (ref 27–31)
MCH RBC QN AUTO: 29.7 PG (ref 27–31)
MCH RBC QN AUTO: 29.9 PG (ref 27–31)
MCH RBC QN AUTO: 30.1 PG (ref 27–31)
MCHC RBC AUTO-ENTMCNC: 31.5 G/DL (ref 33–37)
MCHC RBC AUTO-ENTMCNC: 31.8 G/DL (ref 33–37)
MCHC RBC AUTO-ENTMCNC: 32 G/DL (ref 33–37)
MCHC RBC AUTO-ENTMCNC: 32.3 G/DL (ref 33–37)
MCV RBC AUTO: 93.3 FL (ref 80–94)
MCV RBC AUTO: 93.3 FL (ref 80–94)
MCV RBC AUTO: 93.4 FL (ref 80–94)
MCV RBC AUTO: 93.5 FL (ref 80–94)
PDW BLD-RTO: 16.3 % (ref 11.5–14.5)
PDW BLD-RTO: 16.4 % (ref 11.5–14.5)
PDW BLD-RTO: 16.7 % (ref 11.5–14.5)
PDW BLD-RTO: 16.8 % (ref 11.5–14.5)
PHOSPHORUS: 2.2 MG/DL (ref 2.5–4.5)
PLATELET # BLD: 211 K/UL (ref 130–400)
PLATELET # BLD: 215 K/UL (ref 130–400)
PLATELET # BLD: 226 K/UL (ref 130–400)
PLATELET # BLD: 229 K/UL (ref 130–400)
PMV BLD AUTO: 10.6 FL (ref 9.4–12.4)
PMV BLD AUTO: 10.6 FL (ref 9.4–12.4)
PMV BLD AUTO: 10.8 FL (ref 9.4–12.4)
PMV BLD AUTO: 11 FL (ref 9.4–12.4)
POTASSIUM SERPL-SCNC: 3.7 MMOL/L (ref 3.5–5)
PROCALCITONIN: <0.07 NG/ML
RBC # BLD: 2.68 M/UL (ref 4.7–6.1)
RBC # BLD: 2.73 M/UL (ref 4.7–6.1)
RBC # BLD: 2.78 M/UL (ref 4.7–6.1)
RBC # BLD: 2.82 M/UL (ref 4.7–6.1)
SODIUM BLD-SCNC: 133 MMOL/L (ref 136–145)
TOTAL CK: 87 U/L (ref 39–308)
TOTAL PROTEIN: 5.6 G/DL (ref 6.6–8.7)
TROPONIN: 2.26 NG/ML (ref 0–0.03)
TROPONIN: 2.53 NG/ML (ref 0–0.03)
WBC # BLD: 10.2 K/UL (ref 4.8–10.8)
WBC # BLD: 11 K/UL (ref 4.8–10.8)
WBC # BLD: 11.5 K/UL (ref 4.8–10.8)
WBC # BLD: 9.2 K/UL (ref 4.8–10.8)

## 2018-10-21 PROCEDURE — 82550 ASSAY OF CK (CPK): CPT

## 2018-10-21 PROCEDURE — 6360000002 HC RX W HCPCS: Performed by: EMERGENCY MEDICINE

## 2018-10-21 PROCEDURE — 36415 COLL VENOUS BLD VENIPUNCTURE: CPT

## 2018-10-21 PROCEDURE — C9113 INJ PANTOPRAZOLE SODIUM, VIA: HCPCS | Performed by: EMERGENCY MEDICINE

## 2018-10-21 PROCEDURE — 84484 ASSAY OF TROPONIN QUANT: CPT

## 2018-10-21 PROCEDURE — 2140000000 HC CCU INTERMEDIATE R&B

## 2018-10-21 PROCEDURE — 80053 COMPREHEN METABOLIC PANEL: CPT

## 2018-10-21 PROCEDURE — 6370000000 HC RX 637 (ALT 250 FOR IP): Performed by: INTERNAL MEDICINE

## 2018-10-21 PROCEDURE — 84100 ASSAY OF PHOSPHORUS: CPT

## 2018-10-21 PROCEDURE — 2580000003 HC RX 258: Performed by: EMERGENCY MEDICINE

## 2018-10-21 PROCEDURE — 6360000002 HC RX W HCPCS: Performed by: HOSPITALIST

## 2018-10-21 PROCEDURE — 2580000003 HC RX 258: Performed by: HOSPITALIST

## 2018-10-21 PROCEDURE — 6360000002 HC RX W HCPCS: Performed by: INTERNAL MEDICINE

## 2018-10-21 PROCEDURE — 6370000000 HC RX 637 (ALT 250 FOR IP): Performed by: HOSPITALIST

## 2018-10-21 PROCEDURE — 93005 ELECTROCARDIOGRAM TRACING: CPT

## 2018-10-21 PROCEDURE — 83735 ASSAY OF MAGNESIUM: CPT

## 2018-10-21 PROCEDURE — 99232 SBSQ HOSP IP/OBS MODERATE 35: CPT | Performed by: INTERNAL MEDICINE

## 2018-10-21 PROCEDURE — 2580000003 HC RX 258: Performed by: INTERNAL MEDICINE

## 2018-10-21 PROCEDURE — 85027 COMPLETE CBC AUTOMATED: CPT

## 2018-10-21 PROCEDURE — 2700000000 HC OXYGEN THERAPY PER DAY

## 2018-10-21 RX ORDER — FUROSEMIDE 10 MG/ML
40 INJECTION INTRAMUSCULAR; INTRAVENOUS 2 TIMES DAILY
Status: DISCONTINUED | OUTPATIENT
Start: 2018-10-21 | End: 2018-10-21

## 2018-10-21 RX ORDER — FUROSEMIDE 10 MG/ML
60 INJECTION INTRAMUSCULAR; INTRAVENOUS ONCE
Status: DISCONTINUED | OUTPATIENT
Start: 2018-10-21 | End: 2018-10-21

## 2018-10-21 RX ADMIN — SODIUM BICARBONATE 650 MG: 650 TABLET ORAL at 19:56

## 2018-10-21 RX ADMIN — ASPIRIN 325 MG ORAL TABLET 325 MG: 325 PILL ORAL at 08:47

## 2018-10-21 RX ADMIN — METOPROLOL TARTRATE 25 MG: 25 TABLET ORAL at 19:57

## 2018-10-21 RX ADMIN — MEROPENEM 1 G: 1 INJECTION, POWDER, FOR SOLUTION INTRAVENOUS at 19:57

## 2018-10-21 RX ADMIN — METOPROLOL TARTRATE 12.5 MG: 25 TABLET ORAL at 08:47

## 2018-10-21 RX ADMIN — CLOPIDOGREL BISULFATE 75 MG: 75 TABLET ORAL at 08:47

## 2018-10-21 RX ADMIN — MEROPENEM 1 G: 1 INJECTION, POWDER, FOR SOLUTION INTRAVENOUS at 11:04

## 2018-10-21 RX ADMIN — FUROSEMIDE 10 MG/HR: 10 INJECTION, SOLUTION INTRAMUSCULAR; INTRAVENOUS at 21:48

## 2018-10-21 RX ADMIN — FUROSEMIDE 10 MG/HR: 10 INJECTION, SOLUTION INTRAMUSCULAR; INTRAVENOUS at 11:59

## 2018-10-21 RX ADMIN — SODIUM CHLORIDE 8 MG/HR: 9 INJECTION, SOLUTION INTRAVENOUS at 20:06

## 2018-10-21 RX ADMIN — SODIUM CHLORIDE 8 MG/HR: 9 INJECTION, SOLUTION INTRAVENOUS at 09:30

## 2018-10-21 RX ADMIN — FUROSEMIDE 40 MG: 40 TABLET ORAL at 08:45

## 2018-10-21 RX ADMIN — SODIUM BICARBONATE 650 MG: 650 TABLET ORAL at 08:45

## 2018-10-21 RX ADMIN — SODIUM BICARBONATE 650 MG: 650 TABLET ORAL at 17:38

## 2018-10-21 RX ADMIN — MEROPENEM 1 G: 1 INJECTION, POWDER, FOR SOLUTION INTRAVENOUS at 03:47

## 2018-10-21 RX ADMIN — SODIUM BICARBONATE 650 MG: 650 TABLET ORAL at 13:47

## 2018-10-21 RX ADMIN — Medication 30 MG: at 12:07

## 2018-10-21 ASSESSMENT — PAIN SCALES - GENERAL
PAINLEVEL_OUTOF10: 0

## 2018-10-21 NOTE — PROGRESS NOTES
GI  - PROGRESS NOTE    Admit Date: 10/16/2018             Chief Complaint: feeling better. Zoë Quintero. Diet. No melana    Patient being seen for:  GI bleed     DIET CARDIAC;                         Bowel movement: no black or bloody stools    Pain:None  Nausea:None    Intake/Output Summary (Last 24 hours) at 10/21/18 1154  Last data filed at 10/21/18 0842   Gross per 24 hour   Intake             2785 ml   Output             3020 ml   Net             -235 ml               Lab /Radiology:   Scheduled Meds: Reviewed          -----------------------------------------------------------------          Recent Labs      10/20/18   2018  10/21/18   0012  10/21/18   0626   WBC  10.7  11.5*  11.0*   RBC  2.79*  2.68*  2.73*   HGB  8.4*  8.0*  8.1*   HCT  26.3*  25.0*  25.5*   PLT  209  215  211     Recent Labs      10/19/18   0649  10/20/18   0610  10/21/18   0626   NA  132*  127*  133*   K  4.2  4.4  3.7   ANIONGAP  12  13  14   CL  99  95*  95*   CO2  21*  19*  24   BUN  19  16  13   CREATININE  0.9  0.9  0.9   GLUCOSE  176*  161*  147*   CALCIUM  7.9*  7.6*  7.9*     Recent Labs      10/19/18   0649  10/20/18   0610  10/21/18   0626   AST  209*  109*  46*   ALT  345*  271*  192*   BILITOT  0.8  0.7  0.7   ALKPHOS  70  75  79     No results for input(s): AMYLASE, LIPASE in the last 72 hours. Troponin T:   Recent Labs      10/20/18   1432  10/20/18   2018  10/21/18   0626   TROPONINI  2.29*  2.57*  2.53*     Pro-BNP: No results for input(s): BNP in the last 72 hours. INR: No results for input(s): INR in the last 72 hours. Physical Exam:   Vitals: /65   Pulse 107   Temp 97.7 °F (36.5 °C) (Temporal)   Resp 16   Ht 5' 11\" (1.803 m)   Wt 252 lb 9.6 oz (114.6 kg)   SpO2 100%   BMI 35.23 kg/m²     HEENT: Pupils equal, round, reactive to light       Neck supple. Trachea midline. No crepitus  Lungs: breath sounds bilaterally.   Normal excursion  Heart[de-identified]    RRR without heave or thrill  Abdomen: soft, non-tender; bowel sounds normal; no masses,  no organomegaly. No encarcerated hernia detected. No organomegaly detected  Extremities: no cyanosis or clubbing  Lymphatic: No significant lymph node enlargement papable in the neck , groin or umbilicus  Neurologic: alert. oriented        Impression:     1. Gastric Dieulafloys, S/P clipping, epi. H/H stable      2. CAD with elevated troponin, back on plavix and asperin    Plan:   1. Cont PPI/Carafate   2. Monitor H/H   3. Cardiac intervention per Pt/cardiology        Caterina Garcia M.D.   Gastroenterology

## 2018-10-21 NOTE — PROGRESS NOTES
He has a normal mood and affect. His behavior is normal. Judgment and thought content normal.   Vitals reviewed. Medications:      pantoprozole (PROTONIX) infusion 8 mg/hr (10/21/18 0535)      clopidogrel  75 mg Oral Daily    aspirin  325 mg Oral Daily    furosemide  40 mg Oral BID    LORazepam  0.5 mg Oral QPM    sodium bicarbonate  650 mg Oral 4x Daily    metoprolol tartrate  12.5 mg Oral BID    meropenem  1 g Intravenous Q8H     dextromethorphan, labetalol, promethazine, acetaminophen, morphine, ondansetron  DIET CARDIAC;     Lab and other Data:     Recent Labs      10/20/18   2018  10/21/18   0012  10/21/18   0626   WBC  10.7  11.5*  11.0*   HGB  8.4*  8.0*  8.1*   PLT  209  215  211     Recent Labs      10/19/18   0649  10/20/18   0610  10/21/18   0626   NA  132*  127*  133*   K  4.2  4.4  3.7   CL  99  95*  95*   CO2  21*  19*  24   BUN  19  16  13   CREATININE  0.9  0.9  0.9   GLUCOSE  176*  161*  147*     Recent Labs      10/19/18   0649  10/20/18   0610  10/21/18   0626   AST  209*  109*  46*   ALT  345*  271*  192*   BILITOT  0.8  0.7  0.7   ALKPHOS  70  75  79     Troponin T:   Recent Labs      10/20/18   1432  10/20/18   2018  10/21/18   0626   TROPONINI  2.29*  2.57*  2.53*     Pro-BNP: No results for input(s): BNP in the last 72 hours. INR:   No results for input(s): INR in the last 72 hours. ABGs: No results found for: PHART, PO2ART, WYU4RSP  UA:  No results for input(s): NITRITE, COLORU, PHUR, LABCAST, WBCUA, RBCUA, MUCUS, TRICHOMONAS, YEAST, BACTERIA, CLARITYU, SPECGRAV, LEUKOCYTESUR, UROBILINOGEN, BILIRUBINUR, BLOODU, GLUCOSEU, AMORPHOUS in the last 72 hours. Invalid input(s): Tiffanie Ramirez    RAD:   XR ACUTE ABD SERIES CHEST 1 VW 10/16/2018 8:23 AM   HISTORY: Loss of appetite, vomiting. Report: A frontal view the chest, upright and supine views of the abdomen were obtained, the chest x-rays compared with the study from 2/13/2013. There are no recent abdominal series for comparison.

## 2018-10-21 NOTE — PROGRESS NOTES
level      Cardiac Specific Data:  Specialty Problems        Cardiology Problems    Carotid artery occlusion without infarction        CAD (coronary artery disease)        HTN (hypertension)        Acute on chronic systolic congestive heart failure (HCC)        Dieulafoy lesion of stomach        Hypotension due to hypovolemia        Syncope and collapse        Hypertension              Subjective:  Mr. Hsu Pleasure seen today resting comfortably. Stress test yesterday showed ejection fraction 24% inferolateral scar and some lateral ischemia. Denies chest pain. Dyspnea about the same. No reported GI bleeding. No other difficulties. Objective:   /65   Pulse 107   Temp 97.7 °F (36.5 °C) (Temporal)   Resp 16   Ht 5' 11\" (1.803 m)   Wt 252 lb 9.6 oz (114.6 kg)   SpO2 100%   BMI 35.23 kg/m²       Intake/Output Summary (Last 24 hours) at 10/21/18 1300  Last data filed at 10/21/18 1159   Gross per 24 hour   Intake             2785 ml   Output             3520 ml   Net             -735 ml       Prior to Admission medications    Medication Sig Start Date End Date Taking?  Authorizing Provider   metFORMIN (GLUCOPHAGE) 1000 MG tablet Take 1 tablet by mouth 2 times daily (with meals) 9/24/18  Yes JOSE ELIAS Perez   cyanocobalamin 1000 MCG/ML injection Inject 1 mL into the muscle every 30 days 9/11/18  Yes ROSS Stanton DO   furosemide (LASIX) 40 MG tablet TAKE 1 TABLET BY MOUTH TWICE DAILY 7/13/18  Yes JOSE ELIAS Lovell   spironolactone (ALDACTONE) 25 MG tablet TAKE 1 TABLET DAILY 6/4/18  Yes JOSE ELIAS Brandt   atorvastatin (LIPITOR) 40 MG tablet Take 1 tablet by mouth daily 5/15/18  Yes JOSE ELIAS Lovell   Dulaglutide 0.75 MG/0.5ML SOPN Inject 0.75 mg into the skin every 7 days 1/15/18  Yes ROSS Stanton DO   ramipril (ALTACE) 10 MG capsule TAKE 2 CAPSULES DAILY  Patient taking differently: ONE TABLET NIGHTLY 11/3/17  Yes JOSE ELIAS Brandt   nitroGLYCERIN (NITROLINGUAL) 0.4 MG/SPRAY 0.4 mg spray Place 1 spray under the tongue every 5 minutes as needed for Chest pain 10/27/17  Yes B Contreras Purple, DO   metFORMIN (GLUCOPHAGE) 1000 MG tablet Take 1 tablet by mouth 2 times daily (with meals) 9/29/16  Yes B Contreras Purple, DO   Omega-3 Fatty Acids (FISH OIL) 1000 MG CAPS Take 3,000 mg by mouth daily   Yes Historical Provider, MD   diphenhydrAMINE (BENADRYL) 25 MG tablet Take 50 mg by mouth nightly as needed for Itching   Yes Historical Provider, MD   aspirin 325 MG tablet Take 325 mg by mouth daily. Yes Historical Provider, MD   niacin (NIASPAN) 1000 MG CR tablet Take 1,000 mg by mouth nightly. Yes Historical Provider, MD   metoprolol tartrate (LOPRESSOR) 50 MG tablet Take 1 tablet by mouth 2 times daily 10/18/18   B Contreras Purple, DO   clopidogrel (PLAVIX) 75 MG tablet Take 1 tablet by mouth daily 10/18/18   B Contreras Purple, DO   ramipril (ALTACE) 10 MG capsule Take 2 capsules by mouth daily 10/18/18   B Contreras Purple, DO   triamcinolone (KENALOG) 0.1 % cream Apply topically 2 times daily.  3/30/16   B Contreras Purple, DO        clopidogrel  75 mg Oral Daily    aspirin  325 mg Oral Daily    LORazepam  0.5 mg Oral QPM    sodium bicarbonate  650 mg Oral 4x Daily    metoprolol tartrate  12.5 mg Oral BID    meropenem  1 g Intravenous Q8H       TELEMETRY: Sinus     Physical Exam:      Physical Exam      General:  Awake, alert, NAD  Skin:  Warm and dry  Neck:  no jvd , no carotid bruits  Chest:  Clear to auscultation, no wheezing or rales  Cardiovascular:  RRR N2N1 no murmurs, clicks, gallups, or rubs  Abdomen:  Soft nontender, nondistended, bowel sounds present  Extremities:  Edema: none    Lab Data:  CBC: Recent Labs      10/20/18   2018  10/21/18   0012  10/21/18   0626   WBC  10.7  11.5*  11.0*   HGB  8.4*  8.0*  8.1*   HCT  26.3*  25.0*  25.5*   MCV  94.3*  93.3  93.4   PLT  209  215  211     BMP: Recent Labs      10/19/18   0649  10/20/18   0610  10/21/18 the left base). No dense areas of lung consolidation. No pleural effusion. No pneumothorax. The cardiomediastinal silhouette and pulmonary vascularity are within normal limits. The osseous structures and surrounding soft tissues demonstrate no acute abnormality. 1. Reidentified cardiomegaly which is stable. Central pulmonary congestion with what appears to be mild interstitial edema. No dense consolidations. Signed by Dr Gabo Knapp on 10/18/2018 7:50 AM    Xr Chest Portable    Result Date: 10/17/2018  EXAMINATION: XR CHEST PORTABLE 10/17/2018 12:07 PM HISTORY: Sepsis. Report: Comparison is made with the chest x-ray 10/16/2018 0800 hours. Inspiratory effort is suboptimal, the lungs are hypoaerated and there is mild central vascular congestion and basilar vascular crowding, mild cardiomegaly is present. There is no focal infiltrate or consolidation. No pneumothorax or pleural effusion is seen. Previous median sternotomy is evident. The osseous structures show nothing acute. Pulmonary hypoventilation with mild cardiomegaly and central vascular congestion, no evidence of pulmonary edema or consolidating pneumonia. Signed by Dr Nona Parks on 10/17/2018 12:10 PM    Cta Pulmonary W Contrast    Result Date: 10/19/2018  History: 51-year-old with CHF. Reference: None. Technique: Following the administration of intravenous contrast, helical acquisition was obtained from the thoracic inlet through the diaphragm during the arterial phase. Multiplanar reconstructed images including 3D MIP were obtained. For this CT exam, one or more of the following dose reduction techniques was employed: -automated exposure control -mA and/or kVp adjustment for patient size -iterative reconstruction  mGy-cm Findings: Vascular findings: Satisfactory opacification of the pulmonary arteries without embolus. No thoracic aortic aneurysm or dissection. Prior median sternotomy/CABG.  Heart size upper normal. No pericardial

## 2018-10-22 PROBLEM — E83.42 HYPOMAGNESEMIA: Status: ACTIVE | Noted: 2018-10-22

## 2018-10-22 PROBLEM — E87.6 HYPOKALEMIA: Status: ACTIVE | Noted: 2018-10-22

## 2018-10-22 LAB
ALBUMIN SERPL-MCNC: 3 G/DL (ref 3.5–5.2)
ALP BLD-CCNC: 78 U/L (ref 40–130)
ALT SERPL-CCNC: 140 U/L (ref 5–41)
ANION GAP SERPL CALCULATED.3IONS-SCNC: 13 MMOL/L (ref 7–19)
AST SERPL-CCNC: 28 U/L (ref 5–40)
BILIRUB SERPL-MCNC: 0.6 MG/DL (ref 0.2–1.2)
BUN BLDV-MCNC: 13 MG/DL (ref 8–23)
CALCIUM SERPL-MCNC: 8.2 MG/DL (ref 8.8–10.2)
CHLORIDE BLD-SCNC: 92 MMOL/L (ref 98–111)
CO2: 27 MMOL/L (ref 22–29)
CREAT SERPL-MCNC: 0.9 MG/DL (ref 0.5–1.2)
EKG P AXIS: NORMAL DEGREES
EKG P-R INTERVAL: NORMAL MS
EKG Q-T INTERVAL: 372 MS
EKG QRS DURATION: 142 MS
EKG QTC CALCULATION (BAZETT): 452 MS
EKG T AXIS: 114 DEGREES
GFR NON-AFRICAN AMERICAN: >60
GLUCOSE BLD-MCNC: 171 MG/DL (ref 74–109)
HCT VFR BLD CALC: 25.5 % (ref 42–52)
HCT VFR BLD CALC: 25.8 % (ref 42–52)
HCT VFR BLD CALC: 26.8 % (ref 42–52)
HEMOGLOBIN: 8.1 G/DL (ref 14–18)
HEMOGLOBIN: 8.1 G/DL (ref 14–18)
HEMOGLOBIN: 8.3 G/DL (ref 14–18)
LV EF: 24 %
LVEF MODALITY: NORMAL
MAGNESIUM: 1.5 MG/DL (ref 1.6–2.4)
MCH RBC QN AUTO: 29 PG (ref 27–31)
MCH RBC QN AUTO: 29.3 PG (ref 27–31)
MCH RBC QN AUTO: 29.5 PG (ref 27–31)
MCHC RBC AUTO-ENTMCNC: 31 G/DL (ref 33–37)
MCHC RBC AUTO-ENTMCNC: 31.4 G/DL (ref 33–37)
MCHC RBC AUTO-ENTMCNC: 31.8 G/DL (ref 33–37)
MCV RBC AUTO: 92.4 FL (ref 80–94)
MCV RBC AUTO: 93.7 FL (ref 80–94)
MCV RBC AUTO: 93.8 FL (ref 80–94)
PDW BLD-RTO: 16 % (ref 11.5–14.5)
PDW BLD-RTO: 16 % (ref 11.5–14.5)
PDW BLD-RTO: 16.1 % (ref 11.5–14.5)
PHOSPHORUS: 2.5 MG/DL (ref 2.5–4.5)
PLATELET # BLD: 232 K/UL (ref 130–400)
PLATELET # BLD: 237 K/UL (ref 130–400)
PLATELET # BLD: 238 K/UL (ref 130–400)
PMV BLD AUTO: 10.6 FL (ref 9.4–12.4)
PMV BLD AUTO: 10.8 FL (ref 9.4–12.4)
PMV BLD AUTO: 10.9 FL (ref 9.4–12.4)
POTASSIUM SERPL-SCNC: 3.2 MMOL/L (ref 3.5–5)
RBC # BLD: 2.75 M/UL (ref 4.7–6.1)
RBC # BLD: 2.76 M/UL (ref 4.7–6.1)
RBC # BLD: 2.86 M/UL (ref 4.7–6.1)
SODIUM BLD-SCNC: 132 MMOL/L (ref 136–145)
TOTAL CK: 94 U/L (ref 39–308)
TOTAL PROTEIN: 5.8 G/DL (ref 6.6–8.7)
TROPONIN: 2.1 NG/ML (ref 0–0.03)
TROPONIN: 2.26 NG/ML (ref 0–0.03)
WBC # BLD: 10.4 K/UL (ref 4.8–10.8)
WBC # BLD: 10.4 K/UL (ref 4.8–10.8)
WBC # BLD: 8.9 K/UL (ref 4.8–10.8)

## 2018-10-22 PROCEDURE — 2580000003 HC RX 258: Performed by: HOSPITALIST

## 2018-10-22 PROCEDURE — 6370000000 HC RX 637 (ALT 250 FOR IP): Performed by: INTERNAL MEDICINE

## 2018-10-22 PROCEDURE — C9113 INJ PANTOPRAZOLE SODIUM, VIA: HCPCS | Performed by: EMERGENCY MEDICINE

## 2018-10-22 PROCEDURE — 6360000002 HC RX W HCPCS: Performed by: EMERGENCY MEDICINE

## 2018-10-22 PROCEDURE — 80053 COMPREHEN METABOLIC PANEL: CPT

## 2018-10-22 PROCEDURE — 99232 SBSQ HOSP IP/OBS MODERATE 35: CPT | Performed by: INTERNAL MEDICINE

## 2018-10-22 PROCEDURE — 99231 SBSQ HOSP IP/OBS SF/LOW 25: CPT | Performed by: INTERNAL MEDICINE

## 2018-10-22 PROCEDURE — 2580000003 HC RX 258: Performed by: INTERNAL MEDICINE

## 2018-10-22 PROCEDURE — 93005 ELECTROCARDIOGRAM TRACING: CPT

## 2018-10-22 PROCEDURE — 6370000000 HC RX 637 (ALT 250 FOR IP): Performed by: HOSPITALIST

## 2018-10-22 PROCEDURE — 82550 ASSAY OF CK (CPK): CPT

## 2018-10-22 PROCEDURE — 36415 COLL VENOUS BLD VENIPUNCTURE: CPT

## 2018-10-22 PROCEDURE — 2140000000 HC CCU INTERMEDIATE R&B

## 2018-10-22 PROCEDURE — 6360000002 HC RX W HCPCS: Performed by: INTERNAL MEDICINE

## 2018-10-22 PROCEDURE — 85027 COMPLETE CBC AUTOMATED: CPT

## 2018-10-22 PROCEDURE — 84100 ASSAY OF PHOSPHORUS: CPT

## 2018-10-22 PROCEDURE — 2700000000 HC OXYGEN THERAPY PER DAY

## 2018-10-22 PROCEDURE — 83735 ASSAY OF MAGNESIUM: CPT

## 2018-10-22 PROCEDURE — 2580000003 HC RX 258: Performed by: EMERGENCY MEDICINE

## 2018-10-22 PROCEDURE — 84484 ASSAY OF TROPONIN QUANT: CPT

## 2018-10-22 PROCEDURE — 6360000002 HC RX W HCPCS: Performed by: HOSPITALIST

## 2018-10-22 RX ORDER — PANTOPRAZOLE SODIUM 40 MG/1
40 TABLET, DELAYED RELEASE ORAL
Status: DISCONTINUED | OUTPATIENT
Start: 2018-10-23 | End: 2018-10-24 | Stop reason: HOSPADM

## 2018-10-22 RX ORDER — SUCRALFATE 1 G/1
1 TABLET ORAL EVERY 6 HOURS SCHEDULED
Status: DISCONTINUED | OUTPATIENT
Start: 2018-10-22 | End: 2018-10-24 | Stop reason: HOSPADM

## 2018-10-22 RX ORDER — MAGNESIUM SULFATE IN WATER 40 MG/ML
2 INJECTION, SOLUTION INTRAVENOUS ONCE
Status: COMPLETED | OUTPATIENT
Start: 2018-10-22 | End: 2018-10-22

## 2018-10-22 RX ORDER — IRON POLYSACCHARIDE COMPLEX 150 MG
150 CAPSULE ORAL DAILY
Status: DISCONTINUED | OUTPATIENT
Start: 2018-10-22 | End: 2018-10-24 | Stop reason: HOSPADM

## 2018-10-22 RX ORDER — POTASSIUM CHLORIDE 20MEQ/15ML
40 LIQUID (ML) ORAL EVERY 4 HOURS
Status: COMPLETED | OUTPATIENT
Start: 2018-10-22 | End: 2018-10-22

## 2018-10-22 RX ADMIN — METOPROLOL TARTRATE 12.5 MG: 25 TABLET ORAL at 08:52

## 2018-10-22 RX ADMIN — SODIUM BICARBONATE 650 MG: 650 TABLET ORAL at 12:28

## 2018-10-22 RX ADMIN — MEROPENEM 1 G: 1 INJECTION, POWDER, FOR SOLUTION INTRAVENOUS at 03:39

## 2018-10-22 RX ADMIN — FUROSEMIDE 10 MG/HR: 10 INJECTION, SOLUTION INTRAMUSCULAR; INTRAVENOUS at 08:51

## 2018-10-22 RX ADMIN — ASPIRIN 325 MG ORAL TABLET 325 MG: 325 PILL ORAL at 08:52

## 2018-10-22 RX ADMIN — MEROPENEM 1 G: 1 INJECTION, POWDER, FOR SOLUTION INTRAVENOUS at 12:28

## 2018-10-22 RX ADMIN — MAGNESIUM SULFATE HEPTAHYDRATE 2 G: 40 INJECTION, SOLUTION INTRAVENOUS at 13:49

## 2018-10-22 RX ADMIN — POTASSIUM CHLORIDE 40 MEQ: 20 SOLUTION ORAL at 13:48

## 2018-10-22 RX ADMIN — SODIUM CHLORIDE 8 MG/HR: 9 INJECTION, SOLUTION INTRAVENOUS at 05:36

## 2018-10-22 RX ADMIN — POTASSIUM CHLORIDE 40 MEQ: 20 SOLUTION ORAL at 17:57

## 2018-10-22 RX ADMIN — SODIUM BICARBONATE 650 MG: 650 TABLET ORAL at 08:53

## 2018-10-22 RX ADMIN — CLOPIDOGREL BISULFATE 75 MG: 75 TABLET ORAL at 08:53

## 2018-10-22 RX ADMIN — METOPROLOL TARTRATE 12.5 MG: 25 TABLET ORAL at 21:40

## 2018-10-22 RX ADMIN — SUCRALFATE 1 G: 1 TABLET ORAL at 17:56

## 2018-10-22 RX ADMIN — Medication 150 MG: at 17:56

## 2018-10-22 ASSESSMENT — PAIN SCALES - GENERAL
PAINLEVEL_OUTOF10: 0

## 2018-10-22 NOTE — PROGRESS NOTES
Cardiology Daily Note Marcela Galeas MD      Patient:  Ngozi Cruz  [id number]    Patient Active Problem List    Diagnosis Date Noted    Hypophosphatemia 10/19/2018     Priority: Low    Pleural effusion, bilateral 10/19/2018     Priority: Low    Hypertension      Priority: Low    Palliative care patient 10/17/2018     Priority: Low    Elevated troponin      Priority: Low    GI bleed 10/16/2018     Priority: Low    NSAID long-term use 10/16/2018     Priority: Low    Hypotension due to hypovolemia 10/16/2018     Priority: Low    Acute blood loss anemia 10/16/2018     Priority: Low    Lactic acidosis 10/16/2018     Priority: Low    Chest pain      Priority: Low    Elevated lactic acid level      Priority: Low    Syncope and collapse      Priority: Low    SIRS (systemic inflammatory response syndrome) ([facility])      Priority: Low    TONY (acute kidney injury) ([facility] [address]      Priority: Low    Dieulafoy lesion of stomach      Priority: Low    Acute on chronic systolic congestive heart failure ([facility] 02/07/2018     Priority: Low    CAD (coronary artery disease) 01/20/2015     Priority: Low    DM2 (diabetes mellitus, type 2) ([facility] 01/20/2015     Priority: Low    HTN (hypertension) 01/20/2015     Priority: Low    Hyperlipidemia with target LDL less than 100 01/20/2015     Priority: Low    Carotid artery occlusion without infarction 03/11/2011     Priority: Low       Admit Date:  10/16/2018    Admission Problem List: Present on Admission:   GI bleed   NSAID long-term use   CAD (coronary artery disease)   DM2 (diabetes mellitus, type 2) ([facility])   Hypotension due to hypovolemia   Acute blood loss anemia   Lactic acidosis   Syncope and collapse   SIRS (systemic inflammatory response syndrome) ([facility] [address] [facility]   TONY (acute kidney injury) ([facility] [address] [facility]   Dieulafoy lesion of stomach   Palliative care patient   Elevated troponin   Hypertension   Hypophosphatemia   Pleural effusion, bilateral   Elevated lactic acid level      Cardiac Specific Data:  Specialty Problems        Cardiology Problems    Carotid artery occlusion without infarction        CAD (coronary artery disease)        HTN (hypertension)        Acute on chronic systolic congestive heart failure (HCC)        Dieulafoy lesion of stomach        Hypotension due to hypovolemia        Syncope and collapse        Hypertension              Subjective:  Mr. Lorie Richards seen today resting comfortably. Dyspnea stable. Denies chest pain. Denies she have bleeding. Diuresis started. No other complaints. Objective:   /68   Pulse 103   Temp 97.9 °F (36.6 °C) (Temporal)   Resp 16   Ht 5' 11\" (1.803 m)   Wt 248 lb 12.8 oz (112.9 kg)   SpO2 100%   BMI 34.70 kg/m²       Intake/Output Summary (Last 24 hours) at 10/22/18 0835  Last data filed at 10/22/18 0536   Gross per 24 hour   Intake             2426 ml   Output             6600 ml   Net            -4174 ml       Prior to Admission medications    Medication Sig Start Date End Date Taking?  Authorizing Provider   metFORMIN (GLUCOPHAGE) 1000 MG tablet Take 1 tablet by mouth 2 times daily (with meals) 9/24/18  Yes JOSE ELIAS Jasso   cyanocobalamin 1000 MCG/ML injection Inject 1 mL into the muscle every 30 days 9/11/18  Yes ROSS Prakash DO   furosemide (LASIX) 40 MG tablet TAKE 1 TABLET BY MOUTH TWICE DAILY 7/13/18  Yes JOSE ELIAS Lovell   spironolactone (ALDACTONE) 25 MG tablet TAKE 1 TABLET DAILY 6/4/18  Yes JOSE ELIAS Casey   atorvastatin (LIPITOR) 40 MG tablet Take 1 tablet by mouth daily 5/15/18  Yes JOSE ELIAS Lovell   Dulaglutide 0.75 MG/0.5ML SOPN Inject 0.75 mg into the skin every 7 days 1/15/18  Yes ROSS Prakash DO   ramipril (ALTACE) 10 MG capsule TAKE 2 CAPSULES DAILY  Patient taking differently: ONE TABLET NIGHTLY 11/3/17  Yes JOSE ELIAS Casey   nitroGLYCERIN (NITROLINGUAL) 0.4 MG/SPRAY 0.4 mg spray Place 1 spray under the tongue every 5 minutes as needed for PHOS  1.8*  2.2*  2.5   BUN  16  13  13   CREATININE  0.9  0.9  0.9     LIVER PROFILE:   Recent Labs      10/20/18   0610  10/21/18   0626  10/22/18   0210   AST  109*  46*  28   ALT  271*  192*  140*   BILITOT  0.7  0.7  0.6   ALKPHOS  75  79  78     PT/INR: No results for input(s): PROTIME, INR in the last 72 hours. APTT: No results for input(s): APTT in the last 72 hours. BNP:  No results for input(s): BNP in the last 72 hours. CK, CKMB, Troponin: @LABRCNT (CKTOTAL:3, CKMB:3, TROPONINI:3)@    IMAGING:  Xr Acute Abd Series Chest 1 Vw    Result Date: 10/16/2018  EXAMINATION: XR ACUTE ABD SERIES CHEST 1 VW 10/16/2018 8:23 AM HISTORY: Loss of appetite, vomiting. Report: A frontal view the chest, upright and supine views of the abdomen were obtained, the chest x-rays compared with the study from 2/13/2013. There are no recent abdominal series for comparison. The lungs are clear, there are scattered calcified granulomas. Interstitial markings are somewhat coarse. Heart size is within normal limits and there is no evidence of CHF. Previous median sternotomy is noted. Views of the abdomen demonstrated moderate amount stool within the colon, no bowel dilatation to indicate obstruction. No free air is identified. Cholecystectomy clips are present. There are moderate degenerative changes of the lumbar spine. No acute osseous abnormality. 1. Probable constipation with no evidence of bowel obstruction. No free air is identified. 2. Mild chronic lung changes, no acute cardiopulmonary abnormality. Signed by Dr Alon Walker. Kasey on 10/16/2018 8:25 AM    Xr Chest Portable    Result Date: 10/18/2018  XR CHEST PORTABLE 10/17/2018 11:00 PM HISTORY: Shortness of breath COMPARISON: 10/17/2018. FINDINGS: Reidentified cardiomegaly. Central pulmonary vascular congestion identified and there appears to be interstitial edema (Kerley B lines in the left base). No dense areas of lung consolidation. No pleural effusion.  No and inferior akinesis 3. Myocardial perfusion imaging and demonstrated diminished uptake in the inferior and inferolateral region some reversibility noted in the lateral wall. Total perfusion defect 26% on stress 20% on rest. The sum stress score was 23 and the sum difference score was 6. Consistent with previous scar in this region consistent with previous infarct and/or mild olimpia-infarct ischemia correlate clinically Signed by Dr Talia Ahmadi on 10/20/2018 3:40 PM        Assessment:  1. Elevated troponin up to 1.66 possible demand ischemia versus non-STEMI  2. Severe anemia on presentation hemoglobin 6.2 status post transfusion ×4 ordered now 8.1  3. EGD 10/16/18 with cauterization of visible vessel  4. Complaints of gradually worsening exertional dyspnea  5. Underlying left bundle branch block  6. Complaints of gradually worsening exertional chest pressure neck and jaw discomfort consistent with angina with an episode yesterday requiring 2 nitroglycerin for relief  7. History of colon polyps  8. History of benign prostatic hypertrophy  9. History of basilar insufficiency status post PTCA at [address] [place] [address] in 2010  10. Hypertension  11. Diabetes  12. Hypercholesterolemia  13. Prior tobacco abuse discontinued cigarettes one pack per day after CABG  14. Prerenal azotemia creatinine now up to 2.2  15. Carotid artery disease  16. Lexiscan stress test in 2018 ejection fraction 24% inferolateral scar some reversible lateral ischemia    Plan:  1.  Continue current treatment increase activity as tolerated continue diuresis    Talia Ahmadi MD 10/22/2018 8:35 AM

## 2018-10-22 NOTE — PLAN OF CARE
Problem: Nutrition  Goal: Optimal nutrition therapy  Nutrition Problem: Inadequate oral intake  Intervention: Food and/or Nutrient Delivery: Continue current diet  Nutritional Goals: meet nutritional needs through po intake      Outcome: Ongoing

## 2018-10-22 NOTE — PROGRESS NOTES
Follow up:  Supportive visit. Pt is up in the chair, has finished cleaning up and his wife was present. He denies any pain or soa at this time. Reports he is going to get up and walk when his sons get here. He says he is hopeful to go home soon. Offered encouragement and support. P/C will continue to follow.     Electronically signed by Tal Aragon RN on 10/22/2018 at 1:33 PM

## 2018-10-22 NOTE — PROGRESS NOTES
20%.   Severe global hypokinesis is noted.   Segmental wall motion abnormalities present including inferior wall and apex   Grade II diastolic dysfunction.   No evidence of left ventricular mass or thrombus noted.      Signature   ----------------------------------------------------------------   Electronically signed by Daniella Williamson MD(Interpreting   [id number]) on 10/18/2018 04:01 PM   ----------------------------------------------------------------       NM MYOCARDIAL - Lexiscan Nuclear Stress Test Report: 10/20/2018  Narrative  Procedure date: 10/20/2018  Indications: Shortness of breath  Procedure: Stress was performed with injection of 0.4 mg Lexiscan. Vital signs and EKG were monitored. Technetium-99 sestamibi was  injected in divided doses, approximately 10 mCi and 30 mCi  respectively for rest and stress imaging. The patient was discharged  in stable condition. Results:   Patient had symptoms of dyspnea during infusion that resolved  in recovery. Baseline EKG showed normal sinus rhythm with a left  bundle branch block. During stress there were no significant EKG  changes or rhythm changes. Baseline and peak blood pressures were  109/72, and 109/72 respectively.  Baseline and peak heart rates were  101 and  106 respectively. Radioactive pharmaceuticals used:  Rest images 10.6 mCi technetium 99m sestamibi  Stress images 32.56 mCi technetium 99m sestamibi  Review of rest and stress images obtained in SPECT acquisition mode  along with the bull's-eye Plot revealed:    1. Calculated ejection fraction 24%  2. Wall motion study demonstrated global hypokinesis and inferior akinesis  3. Myocardial perfusion imaging and demonstrated diminished uptake in the inferior and inferolateral region some reversibility noted in the lateral wall. Total perfusion defect 26% on stress 20% on rest.   The sum stress score was 23 and the sum difference score was 6.  Consistent with previous scar in this region consistent with previous infarct and/or mild olimpia-infarct ischemia correlate clinically    Signed by Dr Magalys Benoit on 10/20/2018 3:40 PM         Most Recent Culture Results    Blood Culture:   No results for input(s): BC, BLOODCULT2, ORG in the last 72 hours. GRAM STAIN  No results for input(s): LABGRAM, LABANAE, ORG, WNDABS in the last 72 hours. Resp Culture Brief : No results found for: CULTRESP  Body Fluid : No results found for: BFCX  MRSA :No results found for: [facility]  Urine Culture Brief :   Lab Results   Component Value Date    LABURIN No growth at 36-48 hours 10/16/2018      Organism Brief : No results found for: ORG      Last 30 Day cultures:    Blood Culture Recent:   Recent Labs      10/16/18   0725   BC  No growth after 5 days of incubation. Gram Stain Recent: No results for input(s): LABGRAM in the last 720 hours. Resp CultureRecent: No results for input(s): CULTRESP in the last 720 hours. Body Fluid Recent :No results for input(s): BFCX in the last 720 hours. MRSA Recent : No results for input(s): [facility] in the last 720 hours. Urine Culture Recent :   Recent Labs      10/16/18   1626   LABURIN  No growth at 36-48 hours     Organism Recent : No results for input(s): ORG in the last 720 hours. PATIENT SUMMARY         ASSESSMENT/PLAN:      Principal Problem:    GI bleed  Active Problems:    CAD (coronary artery disease)    DM2 (diabetes mellitus, type 2) ([facility])    NSAID long-term use    Hypotension due to hypovolemia    Acute blood loss anemia    Lactic acidosis    Elevated lactic acid level    Syncope and collapse    SIRS (systemic inflammatory response syndrome) ([facility])    TONY (acute kidney injury) ([facility] [address]    Dieulafoy lesion of stomach    Palliative care patient    Elevated troponin    Hypertension    Hypophosphatemia    Pleural effusion, bilateral  Resolved Problems:    * No resolved hospital problems.  *      UGIB, secondary to Dieulafoy's Lesion, s/p EGD (10/16/2018), S/p Epi injection and Clipping  - S/p PRBC Transfusion   - GI on Board   - H& H now stable  - Monitor H&H Q8H  - Continue Pantoprazole  ggt    # History of combined systolic and diastolic heart failure  - CTA reports bilateral small to moderate pleural effusion   - Exertional shortness of breath clinically with the patient  - Continue to require oxygen  --> Furosemide 60 mg IV ×1 dose given yesterday  - Starting Furosemide ggt  ---> To titrate for a Fluid Goal of Negative 2 L in 24Hours  - Monitor I's and O's  - Fluid restriction: 1 L  - Continue to monitor    # Hx of CAD, Previous hx of  PCI and CABG   - Continue Aspirin and Clopidogrel (Plavix)  - Elevated Troponin -- Trending Up  - Cardiology on Board  - Continue Current management per Cardiology recommendation  - Positive diagnostic cardiac cath performed 10/20/2018    Hypokalemia  - KCL 40 mEQ Q4H PO x2 doses    Hypomagnesemia  - Mag Sulfate 2 g IV x1 dose      Echo:   Severe global hypokinesis   Severely reduced LV function Estimated EF: 20%. Segmental wall motion abnormalities present including inferior wall and apex  Grade II diastolic dysfunction.       Severe sepsis -   - Leukocytosis (Improving), with Persistent Tachycardia  - Lactic acidosis  - Source of infection unclear    - Blood culture no growth so far   - Procalcitonin level within Reference range  - Discontinue Meropenem  - Observe Off Antibiotic for now    Hypophospatemia   - Phosphate level: 1.7 mg/dL yesterday (10/19/2018)  - Replaced --> K-phos 15 mmol IV x 2 dose - to raise his phosphate level by about 1.4 mg/dL  - Phos level: 1.8 mg/dL today (10/20/2018)    - Will check;  --> PTH level and   --> Urine Phosphorous Level    Prophylaxis Orders:   Antibiotic: None  DVT Prophylaxis: Heparin  GI prophylaxis:  Pantoprazole       NutritionOrders: DIET CARDIAC; Carb Control: 4 carb choices (60 gms)/meal        Consults  IP CONSULT TO GI  IP CONSULT TO CARDIOLOGY  PALLIATIVE CARE EVAL      DISCHARGE PLAN: TBD

## 2018-10-23 LAB
ALBUMIN SERPL-MCNC: 3.1 G/DL (ref 3.5–5.2)
ALP BLD-CCNC: 77 U/L (ref 40–130)
ALT SERPL-CCNC: 92 U/L (ref 5–41)
ANION GAP SERPL CALCULATED.3IONS-SCNC: 11 MMOL/L (ref 7–19)
AST SERPL-CCNC: 17 U/L (ref 5–40)
BILIRUB SERPL-MCNC: 0.6 MG/DL (ref 0.2–1.2)
BUN BLDV-MCNC: 13 MG/DL (ref 8–23)
CALCIUM SERPL-MCNC: 8.5 MG/DL (ref 8.8–10.2)
CHLORIDE BLD-SCNC: 92 MMOL/L (ref 98–111)
CO2: 32 MMOL/L (ref 22–29)
CREAT SERPL-MCNC: 1 MG/DL (ref 0.5–1.2)
EKG P AXIS: NORMAL DEGREES
EKG P-R INTERVAL: NORMAL MS
EKG Q-T INTERVAL: 340 MS
EKG QRS DURATION: 110 MS
EKG QTC CALCULATION (BAZETT): 422 MS
EKG T AXIS: 95 DEGREES
GFR NON-AFRICAN AMERICAN: >60
GLUCOSE BLD-MCNC: 139 MG/DL (ref 74–109)
HCT VFR BLD CALC: 23.9 % (ref 42–52)
HCT VFR BLD CALC: 25.4 % (ref 42–52)
HCT VFR BLD CALC: 27.1 % (ref 42–52)
HCT VFR BLD CALC: 29.8 % (ref 42–52)
HEMOGLOBIN: 7.6 G/DL (ref 14–18)
HEMOGLOBIN: 8 G/DL (ref 14–18)
HEMOGLOBIN: 8.5 G/DL (ref 14–18)
HEMOGLOBIN: 9.2 G/DL (ref 14–18)
MAGNESIUM: 1.6 MG/DL (ref 1.6–2.4)
MCH RBC QN AUTO: 28.7 PG (ref 27–31)
MCH RBC QN AUTO: 29.1 PG (ref 27–31)
MCH RBC QN AUTO: 29.2 PG (ref 27–31)
MCH RBC QN AUTO: 29.2 PG (ref 27–31)
MCHC RBC AUTO-ENTMCNC: 30.9 G/DL (ref 33–37)
MCHC RBC AUTO-ENTMCNC: 31.4 G/DL (ref 33–37)
MCHC RBC AUTO-ENTMCNC: 31.5 G/DL (ref 33–37)
MCHC RBC AUTO-ENTMCNC: 31.8 G/DL (ref 33–37)
MCV RBC AUTO: 91.9 FL (ref 80–94)
MCV RBC AUTO: 92.4 FL (ref 80–94)
MCV RBC AUTO: 92.8 FL (ref 80–94)
MCV RBC AUTO: 93.1 FL (ref 80–94)
PDW BLD-RTO: 15.8 % (ref 11.5–14.5)
PDW BLD-RTO: 15.8 % (ref 11.5–14.5)
PDW BLD-RTO: 15.9 % (ref 11.5–14.5)
PDW BLD-RTO: 15.9 % (ref 11.5–14.5)
PHOSPHORUS: 2.8 MG/DL (ref 2.5–4.5)
PLATELET # BLD: 234 K/UL (ref 130–400)
PLATELET # BLD: 254 K/UL (ref 130–400)
PLATELET # BLD: 262 K/UL (ref 130–400)
PLATELET # BLD: 312 K/UL (ref 130–400)
PMV BLD AUTO: 10.4 FL (ref 9.4–12.4)
PMV BLD AUTO: 10.5 FL (ref 9.4–12.4)
PMV BLD AUTO: 10.7 FL (ref 9.4–12.4)
PMV BLD AUTO: 11 FL (ref 9.4–12.4)
POTASSIUM SERPL-SCNC: 3.4 MMOL/L (ref 3.5–5)
RBC # BLD: 2.6 M/UL (ref 4.7–6.1)
RBC # BLD: 2.75 M/UL (ref 4.7–6.1)
RBC # BLD: 2.91 M/UL (ref 4.7–6.1)
RBC # BLD: 3.21 M/UL (ref 4.7–6.1)
SODIUM BLD-SCNC: 135 MMOL/L (ref 136–145)
TOTAL CK: 77 U/L (ref 39–308)
TOTAL PROTEIN: 6.1 G/DL (ref 6.6–8.7)
WBC # BLD: 10 K/UL (ref 4.8–10.8)
WBC # BLD: 8.5 K/UL (ref 4.8–10.8)
WBC # BLD: 8.6 K/UL (ref 4.8–10.8)
WBC # BLD: 9.6 K/UL (ref 4.8–10.8)

## 2018-10-23 PROCEDURE — 2140000000 HC CCU INTERMEDIATE R&B

## 2018-10-23 PROCEDURE — 93005 ELECTROCARDIOGRAM TRACING: CPT

## 2018-10-23 PROCEDURE — 6370000000 HC RX 637 (ALT 250 FOR IP): Performed by: INTERNAL MEDICINE

## 2018-10-23 PROCEDURE — 83735 ASSAY OF MAGNESIUM: CPT

## 2018-10-23 PROCEDURE — 6360000002 HC RX W HCPCS: Performed by: INTERNAL MEDICINE

## 2018-10-23 PROCEDURE — 2580000003 HC RX 258: Performed by: INTERNAL MEDICINE

## 2018-10-23 PROCEDURE — 6370000000 HC RX 637 (ALT 250 FOR IP): Performed by: HOSPITALIST

## 2018-10-23 PROCEDURE — 85027 COMPLETE CBC AUTOMATED: CPT

## 2018-10-23 PROCEDURE — 99231 SBSQ HOSP IP/OBS SF/LOW 25: CPT | Performed by: INTERNAL MEDICINE

## 2018-10-23 PROCEDURE — 84100 ASSAY OF PHOSPHORUS: CPT

## 2018-10-23 PROCEDURE — 99232 SBSQ HOSP IP/OBS MODERATE 35: CPT | Performed by: INTERNAL MEDICINE

## 2018-10-23 PROCEDURE — 36415 COLL VENOUS BLD VENIPUNCTURE: CPT

## 2018-10-23 PROCEDURE — 80053 COMPREHEN METABOLIC PANEL: CPT

## 2018-10-23 PROCEDURE — 82550 ASSAY OF CK (CPK): CPT

## 2018-10-23 RX ORDER — FUROSEMIDE 40 MG/1
40 TABLET ORAL 2 TIMES DAILY
Status: DISCONTINUED | OUTPATIENT
Start: 2018-10-24 | End: 2018-10-24 | Stop reason: HOSPADM

## 2018-10-23 RX ORDER — POTASSIUM CHLORIDE 20MEQ/15ML
40 LIQUID (ML) ORAL ONCE
Status: COMPLETED | OUTPATIENT
Start: 2018-10-23 | End: 2018-10-23

## 2018-10-23 RX ORDER — FUROSEMIDE 10 MG/ML
40 INJECTION INTRAMUSCULAR; INTRAVENOUS 2 TIMES DAILY
Status: COMPLETED | OUTPATIENT
Start: 2018-10-23 | End: 2018-10-23

## 2018-10-23 RX ORDER — MAGNESIUM SULFATE IN WATER 40 MG/ML
2 INJECTION, SOLUTION INTRAVENOUS ONCE
Status: COMPLETED | OUTPATIENT
Start: 2018-10-23 | End: 2018-10-23

## 2018-10-23 RX ADMIN — Medication 150 MG: at 08:56

## 2018-10-23 RX ADMIN — PANTOPRAZOLE SODIUM 40 MG: 40 TABLET, DELAYED RELEASE ORAL at 06:33

## 2018-10-23 RX ADMIN — FUROSEMIDE 40 MG: 10 INJECTION, SOLUTION INTRAMUSCULAR; INTRAVENOUS at 08:45

## 2018-10-23 RX ADMIN — FUROSEMIDE 5 MG/HR: 10 INJECTION, SOLUTION INTRAMUSCULAR; INTRAVENOUS at 00:15

## 2018-10-23 RX ADMIN — METOPROLOL TARTRATE 12.5 MG: 25 TABLET ORAL at 21:31

## 2018-10-23 RX ADMIN — MAGNESIUM SULFATE HEPTAHYDRATE 2 G: 40 INJECTION, SOLUTION INTRAVENOUS at 17:10

## 2018-10-23 RX ADMIN — METOPROLOL TARTRATE 12.5 MG: 25 TABLET ORAL at 08:44

## 2018-10-23 RX ADMIN — SUCRALFATE 1 G: 1 TABLET ORAL at 00:15

## 2018-10-23 RX ADMIN — ASPIRIN 325 MG ORAL TABLET 325 MG: 325 PILL ORAL at 08:44

## 2018-10-23 RX ADMIN — SUCRALFATE 1 G: 1 TABLET ORAL at 13:57

## 2018-10-23 RX ADMIN — POTASSIUM CHLORIDE 40 MEQ: 20 SOLUTION ORAL at 08:45

## 2018-10-23 RX ADMIN — FUROSEMIDE 40 MG: 10 INJECTION, SOLUTION INTRAMUSCULAR; INTRAVENOUS at 17:10

## 2018-10-23 RX ADMIN — SUCRALFATE 1 G: 1 TABLET ORAL at 06:33

## 2018-10-23 RX ADMIN — CLOPIDOGREL BISULFATE 75 MG: 75 TABLET ORAL at 08:45

## 2018-10-23 RX ADMIN — SUCRALFATE 1 G: 1 TABLET ORAL at 17:10

## 2018-10-23 ASSESSMENT — PAIN SCALES - GENERAL
PAINLEVEL_OUTOF10: 0

## 2018-10-23 NOTE — PROGRESS NOTES
Cardiology Daily Note Yoshi Schulz MD      Patient:  Fay [facility]  [id number]    Patient Active Problem List    Diagnosis Date Noted    Hypokalemia 10/22/2018     Priority: Low    Hypomagnesemia 10/22/2018     Priority: Low    Hypophosphatemia 10/19/2018     Priority: Low    Pleural effusion, bilateral 10/19/2018     Priority: Low    Hypertension      Priority: Low    Palliative care patient 10/17/2018     Priority: Low    Elevated troponin      Priority: Low    GI bleed 10/16/2018     Priority: Low    NSAID long-term use 10/16/2018     Priority: Low    Hypotension due to hypovolemia 10/16/2018     Priority: Low    Acute blood loss anemia 10/16/2018     Priority: Low    Lactic acidosis 10/16/2018     Priority: Low    Chest pain      Priority: Low    Elevated lactic acid level      Priority: Low    Syncope and collapse      Priority: Low    SIRS (systemic inflammatory response syndrome) ([facility])      Priority: Low    [facility]) [facility]      Priority: Low    Dieulafoy lesion of stomach      Priority: Low    Acute on chronic systolic congestive heart failure ([facility] 02/07/2018     Priority: Low    CAD (coronary artery disease) 01/20/2015     Priority: Low    DM2 (diabetes mellitus, type 2) ([facility] 01/20/2015     Priority: Low    HTN (hypertension) 01/20/2015     Priority: Low    Hyperlipidemia with target LDL less than 100 01/20/2015     Priority: Low    Carotid artery occlusion without infarction 03/11/2011     Priority: Low       Admit Date:  10/16/2018    Admission Problem List: Present on Admission:   GI bleed   NSAID long-term use   CAD (coronary artery disease)   DM2 (diabetes mellitus, type 2) ([facility])   Hypotension due to hypovolemia   Acute blood loss anemia   Lactic acidosis   Syncope and collapse   SIRS (systemic inflammatory response syndrome) ([facility]   [facility]ONY [facility]acute kidney injury) ([facility]   Dieulafoy lesion of stomach   Palliative care patient   Elevated troponin  

## 2018-10-23 NOTE — PROGRESS NOTES
left atrium.   Dilated left ventricular size with Severely reduced LV function and an estimated ejection fraction of approximately 20%.  Severe global hypokinesis is noted.   Segmental wall motion abnormalities present including inferior wall and apex   Grade II diastolic dysfunction.   No evidence of left ventricular mass or thrombus noted.      Signature   ----------------------------------------------------------------   Electronically signed by Melony Macias MD(Interpreting   [email or web address]) on 10/18/2018 04:01 PM   ----------------------------------------------------------------       NM MYOCARDIAL - Lexiscan Nuclear Stress Test Report: 10/20/2018  Narrative  Procedure date: 10/20/2018  Indications: Shortness of breath  Procedure: Stress was performed with injection of 0.4 mg Lexiscan. Vital signs and EKG were monitored. Technetium-99 sestamibi was  injected in divided doses, approximately 10 mCi and 30 mCi  respectively for rest and stress imaging. The patient was discharged  in stable condition. Results:   Patient had symptoms of dyspnea during infusion that resolved  in recovery. Baseline EKG showed normal sinus rhythm with a left  bundle branch block. During stress there were no significant EKG  changes or rhythm changes. Baseline and peak blood pressures were  109/72, and 109/72 respectively.  Baseline and peak heart rates were  101 and  106 respectively. Radioactive pharmaceuticals used:  Rest images 10.6 mCi technetium 99m sestamibi  Stress images 32.56 mCi technetium 99m sestamibi  Review of rest and stress images obtained in SPECT acquisition mode  along with the bull's-eye Plot revealed:    1. Calculated ejection fraction 24%  2. Wall motion study demonstrated global hypokinesis and inferior akinesis  3. Myocardial perfusion imaging and demonstrated diminished uptake in the inferior and inferolateral region some reversibility noted in the lateral wall.    Total perfusion defect 26% on stress 20% on

## 2018-10-24 VITALS
OXYGEN SATURATION: 95 % | BODY MASS INDEX: 34.3 KG/M2 | HEIGHT: 71 IN | HEART RATE: 104 BPM | RESPIRATION RATE: 16 BRPM | WEIGHT: 245 LBS | TEMPERATURE: 97.2 F | DIASTOLIC BLOOD PRESSURE: 76 MMHG | SYSTOLIC BLOOD PRESSURE: 118 MMHG

## 2018-10-24 PROBLEM — E87.6 HYPOKALEMIA: Status: RESOLVED | Noted: 2018-10-22 | Resolved: 2018-10-24

## 2018-10-24 PROBLEM — E83.39 HYPOPHOSPHATEMIA: Status: RESOLVED | Noted: 2018-10-19 | Resolved: 2018-10-24

## 2018-10-24 PROBLEM — E87.20 LACTIC ACIDOSIS: Status: RESOLVED | Noted: 2018-10-16 | Resolved: 2018-10-24

## 2018-10-24 PROBLEM — E83.42 HYPOMAGNESEMIA: Status: RESOLVED | Noted: 2018-10-22 | Resolved: 2018-10-24

## 2018-10-24 LAB
ALBUMIN SERPL-MCNC: 3.1 G/DL (ref 3.5–5.2)
ALP BLD-CCNC: 69 U/L (ref 40–130)
ALT SERPL-CCNC: 68 U/L (ref 5–41)
ANION GAP SERPL CALCULATED.3IONS-SCNC: 16 MMOL/L (ref 7–19)
AST SERPL-CCNC: 15 U/L (ref 5–40)
BILIRUB SERPL-MCNC: 0.4 MG/DL (ref 0.2–1.2)
BUN BLDV-MCNC: 17 MG/DL (ref 8–23)
CALCIUM SERPL-MCNC: 8.7 MG/DL (ref 8.8–10.2)
CHLORIDE BLD-SCNC: 92 MMOL/L (ref 98–111)
CO2: 26 MMOL/L (ref 22–29)
CREAT SERPL-MCNC: 1 MG/DL (ref 0.5–1.2)
GFR NON-AFRICAN AMERICAN: >60
GLUCOSE BLD-MCNC: 176 MG/DL (ref 74–109)
HCT VFR BLD CALC: 24.6 % (ref 42–52)
HCT VFR BLD CALC: 26.4 % (ref 42–52)
HEMOGLOBIN: 8 G/DL (ref 14–18)
HEMOGLOBIN: 8.3 G/DL (ref 14–18)
MAGNESIUM: 1.7 MG/DL (ref 1.6–2.4)
MCH RBC QN AUTO: 28.8 PG (ref 27–31)
MCH RBC QN AUTO: 29.6 PG (ref 27–31)
MCHC RBC AUTO-ENTMCNC: 31.4 G/DL (ref 33–37)
MCHC RBC AUTO-ENTMCNC: 32.5 G/DL (ref 33–37)
MCV RBC AUTO: 91.1 FL (ref 80–94)
MCV RBC AUTO: 91.7 FL (ref 80–94)
PDW BLD-RTO: 15.6 % (ref 11.5–14.5)
PDW BLD-RTO: 15.7 % (ref 11.5–14.5)
PHOSPHORUS: 3 MG/DL (ref 2.5–4.5)
PLATELET # BLD: 280 K/UL (ref 130–400)
PLATELET # BLD: 286 K/UL (ref 130–400)
PMV BLD AUTO: 10.5 FL (ref 9.4–12.4)
PMV BLD AUTO: 10.8 FL (ref 9.4–12.4)
POTASSIUM SERPL-SCNC: 3.5 MMOL/L (ref 3.5–5)
RBC # BLD: 2.7 M/UL (ref 4.7–6.1)
RBC # BLD: 2.88 M/UL (ref 4.7–6.1)
SODIUM BLD-SCNC: 134 MMOL/L (ref 136–145)
TOTAL CK: 78 U/L (ref 39–308)
TOTAL PROTEIN: 6.1 G/DL (ref 6.6–8.7)
WBC # BLD: 11.2 K/UL (ref 4.8–10.8)
WBC # BLD: 8.3 K/UL (ref 4.8–10.8)

## 2018-10-24 PROCEDURE — 85027 COMPLETE CBC AUTOMATED: CPT

## 2018-10-24 PROCEDURE — 99231 SBSQ HOSP IP/OBS SF/LOW 25: CPT | Performed by: INTERNAL MEDICINE

## 2018-10-24 PROCEDURE — 80053 COMPREHEN METABOLIC PANEL: CPT

## 2018-10-24 PROCEDURE — 84100 ASSAY OF PHOSPHORUS: CPT

## 2018-10-24 PROCEDURE — 36415 COLL VENOUS BLD VENIPUNCTURE: CPT

## 2018-10-24 PROCEDURE — 6370000000 HC RX 637 (ALT 250 FOR IP): Performed by: HOSPITALIST

## 2018-10-24 PROCEDURE — 6370000000 HC RX 637 (ALT 250 FOR IP): Performed by: INTERNAL MEDICINE

## 2018-10-24 PROCEDURE — 99239 HOSP IP/OBS DSCHRG MGMT >30: CPT | Performed by: INTERNAL MEDICINE

## 2018-10-24 PROCEDURE — 83735 ASSAY OF MAGNESIUM: CPT

## 2018-10-24 PROCEDURE — 82550 ASSAY OF CK (CPK): CPT

## 2018-10-24 RX ORDER — PANTOPRAZOLE SODIUM 40 MG/1
40 TABLET, DELAYED RELEASE ORAL
Qty: 30 TABLET | Refills: 3 | Status: SHIPPED | OUTPATIENT
Start: 2018-10-25 | End: 2020-02-20 | Stop reason: ALTCHOICE

## 2018-10-24 RX ORDER — SUCRALFATE 1 G/1
1 TABLET ORAL 4 TIMES DAILY
Qty: 120 TABLET | Refills: 3 | Status: SHIPPED | OUTPATIENT
Start: 2018-10-24 | End: 2018-12-11 | Stop reason: ALTCHOICE

## 2018-10-24 RX ADMIN — SUCRALFATE 1 G: 1 TABLET ORAL at 06:12

## 2018-10-24 RX ADMIN — FUROSEMIDE 40 MG: 40 TABLET ORAL at 09:09

## 2018-10-24 RX ADMIN — ASPIRIN 325 MG ORAL TABLET 325 MG: 325 PILL ORAL at 09:09

## 2018-10-24 RX ADMIN — CLOPIDOGREL BISULFATE 75 MG: 75 TABLET ORAL at 09:09

## 2018-10-24 RX ADMIN — METOPROLOL TARTRATE 12.5 MG: 25 TABLET ORAL at 09:09

## 2018-10-24 RX ADMIN — SUCRALFATE 1 G: 1 TABLET ORAL at 00:49

## 2018-10-24 RX ADMIN — PANTOPRAZOLE SODIUM 40 MG: 40 TABLET, DELAYED RELEASE ORAL at 06:12

## 2018-10-24 RX ADMIN — Medication 150 MG: at 09:09

## 2018-10-24 ASSESSMENT — PAIN SCALES - GENERAL: PAINLEVEL_OUTOF10: 0

## 2018-10-24 NOTE — PROGRESS NOTES
Cardiology Daily Note Yoshi Schulz MD      Patient:  Fay Mew  [id number]    Patient Active Problem List    Diagnosis Date Noted    Pleural effusion, bilateral 10/19/2018     Priority: Low    Hypertension      Priority: Low    Palliative care patient 10/17/2018     Priority: Low    Elevated troponin      Priority: Low    GI bleed 10/16/2018     Priority: Low    NSAID long-term use 10/16/2018     Priority: Low    Hypotension due to hypovolemia 10/16/2018     Priority: Low    Acute blood loss anemia 10/16/2018     Priority: Low    Chest pain      Priority: Low    TONY (acute kidney injury) ([facility]      Priority: Low    Dieulafoy lesion of stomach      Priority: Low    Acute on chronic systolic congestive heart failure ([facility] 02/07/2018     Priority: Low    CAD (coronary artery disease) 01/20/2015     Priority: Low    DM2 (diabetes mellitus, type 2) ([facility] 01/20/2015     Priority: Low    HTN (hypertension) 01/20/2015     Priority: Low    Hyperlipidemia with target LDL less than 100 01/20/2015     Priority: Low    Carotid artery occlusion without infarction 03/11/2011     Priority: Low       Admit Date:  10/16/2018    Admission Problem List: Present on Admission:   GI bleed   NSAID long-term use   CAD (coronary artery disease)   DM2 (diabetes mellitus, type 2) (HCC)   Hypotension due to hypovolemia   Acute blood loss anemia   (Resolved) Lactic acidosis   (Resolved) Syncope and collapse   (Resolved) SIRS (systemic inflammatory response syndrome) (HCC)   TONY (acute kidney injury) ([facility]   Dieulafoy lesion of stomach   Palliative care patient   Elevated troponin   Hypertension   (Resolved) Hypophosphatemia   Pleural effusion, bilateral   (Resolved) Elevated lactic acid level   (Resolved) Hypokalemia   (Resolved) Hypomagnesemia      Cardiac Specific Data:  Specialty Problems        Cardiology Problems    Carotid artery occlusion without infarction        CAD (coronary artery disease)        HTN (hypertension)        Acute on chronic systolic congestive heart failure (HCC)        Dieulafoy lesion of stomach        Hypotension due to hypovolemia        Hypertension              Subjective:  Mr. Michelle Galicia resting comfortably. Feels well. Dyspnea stable and improved. Denies chest pain. Desires to go home today. Objective:   /76   Pulse 104   Temp 97.2 °F (36.2 °C) (Temporal)   Resp 16   Ht 5' 11\" (1.803 m)   Wt 245 lb (111.1 kg)   SpO2 95%   BMI 34.17 kg/m²       Intake/Output Summary (Last 24 hours) at 10/24/18 0916  Last data filed at 10/24/18 0424   Gross per 24 hour   Intake              980 ml   Output             2455 ml   Net            -1475 ml       Prior to Admission medications    Medication Sig Start Date End Date Taking?  Authorizing Provider   pantoprazole (PROTONIX) 40 MG tablet Take 1 tablet by mouth every morning (before breakfast) 10/25/18  Yes Brenda Flores MD   sucralfate (CARAFATE) 1 GM tablet Take 1 tablet by mouth 4 times daily 10/24/18  Yes Brenda Flores MD   cyanocobalamin 1000 MCG/ML injection Inject 1 mL into the muscle every 30 days 9/11/18  Yes ROSS Sorenson, DO   furosemide (LASIX) 40 MG tablet TAKE 1 TABLET BY MOUTH TWICE DAILY 7/13/18  Yes JOSE ELIAS Lovell   spironolactone (ALDACTONE) 25 MG tablet TAKE 1 TABLET DAILY 6/4/18  Yes JOSE ELIAS Leggett   atorvastatin (LIPITOR) 40 MG tablet Take 1 tablet by mouth daily 5/15/18  Yes JOSE ELIAS Lovell   Dulaglutide 0.75 MG/0.5ML SOPN Inject 0.75 mg into the skin every 7 days 1/15/18  Yes ROSS Sorenson, DO   ramipril (ALTACE) 10 MG capsule TAKE 2 CAPSULES DAILY  Patient taking differently: ONE TABLET NIGHTLY 11/3/17  Yes JOSE ELIAS Leggett   nitroGLYCERIN (NITROLINGUAL) 0.4 MG/SPRAY 0.4 mg spray Place 1 spray under the tongue every 5 minutes as needed for Chest pain 10/27/17  Yes ROSS Sorenson,    metFORMIN (GLUCOPHAGE) 1000 MG tablet Take 1 tablet by mouth 2 in the lateral wall. Total perfusion defect 26% on stress 20% on rest. The sum stress score was 23 and the sum difference score was 6. Consistent with previous scar in this region consistent with previous infarct and/or mild olimpia-infarct ischemia correlate clinically Signed by Dr David Bull on 10/20/2018 3:40 PM        Assessment:  1. Elevated troponin up to 1.66 possible demand ischemia versus non-STEMI  2. Severe anemia on presentation hemoglobin 6.2 status post transfusion ×4 ordered now 8.1  3. EGD 10/16/18 with cauterization of visible vessel  4. Complaints of gradually worsening exertional dyspnea  5. Underlying left bundle branch block  6. Complaints of gradually worsening exertional chest pressure neck and jaw discomfort consistent with angina with an episode yesterday requiring 2 nitroglycerin for relief  7. History of colon polyps  8. History of benign prostatic hypertrophy  9. History of basilar insufficiency status post [facility] at [facility] in 2010  10. Hypertension  11. Diabetes  12. Hypercholesterolemia  13. Prior tobacco abuse discontinued cigarettes one pack per day after CABG  14. Prerenal azotemia creatinine now up to 2.2  15. Carotid artery disease  16. Lexiscan stress test in 2018 ejection fraction 24% inferolateral scar some reversible lateral ischemia    Plan:  1.  Stable from a cardiac standpoint can be discharged any time follow-up in the office    David Bull MD 10/24/2018 9:16 AM

## 2018-10-24 NOTE — DISCHARGE SUMMARY
Ena Laws  :  1950  MRN:  [id number]    Admit date:  10/16/2018  Discharge date: 10/24/2018    Admitting Physician:  Patricia Carrillo MD    Advance Directive: No Order    Consults Made:   IP CONSULT TO GI  PALLIATIVE CARE EVAL      Primary Care Physician:  Giuliana Myrick DO    Discharge Diagnoses:  Principal Problem:    GI bleed  Active Problems:    CAD (coronary artery disease)    DM2 (diabetes mellitus, type 2) (HCC)    NSAID long-term use    Hypotension due to hypovolemia    Acute blood loss anemia    TONY (acute kidney injury) ([address]    Dieulafoy lesion of stomach    Elevated troponin    Hypertension    Pleural effusion, bilateral    Hyperparathyroidism ([address]  Resolved Problems:    Lactic acidosis    Elevated lactic acid level    Syncope and collapse    SIRS (systemic inflammatory response syndrome) ([address]    Palliative care patient    Hypophosphatemia    Hypokalemia    Hypomagnesemia      Significant Diagnostic Studies:   RAD:   XR ACUTE ABD SERIES CHEST 1 VW 10/16/2018 8:23 AM   HISTORY: Loss of appetite, vomiting. Report: A frontal view the chest, upright and supine views of the abdomen were obtained, the chest x-rays compared with the study from 2013. There are no recent abdominal series for comparison. The lungs are clear, there are scattered calcified granulomas. Interstitial markings are somewhat coarse. Heart size is within normal limits and there is no evidence of CHF. Previous median sternotomy is noted. Views of the abdomen demonstrated moderate amount stool within the colon, no bowel dilatation to indicate obstruction. No free air is identified. Cholecystectomy clips are present. There are moderate degenerative changes of the lumbar spine. No acute osseous abnormality. Impression  1. Probable constipation with no evidence of bowel obstruction. No free air is identified. 2. Mild chronic lung changes, no acute cardiopulmonary abnormality. Signed by Dr Alon Walker.  Kasey on 10/16/2018 8:25 AM        XR CHEST PORTABLE 10/17/2018 11:00 PM   HISTORY: Shortness of breath COMPARISON: 10/17/2018. FINDINGS: Reidentified cardiomegaly. Central pulmonary vascular congestion identified and there appears to be interstitial edema (Kerley B lines in the left base). No dense areas of lung consolidation. No pleural effusion. No pneumothorax. The cardiomediastinal silhouette and pulmonary vascularity are within normal limits. The osseous structures and surrounding soft tissues demonstrate no acute abnormality. Impression  1. Reidentified cardiomegaly which is stable. Central pulmonary congestion with what appears to be mild interstitial edema. No dense consolidations. Signed by Dr Willam Nino on 10/18/2018 7:50 AM        XR CHEST PORTABLE 10/17/2018 12:07 PM   HISTORY: Sepsis. Report: Comparison is made with the chest x-ray 10/16/2018 0800 hours. Inspiratory effort is suboptimal, the lungs are hypoaerated and there is mild central vascular congestion and basilar vascular crowding, mild cardiomegaly is present. There is no focal infiltrate or consolidation. No pneumothorax or pleural effusion is seen. Previous median sternotomy is evident. The osseous structures show nothing acute. Impression  Pulmonary hypoventilation with mild cardiomegaly and central vascular congestion, no evidence of pulmonary edema or consolidating pneumonia. Signed by Dr Steffanie Cochran. Kasey on 10/17/2018 12:10 PM        CTA PULMONARY W CONTRAST: 10/19/2018  Findings:  Vascular findings:  Satisfactory opacification of the pulmonary arteries without embolus. No thoracic aortic aneurysm or dissection. Prior median sternotomy/CABG. Heart size upper normal.   No pericardial effusion. Mild stenosis of the proximal left subclavian artery.     Nonvascular findings:  Borderline increased in number lymph nodes in the mediastinum and right hilum. These may be reactive. Mild body wall edema suggesting anasarca.   Calcified granuloma left upper lobe. Small/moderate sized bilateral pleural effusions. Adjacent compressive atelectasis. No noncalcified nodules identified. No pneumothorax. No endobronchial mass. Imaging through the upper abdomen shows a very small amount of upper  abdominal ascites. Small left renal cysts. Prior cholecystectomy. Extensive degenerative changes of the spine.     Impression  1. No pulmonary embolus. 2. Small/moderate pleural effusions bilaterally with compressive  atelectasis. 3. Probable reactive lymph nodes in the mediastinum and right hilum. 4. Body wall edema and trace ascites in the upper abdomen. Signed by Dr Lian Bustos on 10/19/2018 9:56 AM     ECHO 2D WO COLOR DOPPLER COMPLETE: 10/16/2018   Conclusions   Summary   Mitral valve leaflets are mildly thickened with preserved leaflet mobility.   Moderate mitral regurgitation.   Aortic valve appears to be tricuspid.   Moderate calcification of the aortic valve with moderately reduced cusp separation.   Moderate aortic stenosis is present.   Tricuspid valve is structurally normal.   Moderate tricuspid regurgitation.   Mildly dilated left atrium.   Dilated left ventricular size with Severely reduced LV function and an estimated ejection fraction of approximately 20%.  Severe global hypokinesis is noted.   Segmental wall motion abnormalities present including inferior wall and apex   Grade II diastolic dysfunction.   No evidence of left ventricular mass or thrombus noted.      Signature   ----------------------------------------------------------------   Electronically signed by Aj Campos MD(Interpreting   LUISALONSO) on 10/18/2018 04:01 PM   ----------------------------------------------------------------       NM MYOCARDIAL - Lexiscan Nuclear Stress Test Report: 10/20/2018  Narrative  Procedure date: 10/20/2018  Indications: Shortness of breath  Procedure: Stress was performed with injection of 0.4 mg Lexiscan.   Vital signs and EKG were replaced as needed while inpatient. Including;    # Hypokalemia - Replaced with KCL  # Hypomagnesemia - Replaced with Mag Sulfate IV  # Hypophospatemia - Likely due to Hyperparathyroidism  - Replaced with K-Phos      Hyperparathyroidism    --> PTH level: 136.4 pg/mL  (Ref: 15.0 - 65.0 pg/mL)   - Secondary from Hypocalcemia on presentation vs Primary Asymptomatic Hyperparathyroidism.  - Patient remains  Asymptomatic  - Further needed / necessary workup, including imaging and/or periodic follow ups - is being deferred to PCP on an Out patient basis. Physical Exam:  Vital Signs: /76   Pulse 104   Temp 97.2 °F (36.2 °C) (Temporal)   Resp 16   Ht 5' 11\" (1.803 m)   Wt 245 lb (111.1 kg)   SpO2 95%   BMI 34.17 kg/m²      General appearance:. Alert and Cooperative   HEENT: Normocephalic. Chest: clear to auscultation bilaterally without wheezes or rhonchi. Cardiac: Normal heart tones with regular rate and rhythm, S1, S2 normal. No murmurs, gallops, or rubs auscultated. Abdomen:soft, non-tender; normal bowel sounds, no masses, no organomegaly. Extremities: No clubbing or cyanosis. 1-2+ peripheral edema. Peripheral pulses palpable. Neurologic: Grossly intact. Discharge Medications:       Alleen Fiona   Home Medication Instructions [id number]    Printed on:10/26/18 8460   Medication Information                      aspirin 325 MG tablet  Take 325 mg by mouth daily.              atorvastatin (LIPITOR) 40 MG tablet  Take 1 tablet by mouth daily             clopidogrel (PLAVIX) 75 MG tablet  Take 1 tablet by mouth daily             cyanocobalamin 1000 MCG/ML injection  Inject 1 mL into the muscle every 30 days             diphenhydrAMINE (BENADRYL) 25 MG tablet  Take 50 mg by mouth nightly as needed for Itching             Dulaglutide 0.75 MG/0.5ML SOPN  Inject 0.75 mg into the skin every 7 days             metFORMIN (GLUCOPHAGE) 1000 MG tablet  Take 1 tablet by mouth 2 times daily (with

## 2018-10-25 ENCOUNTER — OFFICE VISIT (OUTPATIENT)
Dept: PRIMARY CARE CLINIC | Age: 68
End: 2018-10-25
Payer: COMMERCIAL

## 2018-10-25 ENCOUNTER — CARE COORDINATION (OUTPATIENT)
Dept: CASE MANAGEMENT | Age: 68
End: 2018-10-25

## 2018-10-25 ENCOUNTER — CARE COORDINATION (OUTPATIENT)
Dept: CARE COORDINATION | Age: 68
End: 2018-10-25

## 2018-10-25 VITALS
HEIGHT: 71 IN | BODY MASS INDEX: 34.51 KG/M2 | DIASTOLIC BLOOD PRESSURE: 60 MMHG | OXYGEN SATURATION: 99 % | SYSTOLIC BLOOD PRESSURE: 88 MMHG | WEIGHT: 246.5 LBS | HEART RATE: 96 BPM | TEMPERATURE: 98.6 F

## 2018-10-25 DIAGNOSIS — I25.10 CORONARY ARTERY DISEASE INVOLVING NATIVE CORONARY ARTERY, ANGINA PRESENCE UNSPECIFIED, UNSPECIFIED WHETHER NATIVE OR TRANSPLANTED HEART: ICD-10-CM

## 2018-10-25 DIAGNOSIS — K31.811 GASTROINTESTINAL HEMORRHAGE ASSOCIATED WITH ANGIODYSPLASIA OF STOMACH AND DUODENUM: ICD-10-CM

## 2018-10-25 DIAGNOSIS — J90 PLEURAL EFFUSION, BILATERAL: ICD-10-CM

## 2018-10-25 DIAGNOSIS — D50.0 IRON DEFICIENCY ANEMIA DUE TO CHRONIC BLOOD LOSS: ICD-10-CM

## 2018-10-25 DIAGNOSIS — E87.6 HYPOKALEMIA: ICD-10-CM

## 2018-10-25 DIAGNOSIS — I35.0 AORTIC STENOSIS, MODERATE: ICD-10-CM

## 2018-10-25 DIAGNOSIS — R05.9 COUGH: ICD-10-CM

## 2018-10-25 DIAGNOSIS — K31.82 DIEULAFOY LESION OF STOMACH: ICD-10-CM

## 2018-10-25 DIAGNOSIS — I50.23 ACUTE ON CHRONIC SYSTOLIC CONGESTIVE HEART FAILURE (HCC): Primary | ICD-10-CM

## 2018-10-25 DIAGNOSIS — I95.89 OTHER SPECIFIED HYPOTENSION: ICD-10-CM

## 2018-10-25 PROBLEM — Z51.5 PALLIATIVE CARE PATIENT: Status: RESOLVED | Noted: 2018-10-17 | Resolved: 2018-10-25

## 2018-10-25 PROCEDURE — 99496 TRANSJ CARE MGMT HIGH F2F 7D: CPT | Performed by: PEDIATRICS

## 2018-10-25 RX ORDER — HYDROCODONE POLISTIREX AND CHLORPHENIRAMINE POLISTIREX 10; 8 MG/5ML; MG/5ML
5 SUSPENSION, EXTENDED RELEASE ORAL EVERY 12 HOURS PRN
Qty: 115 ML | Refills: 0 | Status: SHIPPED | OUTPATIENT
Start: 2018-10-25 | End: 2018-10-29 | Stop reason: SDUPTHER

## 2018-10-25 RX ORDER — METOLAZONE 2.5 MG/1
2.5 TABLET ORAL DAILY
Qty: 30 TABLET | Refills: 0 | Status: SHIPPED | OUTPATIENT
Start: 2018-10-25 | End: 2018-10-31 | Stop reason: ALTCHOICE

## 2018-10-25 RX ORDER — LANOLIN ALCOHOL/MO/W.PET/CERES
325 CREAM (GRAM) TOPICAL 2 TIMES DAILY
Qty: 60 TABLET | Refills: 1 | Status: SHIPPED | OUTPATIENT
Start: 2018-10-25 | End: 2018-12-14 | Stop reason: SDUPTHER

## 2018-10-25 RX ORDER — POTASSIUM CHLORIDE 20 MEQ/1
20 TABLET, EXTENDED RELEASE ORAL 2 TIMES DAILY
Qty: 60 TABLET | Refills: 3 | Status: SHIPPED | OUTPATIENT
Start: 2018-10-25 | End: 2019-07-05 | Stop reason: SDUPTHER

## 2018-10-25 RX ORDER — BUMETANIDE 2 MG/1
2 TABLET ORAL DAILY
Qty: 30 TABLET | Refills: 3 | Status: SHIPPED | OUTPATIENT
Start: 2018-10-25 | End: 2018-10-31 | Stop reason: ALTCHOICE

## 2018-10-25 RX ORDER — BENZONATATE 100 MG/1
100 CAPSULE ORAL 3 TIMES DAILY PRN
Qty: 30 CAPSULE | Refills: 1 | Status: SHIPPED | OUTPATIENT
Start: 2018-10-25 | End: 2018-11-24 | Stop reason: SDUPTHER

## 2018-10-25 ASSESSMENT — ENCOUNTER SYMPTOMS
COUGH: 1
SORE THROAT: 0
ABDOMINAL PAIN: 0
CHEST TIGHTNESS: 1
NAUSEA: 0
VOMITING: 0
BLOOD IN STOOL: 1
BACK PAIN: 0
DIARRHEA: 0
WHEEZING: 1
SHORTNESS OF BREATH: 1

## 2018-10-25 NOTE — PROGRESS NOTES
bumetanide (BUMEX) 2 MG tablet  Take 1 tablet by mouth daily             clopidogrel (PLAVIX) 75 MG tablet  Take 1 tablet by mouth daily             cyanocobalamin 1000 MCG/ML injection  Inject 1 mL into the muscle every 30 days             diphenhydrAMINE (BENADRYL) 25 MG tablet  Take 50 mg by mouth nightly as needed for Itching             Dulaglutide 0.75 MG/0.5ML SOPN  Inject 0.75 mg into the skin every 7 days             ferrous sulfate (FE TABS) 325 (65 Fe) MG EC tablet  Take 1 tablet by mouth 2 times daily             hydrocodone-chlorpheniramine (TUSSIONEX PENNKINETIC ER) 10-8 MG/5ML SUER  Take 5 mLs by mouth every 12 hours as needed (cough) for up to 10 days. .             metFORMIN (GLUCOPHAGE) 1000 MG tablet  Take 1 tablet by mouth 2 times daily (with meals)             metolazone (ZAROXOLYN) 2.5 MG tablet  Take 1 tablet by mouth daily             metoprolol tartrate (LOPRESSOR) 50 MG tablet  Take 1 tablet by mouth 2 times daily             niacin (NIASPAN) 1000 MG CR tablet  Take 1,000 mg by mouth nightly. nitroGLYCERIN (NITROLINGUAL) 0.4 MG/SPRAY 0.4 mg spray  Place 1 spray under the tongue every 5 minutes as needed for Chest pain             Omega-3 Fatty Acids (FISH OIL) 1000 MG CAPS  Take 3,000 mg by mouth daily             pantoprazole (PROTONIX) 40 MG tablet  Take 1 tablet by mouth every morning (before breakfast)             potassium chloride (KLOR-CON M) 20 MEQ extended release tablet  Take 1 tablet by mouth 2 times daily             sacubitril-valsartan (ENTRESTO) 24-26 MG per tablet  Take 1 tablet by mouth 2 times daily             spironolactone (ALDACTONE) 25 MG tablet  TAKE 1 TABLET DAILY             sucralfate (CARAFATE) 1 GM tablet  Take 1 tablet by mouth 4 times daily             triamcinolone (KENALOG) 0.1 % cream  Apply topically 2 times daily.                    Medications marked \"taking\" at this time  Outpatient Prescriptions Marked as Taking for the 10/25/18 encounter need to be off his Altace for 36 hours prior to that conversion. Review of Systems   Constitutional: Negative for chills, fatigue and fever. HENT: Negative for congestion, ear pain and sore throat. Eyes: Negative for visual disturbance. Respiratory: Positive for cough, chest tightness, shortness of breath and wheezing. Cardiovascular: Positive for palpitations and leg swelling. Negative for chest pain. He is able to lay down some. Gastrointestinal: Positive for blood in stool (but not in past several days. ). Negative for abdominal pain, diarrhea, nausea and vomiting. Endocrine: Negative for polyuria. Genitourinary: Negative for frequency and urgency. Musculoskeletal: Negative for back pain and neck pain. Skin: Negative for rash. Neurological: Positive for dizziness, syncope, weakness and light-headedness. Negative for headaches. Psychiatric/Behavioral: Positive for sleep disturbance (he is unable to sleep ). Negative for self-injury. The patient is not nervous/anxious. Vitals:    10/25/18 1602   BP: 88/60   Site: Left Upper Arm   Position: Sitting   Cuff Size: Large Adult   Pulse: 96   Temp: 98.6 °F (37 °C)   TempSrc: Temporal   SpO2: 99%   Weight: 246 lb 8 oz (111.8 kg)   Height: 5' 11\" (1.803 m)     Body mass index is 34.38 kg/m². Wt Readings from Last 3 Encounters:   10/25/18 246 lb 8 oz (111.8 kg)   10/24/18 245 lb (111.1 kg)   04/24/18 246 lb 1.9 oz (111.6 kg)     BP Readings from Last 3 Encounters:   10/25/18 88/60   10/24/18 118/76   10/16/18 123/61       Physical Exam   Constitutional: He is oriented to person, place, and time. He appears well-developed and well-nourished. No distress. HENT:   Head: Normocephalic and atraumatic. Right Ear: Hearing and external ear normal. No middle ear effusion. Left Ear: Hearing and external ear normal.   Nose: No mucosal edema (mild) or rhinorrhea (thin, clear).    Mouth/Throat: Posterior oropharyngeal erythema (Mild)

## 2018-10-26 ENCOUNTER — CARE COORDINATION (OUTPATIENT)
Dept: CASE MANAGEMENT | Age: 68
End: 2018-10-26

## 2018-10-26 PROBLEM — E21.3 HYPERPARATHYROIDISM (HCC): Status: ACTIVE | Noted: 2018-10-26

## 2018-10-29 ENCOUNTER — CARE COORDINATION (OUTPATIENT)
Dept: CARE COORDINATION | Age: 68
End: 2018-10-29

## 2018-10-29 DIAGNOSIS — R05.9 COUGH: ICD-10-CM

## 2018-10-29 RX ORDER — HYDROCODONE POLISTIREX AND CHLORPHENIRAMINE POLISTIREX 10; 8 MG/5ML; MG/5ML
5 SUSPENSION, EXTENDED RELEASE ORAL EVERY 12 HOURS PRN
Qty: 65 ML | Refills: 0 | Status: SHIPPED | OUTPATIENT
Start: 2018-10-29 | End: 2018-11-08

## 2018-10-30 ENCOUNTER — TELEPHONE (OUTPATIENT)
Dept: GASTROENTEROLOGY | Age: 68
End: 2018-10-30

## 2018-10-31 ENCOUNTER — CARE COORDINATION (OUTPATIENT)
Dept: CARE COORDINATION | Age: 68
End: 2018-10-31

## 2018-10-31 ENCOUNTER — OFFICE VISIT (OUTPATIENT)
Dept: PRIMARY CARE CLINIC | Age: 68
End: 2018-10-31
Payer: COMMERCIAL

## 2018-10-31 VITALS
HEIGHT: 71 IN | TEMPERATURE: 98.4 F | WEIGHT: 222.75 LBS | HEART RATE: 83 BPM | OXYGEN SATURATION: 99 % | BODY MASS INDEX: 31.19 KG/M2

## 2018-10-31 DIAGNOSIS — I10 ESSENTIAL HYPERTENSION: ICD-10-CM

## 2018-10-31 DIAGNOSIS — I50.23 ACUTE ON CHRONIC SYSTOLIC CONGESTIVE HEART FAILURE (HCC): Primary | ICD-10-CM

## 2018-10-31 DIAGNOSIS — I50.23 ACUTE ON CHRONIC SYSTOLIC CONGESTIVE HEART FAILURE (HCC): ICD-10-CM

## 2018-10-31 DIAGNOSIS — I25.10 CORONARY ARTERY DISEASE INVOLVING NATIVE CORONARY ARTERY, ANGINA PRESENCE UNSPECIFIED, UNSPECIFIED WHETHER NATIVE OR TRANSPLANTED HEART: ICD-10-CM

## 2018-10-31 DIAGNOSIS — E21.3 HYPERPARATHYROIDISM (HCC): ICD-10-CM

## 2018-10-31 LAB
ANION GAP SERPL CALCULATED.3IONS-SCNC: 18 MMOL/L (ref 7–19)
BUN BLDV-MCNC: 16 MG/DL (ref 8–23)
CALCIUM SERPL-MCNC: 9.9 MG/DL (ref 8.8–10.2)
CHLORIDE BLD-SCNC: 93 MMOL/L (ref 98–111)
CO2: 24 MMOL/L (ref 22–29)
CREAT SERPL-MCNC: 1.4 MG/DL (ref 0.5–1.2)
GFR NON-AFRICAN AMERICAN: 50
GLUCOSE BLD-MCNC: 160 MG/DL (ref 74–109)
POTASSIUM SERPL-SCNC: 4.7 MMOL/L (ref 3.5–5)
PRO-BNP: 1531 PG/ML (ref 0–900)
SODIUM BLD-SCNC: 135 MMOL/L (ref 136–145)

## 2018-10-31 PROCEDURE — 3017F COLORECTAL CA SCREEN DOC REV: CPT | Performed by: PEDIATRICS

## 2018-10-31 PROCEDURE — 1123F ACP DISCUSS/DSCN MKR DOCD: CPT | Performed by: PEDIATRICS

## 2018-10-31 PROCEDURE — G8427 DOCREV CUR MEDS BY ELIG CLIN: HCPCS | Performed by: PEDIATRICS

## 2018-10-31 PROCEDURE — 1111F DSCHRG MED/CURRENT MED MERGE: CPT | Performed by: PEDIATRICS

## 2018-10-31 PROCEDURE — 4040F PNEUMOC VAC/ADMIN/RCVD: CPT | Performed by: PEDIATRICS

## 2018-10-31 PROCEDURE — 99214 OFFICE O/P EST MOD 30 MIN: CPT | Performed by: PEDIATRICS

## 2018-10-31 PROCEDURE — G8598 ASA/ANTIPLAT THER USED: HCPCS | Performed by: PEDIATRICS

## 2018-10-31 PROCEDURE — 1036F TOBACCO NON-USER: CPT | Performed by: PEDIATRICS

## 2018-10-31 PROCEDURE — 1101F PT FALLS ASSESS-DOCD LE1/YR: CPT | Performed by: PEDIATRICS

## 2018-10-31 PROCEDURE — G8417 CALC BMI ABV UP PARAM F/U: HCPCS | Performed by: PEDIATRICS

## 2018-10-31 PROCEDURE — G8482 FLU IMMUNIZE ORDER/ADMIN: HCPCS | Performed by: PEDIATRICS

## 2018-10-31 RX ORDER — SPIRONOLACTONE 25 MG/1
12.5 TABLET ORAL DAILY
Qty: 90 TABLET | Refills: 3
Start: 2018-10-31 | End: 2019-06-14

## 2018-10-31 RX ORDER — CARVEDILOL 3.12 MG/1
3.12 TABLET ORAL 2 TIMES DAILY
Qty: 60 TABLET | Refills: 3 | Status: SHIPPED | OUTPATIENT
Start: 2018-10-31 | End: 2018-12-11 | Stop reason: ALTCHOICE

## 2018-10-31 ASSESSMENT — ENCOUNTER SYMPTOMS
NAUSEA: 0
SORE THROAT: 0
BLOOD IN STOOL: 1
BACK PAIN: 0
SHORTNESS OF BREATH: 1
WHEEZING: 1
CHEST TIGHTNESS: 0
VOMITING: 0
ABDOMINAL PAIN: 0
DIARRHEA: 0
COUGH: 1

## 2018-10-31 NOTE — PROGRESS NOTES
[facility], [address]  Phone [phone]   Fax [phone]      OFFICE VISIT: 10/31/2018    Chris Rosario-: 1950      HPI  Reason For Visit:  Kevin Springer is a 76 y.o. Health Maintenance    Follow-up (1 week - Feeling better today. Pt still feels like his BP is running too low. Pt has a log of BP readings and sugar readings for review. ) and Discuss Medications (Need to know what to do about the Trulicity. He stopped taking this prior to his hospital stay. Pt would take this once a week and vomit. Has onlt been taking the glucophage. )      Patient presents on follow-up from a week ago. Blood pressure has been running low since starting [facility] measures are anywhere from 82/64s to 110/57    Present medication regimen includes:   Entresto 24/26mg   Aldactone 25 mg daily   Metoprolol tartrate 25 mg twice daily    He is also using diuretics, Bumex with Zaroxolyn. He has not been feeling overly energetic recently (past 2 days)       height is 5' 11\" (1.803 m) and weight is 222 lb 12 oz (101 kg). His temporal temperature is 98.4 °F (36.9 °C). His pulse is 83. His oxygen saturation is 99%. His weight is down 24 pounds from a week ago    Body mass index is 31.07 kg/m². I have reviewed the following with the Mr. Michelle Galicia   Lab Review  Admission on 10/16/2018, Discharged on 10/24/2018   No results displayed because visit has over 200 results. Copies of these are in the chart. Current Outpatient Prescriptions   Medication Sig Dispense Refill    carvedilol (COREG) 3.125 MG tablet Take 1 tablet by mouth 2 times daily 60 tablet 3    spironolactone (ALDACTONE) 25 MG tablet Take 0.5 tablets by mouth daily 90 tablet 3    hydrocodone-chlorpheniramine (TUSSIONEX PENNKINETIC ER) 10-8 MG/5ML SUER Take 5 mLs by mouth every 12 hours as needed (cough) for up to 10 days. Ashleigh Mcclellan Date: 10/29/18 65 mL 0    potassium chloride (KLOR-CON M) 20 MEQ

## 2018-11-01 ENCOUNTER — TELEPHONE (OUTPATIENT)
Dept: PRIMARY CARE CLINIC | Age: 68
End: 2018-11-01

## 2018-11-01 DIAGNOSIS — I10 ESSENTIAL HYPERTENSION: ICD-10-CM

## 2018-11-01 DIAGNOSIS — N28.9 ABNORMAL KIDNEY FUNCTION: Primary | ICD-10-CM

## 2018-11-01 DIAGNOSIS — Z79.899 MEDICATION MANAGEMENT: ICD-10-CM

## 2018-11-01 NOTE — TELEPHONE ENCOUNTER
----- Message from [address] [facility],[address], DO sent at 10/31/2018  3:55 PM CDT -----  As predicted, we do have you little bit on the dry side. Creatinine did increase to 1.4. Backing off on the diuretics should be helpful for this. Potassium was still stable at 4.7. As we back off on the diuretics we can also back off on the potassium.   As your fluid level balances out, you should feel much better

## 2018-11-01 NOTE — TELEPHONE ENCOUNTER
Called patient, spoke with: Spouse regarding the results of the patients most recent labs. I advised Spouse of Dr. Lizabeth Florentino recommendations. Spouse did ask if this meant to stop the Lasix and potassium also or to give the lasix since he stopped the Bumex and the Zaroxolyn. She was also asking about the BNP. I told her I would clarify this with physician and someone would call her back tomorrow. Pts spouse understood.

## 2018-11-07 ENCOUNTER — CARE COORDINATION (OUTPATIENT)
Dept: CARE COORDINATION | Age: 68
End: 2018-11-07

## 2018-11-07 DIAGNOSIS — I10 HYPERTENSION, UNSPECIFIED TYPE: ICD-10-CM

## 2018-11-07 DIAGNOSIS — D62 ACUTE BLOOD LOSS ANEMIA: Primary | ICD-10-CM

## 2018-11-09 ENCOUNTER — TELEPHONE (OUTPATIENT)
Dept: PRIMARY CARE CLINIC | Age: 68
End: 2018-11-09

## 2018-11-09 DIAGNOSIS — I10 ESSENTIAL HYPERTENSION: ICD-10-CM

## 2018-11-09 DIAGNOSIS — Z79.899 MEDICATION MANAGEMENT: ICD-10-CM

## 2018-11-09 DIAGNOSIS — I25.10 CORONARY ARTERY DISEASE INVOLVING NATIVE CORONARY ARTERY, ANGINA PRESENCE UNSPECIFIED, UNSPECIFIED WHETHER NATIVE OR TRANSPLANTED HEART: Primary | ICD-10-CM

## 2018-11-09 DIAGNOSIS — E78.5 HYPERLIPIDEMIA WITH TARGET LDL LESS THAN 100: ICD-10-CM

## 2018-11-09 DIAGNOSIS — E11.9 TYPE 2 DIABETES MELLITUS WITHOUT COMPLICATION, WITHOUT LONG-TERM CURRENT USE OF INSULIN (HCC): ICD-10-CM

## 2018-11-09 DIAGNOSIS — Z00.00 ROUTINE GENERAL MEDICAL EXAMINATION AT A HEALTH CARE FACILITY: ICD-10-CM

## 2018-11-09 DIAGNOSIS — I50.23 ACUTE ON CHRONIC SYSTOLIC CONGESTIVE HEART FAILURE (HCC): ICD-10-CM

## 2018-11-09 LAB
ALBUMIN SERPL-MCNC: 3.7 G/DL (ref 3.5–5.2)
ALP BLD-CCNC: 72 U/L (ref 40–130)
ALT SERPL-CCNC: 12 U/L (ref 5–41)
ANION GAP SERPL CALCULATED.3IONS-SCNC: 17 MMOL/L (ref 7–19)
AST SERPL-CCNC: 14 U/L (ref 5–40)
BASOPHILS ABSOLUTE: 0.1 K/UL (ref 0–0.2)
BASOPHILS RELATIVE PERCENT: 1.3 % (ref 0–1)
BILIRUB SERPL-MCNC: 0.4 MG/DL (ref 0.2–1.2)
BUN BLDV-MCNC: 12 MG/DL (ref 8–23)
CALCIUM SERPL-MCNC: 9 MG/DL (ref 8.8–10.2)
CHLORIDE BLD-SCNC: 101 MMOL/L (ref 98–111)
CHOLESTEROL, TOTAL: 129 MG/DL (ref 160–199)
CO2: 23 MMOL/L (ref 22–29)
CREAT SERPL-MCNC: 1 MG/DL (ref 0.5–1.2)
CREATININE URINE: 118.7 MG/DL (ref 4.2–622)
EOSINOPHILS ABSOLUTE: 0.5 K/UL (ref 0–0.6)
EOSINOPHILS RELATIVE PERCENT: 7 % (ref 0–5)
GFR NON-AFRICAN AMERICAN: >60
GLUCOSE BLD-MCNC: 115 MG/DL (ref 74–109)
HBA1C MFR BLD: 5.4 % (ref 4–6)
HCT VFR BLD CALC: 35.9 % (ref 42–52)
HDLC SERPL-MCNC: 35 MG/DL (ref 55–121)
HEMOGLOBIN: 10.7 G/DL (ref 14–18)
LDL CHOLESTEROL CALCULATED: 65 MG/DL
LYMPHOCYTES ABSOLUTE: 1.9 K/UL (ref 1.1–4.5)
LYMPHOCYTES RELATIVE PERCENT: 26.9 % (ref 20–40)
MCH RBC QN AUTO: 28.4 PG (ref 27–31)
MCHC RBC AUTO-ENTMCNC: 29.8 G/DL (ref 33–37)
MCV RBC AUTO: 95.2 FL (ref 80–94)
MICROALBUMIN UR-MCNC: 1.4 MG/DL (ref 0–19)
MICROALBUMIN/CREAT UR-RTO: 11.8 MG/G
MONOCYTES ABSOLUTE: 0.6 K/UL (ref 0–0.9)
MONOCYTES RELATIVE PERCENT: 7.9 % (ref 0–10)
NEUTROPHILS ABSOLUTE: 3.9 K/UL (ref 1.5–7.5)
NEUTROPHILS RELATIVE PERCENT: 56.6 % (ref 50–65)
PDW BLD-RTO: 16.5 % (ref 11.5–14.5)
PLATELET # BLD: 298 K/UL (ref 130–400)
PMV BLD AUTO: 10.5 FL (ref 9.4–12.4)
POTASSIUM SERPL-SCNC: 3.8 MMOL/L (ref 3.5–5)
RBC # BLD: 3.77 M/UL (ref 4.7–6.1)
SODIUM BLD-SCNC: 141 MMOL/L (ref 136–145)
T4 FREE: 1 NG/DL (ref 0.9–1.7)
TOTAL PROTEIN: 7 G/DL (ref 6.6–8.7)
TRIGL SERPL-MCNC: 144 MG/DL (ref 0–149)
TSH SERPL DL<=0.05 MIU/L-ACNC: 5.49 UIU/ML (ref 0.27–4.2)
WBC # BLD: 7 K/UL (ref 4.8–10.8)

## 2018-11-12 ENCOUNTER — CARE COORDINATION (OUTPATIENT)
Dept: CARE COORDINATION | Age: 68
End: 2018-11-12

## 2018-11-12 ENCOUNTER — TELEPHONE (OUTPATIENT)
Dept: PRIMARY CARE CLINIC | Age: 68
End: 2018-11-12

## 2018-11-12 DIAGNOSIS — R79.89 ELEVATED TSH: Primary | ICD-10-CM

## 2018-11-12 RX ORDER — LEVOTHYROXINE SODIUM 0.03 MG/1
25 TABLET ORAL DAILY
Qty: 90 TABLET | Refills: 1 | Status: SHIPPED | OUTPATIENT
Start: 2018-11-12 | End: 2018-12-11 | Stop reason: SDUPTHER

## 2018-11-12 RX ORDER — LEVOTHYROXINE SODIUM 0.03 MG/1
25 TABLET ORAL DAILY
Qty: 30 TABLET | Refills: 0 | Status: SHIPPED | OUTPATIENT
Start: 2018-11-12 | End: 2018-11-12 | Stop reason: SDUPTHER

## 2018-11-12 NOTE — CARE COORDINATION
Ambulatory Care Coordination Note  11/12/2018  CM Risk Score: 6  Ricci Mortality Risk Score: 11    ACC: Kavita Kirkland, ANDREA    Summary Note: Daniel Lund reported he is feeling well today. His wgt this morning is 215 lb. During call, ACC reviewed pt's lab results and provider instructions. Pt requested 30 d prescription for Synthroid go to local rx and 90 day to his mail order. ACC sent msg to phone MA regarding pt's request.   Pt's A1c is 5.4 showing very good mgmt of his blood sugar. Pt's H/H continues to improve and inst that he is to continue taking his iron. Pt is very pleased with how he is doing, not concerns or complaints today. Care Coordination Interventions    Program Enrollment:  Rising Risk  Referral from Primary Care Provider:  Yes  Suggested Interventions and Community Resources  Zone Management Tools: In Process (Comment: 11/12/18: Discussed pt's lab results and provider recommendations.)         Goals Addressed             Most Recent     Conditions and Symptoms   On track (11/12/2018)             I will schedule office visits, as directed by my provider. I will keep my appointment or reschedule if I have to cancel. I will notify my provider of any barriers to my plan of care. I will follow my Zone Management tool to seek urgent or emergent care. I will notify my provider of any symptoms that indicate a worsening of my condition. Barriers: none  Plan for overcoming my barriers: N/A  Confidence: 9/10  Anticipated Goal Completion Date: 1/29/18              Prior to Admission medications    Medication Sig Start Date End Date Taking?  Authorizing Provider   carvedilol (COREG) 3.125 MG tablet Take 1 tablet by mouth 2 times daily 10/31/18   B Adriana Jimenez DO   spironolactone (ALDACTONE) 25 MG tablet Take 0.5 tablets by mouth daily 10/31/18   B Adriana Jimenez DO   potassium chloride (KLOR-CON M) 20 MEQ extended release tablet Take 1 tablet by mouth 2 times daily 10/25/18   B Kimberly Gutting

## 2018-11-24 DIAGNOSIS — R05.9 COUGH: ICD-10-CM

## 2018-11-26 RX ORDER — BENZONATATE 100 MG/1
CAPSULE ORAL
Qty: 30 CAPSULE | Refills: 0 | Status: SHIPPED | OUTPATIENT
Start: 2018-11-26 | End: 2018-12-06 | Stop reason: SDUPTHER

## 2018-11-27 ENCOUNTER — CARE COORDINATION (OUTPATIENT)
Dept: CARE COORDINATION | Age: 68
End: 2018-11-27

## 2018-12-04 ENCOUNTER — PATIENT MESSAGE (OUTPATIENT)
Dept: PRIMARY CARE CLINIC | Age: 68
End: 2018-12-04

## 2018-12-05 RX ORDER — RAMIPRIL 1.25 MG/1
1.25 CAPSULE ORAL DAILY
Qty: 30 CAPSULE | Refills: 3 | Status: SHIPPED | OUTPATIENT
Start: 2018-12-05 | End: 2019-03-13 | Stop reason: SDUPTHER

## 2018-12-06 DIAGNOSIS — R05.9 COUGH: ICD-10-CM

## 2018-12-06 RX ORDER — BENZONATATE 100 MG/1
100 CAPSULE ORAL 3 TIMES DAILY PRN
Qty: 30 CAPSULE | Refills: 1 | Status: SHIPPED | OUTPATIENT
Start: 2018-12-06 | End: 2019-02-11

## 2018-12-11 ENCOUNTER — CARE COORDINATION (OUTPATIENT)
Dept: CARE COORDINATION | Age: 68
End: 2018-12-11

## 2018-12-11 ENCOUNTER — OFFICE VISIT (OUTPATIENT)
Dept: PRIMARY CARE CLINIC | Age: 68
End: 2018-12-11
Payer: COMMERCIAL

## 2018-12-11 VITALS
OXYGEN SATURATION: 98 % | BODY MASS INDEX: 31.29 KG/M2 | WEIGHT: 223.5 LBS | SYSTOLIC BLOOD PRESSURE: 90 MMHG | HEART RATE: 100 BPM | HEIGHT: 71 IN | DIASTOLIC BLOOD PRESSURE: 58 MMHG | TEMPERATURE: 98.4 F

## 2018-12-11 DIAGNOSIS — I50.23 ACUTE ON CHRONIC SYSTOLIC CONGESTIVE HEART FAILURE (HCC): ICD-10-CM

## 2018-12-11 DIAGNOSIS — Z09 HOSPITAL DISCHARGE FOLLOW-UP: Primary | ICD-10-CM

## 2018-12-11 DIAGNOSIS — E78.5 HYPERLIPIDEMIA WITH TARGET LDL LESS THAN 100: ICD-10-CM

## 2018-12-11 DIAGNOSIS — Z23 NEED FOR PNEUMOCOCCAL VACCINATION: ICD-10-CM

## 2018-12-11 DIAGNOSIS — K31.82 DIEULAFOY LESION OF STOMACH: ICD-10-CM

## 2018-12-11 DIAGNOSIS — Z23 NEED FOR SHINGLES VACCINE: ICD-10-CM

## 2018-12-11 DIAGNOSIS — K25.4 GASTROINTESTINAL HEMORRHAGE ASSOCIATED WITH GASTRIC ULCER: ICD-10-CM

## 2018-12-11 DIAGNOSIS — I21.9 MYOCARDIAL INFARCTION LESS THAN 4 WEEKS AGO (HCC): ICD-10-CM

## 2018-12-11 DIAGNOSIS — Z11.59 ENCOUNTER FOR HEPATITIS C SCREENING TEST FOR LOW RISK PATIENT: ICD-10-CM

## 2018-12-11 DIAGNOSIS — E11.9 TYPE 2 DIABETES MELLITUS WITHOUT COMPLICATION, WITHOUT LONG-TERM CURRENT USE OF INSULIN (HCC): ICD-10-CM

## 2018-12-11 PROBLEM — E21.3 HYPERPARATHYROIDISM (HCC): Status: RESOLVED | Noted: 2018-10-26 | Resolved: 2018-12-11

## 2018-12-11 PROCEDURE — 3044F HG A1C LEVEL LT 7.0%: CPT | Performed by: PEDIATRICS

## 2018-12-11 PROCEDURE — 2022F DILAT RTA XM EVC RTNOPTHY: CPT | Performed by: PEDIATRICS

## 2018-12-11 PROCEDURE — G8482 FLU IMMUNIZE ORDER/ADMIN: HCPCS | Performed by: PEDIATRICS

## 2018-12-11 PROCEDURE — G8417 CALC BMI ABV UP PARAM F/U: HCPCS | Performed by: PEDIATRICS

## 2018-12-11 PROCEDURE — 1036F TOBACCO NON-USER: CPT | Performed by: PEDIATRICS

## 2018-12-11 PROCEDURE — G8427 DOCREV CUR MEDS BY ELIG CLIN: HCPCS | Performed by: PEDIATRICS

## 2018-12-11 PROCEDURE — 3017F COLORECTAL CA SCREEN DOC REV: CPT | Performed by: PEDIATRICS

## 2018-12-11 PROCEDURE — 1101F PT FALLS ASSESS-DOCD LE1/YR: CPT | Performed by: PEDIATRICS

## 2018-12-11 PROCEDURE — 4040F PNEUMOC VAC/ADMIN/RCVD: CPT | Performed by: PEDIATRICS

## 2018-12-11 PROCEDURE — G8598 ASA/ANTIPLAT THER USED: HCPCS | Performed by: PEDIATRICS

## 2018-12-11 PROCEDURE — 1123F ACP DISCUSS/DSCN MKR DOCD: CPT | Performed by: PEDIATRICS

## 2018-12-11 PROCEDURE — 99214 OFFICE O/P EST MOD 30 MIN: CPT | Performed by: PEDIATRICS

## 2018-12-11 RX ORDER — METOPROLOL SUCCINATE 25 MG/1
100 TABLET, EXTENDED RELEASE ORAL DAILY
COMMUNITY
End: 2019-03-25

## 2018-12-11 RX ORDER — METOLAZONE 2.5 MG/1
2.5 TABLET ORAL PRN
COMMUNITY
End: 2019-06-14 | Stop reason: SDUPTHER

## 2018-12-11 RX ORDER — TORSEMIDE 100 MG/1
100 TABLET ORAL DAILY
Status: ON HOLD | COMMUNITY
End: 2019-07-21 | Stop reason: HOSPADM

## 2018-12-11 RX ORDER — LEVOTHYROXINE SODIUM 0.03 MG/1
25 TABLET ORAL DAILY
Qty: 90 TABLET | Refills: 3 | Status: SHIPPED | OUTPATIENT
Start: 2018-12-11 | End: 2019-06-14 | Stop reason: SDUPTHER

## 2018-12-11 ASSESSMENT — ENCOUNTER SYMPTOMS
SHORTNESS OF BREATH: 1
COUGH: 0
CHEST TIGHTNESS: 0
SORE THROAT: 0
VOMITING: 0
DIARRHEA: 0
NAUSEA: 0
ABDOMINAL PAIN: 0
BACK PAIN: 0
WHEEZING: 0

## 2018-12-11 NOTE — PROGRESS NOTES
--------------------------------------------------------------- Study Quality: Fair. Limited by arrhythmia and prominent metallic artifact from the abdomen Left Ventricle: Left ventricular cavity size is moderately dilated. Left ventricular wall thickness is normal. Left ventricular systolic function is severely depressed due to global hypokinesis. The volumetric LV ejection fraction is 24%. There are small subendocardial infarctions of the mid/distal anterior wall, the inferolateral wall, and the basal/mid inferior segments. However, all coronary territories are predominantly viable. Assessment is difficult due to metallic artifact. There is no identifiable intracavitary thrombus. Left Atrium: The left atrium is normal. There is no identifiable thrombus in the left atrial appendage. Right Ventricle: Right ventricular cavity size is normal. Right ventricular systolic function is normal. There is no identifiable RV late gadolinium enhancement. Right Atrium: The right atrium is normal. The interatrial septum is normal. There is no identifiable intracardiac shunt. Aortic Valve: The aortic valve is trileaflet. By planimetry, there is moderate aortic stenosis (JASMIN 1.2 cm2). Mitral Valve: The mitral valve appears structurally normal. There is mild mitral regurgitation. Tricuspid Valve: The tricuspid valve appear structurally normal. Visually, there is no significant tricuspid regurgitation. . Pulmonic Valve: The pulmonic leaflets were not visualized. There is no apparent pulmonic stenosis or regurgitation. Great Vessels: The aortic root and thoracic aorta are normal in dimension. The main, left, and right pulmonary arteries are normal in dimension. . The inferior vena cava is normal. Four pulmonary veins enter the left atrium. Pericardium and Pleura: The pericardium is normal in thickness. There is no pericardial effusion. There is no identifiable pericardial late gadolinium enhancement.  Small bilateral pleural effusions, 10/31/2018 4.7     Chloride 10/31/2018 93*    CO2 10/31/2018 24     Anion Gap 10/31/2018 18     Glucose 10/31/2018 160*    BUN 10/31/2018 16     CREATININE 10/31/2018 1.4*    GFR Non- 10/31/2018 50*    Calcium 10/31/2018 9.9     Pro-BNP 10/31/2018 1531*   Admission on 10/16/2018, Discharged on 10/24/2018   No results displayed because visit has over 200 results. Copies of these are in the chart.     Current Outpatient Prescriptions   Medication Sig Dispense Refill    levothyroxine (SYNTHROID) 25 MCG tablet TAKE 1 TABLET BY MOUTH DAILY 90 tablet 3    metolazone (ZAROXOLYN) 2.5 MG tablet Take 2.5 mg by mouth as needed      torsemide (DEMADEX) 100 MG tablet Take 100 mg by mouth daily      metoprolol succinate (TOPROL XL) 25 MG extended release tablet Take 25 mg by mouth daily      benzonatate (TESSALON) 100 MG capsule Take 1 capsule by mouth 3 times daily as needed for Cough 30 capsule 1    ramipril (ALTACE) 1.25 MG capsule Take 1 capsule by mouth daily 30 capsule 3    spironolactone (ALDACTONE) 25 MG tablet Take 0.5 tablets by mouth daily (Patient taking differently: Take 25 mg by mouth daily ) 90 tablet 3    potassium chloride (KLOR-CON M) 20 MEQ extended release tablet Take 1 tablet by mouth 2 times daily (Patient taking differently: Take 20 mEq by mouth 2 times daily as needed ) 60 tablet 3    ferrous sulfate (FE TABS) 325 (65 Fe) MG EC tablet Take 1 tablet by mouth 2 times daily 60 tablet 1    pantoprazole (PROTONIX) 40 MG tablet Take 1 tablet by mouth every morning (before breakfast) 30 tablet 3    clopidogrel (PLAVIX) 75 MG tablet Take 1 tablet by mouth daily 90 tablet 3    cyanocobalamin 1000 MCG/ML injection Inject 1 mL into the muscle every 30 days 1 mL 5    atorvastatin (LIPITOR) 40 MG tablet Take 1 tablet by mouth daily 90 tablet 3    nitroGLYCERIN (NITROLINGUAL) 0.4 MG/SPRAY 0.4 mg spray Place 1 spray under the tongue every 5 minutes as needed for Chest pain 1

## 2018-12-11 NOTE — CARE COORDINATION
will follow my Zone Management tool to seek urgent or emergent care. I will notify my provider of any symptoms that indicate a worsening of my condition. Barriers: none  Plan for overcoming my barriers: N/A  Confidence: 9/10  Anticipated Goal Completion Date: 1/29/19              Prior to Admission medications    Medication Sig Start Date End Date Taking?  Authorizing Provider   levothyroxine (SYNTHROID) 25 MCG tablet TAKE 1 TABLET BY MOUTH DAILY 12/11/18   ROSS Grajeda DO   metolazone (ZAROXOLYN) 2.5 MG tablet Take 2.5 mg by mouth as needed    Historical Provider, MD   torsemide (DEMADEX) 100 MG tablet Take 100 mg by mouth daily    Historical Provider, MD   metoprolol succinate (TOPROL XL) 25 MG extended release tablet Take 25 mg by mouth daily    Historical Provider, MD   benzonatate (TESSALON) 100 MG capsule Take 1 capsule by mouth 3 times daily as needed for Cough 12/6/18   ROSS Grajeda DO   ramipril (ALTACE) 1.25 MG capsule Take 1 capsule by mouth daily 12/5/18   ROSS Grajeda DO   spironolactone (ALDACTONE) 25 MG tablet Take 0.5 tablets by mouth daily  Patient taking differently: Take 25 mg by mouth daily  10/31/18   ROSS Grajeda DO   potassium chloride (KLOR-CON M) 20 MEQ extended release tablet Take 1 tablet by mouth 2 times daily  Patient taking differently: Take 20 mEq by mouth 2 times daily as needed  10/25/18   ROSS Grajeda DO   ferrous sulfate (FE TABS) 325 (65 Fe) MG EC tablet Take 1 tablet by mouth 2 times daily 10/25/18   ROSS Grajeda DO   pantoprazole (PROTONIX) 40 MG tablet Take 1 tablet by mouth every morning (before breakfast) 10/25/18   Onel Vásquez MD   clopidogrel (PLAVIX) 75 MG tablet Take 1 tablet by mouth daily 10/18/18   ROSS Grajeda DO   cyanocobalamin 1000 MCG/ML injection Inject 1 mL into the muscle every 30 days 9/11/18   ROSS Grajeda DO   atorvastatin (LIPITOR) 40 MG tablet Take 1 tablet by mouth daily 5/15/18

## 2018-12-14 DIAGNOSIS — D50.0 IRON DEFICIENCY ANEMIA DUE TO CHRONIC BLOOD LOSS: ICD-10-CM

## 2018-12-14 DIAGNOSIS — K31.82 DIEULAFOY LESION OF STOMACH: ICD-10-CM

## 2018-12-14 DIAGNOSIS — K31.811 GASTROINTESTINAL HEMORRHAGE ASSOCIATED WITH ANGIODYSPLASIA OF STOMACH AND DUODENUM: ICD-10-CM

## 2018-12-14 RX ORDER — FERROUS SULFATE 325(65) MG
325 TABLET ORAL 2 TIMES DAILY
Qty: 60 TABLET | Refills: 5 | Status: SHIPPED | OUTPATIENT
Start: 2018-12-14 | End: 2019-02-11

## 2019-01-01 NOTE — TELEPHONE ENCOUNTER
From  Bennie Zuluaga To Sent  1/31/2018  8:38 AM   Please see that Dr. Price Blake sees this! I have been having increasing shortness of breath the last few weeks. Yesterday at work I had one of my echo techs do a quick looks see, re: my ejection fraction. She estimated it around 32-34%. If it seems a reasonable request, I would like you to order a full echocardiogram at [facility]. The FAX number is [phone]. Thanks in advance.    ~Samuel 2.52 14.252

## 2019-02-01 ENCOUNTER — TELEPHONE (OUTPATIENT)
Dept: GASTROENTEROLOGY | Age: 69
End: 2019-02-01

## 2019-02-07 ENCOUNTER — CARE COORDINATION (OUTPATIENT)
Dept: CARE COORDINATION | Age: 69
End: 2019-02-07

## 2019-02-11 ENCOUNTER — CARE COORDINATION (OUTPATIENT)
Dept: CARE COORDINATION | Age: 69
End: 2019-02-11

## 2019-02-11 ENCOUNTER — OFFICE VISIT (OUTPATIENT)
Dept: PRIMARY CARE CLINIC | Age: 69
End: 2019-02-11
Payer: COMMERCIAL

## 2019-02-11 VITALS
DIASTOLIC BLOOD PRESSURE: 58 MMHG | HEIGHT: 70 IN | WEIGHT: 208.8 LBS | TEMPERATURE: 97.7 F | SYSTOLIC BLOOD PRESSURE: 80 MMHG | BODY MASS INDEX: 29.89 KG/M2 | OXYGEN SATURATION: 98 % | HEART RATE: 97 BPM

## 2019-02-11 DIAGNOSIS — I87.2 VENOUS STASIS DERMATITIS OF RIGHT LOWER EXTREMITY: ICD-10-CM

## 2019-02-11 DIAGNOSIS — I25.10 CORONARY ARTERY DISEASE INVOLVING NATIVE CORONARY ARTERY, ANGINA PRESENCE UNSPECIFIED, UNSPECIFIED WHETHER NATIVE OR TRANSPLANTED HEART: ICD-10-CM

## 2019-02-11 DIAGNOSIS — F51.02 ADJUSTMENT INSOMNIA: Primary | ICD-10-CM

## 2019-02-11 DIAGNOSIS — F32.A ANXIETY AND DEPRESSION: ICD-10-CM

## 2019-02-11 DIAGNOSIS — F41.9 ANXIETY AND DEPRESSION: ICD-10-CM

## 2019-02-11 DIAGNOSIS — I65.29 OBSTRUCTION OF CAROTID ARTERY WITHOUT CEREBRAL INFARCTION, UNSPECIFIED LATERALITY: ICD-10-CM

## 2019-02-11 DIAGNOSIS — I50.23 ACUTE ON CHRONIC SYSTOLIC CONGESTIVE HEART FAILURE (HCC): ICD-10-CM

## 2019-02-11 DIAGNOSIS — E11.9 TYPE 2 DIABETES MELLITUS WITHOUT COMPLICATION, WITHOUT LONG-TERM CURRENT USE OF INSULIN (HCC): ICD-10-CM

## 2019-02-11 DIAGNOSIS — J01.01 ACUTE RECURRENT MAXILLARY SINUSITIS: ICD-10-CM

## 2019-02-11 PROCEDURE — 3046F HEMOGLOBIN A1C LEVEL >9.0%: CPT | Performed by: PEDIATRICS

## 2019-02-11 PROCEDURE — G8482 FLU IMMUNIZE ORDER/ADMIN: HCPCS | Performed by: PEDIATRICS

## 2019-02-11 PROCEDURE — 1101F PT FALLS ASSESS-DOCD LE1/YR: CPT | Performed by: PEDIATRICS

## 2019-02-11 PROCEDURE — 4040F PNEUMOC VAC/ADMIN/RCVD: CPT | Performed by: PEDIATRICS

## 2019-02-11 PROCEDURE — 1036F TOBACCO NON-USER: CPT | Performed by: PEDIATRICS

## 2019-02-11 PROCEDURE — G8427 DOCREV CUR MEDS BY ELIG CLIN: HCPCS | Performed by: PEDIATRICS

## 2019-02-11 PROCEDURE — 1123F ACP DISCUSS/DSCN MKR DOCD: CPT | Performed by: PEDIATRICS

## 2019-02-11 PROCEDURE — 3017F COLORECTAL CA SCREEN DOC REV: CPT | Performed by: PEDIATRICS

## 2019-02-11 PROCEDURE — G8598 ASA/ANTIPLAT THER USED: HCPCS | Performed by: PEDIATRICS

## 2019-02-11 PROCEDURE — 99214 OFFICE O/P EST MOD 30 MIN: CPT | Performed by: PEDIATRICS

## 2019-02-11 PROCEDURE — 2022F DILAT RTA XM EVC RTNOPTHY: CPT | Performed by: PEDIATRICS

## 2019-02-11 PROCEDURE — G8417 CALC BMI ABV UP PARAM F/U: HCPCS | Performed by: PEDIATRICS

## 2019-02-11 RX ORDER — ESCITALOPRAM OXALATE 10 MG/1
10 TABLET ORAL DAILY
Qty: 30 TABLET | Refills: 3 | Status: SHIPPED | OUTPATIENT
Start: 2019-02-11 | End: 2019-06-12 | Stop reason: SDUPTHER

## 2019-02-11 RX ORDER — AMOXICILLIN AND CLAVULANATE POTASSIUM 875; 125 MG/1; MG/1
1 TABLET, FILM COATED ORAL 2 TIMES DAILY
Qty: 20 TABLET | Refills: 0 | Status: SHIPPED | OUTPATIENT
Start: 2019-02-11 | End: 2019-02-21

## 2019-02-11 RX ORDER — HYDROXYZINE HYDROCHLORIDE 25 MG/1
25 TABLET, FILM COATED ORAL NIGHTLY
Qty: 60 TABLET | Refills: 0 | Status: SHIPPED | OUTPATIENT
Start: 2019-02-11 | End: 2019-03-13 | Stop reason: SDUPTHER

## 2019-02-11 RX ORDER — TRIAMCINOLONE ACETONIDE 1 MG/G
CREAM TOPICAL
Qty: 80 G | Refills: 5 | Status: SHIPPED | OUTPATIENT
Start: 2019-02-11 | End: 2019-06-14 | Stop reason: SDUPTHER

## 2019-02-11 ASSESSMENT — ENCOUNTER SYMPTOMS
BACK PAIN: 0
NAUSEA: 0
DIARRHEA: 0
WHEEZING: 0
COUGH: 0
CHEST TIGHTNESS: 0
SORE THROAT: 0
SHORTNESS OF BREATH: 1
ABDOMINAL PAIN: 0
VOMITING: 0

## 2019-02-11 ASSESSMENT — PATIENT HEALTH QUESTIONNAIRE - PHQ9
2. FEELING DOWN, DEPRESSED OR HOPELESS: 0
SUM OF ALL RESPONSES TO PHQ9 QUESTIONS 1 & 2: 0
SUM OF ALL RESPONSES TO PHQ QUESTIONS 1-9: 0
1. LITTLE INTEREST OR PLEASURE IN DOING THINGS: 0
SUM OF ALL RESPONSES TO PHQ QUESTIONS 1-9: 0

## 2019-02-13 ENCOUNTER — TELEPHONE (OUTPATIENT)
Dept: PRIMARY CARE CLINIC | Age: 69
End: 2019-02-13

## 2019-03-13 DIAGNOSIS — F51.02 ADJUSTMENT INSOMNIA: ICD-10-CM

## 2019-03-13 RX ORDER — RAMIPRIL 1.25 MG/1
1.25 CAPSULE ORAL DAILY
Qty: 30 CAPSULE | Refills: 0 | Status: CANCELLED | OUTPATIENT
Start: 2019-03-13

## 2019-03-13 RX ORDER — HYDROXYZINE HYDROCHLORIDE 25 MG/1
25 TABLET, FILM COATED ORAL NIGHTLY
Qty: 60 TABLET | Refills: 0 | Status: SHIPPED | OUTPATIENT
Start: 2019-03-13 | End: 2019-05-13 | Stop reason: SDUPTHER

## 2019-03-13 RX ORDER — RAMIPRIL 1.25 MG/1
1.25 CAPSULE ORAL DAILY
Qty: 30 CAPSULE | Refills: 3 | Status: ON HOLD | OUTPATIENT
Start: 2019-03-13 | End: 2019-07-21 | Stop reason: HOSPADM

## 2019-03-13 RX ORDER — HYDROXYZINE HYDROCHLORIDE 25 MG/1
TABLET, FILM COATED ORAL
Qty: 60 TABLET | Refills: 0 | Status: CANCELLED | OUTPATIENT
Start: 2019-03-13

## 2019-03-25 ENCOUNTER — OFFICE VISIT (OUTPATIENT)
Dept: PRIMARY CARE CLINIC | Age: 69
End: 2019-03-25
Payer: COMMERCIAL

## 2019-03-25 ENCOUNTER — CARE COORDINATION (OUTPATIENT)
Dept: CARE COORDINATION | Age: 69
End: 2019-03-25

## 2019-03-25 VITALS
TEMPERATURE: 98.5 F | SYSTOLIC BLOOD PRESSURE: 102 MMHG | HEIGHT: 71 IN | WEIGHT: 208.8 LBS | BODY MASS INDEX: 29.23 KG/M2 | OXYGEN SATURATION: 98 % | HEART RATE: 82 BPM | DIASTOLIC BLOOD PRESSURE: 60 MMHG

## 2019-03-25 DIAGNOSIS — I65.29 OBSTRUCTION OF CAROTID ARTERY WITHOUT CEREBRAL INFARCTION, UNSPECIFIED LATERALITY: ICD-10-CM

## 2019-03-25 DIAGNOSIS — R06.01 ORTHOPNEA: ICD-10-CM

## 2019-03-25 DIAGNOSIS — I10 HYPERTENSION, UNSPECIFIED TYPE: ICD-10-CM

## 2019-03-25 DIAGNOSIS — E78.5 HYPERLIPIDEMIA WITH TARGET LDL LESS THAN 100: ICD-10-CM

## 2019-03-25 DIAGNOSIS — E03.9 ACQUIRED HYPOTHYROIDISM: ICD-10-CM

## 2019-03-25 DIAGNOSIS — E11.9 TYPE 2 DIABETES MELLITUS WITHOUT COMPLICATION, WITHOUT LONG-TERM CURRENT USE OF INSULIN (HCC): ICD-10-CM

## 2019-03-25 DIAGNOSIS — I50.22 CHRONIC SYSTOLIC HEART FAILURE (HCC): Primary | ICD-10-CM

## 2019-03-25 DIAGNOSIS — I25.5 ISCHEMIC CARDIOMYOPATHY: ICD-10-CM

## 2019-03-25 PROCEDURE — 1036F TOBACCO NON-USER: CPT | Performed by: PEDIATRICS

## 2019-03-25 PROCEDURE — 1101F PT FALLS ASSESS-DOCD LE1/YR: CPT | Performed by: PEDIATRICS

## 2019-03-25 PROCEDURE — G8482 FLU IMMUNIZE ORDER/ADMIN: HCPCS | Performed by: PEDIATRICS

## 2019-03-25 PROCEDURE — 4040F PNEUMOC VAC/ADMIN/RCVD: CPT | Performed by: PEDIATRICS

## 2019-03-25 PROCEDURE — 3046F HEMOGLOBIN A1C LEVEL >9.0%: CPT | Performed by: PEDIATRICS

## 2019-03-25 PROCEDURE — 2022F DILAT RTA XM EVC RTNOPTHY: CPT | Performed by: PEDIATRICS

## 2019-03-25 PROCEDURE — G8427 DOCREV CUR MEDS BY ELIG CLIN: HCPCS | Performed by: PEDIATRICS

## 2019-03-25 PROCEDURE — 3017F COLORECTAL CA SCREEN DOC REV: CPT | Performed by: PEDIATRICS

## 2019-03-25 PROCEDURE — 99214 OFFICE O/P EST MOD 30 MIN: CPT | Performed by: PEDIATRICS

## 2019-03-25 PROCEDURE — G8417 CALC BMI ABV UP PARAM F/U: HCPCS | Performed by: PEDIATRICS

## 2019-03-25 PROCEDURE — 1123F ACP DISCUSS/DSCN MKR DOCD: CPT | Performed by: PEDIATRICS

## 2019-03-25 PROCEDURE — G8598 ASA/ANTIPLAT THER USED: HCPCS | Performed by: PEDIATRICS

## 2019-03-25 RX ORDER — METOPROLOL SUCCINATE 100 MG/1
100 TABLET, EXTENDED RELEASE ORAL DAILY
Status: ON HOLD | COMMUNITY
End: 2019-07-21 | Stop reason: HOSPADM

## 2019-03-25 ASSESSMENT — ENCOUNTER SYMPTOMS
NAUSEA: 0
SHORTNESS OF BREATH: 1
DIARRHEA: 0
CHEST TIGHTNESS: 0
COUGH: 0
BACK PAIN: 0
VOMITING: 0
SORE THROAT: 0
WHEEZING: 0
ABDOMINAL PAIN: 0

## 2019-04-11 ENCOUNTER — OFFICE VISIT (OUTPATIENT)
Dept: VASCULAR SURGERY | Age: 69
End: 2019-04-11
Payer: COMMERCIAL

## 2019-04-11 VITALS
SYSTOLIC BLOOD PRESSURE: 92 MMHG | RESPIRATION RATE: 18 BRPM | OXYGEN SATURATION: 98 % | DIASTOLIC BLOOD PRESSURE: 60 MMHG | HEART RATE: 80 BPM

## 2019-04-11 DIAGNOSIS — I65.23 BILATERAL CAROTID ARTERY STENOSIS: ICD-10-CM

## 2019-04-11 DIAGNOSIS — N28.9 ABNORMAL KIDNEY FUNCTION: ICD-10-CM

## 2019-04-11 DIAGNOSIS — N18.2 STAGE 2 CHRONIC KIDNEY DISEASE: Primary | ICD-10-CM

## 2019-04-11 DIAGNOSIS — I10 ESSENTIAL HYPERTENSION: ICD-10-CM

## 2019-04-11 DIAGNOSIS — Z79.899 MEDICATION MANAGEMENT: ICD-10-CM

## 2019-04-11 LAB
ANION GAP SERPL CALCULATED.3IONS-SCNC: 20 MMOL/L (ref 7–19)
BUN BLDV-MCNC: 38 MG/DL (ref 8–23)
CALCIUM SERPL-MCNC: 10 MG/DL (ref 8.8–10.2)
CHLORIDE BLD-SCNC: 86 MMOL/L (ref 98–111)
CO2: 28 MMOL/L (ref 22–29)
CREAT SERPL-MCNC: 1.3 MG/DL (ref 0.5–1.2)
GFR NON-AFRICAN AMERICAN: 55
GLUCOSE BLD-MCNC: 124 MG/DL (ref 74–109)
POTASSIUM SERPL-SCNC: 3.3 MMOL/L (ref 3.5–5)
SODIUM BLD-SCNC: 134 MMOL/L (ref 136–145)

## 2019-04-11 PROCEDURE — G8427 DOCREV CUR MEDS BY ELIG CLIN: HCPCS | Performed by: NURSE PRACTITIONER

## 2019-04-11 PROCEDURE — G8417 CALC BMI ABV UP PARAM F/U: HCPCS | Performed by: NURSE PRACTITIONER

## 2019-04-11 PROCEDURE — 4040F PNEUMOC VAC/ADMIN/RCVD: CPT | Performed by: NURSE PRACTITIONER

## 2019-04-11 PROCEDURE — G8598 ASA/ANTIPLAT THER USED: HCPCS | Performed by: NURSE PRACTITIONER

## 2019-04-11 PROCEDURE — 99203 OFFICE O/P NEW LOW 30 MIN: CPT | Performed by: NURSE PRACTITIONER

## 2019-04-11 PROCEDURE — 1123F ACP DISCUSS/DSCN MKR DOCD: CPT | Performed by: NURSE PRACTITIONER

## 2019-04-11 PROCEDURE — 3017F COLORECTAL CA SCREEN DOC REV: CPT | Performed by: NURSE PRACTITIONER

## 2019-04-11 PROCEDURE — 1036F TOBACCO NON-USER: CPT | Performed by: NURSE PRACTITIONER

## 2019-04-11 NOTE — PROGRESS NOTES
(LEXAPRO) 10 MG tablet Take 1 tablet by mouth daily 30 tablet 3    Evolocumab (REPATHA) 140 MG/ML SOSY Inject 140 mg into the skin every 14 days 2 Syringe 5    triamcinolone (KENALOG) 0.1 % cream Apply topically 2 times daily. 80 g 5    Evolocumab 140 MG/ML SOAJ Inject 140 mg into the skin every 14 days 2 pen 11    levothyroxine (SYNTHROID) 25 MCG tablet TAKE 1 TABLET BY MOUTH DAILY 90 tablet 3    metolazone (ZAROXOLYN) 2.5 MG tablet Take 2.5 mg by mouth as needed      torsemide (DEMADEX) 100 MG tablet Take 100 mg by mouth daily Patient is taking 200 mg every other day per Glynn Gill spironolactone (ALDACTONE) 25 MG tablet Take 0.5 tablets by mouth daily (Patient taking differently: Take 25 mg by mouth daily ) 90 tablet 3    potassium chloride (KLOR-CON M) 20 MEQ extended release tablet Take 1 tablet by mouth 2 times daily (Patient taking differently: Take 20 mEq by mouth 2 times daily as needed ) 60 tablet 3    pantoprazole (PROTONIX) 40 MG tablet Take 1 tablet by mouth every morning (before breakfast) 30 tablet 3    clopidogrel (PLAVIX) 75 MG tablet Take 1 tablet by mouth daily 90 tablet 3    cyanocobalamin 1000 MCG/ML injection Inject 1 mL into the muscle every 30 days 1 mL 5    atorvastatin (LIPITOR) 40 MG tablet Take 1 tablet by mouth daily (Patient taking differently: Take 40 mg by mouth nightly ) 90 tablet 3    Dulaglutide 0.75 MG/0.5ML SOPN Inject 0.75 mg into the skin every 7 days (Patient taking differently: Inject 0.75 mg into the skin every 7 days Patient states this is ON HOLD since he was at [facility]- Patient states he didn't need it.) 12 pen 3    nitroGLYCERIN (NITROLINGUAL) 0.4 MG/SPRAY 0.4 mg spray Place 1 spray under the tongue every 5 minutes as needed for Chest pain 1 Bottle 0    metFORMIN (GLUCOPHAGE) 1000 MG tablet Take 1 tablet by mouth 2 times daily (with meals) 180 tablet 3    triamcinolone (KENALOG) 0.1 % cream Apply topically 2 times daily.  80 g 5    Omega-3 Fatty Acids (FISH OIL) 1000 MG CAPS Take 3,000 mg by mouth daily      diphenhydrAMINE (BENADRYL) 25 MG tablet Take 50 mg by mouth nightly as needed for Itching      aspirin 325 MG tablet Take 325 mg by mouth daily. No current facility-administered medications for this visit. Allergies: Codeine  Past Medical History:   Diagnosis Date    Arthritis     Basilar artery stenosis     CAD (coronary artery disease) 1997 2009    MI    Cancer ([address]     SKIN 6YRS AGO    CHF (congestive heart failure) (HCC)     Heel spur     Hyperlipidemia     Hypertension     Palliative care patient 10/17/2018    Sleep apnea     no c-pap    Type II or unspecified type diabetes mellitus without mention of complication, not stated as uncontrolled     Vertebral artery insufficiency      Past Surgical History:   Procedure Laterality Date    ANGIOPLASTY Bilateral     basilar artery angioplasty    CARDIAC [place]    CARPAL TUNNEL RELEASE      bilateral    CATARACT REMOVAL Bilateral     CHOLECYSTECTOMY      CORONARY ARTERY BYPASS GRAFT  1997    3v    HEEL SPUR SURGERY      left    VT EGD TRANSORAL BIOPSY SINGLE/MULTIPLE N/A 10/16/2018    Dr Vladimir Kerns-w/control of bleeding-Active bleeding in the stomach from [place] lesion    PTCA  2009 [address]    with stents    TONSILLECTOMY       Family History   Problem Relation Age of Onset    Heart Disease Father     High Blood Pressure Father     Other Father         Esophageal Varices     Social History     Tobacco Use    Smoking status: Former Smoker     Packs/day: 1.00     Years: 30.00     Pack years: 30.00     Types: Cigars     Last attempt to quit: 3/3/2009     Years since quitting: 10.1    Smokeless tobacco: Never Used   Substance Use Topics    Alcohol use: No     Alcohol/week: 0.0 oz         Old records have been obtained from the referring provider. These records have been reviewed and summarized.       Review of Systems    Constitutional - no pulse: absent Right DP: absent; Right PT absent; Left femoral pulse: present 2+; Left popliteal pulse: absent; Left DP: absent; Left PT: absent  No cyanosis, clubbing, or significant edema. No signs atheroembolic event. Pulmonary - effort appears normal.  No respiratory distress. Lungs - Breath sounds normal. No wheezes or rales. GI - Abdomen - soft, non tender, bowel sounds X 4 quadrants. No guarding or rebound tenderness. No distension or palpable mass. Genitourinary - deferred. Musculoskeletal - ROM appears normal.  No significant edema. Neurologic - alert and oriented X 3. Physiologic. Face symmetric. Skin - warm, dry, and intact. No rash, erythema, or pallor. Psychiatric - mood, affect, and behavior appear normal.  Judgment and thought processes appear normal.    Risk factors for atherosclerosis of all vascular beds have been reviewed with the patient including:  Family history, tobacco abuse in all forms, elevated cholesterol, hyperlipidemia, and diabetes. Doppler results:    Right CCA/ICA 70-99% stenotic  Left CCA/ICA 50-69% stenotic  Right vertebral artery flow is not seen  Left vertebral artery flow is antegrade  Individual velocities reviewed: Yes. Results were reviewed with the patient. Disease process is unstable    BMP ordered - GFR is 55  Case discussed with Dr. Bryce Mata. We will have to have a CTA to know what we are dealing with. There was no significant stenosis on the films reviewed in 2011. If this is a change, and he is not a candidate for general anesthesia, could consider angioplasty/stent if needed for heart transplant. Options have been discussed with the patient including continued medical management and CTA. Patient has opted to proceed with CTA. Assessment    1. Stage 2 chronic kidney disease    2.  Bilateral carotid artery stenosis          Plan    Will use [facility]  cta head and neck if he decides to proceed  He is not certain yet if he wants the heart transplant  He will call when he makes a decision  Strongly encourage start/continue statin therapy  Recommend no smoking  Patient instructed to call or proceed to the emergency room with any symptoms of lateralizing weakness, loss of vision in one eye, or episodes slurred speech.

## 2019-04-12 PROBLEM — I65.23 BILATERAL CAROTID ARTERY STENOSIS: Status: ACTIVE | Noted: 2019-04-12

## 2019-04-29 RX ORDER — ATORVASTATIN CALCIUM 40 MG/1
40 TABLET, FILM COATED ORAL NIGHTLY
Qty: 90 TABLET | Refills: 3 | Status: SHIPPED | OUTPATIENT
Start: 2019-04-29 | End: 2019-06-14 | Stop reason: SDUPTHER

## 2019-05-07 ENCOUNTER — TELEPHONE (OUTPATIENT)
Dept: PRIMARY CARE CLINIC | Age: 69
End: 2019-05-07

## 2019-05-07 NOTE — TELEPHONE ENCOUNTER
Called and spoke with Fiorella Ramos MD   Pt has dx of CKD AND HTN and HX OF heart failure and they recommend adding a ACE Inhibitator, he is on a ace inhibitor ramipril. Fadia Rodríguez with Pharm MD aware.

## 2019-05-07 NOTE — TELEPHONE ENCOUNTER
DEYSI WITH PHARM MD  MEDICATION FLAG    WPN#[id number]  CALL BACK NUMBER [phone]    Also sending over a fax might take 1 business day, use reference number or if you call use refer number.

## 2019-05-13 DIAGNOSIS — F51.02 ADJUSTMENT INSOMNIA: ICD-10-CM

## 2019-05-13 RX ORDER — CYANOCOBALAMIN 1000 UG/ML
1000 INJECTION INTRAMUSCULAR; SUBCUTANEOUS
Qty: 10 ML | Refills: 0 | Status: SHIPPED | OUTPATIENT
Start: 2019-05-13 | End: 2019-09-19 | Stop reason: SDUPTHER

## 2019-05-13 RX ORDER — HYDROXYZINE HYDROCHLORIDE 25 MG/1
25 TABLET, FILM COATED ORAL NIGHTLY
Qty: 60 TABLET | Refills: 5 | Status: SHIPPED | OUTPATIENT
Start: 2019-05-13 | End: 2020-02-20

## 2019-05-20 DIAGNOSIS — I10 ESSENTIAL HYPERTENSION: ICD-10-CM

## 2019-05-20 RX ORDER — SPIRONOLACTONE 25 MG/1
25 TABLET ORAL DAILY
Qty: 90 TABLET | Refills: 3 | Status: ON HOLD | OUTPATIENT
Start: 2019-05-20 | End: 2019-07-21 | Stop reason: HOSPADM

## 2019-05-20 RX ORDER — LEVOTHYROXINE SODIUM 0.03 MG/1
25 TABLET ORAL DAILY
Qty: 90 TABLET | Refills: 1 | Status: SHIPPED | OUTPATIENT
Start: 2019-05-20 | End: 2019-12-20 | Stop reason: SDUPTHER

## 2019-05-26 DIAGNOSIS — I50.23 ACUTE ON CHRONIC SYSTOLIC CONGESTIVE HEART FAILURE (HCC): ICD-10-CM

## 2019-05-28 RX ORDER — METOLAZONE 2.5 MG/1
2.5 TABLET ORAL DAILY
Qty: 30 TABLET | Refills: 5 | Status: ON HOLD | OUTPATIENT
Start: 2019-05-28 | End: 2019-07-21 | Stop reason: HOSPADM

## 2019-06-01 NOTE — PLAN OF CARE
Problem: Pain:  Goal: Pain level will decrease  Pain level will decrease   Outcome: Met This Shift    Goal: Control of acute pain  Control of acute pain   Outcome: Met This Shift    Goal: Control of chronic pain  Control of chronic pain   Outcome: Met This Shift      Problem: Falls - Risk of:  Goal: Will remain free from falls  Will remain free from falls   Outcome: Met This Shift    Goal: Absence of physical injury  Absence of physical injury   Outcome: Met This Shift      Problem: Nutrition  Goal: Optimal nutrition therapy  Nutrition Problem: Inadequate oral intake  Intervention: Food and/or Nutrient Delivery: Continue NPO  Nutritional Goals: meet nutritional needs through po intake     Outcome: Met This Shift straight cane

## 2019-06-12 DIAGNOSIS — F32.A ANXIETY AND DEPRESSION: ICD-10-CM

## 2019-06-12 DIAGNOSIS — F41.9 ANXIETY AND DEPRESSION: ICD-10-CM

## 2019-06-12 RX ORDER — ESCITALOPRAM OXALATE 10 MG/1
10 TABLET ORAL DAILY
Qty: 90 TABLET | Refills: 3 | Status: SHIPPED | OUTPATIENT
Start: 2019-06-12 | End: 2020-02-20

## 2019-06-14 ENCOUNTER — OFFICE VISIT (OUTPATIENT)
Dept: PRIMARY CARE CLINIC | Age: 69
End: 2019-06-14
Payer: COMMERCIAL

## 2019-06-14 VITALS
BODY MASS INDEX: 28.72 KG/M2 | DIASTOLIC BLOOD PRESSURE: 58 MMHG | HEART RATE: 88 BPM | TEMPERATURE: 98.8 F | SYSTOLIC BLOOD PRESSURE: 98 MMHG | HEIGHT: 71 IN | WEIGHT: 205.12 LBS | OXYGEN SATURATION: 98 %

## 2019-06-14 DIAGNOSIS — E78.5 HYPERLIPIDEMIA WITH TARGET LDL LESS THAN 100: ICD-10-CM

## 2019-06-14 DIAGNOSIS — I25.10 CORONARY ARTERY DISEASE INVOLVING NATIVE CORONARY ARTERY, ANGINA PRESENCE UNSPECIFIED, UNSPECIFIED WHETHER NATIVE OR TRANSPLANTED HEART: ICD-10-CM

## 2019-06-14 DIAGNOSIS — R53.83 FATIGUE, UNSPECIFIED TYPE: ICD-10-CM

## 2019-06-14 DIAGNOSIS — I50.22 CHRONIC SYSTOLIC CONGESTIVE HEART FAILURE (HCC): Primary | ICD-10-CM

## 2019-06-14 PROCEDURE — 3017F COLORECTAL CA SCREEN DOC REV: CPT | Performed by: PEDIATRICS

## 2019-06-14 PROCEDURE — G8598 ASA/ANTIPLAT THER USED: HCPCS | Performed by: PEDIATRICS

## 2019-06-14 PROCEDURE — G8427 DOCREV CUR MEDS BY ELIG CLIN: HCPCS | Performed by: PEDIATRICS

## 2019-06-14 PROCEDURE — G8417 CALC BMI ABV UP PARAM F/U: HCPCS | Performed by: PEDIATRICS

## 2019-06-14 PROCEDURE — 1123F ACP DISCUSS/DSCN MKR DOCD: CPT | Performed by: PEDIATRICS

## 2019-06-14 PROCEDURE — 4040F PNEUMOC VAC/ADMIN/RCVD: CPT | Performed by: PEDIATRICS

## 2019-06-14 PROCEDURE — 99214 OFFICE O/P EST MOD 30 MIN: CPT | Performed by: PEDIATRICS

## 2019-06-14 PROCEDURE — 1036F TOBACCO NON-USER: CPT | Performed by: PEDIATRICS

## 2019-06-14 ASSESSMENT — ENCOUNTER SYMPTOMS
COUGH: 0
ABDOMINAL PAIN: 0
DIARRHEA: 0
SHORTNESS OF BREATH: 1
NAUSEA: 0
SORE THROAT: 0
CHEST TIGHTNESS: 0
WHEEZING: 0
VOMITING: 0
BACK PAIN: 0

## 2019-06-14 NOTE — PROGRESS NOTES
[facility], [address]  Phone [phone]   Fax [phone]      OFFICE VISIT: 2019    Yumi Rosario-: 1950      [facility]  Reason For Visit:  Sherman Morris is a 76 y.o. Health Maintenance    3 Month Follow-Up (Patient is here for a 3 month follow up); Insomnia (Patient states this is not much better); Congestive Heart Failure (Patient states he has had some weight issues but has adjusted his water pill accordingly.); and Epistaxis (Patient stated he fell  and hit his nose and also states that he has been having nose bleeds on and off but he is on Plavix. He wanted to make you aware. )    Patient presents on routine follow-up for congestive heart failure. He is following his weight and utilizing diuretics based upon protocol. He states that he has been having nosebleeds. 2 weeks ago he fell and hit his nose and has been having problems with epistaxis since that time. He is off of his Plavix presently. Most recent coronary artery stent placement:      He is going to retire in 2 weeks. He is presently working 3 days per week. height is 5' 11\" (1.803 m) and weight is 205 lb 1.9 oz (93 kg). His temporal temperature is 98.8 °F (37.1 °C). His blood pressure is 98/58 (abnormal) and his pulse is 88. His oxygen saturation is 98%. Body mass index is 28.61 kg/m². I have reviewed the following with the Mr. Ayden Ahmadi Visit on 2019   Component Date Value    Sodium 2019 134*    Potassium 2019 3.3*    Chloride 2019 86*    CO2 2019 28     Anion Gap 2019 20*    Glucose 2019 124*    BUN 2019 38*    CREATININE 2019 1.3*    GFR Non- 2019 55*    Calcium 2019 10.0      Copies of these are in the chart.     Current Outpatient Medications   Medication Sig Dispense Refill    escitalopram (LEXAPRO) 10 MG tablet Take 1 tablet by mouth daily 90 tablet 3    metolazone (ZAROXOLYN) 2.5 MG tablet Take 1 tablet by mouth daily 30 tablet 5    levothyroxine (SYNTHROID) 25 MCG tablet Take 1 tablet by mouth Daily 90 tablet 1    spironolactone (ALDACTONE) 25 MG tablet Take 1 tablet by mouth daily 90 tablet 3    cyanocobalamin 1000 MCG/ML injection Inject 1 mL into the muscle every 30 days 10 mL 0    hydrOXYzine (ATARAX) 25 MG tablet Take 1 tablet by mouth nightly 60 tablet 5    metoprolol succinate (TOPROL XL) 100 MG extended release tablet Take 100 mg by mouth daily      ramipril (ALTACE) 1.25 MG capsule Take 1 capsule by mouth daily 30 capsule 3    torsemide (DEMADEX) 100 MG tablet Take 100 mg by mouth daily Patient is taking 200 mg every other day per Lanre      potassium chloride (KLOR-CON M) 20 MEQ extended release tablet Take 1 tablet by mouth 2 times daily 60 tablet 3    pantoprazole (PROTONIX) 40 MG tablet Take 1 tablet by mouth every morning (before breakfast) 30 tablet 3    clopidogrel (PLAVIX) 75 MG tablet Take 1 tablet by mouth daily 90 tablet 3    atorvastatin (LIPITOR) 40 MG tablet Take 1 tablet by mouth daily (Patient taking differently: Take 40 mg by mouth nightly ) 90 tablet 3    nitroGLYCERIN (NITROLINGUAL) 0.4 MG/SPRAY 0.4 mg spray Place 1 spray under the tongue every 5 minutes as needed for Chest pain 1 Bottle 0    metFORMIN (GLUCOPHAGE) 1000 MG tablet Take 1 tablet by mouth 2 times daily (with meals) 180 tablet 3    triamcinolone (KENALOG) 0.1 % cream Apply topically 2 times daily. 80 g 5    Omega-3 Fatty Acids (FISH OIL) 1000 MG CAPS Take 3,000 mg by mouth daily      diphenhydrAMINE (BENADRYL) 25 MG tablet Take 50 mg by mouth nightly as needed for Itching      aspirin 325 MG tablet Take 325 mg by mouth daily.       Dulaglutide 0.75 MG/0.5ML SOPN Inject 0.75 mg into the skin every 7 days (Patient taking differently: Inject 0.75 mg into the skin every 7 days Patient states this is ON HOLD since he was at [facility]- Patient states he deficit (no numbness or tingling). Skin: Skin is warm and dry. No rash noted. Psychiatric: He has a normal mood and affect. ASSESSMENT      ICD-10-CM    1. Chronic systolic congestive heart failure (HCC) I50.22    2. Coronary artery disease involving native coronary artery, angina presence unspecified, unspecified whether native or transplanted heart I25.10    3. Hyperlipidemia with target LDL less than 100 E78.5    4. Fatigue, unspecified type R53.83        PLAN      ICD-10-CM    1. Chronic systolic congestive heart failure (HCC) I50.22  he is doing fairly well given his limited cardiac output. He is following up with [facility] his next appointment will be end of July. We discussed some options in regards to medication such as Entresto but his blood pressure is not likely to sustain this medication. His heart rate is not elevated which precludes   2. Coronary artery disease involving native coronary artery, angina presence unspecified, unspecified whether native or transplanted heart I25.10  stable now without any additional anginal symptoms    He is contemplating possible heart transplant list   3. Hyperlipidemia with target LDL less than 100 E78.5  appropriately controlled. 4. Fatigue, unspecified type R53.83  this is his biggest limiting factor secondary to his congestive heart failure       No orders of the defined types were placed in this encounter. Return in about 3 months (around 9/14/2019) for 30.

## 2019-06-17 DIAGNOSIS — R05.9 COUGH: ICD-10-CM

## 2019-06-17 RX ORDER — BENZONATATE 100 MG/1
100 CAPSULE ORAL 3 TIMES DAILY PRN
Qty: 30 CAPSULE | Refills: 0 | Status: SHIPPED | OUTPATIENT
Start: 2019-06-17 | End: 2019-09-07 | Stop reason: SDUPTHER

## 2019-06-17 NOTE — TELEPHONE ENCOUNTER
Received fax from pharmacy requesting refill on pts medication(s). Pt was last seen in office on 6/14/2019  and has a follow up scheduled for 9/13/2019. Will send request to  Dr. Jacinta Wong  for patient.      Requested Prescriptions     Pending Prescriptions Disp Refills    benzonatate (TESSALON) 100 MG capsule [Pharmacy Med Name: BENZONATATE 100MG CAPSULES] 30 capsule 0     Sig: TAKE 1 CAPSULE BY MOUTH THREE TIMES DAILY AS NEEDED FOR COUGH

## 2019-07-05 DIAGNOSIS — E87.6 HYPOKALEMIA: ICD-10-CM

## 2019-07-05 RX ORDER — POTASSIUM CHLORIDE 20 MEQ/1
TABLET, EXTENDED RELEASE ORAL
Qty: 60 TABLET | Refills: 0 | Status: SHIPPED | OUTPATIENT
Start: 2019-07-05 | End: 2019-09-19 | Stop reason: SDUPTHER

## 2019-07-05 NOTE — TELEPHONE ENCOUNTER
Pt seen 6/14/19 with a follow up on 9/13/19.       Requested Prescriptions     Pending Prescriptions Disp Refills    potassium chloride (KLOR-CON M) 20 MEQ extended release tablet [Pharmacy Med Name: POTASSIUM CL 20MEQ ER TABLETS] 60 tablet 0     Sig: TAKE 1 TABLET BY MOUTH TWICE DAILY

## 2019-07-08 ENCOUNTER — PATIENT MESSAGE (OUTPATIENT)
Dept: PRIMARY CARE CLINIC | Age: 69
End: 2019-07-08

## 2019-07-08 NOTE — TELEPHONE ENCOUNTER
From: Miriam Alvarez  To: Dago Naidu DO  Sent: 7/8/2019 3:43 PM CDT  Subject: Non-Urgent Medical Question    Dr. Onur Sahni,  Is there an alternative to the metoprolol extended release I am currently taking. ? It seems the metoprolol has \"turned on me\" and is really causing considerable breathing issues. The reason I suspect the drug is that I went 48-hrs with no metoprolol over the weekend (intentionally) and my breathing vastly improved to the point I did not require oxygen to sleep. .    I resumed the normal dose this morning (after having a good night breathing-wise) and within a couple of hours my has quite drastically deteriorated. Just wondering . ....     Darion Shrestha

## 2019-07-09 RX ORDER — CARVEDILOL 6.25 MG/1
6.25 TABLET ORAL 2 TIMES DAILY
Qty: 60 TABLET | Refills: 11 | Status: SHIPPED | OUTPATIENT
Start: 2019-07-09 | End: 2019-09-13

## 2019-07-19 ENCOUNTER — APPOINTMENT (OUTPATIENT)
Dept: CT IMAGING | Age: 69
DRG: 312 | End: 2019-07-19
Payer: MEDICARE

## 2019-07-19 ENCOUNTER — APPOINTMENT (OUTPATIENT)
Dept: GENERAL RADIOLOGY | Age: 69
DRG: 312 | End: 2019-07-19
Payer: MEDICARE

## 2019-07-19 ENCOUNTER — HOSPITAL ENCOUNTER (INPATIENT)
Age: 69
LOS: 2 days | Discharge: HOME | DRG: 312 | End: 2019-07-21
Attending: EMERGENCY MEDICINE | Admitting: INTERNAL MEDICINE
Payer: MEDICARE

## 2019-07-19 DIAGNOSIS — I95.9 HYPOTENSION, UNSPECIFIED HYPOTENSION TYPE: ICD-10-CM

## 2019-07-19 DIAGNOSIS — R55 SYNCOPE AND COLLAPSE: Primary | ICD-10-CM

## 2019-07-19 DIAGNOSIS — E87.1 HYPONATREMIA: ICD-10-CM

## 2019-07-19 LAB
ALBUMIN SERPL-MCNC: 4 G/DL (ref 3.5–5.2)
ALP BLD-CCNC: 82 U/L (ref 40–130)
ALT SERPL-CCNC: 10 U/L (ref 5–41)
ANION GAP SERPL CALCULATED.3IONS-SCNC: 18 MMOL/L (ref 7–19)
APTT: 32.8 SEC (ref 26–36.2)
AST SERPL-CCNC: 14 U/L (ref 5–40)
BASOPHILS ABSOLUTE: 0.1 K/UL (ref 0–0.2)
BASOPHILS RELATIVE PERCENT: 0.9 % (ref 0–1)
BILIRUB SERPL-MCNC: 0.8 MG/DL (ref 0.2–1.2)
BUN BLDV-MCNC: 78 MG/DL (ref 8–23)
CALCIUM SERPL-MCNC: 9.2 MG/DL (ref 8.8–10.2)
CHLORIDE BLD-SCNC: 78 MMOL/L (ref 98–111)
CO2: 25 MMOL/L (ref 22–29)
CREAT SERPL-MCNC: 2 MG/DL (ref 0.5–1.2)
EOSINOPHILS ABSOLUTE: 1.1 K/UL (ref 0–0.6)
EOSINOPHILS RELATIVE PERCENT: 10.6 % (ref 0–5)
GFR NON-AFRICAN AMERICAN: 33
GLUCOSE BLD-MCNC: 215 MG/DL (ref 74–109)
GLUCOSE BLD-MCNC: 244 MG/DL (ref 70–99)
HCT VFR BLD CALC: 27.7 % (ref 42–52)
HEMOGLOBIN: 9.1 G/DL (ref 14–18)
INR BLD: 1.35 (ref 0.88–1.18)
LYMPHOCYTES ABSOLUTE: 0.8 K/UL (ref 1.1–4.5)
LYMPHOCYTES RELATIVE PERCENT: 8 % (ref 20–40)
MCH RBC QN AUTO: 30.1 PG (ref 27–31)
MCHC RBC AUTO-ENTMCNC: 32.9 G/DL (ref 33–37)
MCV RBC AUTO: 91.7 FL (ref 80–94)
MONOCYTES ABSOLUTE: 1.1 K/UL (ref 0–0.9)
MONOCYTES RELATIVE PERCENT: 10.9 % (ref 0–10)
NEUTROPHILS ABSOLUTE: 7.1 K/UL (ref 1.5–7.5)
NEUTROPHILS RELATIVE PERCENT: 69.2 % (ref 50–65)
OSMOLALITY URINE: 294 MOSM/KG (ref 250–1200)
PDW BLD-RTO: 18.4 % (ref 11.5–14.5)
PERFORMED ON: ABNORMAL
PLATELET # BLD: 177 K/UL (ref 130–400)
PMV BLD AUTO: 10.8 FL (ref 9.4–12.4)
POTASSIUM REFLEX MAGNESIUM: 4.4 MMOL/L (ref 3.5–5)
PRO-BNP: 2812 PG/ML (ref 0–900)
PROTHROMBIN TIME: 16 SEC (ref 12–14.6)
RBC # BLD: 3.02 M/UL (ref 4.7–6.1)
SODIUM BLD-SCNC: 121 MMOL/L (ref 136–145)
SODIUM BLD-SCNC: 123 MMOL/L (ref 136–145)
SODIUM URINE: 48 MMOL/L
TOTAL PROTEIN: 7 G/DL (ref 6.6–8.7)
TROPONIN: 0.02 NG/ML (ref 0–0.03)
TROPONIN: 0.03 NG/ML (ref 0–0.03)
WBC # BLD: 10.2 K/UL (ref 4.8–10.8)

## 2019-07-19 PROCEDURE — 36415 COLL VENOUS BLD VENIPUNCTURE: CPT

## 2019-07-19 PROCEDURE — 93005 ELECTROCARDIOGRAM TRACING: CPT

## 2019-07-19 PROCEDURE — 83935 ASSAY OF URINE OSMOLALITY: CPT

## 2019-07-19 PROCEDURE — 80053 COMPREHEN METABOLIC PANEL: CPT

## 2019-07-19 PROCEDURE — 2580000003 HC RX 258: Performed by: INTERNAL MEDICINE

## 2019-07-19 PROCEDURE — 6370000000 HC RX 637 (ALT 250 FOR IP): Performed by: EMERGENCY MEDICINE

## 2019-07-19 PROCEDURE — 84300 ASSAY OF URINE SODIUM: CPT

## 2019-07-19 PROCEDURE — 99223 1ST HOSP IP/OBS HIGH 75: CPT | Performed by: INTERNAL MEDICINE

## 2019-07-19 PROCEDURE — 70450 CT HEAD/BRAIN W/O DYE: CPT

## 2019-07-19 PROCEDURE — 99285 EMERGENCY DEPT VISIT HI MDM: CPT

## 2019-07-19 PROCEDURE — 84484 ASSAY OF TROPONIN QUANT: CPT

## 2019-07-19 PROCEDURE — 6370000000 HC RX 637 (ALT 250 FOR IP): Performed by: INTERNAL MEDICINE

## 2019-07-19 PROCEDURE — 2140000000 HC CCU INTERMEDIATE R&B

## 2019-07-19 PROCEDURE — 85610 PROTHROMBIN TIME: CPT

## 2019-07-19 PROCEDURE — 83880 ASSAY OF NATRIURETIC PEPTIDE: CPT

## 2019-07-19 PROCEDURE — 72125 CT NECK SPINE W/O DYE: CPT

## 2019-07-19 PROCEDURE — 82948 REAGENT STRIP/BLOOD GLUCOSE: CPT

## 2019-07-19 PROCEDURE — 84295 ASSAY OF SERUM SODIUM: CPT

## 2019-07-19 PROCEDURE — 99285 EMERGENCY DEPT VISIT HI MDM: CPT | Performed by: EMERGENCY MEDICINE

## 2019-07-19 PROCEDURE — 71045 X-RAY EXAM CHEST 1 VIEW: CPT

## 2019-07-19 PROCEDURE — 85025 COMPLETE CBC W/AUTO DIFF WBC: CPT

## 2019-07-19 PROCEDURE — 6360000002 HC RX W HCPCS: Performed by: INTERNAL MEDICINE

## 2019-07-19 PROCEDURE — 85730 THROMBOPLASTIN TIME PARTIAL: CPT

## 2019-07-19 PROCEDURE — 2580000003 HC RX 258: Performed by: EMERGENCY MEDICINE

## 2019-07-19 RX ORDER — SODIUM CHLORIDE 9 MG/ML
INJECTION, SOLUTION INTRAVENOUS CONTINUOUS
Status: DISCONTINUED | OUTPATIENT
Start: 2019-07-19 | End: 2019-07-20

## 2019-07-19 RX ORDER — DEXTROSE MONOHYDRATE 50 MG/ML
100 INJECTION, SOLUTION INTRAVENOUS PRN
Status: DISCONTINUED | OUTPATIENT
Start: 2019-07-19 | End: 2019-07-21 | Stop reason: HOSPADM

## 2019-07-19 RX ORDER — ESCITALOPRAM OXALATE 10 MG/1
10 TABLET ORAL DAILY
Status: DISCONTINUED | OUTPATIENT
Start: 2019-07-20 | End: 2019-07-21 | Stop reason: HOSPADM

## 2019-07-19 RX ORDER — ACETAMINOPHEN 325 MG/1
650 TABLET ORAL EVERY 8 HOURS PRN
Status: DISCONTINUED | OUTPATIENT
Start: 2019-07-19 | End: 2019-07-21 | Stop reason: HOSPADM

## 2019-07-19 RX ORDER — ATORVASTATIN CALCIUM 40 MG/1
40 TABLET, FILM COATED ORAL NIGHTLY
Status: DISCONTINUED | OUTPATIENT
Start: 2019-07-19 | End: 2019-07-21 | Stop reason: HOSPADM

## 2019-07-19 RX ORDER — CLOPIDOGREL BISULFATE 75 MG/1
75 TABLET ORAL DAILY
Status: DISCONTINUED | OUTPATIENT
Start: 2019-07-20 | End: 2019-07-21 | Stop reason: HOSPADM

## 2019-07-19 RX ORDER — NICOTINE POLACRILEX 4 MG
15 LOZENGE BUCCAL PRN
Status: DISCONTINUED | OUTPATIENT
Start: 2019-07-19 | End: 2019-07-21 | Stop reason: HOSPADM

## 2019-07-19 RX ORDER — SODIUM CHLORIDE 0.9 % (FLUSH) 0.9 %
10 SYRINGE (ML) INJECTION PRN
Status: DISCONTINUED | OUTPATIENT
Start: 2019-07-19 | End: 2019-07-21 | Stop reason: HOSPADM

## 2019-07-19 RX ORDER — ASPIRIN 325 MG
325 TABLET ORAL DAILY
Status: DISCONTINUED | OUTPATIENT
Start: 2019-07-20 | End: 2019-07-21 | Stop reason: HOSPADM

## 2019-07-19 RX ORDER — SODIUM CHLORIDE 1000 MG
1 TABLET, SOLUBLE MISCELLANEOUS ONCE
Status: COMPLETED | OUTPATIENT
Start: 2019-07-19 | End: 2019-07-19

## 2019-07-19 RX ORDER — 0.9 % SODIUM CHLORIDE 0.9 %
500 INTRAVENOUS SOLUTION INTRAVENOUS ONCE
Status: COMPLETED | OUTPATIENT
Start: 2019-07-19 | End: 2019-07-19

## 2019-07-19 RX ORDER — DEXTROSE MONOHYDRATE 25 G/50ML
12.5 INJECTION, SOLUTION INTRAVENOUS PRN
Status: DISCONTINUED | OUTPATIENT
Start: 2019-07-19 | End: 2019-07-21 | Stop reason: HOSPADM

## 2019-07-19 RX ORDER — SODIUM CHLORIDE 0.9 % (FLUSH) 0.9 %
10 SYRINGE (ML) INJECTION EVERY 12 HOURS SCHEDULED
Status: DISCONTINUED | OUTPATIENT
Start: 2019-07-19 | End: 2019-07-21 | Stop reason: HOSPADM

## 2019-07-19 RX ORDER — LEVOTHYROXINE SODIUM 0.03 MG/1
25 TABLET ORAL DAILY
Status: DISCONTINUED | OUTPATIENT
Start: 2019-07-20 | End: 2019-07-21 | Stop reason: HOSPADM

## 2019-07-19 RX ORDER — HYDROXYZINE HYDROCHLORIDE 25 MG/1
25 TABLET, FILM COATED ORAL NIGHTLY PRN
Status: DISCONTINUED | OUTPATIENT
Start: 2019-07-19 | End: 2019-07-21 | Stop reason: HOSPADM

## 2019-07-19 RX ORDER — ONDANSETRON 2 MG/ML
4 INJECTION INTRAMUSCULAR; INTRAVENOUS EVERY 6 HOURS PRN
Status: DISCONTINUED | OUTPATIENT
Start: 2019-07-19 | End: 2019-07-21 | Stop reason: HOSPADM

## 2019-07-19 RX ADMIN — ENOXAPARIN SODIUM 40 MG: 40 INJECTION SUBCUTANEOUS at 21:41

## 2019-07-19 RX ADMIN — SODIUM CHLORIDE 500 ML: 9 INJECTION, SOLUTION INTRAVENOUS at 17:41

## 2019-07-19 RX ADMIN — SODIUM CHLORIDE TAB 1 GM 1 G: 1 TAB at 17:41

## 2019-07-19 RX ADMIN — SODIUM CHLORIDE: 9 INJECTION, SOLUTION INTRAVENOUS at 21:42

## 2019-07-19 RX ADMIN — ATORVASTATIN CALCIUM 40 MG: 40 TABLET, FILM COATED ORAL at 21:41

## 2019-07-19 RX ADMIN — INSULIN LISPRO 1 UNITS: 100 INJECTION, SOLUTION INTRAVENOUS; SUBCUTANEOUS at 21:44

## 2019-07-19 ASSESSMENT — ENCOUNTER SYMPTOMS
TROUBLE SWALLOWING: 0
NAUSEA: 0
SORE THROAT: 0
ABDOMINAL DISTENTION: 0
BACK PAIN: 0
DIARRHEA: 0
COUGH: 0
ABDOMINAL PAIN: 0
CONSTIPATION: 0
BLOOD IN STOOL: 0
VOMITING: 0
CHEST TIGHTNESS: 0
RHINORRHEA: 0
SHORTNESS OF BREATH: 0

## 2019-07-19 NOTE — ED PROVIDER NOTES
dizziness, weakness, light-headedness and headaches. Psychiatric/Behavioral: Positive for confusion (As per wife). Negative for hallucinations, self-injury and suicidal ideas. PAST MEDICAL HISTORY     Past Medical History:   Diagnosis Date    Arthritis     Basilar artery stenosis     CAD (coronary artery disease) 1997 2009    MI    Cancer ([address]     SKIN 6YRS AGO    CHF (congestive heart failure) (HCC)     Heel spur     Hyperlipidemia     Hypertension     Palliative care patient 10/17/2018    Sleep apnea     no c-pap    Type II or unspecified type diabetes mellitus without mention of complication, not stated as uncontrolled     Vertebral artery insufficiency        SURGICAL HISTORY       Past Surgical History:   Procedure Laterality Date    ANGIOPLASTY Bilateral     basilar artery angioplasty    [place]    CARPAL TUNNEL RELEASE      bilateral    CATARACT REMOVAL Bilateral     CHOLECYSTECTOMY      CORONARY ARTERY BYPASS GRAFT  1997    3v    HEEL SPUR SURGERY      left    RI EGD TRANSORAL BIOPSY SINGLE/MULTIPLE N/A 10/16/2018    Dr Daphne Kerns-w/control of bleeding-Active bleeding in the stomach from Dieulafoy lesion    PTCA  2009 [facility]    with stents    TONSILLECTOMY         CURRENT MEDICATIONS       Previous Medications    ASPIRIN 325 MG TABLET    Take 325 mg by mouth daily.     ATORVASTATIN (LIPITOR) 40 MG TABLET    Take 1 tablet by mouth daily    BENZONATATE (TESSALON) 100 MG CAPSULE    Take 1 capsule by mouth 3 times daily as needed for Cough    CARVEDILOL (COREG) 6.25 MG TABLET    Take 1 tablet by mouth 2 times daily    CLOPIDOGREL (PLAVIX) 75 MG TABLET    Take 1 tablet by mouth daily    CYANOCOBALAMIN 1000 MCG/ML INJECTION    Inject 1 mL into the muscle every 30 days    DIPHENHYDRAMINE (BENADRYL) 25 MG TABLET    Take 50 mg by mouth nightly as needed for Itching    DULAGLUTIDE 0.75 MG/0.5ML SOPN    Inject 0.75 mg into the skin every 7 days or deformities. Cardiovascular: Normal rate, regular rhythm, normal heart sounds and intact distal pulses. Exam reveals no gallop and no friction rub. No murmur heard. Pulmonary/Chest: Effort normal and breath sounds normal. No respiratory distress. He has no wheezes. He has no rales. He exhibits no tenderness. Abdominal: Soft. He exhibits no distension and no mass. There is no tenderness. There is no rebound and no guarding. Musculoskeletal: Normal range of motion. He exhibits no edema, tenderness or deformity. No thoracic, lumbar spinous process tenderness, no step-offs or deformities. Neurological: He is alert and oriented to person, place, and time. No cranial nerve deficit. He exhibits normal muscle tone. Coordination normal.   Cranial nerves II through XII intact, appropriate equal strength in upper and lower extremities, sensation intact to light touch in all 4 extremities. Skin: Skin is warm and dry. No rash noted. Psychiatric: He has a normal mood and affect. His behavior is normal. Judgment and thought content normal.   Nursing note and vitals reviewed. DIAGNOSTIC RESULTS     EKG:All EKG's are interpreted by the Emergency Department Physician who either signs or Co-signs this chart in the absence of a cardiologist.    [id number]: Atrial sensed and ventricular paced complexes appropriate discordance, prolonged QTC, no evidence of ischemic changes. RADIOLOGY:   Non-plain film images such as CT, Ultrasound and MRI are read by theradiologist. Plain radiographic images are visualized and preliminarily interpreted by the emergency physician with the below findings:    CT Cervical Spine WO Contrast   Final Result   CT head. No acute intracranial abnormalities. CT cervical spine. No acute osseous post traumatic finding. Signed by Dr Milton Euceda on 7/19/2019 4:26 PM      CT Head WO Contrast   Final Result   CT head. No acute intracranial abnormalities. CT cervical spine.  No acute osseous

## 2019-07-20 PROBLEM — E87.1 HYPONATREMIA: Status: ACTIVE | Noted: 2019-07-20

## 2019-07-20 PROBLEM — N17.9 ACUTE RENAL FAILURE SUPERIMPOSED ON STAGE 3 CHRONIC KIDNEY DISEASE (HCC): Status: ACTIVE | Noted: 2019-07-20

## 2019-07-20 PROBLEM — I50.22 CHRONIC SYSTOLIC CONGESTIVE HEART FAILURE (HCC): Status: ACTIVE | Noted: 2018-02-07

## 2019-07-20 PROBLEM — N18.30 ACUTE RENAL FAILURE SUPERIMPOSED ON STAGE 3 CHRONIC KIDNEY DISEASE (HCC): Status: ACTIVE | Noted: 2019-07-20

## 2019-07-20 LAB
ALBUMIN SERPL-MCNC: 3.9 G/DL (ref 3.5–5.2)
ALP BLD-CCNC: 79 U/L (ref 40–130)
ALT SERPL-CCNC: 9 U/L (ref 5–41)
ANION GAP SERPL CALCULATED.3IONS-SCNC: 15 MMOL/L (ref 7–19)
ANION GAP SERPL CALCULATED.3IONS-SCNC: 18 MMOL/L (ref 7–19)
AST SERPL-CCNC: 12 U/L (ref 5–40)
BILIRUB SERPL-MCNC: 0.7 MG/DL (ref 0.2–1.2)
BILIRUBIN URINE: NEGATIVE
BLOOD, URINE: NEGATIVE
BUN BLDV-MCNC: 77 MG/DL (ref 8–23)
BUN BLDV-MCNC: 78 MG/DL (ref 8–23)
CALCIUM SERPL-MCNC: 9.1 MG/DL (ref 8.8–10.2)
CALCIUM SERPL-MCNC: 9.2 MG/DL (ref 8.8–10.2)
CHLORIDE BLD-SCNC: 80 MMOL/L (ref 98–111)
CHLORIDE BLD-SCNC: 81 MMOL/L (ref 98–111)
CLARITY: CLEAR
CO2: 25 MMOL/L (ref 22–29)
CO2: 27 MMOL/L (ref 22–29)
COLOR: YELLOW
CREAT SERPL-MCNC: 1.8 MG/DL (ref 0.5–1.2)
CREAT SERPL-MCNC: 1.9 MG/DL (ref 0.5–1.2)
CREATININE URINE: 71.7 MG/DL (ref 4.2–622)
EOSINOPHIL,URINE: NORMAL
GFR NON-AFRICAN AMERICAN: 35
GFR NON-AFRICAN AMERICAN: 38
GLUCOSE BLD-MCNC: 152 MG/DL (ref 70–99)
GLUCOSE BLD-MCNC: 196 MG/DL (ref 70–99)
GLUCOSE BLD-MCNC: 198 MG/DL (ref 74–109)
GLUCOSE BLD-MCNC: 212 MG/DL (ref 74–109)
GLUCOSE BLD-MCNC: 219 MG/DL (ref 70–99)
GLUCOSE BLD-MCNC: 261 MG/DL (ref 70–99)
GLUCOSE URINE: NEGATIVE MG/DL
HCT VFR BLD CALC: 27.4 % (ref 42–52)
HEMOGLOBIN: 8.7 G/DL (ref 14–18)
KETONES, URINE: NEGATIVE MG/DL
LEUKOCYTE ESTERASE, URINE: NEGATIVE
MAGNESIUM: 2.2 MG/DL (ref 1.6–2.4)
MCH RBC QN AUTO: 29.6 PG (ref 27–31)
MCHC RBC AUTO-ENTMCNC: 31.8 G/DL (ref 33–37)
MCV RBC AUTO: 93.2 FL (ref 80–94)
NITRITE, URINE: NEGATIVE
PDW BLD-RTO: 18.5 % (ref 11.5–14.5)
PERFORMED ON: ABNORMAL
PH UA: 6 (ref 5–8)
PLATELET # BLD: 157 K/UL (ref 130–400)
PMV BLD AUTO: 10.2 FL (ref 9.4–12.4)
POTASSIUM REFLEX MAGNESIUM: 3.6 MMOL/L (ref 3.5–5)
POTASSIUM SERPL-SCNC: 3.5 MMOL/L (ref 3.5–5)
PROTEIN PROTEIN: 5 MG/DL (ref 15–45)
PROTEIN UA: NEGATIVE MG/DL
RBC # BLD: 2.94 M/UL (ref 4.7–6.1)
SODIUM BLD-SCNC: 122 MMOL/L (ref 136–145)
SODIUM BLD-SCNC: 123 MMOL/L (ref 136–145)
SODIUM BLD-SCNC: 124 MMOL/L (ref 136–145)
SODIUM BLD-SCNC: 125 MMOL/L (ref 136–145)
SODIUM URINE: <20 MMOL/L
SPECIFIC GRAVITY UA: 1.01 (ref 1–1.03)
TOTAL PROTEIN: 6.9 G/DL (ref 6.6–8.7)
TROPONIN: 0.01 NG/ML (ref 0–0.03)
UREA NITROGEN, UR: 644 MG/DL
UROBILINOGEN, URINE: 0.2 E.U./DL
WBC # BLD: 8 K/UL (ref 4.8–10.8)

## 2019-07-20 PROCEDURE — 6370000000 HC RX 637 (ALT 250 FOR IP): Performed by: INTERNAL MEDICINE

## 2019-07-20 PROCEDURE — 87205 SMEAR GRAM STAIN: CPT

## 2019-07-20 PROCEDURE — 80053 COMPREHEN METABOLIC PANEL: CPT

## 2019-07-20 PROCEDURE — 85027 COMPLETE CBC AUTOMATED: CPT

## 2019-07-20 PROCEDURE — 84156 ASSAY OF PROTEIN URINE: CPT

## 2019-07-20 PROCEDURE — 83735 ASSAY OF MAGNESIUM: CPT

## 2019-07-20 PROCEDURE — 84295 ASSAY OF SERUM SODIUM: CPT

## 2019-07-20 PROCEDURE — 2580000003 HC RX 258: Performed by: INTERNAL MEDICINE

## 2019-07-20 PROCEDURE — 82570 ASSAY OF URINE CREATININE: CPT

## 2019-07-20 PROCEDURE — 2140000000 HC CCU INTERMEDIATE R&B

## 2019-07-20 PROCEDURE — 84484 ASSAY OF TROPONIN QUANT: CPT

## 2019-07-20 PROCEDURE — 84540 ASSAY OF URINE/UREA-N: CPT

## 2019-07-20 PROCEDURE — 82948 REAGENT STRIP/BLOOD GLUCOSE: CPT

## 2019-07-20 PROCEDURE — 6360000002 HC RX W HCPCS: Performed by: INTERNAL MEDICINE

## 2019-07-20 PROCEDURE — 84300 ASSAY OF URINE SODIUM: CPT

## 2019-07-20 PROCEDURE — 36415 COLL VENOUS BLD VENIPUNCTURE: CPT

## 2019-07-20 PROCEDURE — 99233 SBSQ HOSP IP/OBS HIGH 50: CPT | Performed by: INTERNAL MEDICINE

## 2019-07-20 PROCEDURE — 81003 URINALYSIS AUTO W/O SCOPE: CPT

## 2019-07-20 RX ORDER — CARVEDILOL 6.25 MG/1
6.25 TABLET ORAL 2 TIMES DAILY WITH MEALS
Status: DISCONTINUED | OUTPATIENT
Start: 2019-07-20 | End: 2019-07-21

## 2019-07-20 RX ADMIN — CARVEDILOL 6.25 MG: 6.25 TABLET, FILM COATED ORAL at 12:36

## 2019-07-20 RX ADMIN — LEVOTHYROXINE SODIUM 25 MCG: 25 TABLET ORAL at 06:17

## 2019-07-20 RX ADMIN — INSULIN LISPRO 1 UNITS: 100 INJECTION, SOLUTION INTRAVENOUS; SUBCUTANEOUS at 08:17

## 2019-07-20 RX ADMIN — INSULIN LISPRO 2 UNITS: 100 INJECTION, SOLUTION INTRAVENOUS; SUBCUTANEOUS at 22:00

## 2019-07-20 RX ADMIN — ESCITALOPRAM OXALATE 10 MG: 10 TABLET ORAL at 08:16

## 2019-07-20 RX ADMIN — ATORVASTATIN CALCIUM 40 MG: 40 TABLET, FILM COATED ORAL at 21:59

## 2019-07-20 RX ADMIN — INSULIN LISPRO 2 UNITS: 100 INJECTION, SOLUTION INTRAVENOUS; SUBCUTANEOUS at 13:25

## 2019-07-20 RX ADMIN — Medication 10 ML: at 08:16

## 2019-07-20 RX ADMIN — CARVEDILOL 6.25 MG: 6.25 TABLET, FILM COATED ORAL at 21:59

## 2019-07-20 RX ADMIN — ASPIRIN 325 MG: 325 TABLET, COATED ORAL at 08:16

## 2019-07-20 RX ADMIN — CLOPIDOGREL BISULFATE 75 MG: 75 TABLET ORAL at 08:16

## 2019-07-20 RX ADMIN — SODIUM CHLORIDE: 9 INJECTION, SOLUTION INTRAVENOUS at 12:35

## 2019-07-20 RX ADMIN — ENOXAPARIN SODIUM 40 MG: 40 INJECTION SUBCUTANEOUS at 08:16

## 2019-07-20 RX ADMIN — Medication 10 ML: at 22:00

## 2019-07-20 ASSESSMENT — PAIN SCALES - GENERAL
PAINLEVEL_OUTOF10: 0
PAINLEVEL_OUTOF10: 0

## 2019-07-20 NOTE — CONSULTS
Nephrology ([address]) Consult Note      Patient:  Anita Alanis  YOB: 1950  Date of Service: 7/20/2019  MRN: [id number]   Acct: [de-identified]   Primary Care Physician: Sanna Dee DO  Advance Directive: Full Code  Admit Date: 7/19/2019       Hospital Day: 1  Referring Provider: Yonathan Mukherjee DO    Patient independently seen and examined, Chart, Consults, Notes, Operative notes, Labs, Cardiology, and Radiology studies reviewed as able. Subjective:  Anita Alanis is a 71 y.o. male  whom we were consulted for acute renal failure and hyponatremia. Patient with complicated cardiac history following with [facility] cardiology and reportedly being worked up for transplant. He recently was at [address] [place] [address] visiting his previous employer and was subsequently confused there and actually walked into 1 of the brick columns. He was also weak and taken home by family and the next day he continued to get worse and so presented emergency room for further evaluation. He has a long history of NSAID usage noted in his chart. He denies hematuria, dysuria, rash. He has chronic bilateral lower extremity edema which he feels is better than usual.  He denies current chest pain, shortness of air. He notes that he is a frequent \"dangler\" which worsens his peripheral edema.     Allergies:  Codeine    Medicines:  Current Facility-Administered Medications   Medication Dose Route Frequency Provider Last Rate Last Dose    carvedilol (COREG) tablet 6.25 mg  6.25 mg Oral BID  Ladi Price DO   6.25 mg at 07/20/19 1236    sodium chloride flush 0.9 % injection 10 mL  10 mL Intravenous 2 times per day Damir Marie MD   10 mL at 07/20/19 0816    sodium chloride flush 0.9 % injection 10 mL  10 mL Intravenous PRN Damir Marie MD        magnesium hydroxide (MILK OF MAGNESIA) 400 MG/5ML suspension 30 mL  30 mL Oral Daily PRN Damir Marie MD        [facility]dansetron [facility]) injection 4 mg  4 mg Intravenous Q6H PRN Tanvir Canales MD        acetaminophen (TYLENOL) tablet 650 mg  650 mg Oral Q8H PRN Tanvir Canales MD        enoxaparin (LOVENOX) injection 40 mg  40 mg Subcutaneous Daily Tanvir Canales MD   40 mg at 07/20/19 0816    glucose (GLUTOSE) 40 % oral gel 15 g  15 g Oral PRN Tanvir Canales MD        dextrose 50 % IV solution  12.5 g Intravenous PRN Tanvir Canales MD        glucagon (rDNA) injection 1 mg  1 mg Intramuscular PRN Tanvir Canales MD        dextrose 5 % solution  100 mL/hr Intravenous PRN Tanvir Canales MD        insulin lispro (HUMALOG) injection vial 0-6 Units  0-6 Units Subcutaneous TID [facility] Tanvir Canales MD   2 Units at 07/20/19 1325    insulin lispro (HUMALOG) injection vial 0-3 Units  0-3 Units Subcutaneous Nightly Tanvir Canales MD   1 Units at 07/19/19 2144    aspirin tablet 325 mg  325 mg Oral Daily Tanvir Canales MD   325 mg at 07/20/19 0816    atorvastatin (LIPITOR) tablet 40 mg  40 mg Oral Nightly Tanvir Canales MD   40 mg at 07/19/19 2141    clopidogrel (PLAVIX) tablet 75 mg  75 mg Oral Daily Tanvir Canales MD   75 mg at 07/20/19 0816    escitalopram (LEXAPRO) tablet 10 mg  10 mg Oral Daily Tanvir Canales MD   10 mg at 07/20/19 6990    hydrOXYzine (ATARAX) tablet 25 mg  25 mg Oral Nightly PRN Tanvir Canales MD        levothyroxine (SYNTHROID) tablet 25 mcg  25 mcg Oral Daily Tanvir Canales MD   25 mcg at 07/20/19 0617       Past Medical History:  Past Medical History:   Diagnosis Date    Arthritis     Basilar artery stenosis     CAD (coronary artery disease) 1997 2009    [place]    [facility] [address]     SKIN 6YRS AGO    CHF (congestive heart failure) ([facility]     Heel spur     Hyperlipidemia     Hypertension     Palliative care patient 10/17/2018    Sleep apnea     no c-pap    Type II or unspecified type diabetes mellitus without mention of complication, not stated as --  80*   CO2 25  --  25  --  27   BUN 78*  --  77*  --  78*   CREATININE 2.0*  --  1.9*  --  1.8*   CALCIUM 9.2  --  9.1  --  9.2    < > = values in this interval not displayed. CBC:   Recent Labs     07/19/19  1548 07/20/19  0150   WBC 10.2 8.0   HGB 9.1* 8.7*   HCT 27.7* 27.4*   MCV 91.7 93.2    157     LIVER PROFILE:   Recent Labs     07/19/19  1548 07/20/19  0150   AST 14 12   ALT 10 9   BILITOT 0.8 0.7   ALKPHOS 82 79     PT/INR:   Recent Labs     07/19/19 1548   PROTIME 16.0*   INR 1.35*     APTT:   Recent Labs     07/19/19 1548   APTT 32.8     BNP:  No results for input(s): BNP in the last 72 hours. Ionized Calcium:No results for input(s): IONCA in the last 72 hours. Magnesium:  Recent Labs     07/20/19  0150   MG 2.2     Phosphorus:No results for input(s): PHOS in the last 72 hours. HgbA1C: No results for input(s): LABA1C in the last 72 hours. Hepatic:   Recent Labs     07/19/19  1548 07/20/19  0150   ALKPHOS 82 79   ALT 10 9   AST 14 12   PROT 7.0 6.9   BILITOT 0.8 0.7   LABALBU 4.0 3.9     Lactic Acid: No results for input(s): LACTA in the last 72 hours. Troponin: No results for input(s): CKTOTAL, CKMB, TROPONINT in the last 72 hours. ABGs: No results for input(s): PH, PCO2, PO2, HCO3, O2SAT in the last 72 hours. CRP:  No results for input(s): CRP in the last 72 hours. Sed Rate:  No results for input(s): SEDRATE in the last 72 hours. Cultures:   No results for input(s): CULTURE in the last 72 hours. No results for input(s): BC, Truddie Humberto in the last 72 hours. No results for input(s): CXSURG in the last 72 hours. Radiology reports as per the Radiologist  Radiology: Ct Head Wo Contrast    Result Date: 7/19/2019  EXAM: CT OF THE HEAD WITHOUT IV CONTRAST CT CERVICAL SPINE WITHOUT IV CONTRAST 7/19/2019. COMPARISON: None. INDICATION: Trauma PROCEDURE: Non contrast enhanced head CT and cervical spine CT were performed.  The head images were formatted in the axial plane at 5 mm

## 2019-07-20 NOTE — PROGRESS NOTES
4 Eyes Skin Assessment    Adelina Alvares is being assessed upon: Admission    I agree that I, Surjit Sloan, along with Manuela Abdi RN (either 2 RN's or 1 LPN and 1 RN) have performed a thorough Head to Toe Skin Assessment on the patient. ALL assessment sites listed below have been assessed. Areas assessed by both nurses:     [x]   Head, Face, and Ears   [x]   Shoulders, Back, and Chest  [x]   Arms, Elbows, and Hands   [x]   Coccyx, Sacrum, and Ischium  [x]   Legs, Feet, and Heels    Does the Patient have Skin Breakdown?  NO  Skinny Prevention initiated: Yes  Wound Care Orders initiated: NA    [facility] nurse consulted for Pressure Injury (Stage 3,4, Unstageable, DTI, NWPT, and Complex wounds) and New or Established Ostomies: NA        Primary Nurse eSignature: Surjit Sloan RN on 7/19/2019 at 11:04 PM      Co-Signer eSignature: {Esignature:[id number]}
Code          Electronically signed by Joe Jacques DO on 7/20/2019 at 8:34 AM    Addendum:  Repeat labs showing sodium back down to 122. Continue to trend sodiums every 6 hours. Will hold on further IV fluids at this time. We will plan consulted nephrology for recommendations. Case discussed with Dr. Maria Isabel An.   We will follow-up recommendation at re-eval.    Joe Jacques DO 7/20 @ 3:15 PM

## 2019-07-20 NOTE — H&P
and negative except as noted above. Past Medical History:      Diagnosis Date    Arthritis     Basilar artery stenosis     CAD (coronary artery disease) 1997 2009    MI    Cancer ([address]     SKIN 6YRS AGO    CHF (congestive heart failure) (HCC)     Heel spur     Hyperlipidemia     Hypertension     Palliative care patient 10/17/2018    Sleep apnea     no c-pap    Type II or unspecified type diabetes mellitus without mention of complication, not stated as uncontrolled     Vertebral artery insufficiency        Past Surgical History:      Procedure Laterality Date    ANGIOPLASTY Bilateral     basilar artery angioplasty    CARDIAC CATHETERIZATION  11/30/2018    [place]    CARPAL TUNNEL RELEASE      bilateral    CATARACT REMOVAL Bilateral     CHOLECYSTECTOMY      CORONARY ARTERY BYPASS GRAFT  1997    3v    HEEL SPUR SURGERY      left    DE EGD TRANSORAL BIOPSY SINGLE/MULTIPLE N/A 10/16/2018    Dr Irasema Kerns-w/control of bleeding-Active bleeding in the stomach from Dieulafoy lesion    PTCA  2009 [facility]    with stents    TONSILLECTOMY         Medications Prior to Admission:    Prior to Admission medications    Medication Sig Start Date End Date Taking?  Authorizing Provider   carvedilol (COREG) 6.25 MG tablet Take 1 tablet by mouth 2 times daily 7/9/19   B Pierce Reyes DO   potassium chloride (KLOR-CON M) 20 MEQ extended release tablet TAKE 1 TABLET BY MOUTH TWICE DAILY 7/5/19   B Pierce Reyes DO   benzonatate (TESSALON) 100 MG capsule Take 1 capsule by mouth 3 times daily as needed for Cough 6/17/19   B Pierce Reyes DO   escitalopram (LEXAPRO) 10 MG tablet Take 1 tablet by mouth daily 6/12/19   JOSE ELIAS Lovell   metolazone (ZAROXOLYN) 2.5 MG tablet Take 1 tablet by mouth daily 5/28/19   ROSS Reyes DO   levothyroxine (SYNTHROID) 25 MCG tablet Take 1 tablet by mouth Daily 5/20/19   JOSE ELIAS Roland   spironolactone (ALDACTONE) 25 MG tablet Take 1 tablet by mouth daily 5/20/19   JOSE ELIAS Moran   cyanocobalamin 1000 MCG/ML injection Inject 1 mL into the muscle every 30 days 5/13/19   JOSE ELIAS Moran   hydrOXYzine (ATARAX) 25 MG tablet Take 1 tablet by mouth nightly 5/13/19   B Letybre Alegre, DO   metoprolol succinate (TOPROL XL) 100 MG extended release tablet Take 100 mg by mouth daily    Historical Provider, MD   ramipril (ALTACE) 1.25 MG capsule Take 1 capsule by mouth daily 3/13/19   JOSE ELIAS Fermin   torsemide (DEMADEX) 100 MG tablet Take 100 mg by mouth daily Patient is taking 200 mg every other day per [facility]    Historical Provider, MD   pantoprazole (PROTONIX) 40 MG tablet Take 1 tablet by mouth every morning (before breakfast) 10/25/18   Nitin Alonso MD   clopidogrel (PLAVIX) 75 MG tablet Take 1 tablet by mouth daily 10/18/18   B Lety Alegre DO   atorvastatin (LIPITOR) 40 MG tablet Take 1 tablet by mouth daily  Patient taking differently: Take 40 mg by mouth nightly  5/15/18   JOSE ELIAS Lovell   Dulaglutide 0.75 MG/0.5ML SOPN Inject 0.75 mg into the skin every 7 days  Patient taking differently: Inject 0.75 mg into the skin every 7 days Patient states this is ON HOLD since he was at [facility]- Patient states he didn't need it. 1/15/18   B Lety [place], DO   nitroGLYCERIN (NITROLINGUAL) 0.4 MG/SPRAY 0.4 mg spray Place 1 spray under the tongue every 5 minutes as needed for Chest pain 10/27/17   B Lety [place], DO   metFORMIN (GLUCOPHAGE) 1000 MG tablet Take 1 tablet by mouth 2 times daily (with meals) 9/29/16   B Lety [place], DO   triamcinolone (KENALOG) 0.1 % cream Apply topically 2 times daily. 3/30/16   B Lety [place], DO   Omega-3 Fatty Acids (FISH OIL) 1000 MG CAPS Take 3,000 mg by mouth daily    Historical Provider, MD   diphenhydrAMINE (BENADRYL) 25 MG tablet Take 50 mg by mouth nightly as needed for Itching    Historical Provider, MD   aspirin 325 MG tablet Take 325 mg by mouth daily. CARBOXHGBART, 02THERAPY in the last 72 hours. INR:   Recent Labs     07/19/19  1548   INR 1.35*     A1c:Invalid input(s): HEMOGLOBIN A1C      Assessment and Plan   #Syncope  -Monitor on telemetry  -This was likely due to hypotension, check orthostatic vital signs every shift  -Stat recheck of sodium after 500 cc bolus  -If sodium is not increasing too rapidly we will start gentle hydration overnight  -We will hold beta-blocker, ACE, torsemide, metolazone, spironolactone tonight  -Consider restarting pending orthostatic vital signs tomorrow morning    #Hyponatremia  -Likely hypovolemic  -Hold diuretics as above  -Check urine sodium and urine osmolality  -Plan for gentle hydration overnight pending sodium recheck    #TONY  -Likely prerenal in setting of hypokalemia  -Holding diuretics as above  -Gentle hydration pending sodium level    #End-stage congestive heart failure with reduced ejection fraction  -Patient has close follow-up with [place] and request to not have a repeat echo or cardiology eval at this time unless absolutely necessary.     #Diabetes mellitus  -Insulin sliding scale, monitor requirements and adjust as needed    #Basilar artery stenosis  -Continue statin, full dose aspirin and Plavix    Simba Cole MD  Admitting Hospitalist

## 2019-07-21 VITALS
WEIGHT: 221.6 LBS | BODY MASS INDEX: 31.02 KG/M2 | DIASTOLIC BLOOD PRESSURE: 65 MMHG | HEIGHT: 71 IN | OXYGEN SATURATION: 97 % | RESPIRATION RATE: 18 BRPM | SYSTOLIC BLOOD PRESSURE: 87 MMHG | HEART RATE: 82 BPM | TEMPERATURE: 98 F

## 2019-07-21 LAB
ANION GAP SERPL CALCULATED.3IONS-SCNC: 17 MMOL/L (ref 7–19)
BUN BLDV-MCNC: 74 MG/DL (ref 8–23)
CALCIUM SERPL-MCNC: 9.3 MG/DL (ref 8.8–10.2)
CHLORIDE BLD-SCNC: 83 MMOL/L (ref 98–111)
CO2: 26 MMOL/L (ref 22–29)
CREAT SERPL-MCNC: 1.7 MG/DL (ref 0.5–1.2)
EKG P AXIS: -167 DEGREES
EKG P-R INTERVAL: 92 MS
EKG Q-T INTERVAL: 434 MS
EKG QRS DURATION: 178 MS
EKG QTC CALCULATION (BAZETT): 472 MS
EKG T AXIS: 21 DEGREES
GFR NON-AFRICAN AMERICAN: 40
GLUCOSE BLD-MCNC: 155 MG/DL (ref 74–109)
GLUCOSE BLD-MCNC: 171 MG/DL (ref 70–99)
GLUCOSE BLD-MCNC: 259 MG/DL (ref 70–99)
HCT VFR BLD CALC: 27.7 % (ref 42–52)
HEMOGLOBIN: 8.8 G/DL (ref 14–18)
MAGNESIUM: 2.2 MG/DL (ref 1.6–2.4)
MCH RBC QN AUTO: 29.8 PG (ref 27–31)
MCHC RBC AUTO-ENTMCNC: 31.8 G/DL (ref 33–37)
MCV RBC AUTO: 93.9 FL (ref 80–94)
PARATHYROID HORMONE INTACT: 87.1 PG/ML (ref 15–65)
PDW BLD-RTO: 18.5 % (ref 11.5–14.5)
PERFORMED ON: ABNORMAL
PERFORMED ON: ABNORMAL
PHOSPHORUS: 4 MG/DL (ref 2.5–4.5)
PLATELET # BLD: 174 K/UL (ref 130–400)
PMV BLD AUTO: 11.3 FL (ref 9.4–12.4)
POTASSIUM SERPL-SCNC: 3.6 MMOL/L (ref 3.5–5)
RBC # BLD: 2.95 M/UL (ref 4.7–6.1)
SODIUM BLD-SCNC: 124 MMOL/L (ref 136–145)
SODIUM BLD-SCNC: 126 MMOL/L (ref 136–145)
URIC ACID, SERUM: 13.6 MG/DL (ref 3.4–7)
VITAMIN D 25-HYDROXY: 29 NG/ML
WBC # BLD: 8 K/UL (ref 4.8–10.8)

## 2019-07-21 PROCEDURE — 6360000002 HC RX W HCPCS: Performed by: INTERNAL MEDICINE

## 2019-07-21 PROCEDURE — 85027 COMPLETE CBC AUTOMATED: CPT

## 2019-07-21 PROCEDURE — 84295 ASSAY OF SERUM SODIUM: CPT

## 2019-07-21 PROCEDURE — 80048 BASIC METABOLIC PNL TOTAL CA: CPT

## 2019-07-21 PROCEDURE — 6370000000 HC RX 637 (ALT 250 FOR IP): Performed by: INTERNAL MEDICINE

## 2019-07-21 PROCEDURE — 82306 VITAMIN D 25 HYDROXY: CPT

## 2019-07-21 PROCEDURE — 84100 ASSAY OF PHOSPHORUS: CPT

## 2019-07-21 PROCEDURE — 99239 HOSP IP/OBS DSCHRG MGMT >30: CPT | Performed by: INTERNAL MEDICINE

## 2019-07-21 PROCEDURE — 83735 ASSAY OF MAGNESIUM: CPT

## 2019-07-21 PROCEDURE — 2580000003 HC RX 258: Performed by: INTERNAL MEDICINE

## 2019-07-21 PROCEDURE — 82948 REAGENT STRIP/BLOOD GLUCOSE: CPT

## 2019-07-21 PROCEDURE — 36415 COLL VENOUS BLD VENIPUNCTURE: CPT

## 2019-07-21 PROCEDURE — 84550 ASSAY OF BLOOD/URIC ACID: CPT

## 2019-07-21 PROCEDURE — 83970 ASSAY OF PARATHORMONE: CPT

## 2019-07-21 RX ORDER — CARVEDILOL 3.12 MG/1
3.12 TABLET ORAL 2 TIMES DAILY WITH MEALS
Status: DISCONTINUED | OUTPATIENT
Start: 2019-07-21 | End: 2019-07-21 | Stop reason: HOSPADM

## 2019-07-21 RX ADMIN — Medication 10 ML: at 08:55

## 2019-07-21 RX ADMIN — INSULIN LISPRO 1 UNITS: 100 INJECTION, SOLUTION INTRAVENOUS; SUBCUTANEOUS at 09:05

## 2019-07-21 RX ADMIN — CARVEDILOL 3.12 MG: 3.12 TABLET, FILM COATED ORAL at 08:54

## 2019-07-21 RX ADMIN — CLOPIDOGREL BISULFATE 75 MG: 75 TABLET ORAL at 08:54

## 2019-07-21 RX ADMIN — INSULIN LISPRO 3 UNITS: 100 INJECTION, SOLUTION INTRAVENOUS; SUBCUTANEOUS at 11:19

## 2019-07-21 RX ADMIN — LEVOTHYROXINE SODIUM 25 MCG: 25 TABLET ORAL at 06:10

## 2019-07-21 RX ADMIN — ASPIRIN 325 MG: 325 TABLET, COATED ORAL at 08:54

## 2019-07-21 RX ADMIN — ESCITALOPRAM OXALATE 10 MG: 10 TABLET ORAL at 08:54

## 2019-07-21 RX ADMIN — ENOXAPARIN SODIUM 40 MG: 40 INJECTION SUBCUTANEOUS at 08:55

## 2019-07-21 NOTE — PLAN OF CARE
Problem: Falls - Risk of:  Goal: Will remain free from falls  Description  Will remain free from falls  Outcome: Met This Shift  Goal: Absence of physical injury  Description  Absence of physical injury  Outcome: Met This Shift     Problem: SAFETY  Goal: Free from accidental physical injury  Outcome: Met This Shift  Goal: Free from intentional harm  Outcome: Met This Shift     Problem: DAILY CARE  Goal: Daily care needs are met  Outcome: Met This Shift

## 2019-07-21 NOTE — DISCHARGE SUMMARY
1626     Order Status: Completed Updated: 07/19/19 1628     Narrative:       EXAM:   CT OF THE HEAD WITHOUT IV CONTRAST   CT CERVICAL SPINE WITHOUT IV CONTRAST 7/19/2019. COMPARISON: None. INDICATION: Trauma   PROCEDURE: Non contrast enhanced head CT and cervical spine CT were  performed. The head images were formatted in the axial plane at 5 mm  thick intervals. The cervical spine images were formatted in the  axial, coronal and sagittal planes. Radiation dose equals DLP 1574 mGy-cm.  Automated exposure control  dose reduction technique was implemented. FINDINGS:   HEAD: Mild to moderate generalized cerebral volume loss. Mild-to-moderate chronic microvascular changes. . There is no evidence  of mass-effect or midline shift. The gray-white differentiation is  preserved. Curvin Mejias is no evidence of intracranial contusion,  hemorrhage, or skull fracture.  The visualized portions of the  paranasal sinuses and mastoid air cells are unremarkable. CERVICAL SPINE: The odontoid process is well approximated with the  anterior body of C1 and well aligned between the lateral masses of C1. There is no spondylolisthesis. No acute compression deformity. No  dislocation. Multilevel degenerative changes, with distances and facet  arthropathy. Left-sided cardiac leads incompletely visualized.     Impression:       CT head. No acute intracranial abnormalities. CT cervical spine. No acute osseous post traumatic finding. Signed by Dr Yolanda Dyer on 7/19/2019 4:26 PM     CT Cervical Spine WO Contrast [[id number]] Resulted: 07/19/19 1626     Order Status: Completed Updated: 07/19/19 1628     Narrative:       EXAM:   CT OF THE HEAD WITHOUT IV CONTRAST   CT CERVICAL SPINE WITHOUT IV CONTRAST 7/19/2019. COMPARISON: None. INDICATION: Trauma   PROCEDURE: Non contrast enhanced head CT and cervical spine CT were  performed. The head images were formatted in the axial plane at 5 mm  thick intervals.  The cervical spine images were atorvastatin (LIPITOR) 40 MG tablet Take 1 tablet by mouth daily  Qty: 90 tablet, Refills: 3      Omega-3 Fatty Acids (FISH OIL) 1000 MG CAPS Take 3,000 mg by mouth daily      diphenhydrAMINE (BENADRYL) 25 MG tablet Take 50 mg by mouth nightly as needed for Itching      aspirin 325 MG tablet Take 325 mg by mouth daily. cyanocobalamin 1000 MCG/ML injection Inject 1 mL into the muscle every 30 days  Qty: 10 mL, Refills: 0      Dulaglutide 0.75 MG/0.5ML SOPN Inject 0.75 mg into the skin every 7 days  Qty: 12 pen, Refills: 3    Associated Diagnoses: Type 2 diabetes mellitus without complication, without long-term current use of insulin ([facility])      nitroGLYCERIN (NITROLINGUAL) 0.4 MG/SPRAY 0.4 mg spray Place 1 spray under the tongue every 5 minutes as needed for Chest pain  Qty: 1 Bottle, Refills: 0    Associated Diagnoses: Coronary artery disease involving native heart without angina pectoris, unspecified vessel or lesion type      triamcinolone (KENALOG) 0.1 % cream Apply topically 2 times daily.   Qty: 80 g, Refills: 5    Associated Diagnoses: Dermatitis           Current Facility-Administered Medications   Medication Dose Route Frequency Provider Last Rate Last Dose    carvedilol (COREG) tablet 3.125 mg  3.125 mg Oral BID  Jovita Elia Price DO   3.125 mg at 07/21/19 0854    sodium chloride flush 0.9 % injection 10 mL  10 mL Intravenous 2 times per day Johnnie Keller MD   10 mL at 07/21/19 0855    sodium chloride flush 0.9 % injection 10 mL  10 mL Intravenous PRN Johnnie Keller MD        magnesium hydroxide (MILK OF MAGNESIA) 400 MG/5ML suspension 30 mL  30 mL Oral Daily PRN Johnnie Keller MD        [facility]ron [facility]) injection 4 mg  4 mg Intravenous Q6H PRN Johnnie Keller MD        acetaminophen (TYLENOL) tablet 650 mg  650 mg Oral Q8H PRN Johnnie Keller MD        enoxaparin (LOVENOX) injection 40 mg  40 mg Subcutaneous Daily Johnnie Keller MD   40 mg at 07/21/19 0855   

## 2019-07-22 ENCOUNTER — CARE COORDINATION (OUTPATIENT)
Dept: CASE MANAGEMENT | Age: 69
End: 2019-07-22

## 2019-07-22 ENCOUNTER — TELEPHONE (OUTPATIENT)
Dept: PRIMARY CARE CLINIC | Age: 69
End: 2019-07-22

## 2019-07-22 NOTE — TELEPHONE ENCOUNTER
[facility] Transitions Initial Follow Up Call    Outreach made within 2 business days of discharge: Yes    Patient: Forrest Dela Cruz Patient : 1950   MRN: [id number]  Reason for Admission: There are no discharge diagnoses documented for the most recent discharge. Discharge Date: 19       Spoke with: Left message for patient to return call at convenience regarding hospital stay.        Scheduled appointment with PCP within 7-14 days    Follow Up  Future Appointments   Date Time Provider Ella Cloud   2019 11:30 AM [address] [facility],[address], [address] [facility]

## 2019-07-22 NOTE — CARE COORDINATION
[facility]reet[facility] Transitions Initial Follow Up Call    Call within 2 business days of discharge: Yes    Patient: Landen Henry Patient : 1950   MRN: [id number]  Reason for Admission: Syncope and Collapse  Discharge Date: 19 RARS: Readmission Risk Score: 24      Last Discharge [facility]       Complaint Diagnosis Description Type Department Provider    19 Loss of Consciousness Syncope and collapse . .. ED to Hosp-Admission (Discharged) (ADMITTED) MEENU Milton DO; Casandra Reason. .. Spoke with: N/A    Facility: [place] [address]    Non-face-to-face services provided:  Reviewed encounter information for continuity of care prior to follow up phone call - chart notes, consults, progress notes, test results, med list, appointments, AVS, other information. Care Transitions 24 Hour Call    Do you have all of your prescriptions and are they filled?:  Yes  Patient DME:  Straight cane  Patient Home Equipment:  Oxygen  Do you have support at home?:  Partner/Spouse/SO  Are you an active caregiver in your home?:  No  Care Transitions Interventions         Follow Up: Attempted to make contact with patient for an initial follow up call post discharge from the hospital without success. Left a message regarding intent of call and call back information. Will try again my next business day.         Future Appointments   Date Time Provider Ella Cloud   2019 11:30 AM [address] [facility],[address], [address] [facility]       Tony Simmons RN

## 2019-07-23 ENCOUNTER — CARE COORDINATION (OUTPATIENT)
Dept: CASE MANAGEMENT | Age: 69
End: 2019-07-23

## 2019-07-23 NOTE — TELEPHONE ENCOUNTER
[facility]reet[facility] 45 Transitions Initial Follow Up Call    Outreach made within 2 business days of discharge: Yes    Patient: Nereida Dasilva Patient : 1950   MRN: [id number]  Reason for Admission: There are no discharge diagnoses documented for the most recent discharge. Discharge Date: 19       Spoke with: Left message for patient to return call at convenience regarding hospital discharge.      Scheduled appointment with PCP within 7-14 days    Follow Up  Future Appointments   Date Time Provider Ella Cloud   2019 11:30 AM Paradise Rodriguez, [address] [place] [address]

## 2019-07-24 ENCOUNTER — CARE COORDINATION (OUTPATIENT)
Dept: CASE MANAGEMENT | Age: 69
End: 2019-07-24

## 2019-08-02 DIAGNOSIS — I25.10 CORONARY ARTERY DISEASE INVOLVING NATIVE HEART WITHOUT ANGINA PECTORIS, UNSPECIFIED VESSEL OR LESION TYPE: ICD-10-CM

## 2019-08-02 DIAGNOSIS — E78.5 HYPERLIPIDEMIA WITH TARGET LDL LESS THAN 100: ICD-10-CM

## 2019-08-02 DIAGNOSIS — I65.29 OBSTRUCTION OF CAROTID ARTERY WITHOUT CEREBRAL INFARCTION, UNSPECIFIED LATERALITY: ICD-10-CM

## 2019-08-02 DIAGNOSIS — R06.02 SHORTNESS OF BREATH: ICD-10-CM

## 2019-08-02 DIAGNOSIS — E11.9 TYPE 2 DIABETES MELLITUS WITHOUT COMPLICATION, WITHOUT LONG-TERM CURRENT USE OF INSULIN (HCC): ICD-10-CM

## 2019-08-02 DIAGNOSIS — I10 ESSENTIAL HYPERTENSION: ICD-10-CM

## 2019-08-02 DIAGNOSIS — R60.9 FLUID RETENTION: ICD-10-CM

## 2019-08-02 RX ORDER — FUROSEMIDE 40 MG/1
TABLET ORAL
Qty: 60 TABLET | Refills: 0 | OUTPATIENT
Start: 2019-08-02

## 2019-08-19 ENCOUNTER — PATIENT MESSAGE (OUTPATIENT)
Dept: PRIMARY CARE CLINIC | Age: 69
End: 2019-08-19

## 2019-08-21 ENCOUNTER — PATIENT MESSAGE (OUTPATIENT)
Dept: PRIMARY CARE CLINIC | Age: 69
End: 2019-08-21

## 2019-08-21 RX ORDER — TRAZODONE HYDROCHLORIDE 50 MG/1
50 TABLET ORAL NIGHTLY
Qty: 90 TABLET | Refills: 3 | Status: SHIPPED | OUTPATIENT
Start: 2019-08-21 | End: 2019-08-21 | Stop reason: SDUPTHER

## 2019-08-21 RX ORDER — TRAZODONE HYDROCHLORIDE 50 MG/1
50 TABLET ORAL NIGHTLY
Qty: 90 TABLET | Refills: 3 | Status: SHIPPED | OUTPATIENT
Start: 2019-08-21 | End: 2020-06-05

## 2019-08-30 ENCOUNTER — CARE COORDINATION (OUTPATIENT)
Dept: CARE COORDINATION | Age: 69
End: 2019-08-30

## 2019-09-04 ENCOUNTER — CARE COORDINATION (OUTPATIENT)
Dept: CARE COORDINATION | Age: 69
End: 2019-09-04

## 2019-09-07 DIAGNOSIS — R05.9 COUGH: ICD-10-CM

## 2019-09-09 ENCOUNTER — HOSPITAL ENCOUNTER (OUTPATIENT)
Dept: LAB | Age: 69
Discharge: HOME | End: 2019-09-09
Payer: MEDICARE

## 2019-09-09 LAB
ANION GAP SERPL CALCULATED.3IONS-SCNC: 21 MMOL/L (ref 7–19)
BUN BLDV-MCNC: 81 MG/DL (ref 8–23)
CALCIUM SERPL-MCNC: 9.1 MG/DL (ref 8.8–10.2)
CHLORIDE BLD-SCNC: 79 MMOL/L (ref 98–111)
CO2: 24 MMOL/L (ref 22–29)
CREAT SERPL-MCNC: 1.8 MG/DL (ref 0.5–1.2)
GFR NON-AFRICAN AMERICAN: 38
GLUCOSE BLD-MCNC: 248 MG/DL (ref 74–109)
POTASSIUM SERPL-SCNC: 3.5 MMOL/L (ref 3.5–5)
SODIUM BLD-SCNC: 124 MMOL/L (ref 136–145)

## 2019-09-09 PROCEDURE — 36415 COLL VENOUS BLD VENIPUNCTURE: CPT

## 2019-09-09 PROCEDURE — 80048 BASIC METABOLIC PNL TOTAL CA: CPT

## 2019-09-10 RX ORDER — BENZONATATE 100 MG/1
100 CAPSULE ORAL 3 TIMES DAILY PRN
Qty: 30 CAPSULE | Refills: 0 | Status: SHIPPED | OUTPATIENT
Start: 2019-09-10 | End: 2019-09-24 | Stop reason: SDUPTHER

## 2019-09-13 ENCOUNTER — OFFICE VISIT (OUTPATIENT)
Dept: PRIMARY CARE CLINIC | Age: 69
End: 2019-09-13
Payer: COMMERCIAL

## 2019-09-13 VITALS
WEIGHT: 220 LBS | SYSTOLIC BLOOD PRESSURE: 88 MMHG | BODY MASS INDEX: 30.8 KG/M2 | HEART RATE: 74 BPM | HEIGHT: 71 IN | OXYGEN SATURATION: 96 % | TEMPERATURE: 98 F | DIASTOLIC BLOOD PRESSURE: 58 MMHG

## 2019-09-13 DIAGNOSIS — F51.01 PRIMARY INSOMNIA: Primary | ICD-10-CM

## 2019-09-13 DIAGNOSIS — I25.10 CORONARY ARTERY DISEASE INVOLVING NATIVE CORONARY ARTERY OF NATIVE HEART WITHOUT ANGINA PECTORIS: ICD-10-CM

## 2019-09-13 DIAGNOSIS — E11.9 TYPE 2 DIABETES MELLITUS WITHOUT COMPLICATION, WITHOUT LONG-TERM CURRENT USE OF INSULIN (HCC): ICD-10-CM

## 2019-09-13 DIAGNOSIS — N18.30 CHRONIC KIDNEY DISEASE, STAGE III (MODERATE) (HCC): ICD-10-CM

## 2019-09-13 DIAGNOSIS — I50.22 CHRONIC SYSTOLIC CONGESTIVE HEART FAILURE (HCC): ICD-10-CM

## 2019-09-13 DIAGNOSIS — I10 ESSENTIAL HYPERTENSION: ICD-10-CM

## 2019-09-13 PROCEDURE — G8417 CALC BMI ABV UP PARAM F/U: HCPCS | Performed by: PEDIATRICS

## 2019-09-13 PROCEDURE — 3046F HEMOGLOBIN A1C LEVEL >9.0%: CPT | Performed by: PEDIATRICS

## 2019-09-13 PROCEDURE — 3017F COLORECTAL CA SCREEN DOC REV: CPT | Performed by: PEDIATRICS

## 2019-09-13 PROCEDURE — 1036F TOBACCO NON-USER: CPT | Performed by: PEDIATRICS

## 2019-09-13 PROCEDURE — G8598 ASA/ANTIPLAT THER USED: HCPCS | Performed by: PEDIATRICS

## 2019-09-13 PROCEDURE — G8427 DOCREV CUR MEDS BY ELIG CLIN: HCPCS | Performed by: PEDIATRICS

## 2019-09-13 PROCEDURE — 1123F ACP DISCUSS/DSCN MKR DOCD: CPT | Performed by: PEDIATRICS

## 2019-09-13 PROCEDURE — 99214 OFFICE O/P EST MOD 30 MIN: CPT | Performed by: PEDIATRICS

## 2019-09-13 PROCEDURE — 4040F PNEUMOC VAC/ADMIN/RCVD: CPT | Performed by: PEDIATRICS

## 2019-09-13 PROCEDURE — 2022F DILAT RTA XM EVC RTNOPTHY: CPT | Performed by: PEDIATRICS

## 2019-09-13 RX ORDER — METOPROLOL SUCCINATE 25 MG/1
37.5 TABLET, EXTENDED RELEASE ORAL 2 TIMES DAILY
COMMUNITY
Start: 2019-09-05 | End: 2020-09-05

## 2019-09-13 RX ORDER — TEMAZEPAM 15 MG/1
15 CAPSULE ORAL NIGHTLY PRN
Qty: 30 CAPSULE | Refills: 0 | Status: SHIPPED | OUTPATIENT
Start: 2019-09-13 | End: 2019-10-13

## 2019-09-13 ASSESSMENT — ENCOUNTER SYMPTOMS
DIARRHEA: 0
NAUSEA: 0
WHEEZING: 0
VOMITING: 0
SHORTNESS OF BREATH: 1
COUGH: 0
SORE THROAT: 0
CHEST TIGHTNESS: 0
ABDOMINAL PAIN: 0
BACK PAIN: 0

## 2019-09-13 NOTE — PROGRESS NOTES
Eosinophil, Ur 07/20/2019 Insuf     POC Glucose 07/20/2019 261*    Performed on 07/20/2019 AccuChek     Sodium 07/21/2019 126*    Potassium 07/21/2019 3.6     Chloride 07/21/2019 83*    CO2 07/21/2019 26     Anion Gap 07/21/2019 17     Glucose 07/21/2019 155*    BUN 07/21/2019 74*    CREATININE 07/21/2019 1.7*    GFR Non- 07/21/2019 40*    Calcium 07/21/2019 9.3     POC Glucose 07/21/2019 171*    Performed on 07/21/2019 AccuChek     POC Glucose 07/21/2019 259*    Performed on 07/21/2019 AccuChek    Office Visit on 04/11/2019   Component Date Value    Sodium 04/11/2019 134*    Potassium 04/11/2019 3.3*    Chloride 04/11/2019 86*    CO2 04/11/2019 28     Anion Gap 04/11/2019 20*    Glucose 04/11/2019 124*    BUN 04/11/2019 38*    CREATININE 04/11/2019 1.3*    GFR Non- 04/11/2019 55*    Calcium 04/11/2019 10.0      Copies of these are in the chart. Current Outpatient Medications   Medication Sig Dispense Refill    metoprolol succinate (TOPROL XL) 25 MG extended release tablet Take 37.5 mg by mouth 2 times daily      temazepam (RESTORIL) 15 MG capsule Take 1 capsule by mouth nightly as needed for Sleep for up to 30 days.  30 capsule 0    Plecanatide 3 MG TABS Take 3 mg by mouth daily 30 tablet 5    benzonatate (TESSALON) 100 MG capsule Take 1 capsule by mouth 3 times daily as needed for Cough 30 capsule 0    traZODone (DESYREL) 50 MG tablet Take 1 tablet by mouth nightly 90 tablet 3    potassium chloride (KLOR-CON M) 20 MEQ extended release tablet TAKE 1 TABLET BY MOUTH TWICE DAILY 60 tablet 0    escitalopram (LEXAPRO) 10 MG tablet Take 1 tablet by mouth daily 90 tablet 3    levothyroxine (SYNTHROID) 25 MCG tablet Take 1 tablet by mouth Daily 90 tablet 1    cyanocobalamin 1000 MCG/ML injection Inject 1 mL into the muscle every 30 days 10 mL 0    hydrOXYzine (ATARAX) 25 MG tablet Take 1 tablet by mouth nightly 60 tablet 5    pantoprazole (PROTONIX) 40 MG tablet Take 1 tablet by mouth every morning (before breakfast) 30 tablet 3    clopidogrel (PLAVIX) 75 MG tablet Take 1 tablet by mouth daily 90 tablet 3    atorvastatin (LIPITOR) 40 MG tablet Take 1 tablet by mouth daily (Patient taking differently: Take 40 mg by mouth nightly ) 90 tablet 3    nitroGLYCERIN (NITROLINGUAL) 0.4 MG/SPRAY 0.4 mg spray Place 1 spray under the tongue every 5 minutes as needed for Chest pain 1 Bottle 0    Omega-3 Fatty Acids (FISH OIL) 1000 MG CAPS Take 3,000 mg by mouth daily      diphenhydrAMINE (BENADRYL) 25 MG tablet Take 50 mg by mouth nightly as needed for Itching      aspirin 325 MG tablet Take 325 mg by mouth daily.  triamcinolone (KENALOG) 0.1 % cream Apply topically 2 times daily. 80 g 5     No current facility-administered medications for this visit.         Allergies: Codeine     Past Medical History:   Diagnosis Date    Arthritis     Basilar artery stenosis     CAD (coronary artery disease) 1997 2009    MI    Cancer ([facility] [address]     SKIN 6YRS AGO    [facility]     Heel spur     Hyperlipidemia     Hypertension     Palliative care patient 10/17/2018    Sleep apnea     no c-pap    Type II or unspecified type diabetes mellitus without mention of complication, not stated as uncontrolled     Vertebral artery insufficiency        Family History   Problem Relation Age of Onset    Heart Disease Father     High Blood Pressure Father     Other Father         Esophageal Varices       Past Surgical History:   Procedure Laterality Date    ANGIOPLASTY Bilateral     basilar artery angioplasty    CARDIAC [place]/2018    [place]    CARPAL TUNNEL RELEASE      bilateral    CATARACT REMOVAL Bilateral     CHOLECYSTECTOMY      CORONARY ARTERY BYPASS GRAFT  1997    3v    HEEL SPUR SURGERY      left    ID EGD TRANSORAL BIOPSY SINGLE/MULTIPLE N/A 10/16/2018    Dr Azeem Kerns-w/control of bleeding-Active bleeding in the stomach from

## 2019-09-19 DIAGNOSIS — E87.6 HYPOKALEMIA: ICD-10-CM

## 2019-09-19 RX ORDER — POTASSIUM CHLORIDE 20 MEQ/1
TABLET, EXTENDED RELEASE ORAL
Qty: 180 TABLET | Refills: 3 | Status: SHIPPED | OUTPATIENT
Start: 2019-09-19 | End: 2020-02-20 | Stop reason: SDUPTHER

## 2019-09-19 RX ORDER — CYANOCOBALAMIN 1000 UG/ML
1000 INJECTION INTRAMUSCULAR; SUBCUTANEOUS
Qty: 10 ML | Refills: 0 | Status: SHIPPED | OUTPATIENT
Start: 2019-09-19 | End: 2020-03-23

## 2019-09-19 NOTE — TELEPHONE ENCOUNTER
Received fax from pharmacy requesting refill on pts medication(s). Pt was last seen in office on 9/13/2019  and has a follow up scheduled for 10/14/2019. Will send request to  Dr. Piyush Mccann  for patient.      Requested Prescriptions     Pending Prescriptions Disp Refills    potassium chloride (KLOR-CON M) 20 MEQ extended release tablet 180 tablet 3     Sig: TAKE 1 TABLET BY MOUTH TWICE DAILY    cyanocobalamin 1000 MCG/ML injection 10 mL 0     Sig: Inject 1 mL into the muscle every 30 days

## 2019-09-23 DIAGNOSIS — I50.22 CHRONIC SYSTOLIC CONGESTIVE HEART FAILURE (HCC): ICD-10-CM

## 2019-09-23 LAB
ALBUMIN SERPL-MCNC: 4 G/DL (ref 3.5–5.2)
ALP BLD-CCNC: 121 U/L (ref 40–130)
ALT SERPL-CCNC: 15 U/L (ref 5–41)
ANION GAP SERPL CALCULATED.3IONS-SCNC: 18 MMOL/L (ref 7–19)
AST SERPL-CCNC: 17 U/L (ref 5–40)
BILIRUB SERPL-MCNC: 1 MG/DL (ref 0.2–1.2)
BUN BLDV-MCNC: 42 MG/DL (ref 8–23)
CALCIUM SERPL-MCNC: 9.2 MG/DL (ref 8.8–10.2)
CHLORIDE BLD-SCNC: 84 MMOL/L (ref 98–111)
CO2: 27 MMOL/L (ref 22–29)
CREAT SERPL-MCNC: 1.3 MG/DL (ref 0.5–1.2)
GFR NON-AFRICAN AMERICAN: 55
GLUCOSE BLD-MCNC: 237 MG/DL (ref 74–109)
POTASSIUM SERPL-SCNC: 3 MMOL/L (ref 3.5–5)
SODIUM BLD-SCNC: 129 MMOL/L (ref 136–145)
TOTAL PROTEIN: 7.7 G/DL (ref 6.6–8.7)

## 2019-09-24 DIAGNOSIS — R05.9 COUGH: ICD-10-CM

## 2019-09-25 ENCOUNTER — TELEPHONE (OUTPATIENT)
Dept: PRIMARY CARE CLINIC | Age: 69
End: 2019-09-25

## 2019-09-25 RX ORDER — POTASSIUM CHLORIDE 1500 MG/1
20 TABLET, FILM COATED, EXTENDED RELEASE ORAL 2 TIMES DAILY
Qty: 60 TABLET | Refills: 5 | Status: SHIPPED | OUTPATIENT
Start: 2019-09-25 | End: 2019-10-31 | Stop reason: SDUPTHER

## 2019-09-25 RX ORDER — BENZONATATE 100 MG/1
100 CAPSULE ORAL 3 TIMES DAILY PRN
Qty: 30 CAPSULE | Refills: 0 | Status: SHIPPED | OUTPATIENT
Start: 2019-09-25 | End: 2019-10-11 | Stop reason: SDUPTHER

## 2019-09-25 NOTE — TELEPHONE ENCOUNTER
----- Message from [id number] [facility],[address], DO sent at 9/23/2019  7:17 PM CDT -----  Sodium potassium and chloride are all low. This most likely reflects your diuretics. Blood sugar is elevated to 237 at the time of the blood draw. Recommend Jardiance 10 mg daily for dual purpose. This will help from a congestive heart failure standpoint and will also help from a diabetes standpoint. Also if you are not taking your potassium normally, we will need to take this as prescribed. If you have been taking it, and we are going to need to increase it by 20 mEq twice daily.     We did have samples of Jardiance if you would like to try them first

## 2019-10-11 DIAGNOSIS — R05.9 COUGH: ICD-10-CM

## 2019-10-15 RX ORDER — BENZONATATE 100 MG/1
100 CAPSULE ORAL 3 TIMES DAILY PRN
Qty: 30 CAPSULE | Refills: 0 | Status: SHIPPED | OUTPATIENT
Start: 2019-10-15 | End: 2019-10-31

## 2019-10-23 ENCOUNTER — APPOINTMENT (OUTPATIENT)
Dept: CT IMAGING | Age: 69
End: 2019-10-23
Payer: MEDICARE

## 2019-10-23 ENCOUNTER — HOSPITAL ENCOUNTER (OUTPATIENT)
Age: 69
Setting detail: OBSERVATION
Discharge: HOME | End: 2019-10-23
Attending: EMERGENCY MEDICINE
Payer: MEDICARE

## 2019-10-23 VITALS
HEART RATE: 81 BPM | RESPIRATION RATE: 19 BRPM | DIASTOLIC BLOOD PRESSURE: 48 MMHG | TEMPERATURE: 98.2 F | SYSTOLIC BLOOD PRESSURE: 66 MMHG | OXYGEN SATURATION: 100 %

## 2019-10-23 DIAGNOSIS — G45.9 TIA (TRANSIENT ISCHEMIC ATTACK): Primary | ICD-10-CM

## 2019-10-23 DIAGNOSIS — I42.9 CARDIOMYOPATHY, UNSPECIFIED TYPE (HCC): ICD-10-CM

## 2019-10-23 DIAGNOSIS — I65.21 STENOSIS OF RIGHT CAROTID ARTERY: ICD-10-CM

## 2019-10-23 PROBLEM — R53.1 WEAKNESS: Status: ACTIVE | Noted: 2019-10-23

## 2019-10-23 PROBLEM — H53.9 VISUAL DISTURBANCE: Status: ACTIVE | Noted: 2019-10-23

## 2019-10-23 LAB
ALBUMIN SERPL-MCNC: 3.9 G/DL (ref 3.5–5.2)
ALP BLD-CCNC: 92 U/L (ref 40–130)
ALT SERPL-CCNC: 8 U/L (ref 5–41)
ANION GAP SERPL CALCULATED.3IONS-SCNC: 18 MMOL/L (ref 7–19)
APTT: 34 SEC (ref 26–36.2)
AST SERPL-CCNC: 14 U/L (ref 5–40)
BASOPHILS ABSOLUTE: 0.1 K/UL (ref 0–0.2)
BASOPHILS RELATIVE PERCENT: 1.4 % (ref 0–1)
BILIRUB SERPL-MCNC: 0.9 MG/DL (ref 0.2–1.2)
BUN BLDV-MCNC: 49 MG/DL (ref 8–23)
CALCIUM SERPL-MCNC: 9.1 MG/DL (ref 8.8–10.2)
CHLORIDE BLD-SCNC: 88 MMOL/L (ref 98–111)
CO2: 26 MMOL/L (ref 22–29)
CREAT SERPL-MCNC: 1.7 MG/DL (ref 0.5–1.2)
EOSINOPHILS ABSOLUTE: 1 K/UL (ref 0–0.6)
EOSINOPHILS RELATIVE PERCENT: 14.2 % (ref 0–5)
GFR NON-AFRICAN AMERICAN: 40
GLUCOSE BLD-MCNC: 145 MG/DL (ref 70–99)
GLUCOSE BLD-MCNC: 149 MG/DL (ref 74–109)
HCT VFR BLD CALC: 34.5 % (ref 42–52)
HEMOGLOBIN: 10.9 G/DL (ref 14–18)
IMMATURE GRANULOCYTES #: 0 K/UL
INR BLD: 1.34 (ref 0.88–1.18)
LACTIC ACID: 2.9 MMOL/L (ref 0.5–1.9)
LYMPHOCYTES ABSOLUTE: 0.8 K/UL (ref 1.1–4.5)
LYMPHOCYTES RELATIVE PERCENT: 11.2 % (ref 20–40)
MCH RBC QN AUTO: 29.8 PG (ref 27–31)
MCHC RBC AUTO-ENTMCNC: 31.6 G/DL (ref 33–37)
MCV RBC AUTO: 94.3 FL (ref 80–94)
MONOCYTES ABSOLUTE: 1 K/UL (ref 0–0.9)
MONOCYTES RELATIVE PERCENT: 13.7 % (ref 0–10)
NEUTROPHILS ABSOLUTE: 4.3 K/UL (ref 1.5–7.5)
NEUTROPHILS RELATIVE PERCENT: 59.2 % (ref 50–65)
PDW BLD-RTO: 18.2 % (ref 11.5–14.5)
PERFORMED ON: ABNORMAL
PLATELET # BLD: 156 K/UL (ref 130–400)
PMV BLD AUTO: 10.1 FL (ref 9.4–12.4)
POTASSIUM SERPL-SCNC: 3.1 MMOL/L (ref 3.5–5)
PRO-BNP: 2907 PG/ML (ref 0–900)
PROTHROMBIN TIME: 15.9 SEC (ref 12–14.6)
RBC # BLD: 3.66 M/UL (ref 4.7–6.1)
SODIUM BLD-SCNC: 132 MMOL/L (ref 136–145)
TOTAL PROTEIN: 7.6 G/DL (ref 6.6–8.7)
WBC # BLD: 7.2 K/UL (ref 4.8–10.8)

## 2019-10-23 PROCEDURE — 70496 CT ANGIOGRAPHY HEAD: CPT

## 2019-10-23 PROCEDURE — 99285 EMERGENCY DEPT VISIT HI MDM: CPT | Performed by: PSYCHIATRY & NEUROLOGY

## 2019-10-23 PROCEDURE — 85610 PROTHROMBIN TIME: CPT

## 2019-10-23 PROCEDURE — 36415 COLL VENOUS BLD VENIPUNCTURE: CPT

## 2019-10-23 PROCEDURE — 82948 REAGENT STRIP/BLOOD GLUCOSE: CPT

## 2019-10-23 PROCEDURE — 83880 ASSAY OF NATRIURETIC PEPTIDE: CPT

## 2019-10-23 PROCEDURE — 85730 THROMBOPLASTIN TIME PARTIAL: CPT

## 2019-10-23 PROCEDURE — 99285 EMERGENCY DEPT VISIT HI MDM: CPT

## 2019-10-23 PROCEDURE — 80053 COMPREHEN METABOLIC PANEL: CPT

## 2019-10-23 PROCEDURE — 70450 CT HEAD/BRAIN W/O DYE: CPT

## 2019-10-23 PROCEDURE — 85025 COMPLETE CBC W/AUTO DIFF WBC: CPT

## 2019-10-23 PROCEDURE — 6360000004 HC RX CONTRAST MEDICATION: Performed by: EMERGENCY MEDICINE

## 2019-10-23 PROCEDURE — 83605 ASSAY OF LACTIC ACID: CPT

## 2019-10-23 PROCEDURE — 93005 ELECTROCARDIOGRAM TRACING: CPT | Performed by: EMERGENCY MEDICINE

## 2019-10-23 PROCEDURE — 70498 CT ANGIOGRAPHY NECK: CPT

## 2019-10-23 PROCEDURE — G0378 HOSPITAL OBSERVATION PER HR: HCPCS

## 2019-10-23 RX ADMIN — IOPAMIDOL 90 ML: 755 INJECTION, SOLUTION INTRAVENOUS at 13:57

## 2019-10-24 LAB
EKG P AXIS: NORMAL DEGREES
EKG P-R INTERVAL: NORMAL MS
EKG Q-T INTERVAL: 422 MS
EKG QRS DURATION: 116 MS
EKG QTC CALCULATION (BAZETT): 458 MS
EKG T AXIS: 112 DEGREES

## 2019-10-31 ENCOUNTER — OFFICE VISIT (OUTPATIENT)
Dept: PRIMARY CARE CLINIC | Age: 69
End: 2019-10-31
Payer: MEDICARE

## 2019-10-31 VITALS
DIASTOLIC BLOOD PRESSURE: 72 MMHG | HEART RATE: 77 BPM | TEMPERATURE: 97.1 F | WEIGHT: 229.12 LBS | SYSTOLIC BLOOD PRESSURE: 98 MMHG | OXYGEN SATURATION: 98 % | HEIGHT: 71 IN | BODY MASS INDEX: 32.08 KG/M2

## 2019-10-31 DIAGNOSIS — I65.23 BILATERAL CAROTID ARTERY STENOSIS: ICD-10-CM

## 2019-10-31 DIAGNOSIS — I50.22 CHRONIC SYSTOLIC CONGESTIVE HEART FAILURE (HCC): Primary | ICD-10-CM

## 2019-10-31 DIAGNOSIS — I10 HYPERTENSION, UNSPECIFIED TYPE: ICD-10-CM

## 2019-10-31 DIAGNOSIS — I25.10 CORONARY ARTERY DISEASE INVOLVING NATIVE CORONARY ARTERY OF NATIVE HEART WITHOUT ANGINA PECTORIS: ICD-10-CM

## 2019-10-31 DIAGNOSIS — G45.9 TIA (TRANSIENT ISCHEMIC ATTACK): ICD-10-CM

## 2019-10-31 DIAGNOSIS — E78.5 HYPERLIPIDEMIA WITH TARGET LDL LESS THAN 100: ICD-10-CM

## 2019-10-31 DIAGNOSIS — E11.9 TYPE 2 DIABETES MELLITUS WITHOUT COMPLICATION, WITHOUT LONG-TERM CURRENT USE OF INSULIN (HCC): ICD-10-CM

## 2019-10-31 DIAGNOSIS — Z00.00 ROUTINE GENERAL MEDICAL EXAMINATION AT A HEALTH CARE FACILITY: ICD-10-CM

## 2019-10-31 DIAGNOSIS — R29.6 MULTIPLE FALLS: ICD-10-CM

## 2019-10-31 PROCEDURE — 3046F HEMOGLOBIN A1C LEVEL >9.0%: CPT | Performed by: PEDIATRICS

## 2019-10-31 PROCEDURE — 3017F COLORECTAL CA SCREEN DOC REV: CPT | Performed by: PEDIATRICS

## 2019-10-31 PROCEDURE — G8417 CALC BMI ABV UP PARAM F/U: HCPCS | Performed by: PEDIATRICS

## 2019-10-31 PROCEDURE — 4040F PNEUMOC VAC/ADMIN/RCVD: CPT | Performed by: PEDIATRICS

## 2019-10-31 PROCEDURE — 1123F ACP DISCUSS/DSCN MKR DOCD: CPT | Performed by: PEDIATRICS

## 2019-10-31 PROCEDURE — G8484 FLU IMMUNIZE NO ADMIN: HCPCS | Performed by: PEDIATRICS

## 2019-10-31 PROCEDURE — G8598 ASA/ANTIPLAT THER USED: HCPCS | Performed by: PEDIATRICS

## 2019-10-31 PROCEDURE — G0439 PPPS, SUBSEQ VISIT: HCPCS | Performed by: PEDIATRICS

## 2019-10-31 PROCEDURE — 1036F TOBACCO NON-USER: CPT | Performed by: PEDIATRICS

## 2019-10-31 PROCEDURE — G8427 DOCREV CUR MEDS BY ELIG CLIN: HCPCS | Performed by: PEDIATRICS

## 2019-10-31 PROCEDURE — 99214 OFFICE O/P EST MOD 30 MIN: CPT | Performed by: PEDIATRICS

## 2019-10-31 PROCEDURE — 2022F DILAT RTA XM EVC RTNOPTHY: CPT | Performed by: PEDIATRICS

## 2019-10-31 ASSESSMENT — ENCOUNTER SYMPTOMS
SORE THROAT: 0
VOMITING: 0
SHORTNESS OF BREATH: 1
ABDOMINAL PAIN: 0
NAUSEA: 0
WHEEZING: 0
CHEST TIGHTNESS: 0
BACK PAIN: 0
COUGH: 0
DIARRHEA: 0

## 2019-10-31 ASSESSMENT — PATIENT HEALTH QUESTIONNAIRE - PHQ9
SUM OF ALL RESPONSES TO PHQ QUESTIONS 1-9: 0
SUM OF ALL RESPONSES TO PHQ QUESTIONS 1-9: 0

## 2019-11-07 ENCOUNTER — TELEPHONE (OUTPATIENT)
Dept: PRIMARY CARE CLINIC | Age: 69
End: 2019-11-07

## 2019-11-27 ASSESSMENT — ENCOUNTER SYMPTOMS
NAUSEA: 0
ABDOMINAL PAIN: 0
VOMITING: 0
CHEST TIGHTNESS: 0
SHORTNESS OF BREATH: 0
DIARRHEA: 0
CONSTIPATION: 0

## 2019-12-19 ENCOUNTER — PATIENT MESSAGE (OUTPATIENT)
Dept: PRIMARY CARE CLINIC | Age: 69
End: 2019-12-19

## 2019-12-19 DIAGNOSIS — I25.5 ISCHEMIC CARDIOMYOPATHY: Primary | ICD-10-CM

## 2019-12-20 RX ORDER — LEVOTHYROXINE SODIUM 0.03 MG/1
25 TABLET ORAL DAILY
Qty: 90 TABLET | Refills: 3 | Status: SHIPPED | OUTPATIENT
Start: 2019-12-20 | End: 2020-08-27 | Stop reason: SDUPTHER

## 2019-12-27 RX ORDER — BENZONATATE 200 MG/1
200 CAPSULE ORAL 3 TIMES DAILY PRN
Qty: 30 CAPSULE | Refills: 0 | Status: SHIPPED | OUTPATIENT
Start: 2019-12-27 | End: 2020-01-03

## 2020-01-21 RX ORDER — LEVOTHYROXINE SODIUM 0.03 MG/1
25 TABLET ORAL DAILY
Qty: 90 TABLET | Refills: 3 | OUTPATIENT
Start: 2020-01-21

## 2020-01-22 RX ORDER — LEVOTHYROXINE SODIUM 0.03 MG/1
25 TABLET ORAL DAILY
Qty: 90 TABLET | Refills: 3 | OUTPATIENT
Start: 2020-01-22

## 2020-01-23 ENCOUNTER — TELEPHONE (OUTPATIENT)
Dept: PRIMARY CARE CLINIC | Age: 70
End: 2020-01-23

## 2020-01-23 NOTE — TELEPHONE ENCOUNTER
----- Message from Fer Rodriguez DO sent at 1/23/2020  6:21 AM CST -----  Echo is stable. Continue present therapy.

## 2020-02-20 ENCOUNTER — OFFICE VISIT (OUTPATIENT)
Dept: PRIMARY CARE CLINIC | Age: 70
End: 2020-02-20
Payer: MEDICARE

## 2020-02-20 VITALS
HEART RATE: 75 BPM | OXYGEN SATURATION: 98 % | SYSTOLIC BLOOD PRESSURE: 98 MMHG | WEIGHT: 210 LBS | HEIGHT: 71 IN | DIASTOLIC BLOOD PRESSURE: 66 MMHG | BODY MASS INDEX: 29.4 KG/M2 | TEMPERATURE: 99 F

## 2020-02-20 DIAGNOSIS — E78.5 HYPERLIPIDEMIA WITH TARGET LDL LESS THAN 100: ICD-10-CM

## 2020-02-20 DIAGNOSIS — E03.9 ACQUIRED HYPOTHYROIDISM: ICD-10-CM

## 2020-02-20 DIAGNOSIS — I10 ESSENTIAL HYPERTENSION: ICD-10-CM

## 2020-02-20 DIAGNOSIS — E11.9 TYPE 2 DIABETES MELLITUS WITHOUT COMPLICATION, WITHOUT LONG-TERM CURRENT USE OF INSULIN (HCC): ICD-10-CM

## 2020-02-20 LAB
ALBUMIN SERPL-MCNC: 4.6 G/DL (ref 3.5–5.2)
ALP BLD-CCNC: 84 U/L (ref 40–130)
ALT SERPL-CCNC: 6 U/L (ref 5–41)
ANION GAP SERPL CALCULATED.3IONS-SCNC: 19 MMOL/L (ref 7–19)
AST SERPL-CCNC: 12 U/L (ref 5–40)
BASOPHILS ABSOLUTE: 0.1 K/UL (ref 0–0.2)
BASOPHILS RELATIVE PERCENT: 1.4 % (ref 0–1)
BILIRUB SERPL-MCNC: 1 MG/DL (ref 0.2–1.2)
BUN BLDV-MCNC: 44 MG/DL (ref 8–23)
CALCIUM SERPL-MCNC: 9.8 MG/DL (ref 8.8–10.2)
CHLORIDE BLD-SCNC: 87 MMOL/L (ref 98–111)
CHOLESTEROL, TOTAL: 175 MG/DL (ref 160–199)
CO2: 26 MMOL/L (ref 22–29)
CREAT SERPL-MCNC: 1.5 MG/DL (ref 0.5–1.2)
EOSINOPHILS ABSOLUTE: 1 K/UL (ref 0–0.6)
EOSINOPHILS RELATIVE PERCENT: 12.2 % (ref 0–5)
GFR NON-AFRICAN AMERICAN: 46
GLUCOSE BLD-MCNC: 122 MG/DL (ref 74–109)
HBA1C MFR BLD: 6.6 % (ref 4–6)
HCT VFR BLD CALC: 41.4 % (ref 42–52)
HDLC SERPL-MCNC: 32 MG/DL (ref 55–121)
HEMOGLOBIN: 13.2 G/DL (ref 14–18)
IMMATURE GRANULOCYTES #: 0 K/UL
LDL CHOLESTEROL CALCULATED: 120 MG/DL
LYMPHOCYTES ABSOLUTE: 1.1 K/UL (ref 1.1–4.5)
LYMPHOCYTES RELATIVE PERCENT: 13.5 % (ref 20–40)
MCH RBC QN AUTO: 30.6 PG (ref 27–31)
MCHC RBC AUTO-ENTMCNC: 31.9 G/DL (ref 33–37)
MCV RBC AUTO: 96.1 FL (ref 80–94)
MONOCYTES ABSOLUTE: 0.8 K/UL (ref 0–0.9)
MONOCYTES RELATIVE PERCENT: 9.7 % (ref 0–10)
NEUTROPHILS ABSOLUTE: 5.3 K/UL (ref 1.5–7.5)
NEUTROPHILS RELATIVE PERCENT: 62.8 % (ref 50–65)
PDW BLD-RTO: 16.3 % (ref 11.5–14.5)
PLATELET # BLD: 153 K/UL (ref 130–400)
PMV BLD AUTO: 10.8 FL (ref 9.4–12.4)
POTASSIUM SERPL-SCNC: 3.2 MMOL/L (ref 3.5–5)
RBC # BLD: 4.31 M/UL (ref 4.7–6.1)
SODIUM BLD-SCNC: 132 MMOL/L (ref 136–145)
TOTAL PROTEIN: 8.2 G/DL (ref 6.6–8.7)
TRIGL SERPL-MCNC: 117 MG/DL (ref 0–149)
TSH SERPL DL<=0.05 MIU/L-ACNC: 5.5 UIU/ML (ref 0.27–4.2)
WBC # BLD: 8.5 K/UL (ref 4.8–10.8)

## 2020-02-20 PROCEDURE — G8484 FLU IMMUNIZE NO ADMIN: HCPCS | Performed by: PEDIATRICS

## 2020-02-20 PROCEDURE — 3044F HG A1C LEVEL LT 7.0%: CPT | Performed by: PEDIATRICS

## 2020-02-20 PROCEDURE — 1123F ACP DISCUSS/DSCN MKR DOCD: CPT | Performed by: PEDIATRICS

## 2020-02-20 PROCEDURE — 99214 OFFICE O/P EST MOD 30 MIN: CPT | Performed by: PEDIATRICS

## 2020-02-20 PROCEDURE — 2022F DILAT RTA XM EVC RTNOPTHY: CPT | Performed by: PEDIATRICS

## 2020-02-20 PROCEDURE — 4040F PNEUMOC VAC/ADMIN/RCVD: CPT | Performed by: PEDIATRICS

## 2020-02-20 PROCEDURE — 3017F COLORECTAL CA SCREEN DOC REV: CPT | Performed by: PEDIATRICS

## 2020-02-20 PROCEDURE — G8417 CALC BMI ABV UP PARAM F/U: HCPCS | Performed by: PEDIATRICS

## 2020-02-20 PROCEDURE — G8427 DOCREV CUR MEDS BY ELIG CLIN: HCPCS | Performed by: PEDIATRICS

## 2020-02-20 PROCEDURE — G0296 VISIT TO DETERM LDCT ELIG: HCPCS | Performed by: PEDIATRICS

## 2020-02-20 PROCEDURE — 1036F TOBACCO NON-USER: CPT | Performed by: PEDIATRICS

## 2020-02-20 RX ORDER — TORSEMIDE 100 MG/1
100 TABLET ORAL EVERY OTHER DAY
COMMUNITY
End: 2021-08-30 | Stop reason: SDUPTHER

## 2020-02-20 RX ORDER — RAMIPRIL 1.25 MG/1
1.25 CAPSULE ORAL DAILY
COMMUNITY
End: 2020-04-16 | Stop reason: SDUPTHER

## 2020-02-20 RX ORDER — BENZONATATE 100 MG/1
100 CAPSULE ORAL 3 TIMES DAILY PRN
COMMUNITY
End: 2020-05-27

## 2020-02-20 RX ORDER — POTASSIUM CHLORIDE 20 MEQ/1
TABLET, EXTENDED RELEASE ORAL
Qty: 180 TABLET | Refills: 3
Start: 2020-02-20 | End: 2021-09-23 | Stop reason: SDUPTHER

## 2020-02-20 RX ORDER — METOLAZONE 2.5 MG/1
2.5 TABLET ORAL DAILY
COMMUNITY
End: 2020-05-06 | Stop reason: SDUPTHER

## 2020-02-20 RX ORDER — M-VIT,TX,IRON,MINS/CALC/FOLIC 27MG-0.4MG
1 TABLET ORAL DAILY
COMMUNITY

## 2020-02-20 RX ORDER — MAGNESIUM OXIDE 400 MG/1
400 TABLET ORAL DAILY
COMMUNITY

## 2020-02-20 RX ORDER — CHOLECALCIFEROL (VITAMIN D3) 1250 MCG
1 CAPSULE ORAL
Status: ON HOLD | COMMUNITY
End: 2021-07-06 | Stop reason: ALTCHOICE

## 2020-02-20 RX ORDER — SPIRONOLACTONE 25 MG/1
25 TABLET ORAL DAILY
COMMUNITY
End: 2020-07-20 | Stop reason: SDUPTHER

## 2020-02-20 ASSESSMENT — ENCOUNTER SYMPTOMS
WHEEZING: 0
DIARRHEA: 0
BACK PAIN: 0
COUGH: 0
SHORTNESS OF BREATH: 1
VOMITING: 0
CHEST TIGHTNESS: 0
SORE THROAT: 0
NAUSEA: 0
ABDOMINAL PAIN: 0

## 2020-02-20 NOTE — PATIENT INSTRUCTIONS
Failure: Care Instructions  Your Care Instructions    Heart failure occurs when your heart does not pump as much blood as the body needs. Failure does not mean that the heart has stopped pumping but rather that it is not pumping as well as it should. Over time, this causes fluid buildup in your lungs and other parts of your body. Fluid buildup can cause shortness of breath, fatigue, swollen ankles, and other problems. By taking medicines regularly, reducing sodium (salt) in your diet, checking your weight every day, and making lifestyle changes, you can feel better and live longer. Follow-up care is a key part of your treatment and safety. Be sure to make and go to all appointments, and call your doctor if you are having problems. It's also a good idea to know your test results and keep a list of the medicines you take. How can you care for yourself at home? Medicines    · Be safe with medicines. Take your medicines exactly as prescribed. Call your doctor if you think you are having a problem with your medicine.     · Do not take any vitamins, over-the-counter medicine, or herbal products without talking to your doctor first. Deri René not take ibuprofen (Advil or Motrin) and naproxen (Aleve) without talking to your doctor first. They could make your heart failure worse.     · You may take some of the following medicine. ? Angiotensin-converting enzyme inhibitors (ACEIs) or angiotensin II receptor blockers (ARBs) reduce the heart's workload, lower blood pressure, and reduce swelling. Taking an ACEI or ARB may lower your chance of needing to be hospitalized. ? Beta-blockers can slow heart rate, decrease blood pressure, and improve your condition. Taking a beta-blocker may lower your chance of needing to be hospitalized. ? Diuretics, also called water pills, reduce swelling.    You will get more details on the specific medicines your doctor prescribes. Diet    · Your doctor may suggest that you limit sodium.  Your \"low-sodium\" (less than 140 mg of sodium per serving). A food labeled \"light sodium\" has less than half of the full-sodium version of that food. Foods labeled \"reduced-sodium\" may still have too much sodium. · Buy fresh vegetables or plain, frozen vegetables. Buy low-sodium versions of canned vegetables, soups, and other canned goods. Prepare low-sodium meals  · Use less salt each day when cooking. Reducing salt in this way will help you adjust to the taste. Do not add salt after cooking. Take the salt shaker off the table. · Flavor your food with garlic, lemon juice, onion, vinegar, herbs, and spices instead of salt. Do not use soy sauce, steak sauce, onion salt, garlic salt, mustard, or ketchup on your food. · Make your own salad dressings, sauces, and ketchup without adding salt. · Use less salt (or none) when recipes call for it. You can often use half the salt a recipe calls for without losing flavor. Other dishes like rice, pasta, and grains do not need added salt. · Rinse canned vegetables. This removes some--but not all--of the salt. · Avoid water that has a naturally high sodium content or that has been treated with water softeners, which add sodium. Call your local water company to find out the sodium content of your water supply. If you buy bottled water, read the label and choose a sodium-free brand. Avoid high-sodium foods, such as:  · Smoked, cured, salted, and canned meat, fish, and poultry. · Ham, mejias, hot dogs, and luncheon meats. · Regular, hard, and processed cheese and regular peanut butter. · Crackers with salted tops. · Frozen prepared meals. · Canned and dried soups, broths, and bouillon, unless labeled sodium-free or low-sodium. · Canned vegetables, unless labeled sodium-free or low-sodium. · Salted snack foods such as chips and pretzels. · [place] fries, pizza, tacos, and other fast foods.   · Pickles, olives, ketchup, and other condiments, especially soy sauce, unless labeled sodium-free or low-sodium. If you cannot cook for yourself  · Have family members or friends help you, or have someone cook low-sodium meals. · Check with your local senior nutrition program to find out where meals are served and whether they offer a low-sodium option. You can often find these programs through your local health department or hospital.  · Have meals delivered to your home. Most [facility] have a Meals on Codeanywhere. These programs provide one hot meal a day for older adults, delivered to their homes. Ask whether these meals are low-sodium. Let them know that you are on a low-sodium diet. Where can you learn more? Go to https://CharitybuzzpeTicketLabs.Sirin Mobile Technologies. org and sign in to your BRAIN account. Enter A166 in the "[employer] box to learn more about \"Limiting Sodium With Heart Failure: Care Instructions. \"     If you do not have an account, please click on the \"Sign Up Now\" link. Current as of: April 9, 2019  Content Version: 12.3  © 8517-2431 SecureLink. Care instructions adapted under license by [facility] ([facility]). If you have questions about a medical condition or this instruction, always ask your healthcare professional. [place] [address] any warranty or liability for your use of this information. Patient Education        Managing Other Conditions When You Have Heart Failure: Care Instructions  Your Care Instructions    All the systems in your body rely on each other to work properly. Heart failure has effects all through your body that can lead to other problems, such as kidney disease. The reverse is also true. A condition like diabetes or lung disease can damage or stress your heart and cause heart failure. Managing any other problems can help reduce your heart's workload and make your heart failure better.   Conditions that commonly cause or occur along with heart failure include high blood pressure, diabetes, COPD, high cholesterol, medicines are working well together. Talk to your doctor if you have any problems with your medicine. · Keep all your doctors informed about your health problems and all the medicines you take for them. Medicines that can treat one condition may make another condition worse. · Talk to your doctor before you take any vitamins, over-the-counter drugs, or herbal products. Do not take aspirin, ibuprofen (Advil, Motrin), or naproxen (Aleve) unless you talk to your doctor first. They could make your heart failure and other problems worse. When should you call for help? Call 911 anytime you think you may need emergency care. For example, call if:    · You have symptoms of sudden heart failure, such as:  ? Severe trouble breathing. ? Coughing up pink, foamy mucus. ? A new irregular or rapid heartbeat.     · You have symptoms of a heart attack. These may include:  ? Chest pain or pressure, or a strange feeling in the chest.  ? Sweating. ? Shortness of breath. ? Nausea or vomiting. ? Pain, pressure, or a strange feeling in the back, neck, jaw, or upper belly or in one or both shoulders or arms. ? Lightheadedness or sudden weakness. ? A fast or irregular heartbeat.    After you call  911, the  may tell you to chew 1 adult-strength or 2 to 4 low-dose aspirin. Wait for an ambulance. Do not try to drive yourself.   Call your doctor now or seek immediate medical care if:    · You have new or increased shortness of breath.     · You are dizzy or lightheaded, or you feel like you may faint.     · You have sudden weight gain, such as more than 2 to 3 pounds in a day or 5 pounds in a week. (Your doctor may suggest a different range of weight gain.)     · You have increased swelling in your legs, ankles, or feet.     · You are suddenly so tired or weak that you cannot do your usual activities.    Watch closely for changes in your health, and be sure to contact your doctor if you develop new symptoms.   Where can you learn more? Go to https://chpepiceweb.rSmart. org and sign in to your Nebula account. Enter P052 in the [facility] box to learn more about \"Managing Other Conditions When You Have Heart Failure: Care Instructions. \"     If you do not have an account, please click on the \"Sign Up Now\" link. Current as of: April 9, 2019  Content Version: 12.3  © 8074-4759 LevelUp. Care instructions adapted under license by [facility] ([facility]). If you have questions about a medical condition or this instruction, always ask your healthcare professional. [address] any warranty or liability for your use of this information. Patient Education        Medicines to Avoid With Heart Failure: Care Instructions  Your Care Instructions    Your doctor gave you medicines to help treat your heart failure. But did you know that many other medicines can make heart failure worse? Even medicines and herbs that you buy over the counter (OTC) can harm you. Be sure your doctor knows all of the OTC and prescription drugs you take. And don't start to take any medicine unless your doctor says it's okay. Follow-up care is a key part of your treatment and safety. Be sure to make and go to all appointments, and call your doctor if you are having problems. It's also a good idea to know your test results and keep a list of the medicines you take. How can you care for yourself at home? Over-the-counter drugs  · Before you take any over-the-counter drug, ask your doctor or pharmacist if it is safe. This includes herbs and vitamins. Medicines to avoid include:  ? Pain relievers called NSAIDs. These include ibuprofen and naproxen. Use acetaminophen instead. For example, you can take Tylenol for pain or fever. ? Low-dose aspirin. If your doctor has told you to take aspirin every day for your heart, follow his or her instructions on how much to take. Do not take aspirin for pain. ?  Antacids or laxatives. Do not take ones that have sodium in them. These include [place]. ? Cold, cough, flu, or sinus medicines. Read the label. Do not take ones that have pseudoephedrine, ephedrine, phenylephrine, or oxymetazoline in them. And make sure they don't have aspirin or ibuprofen in them. Watch for all of these in allergy medicines, nose sprays, and herbal products too.  ? Supplements and vitamins. These include black cohosh, Biscoe's wort, and vitamin E.  Prescription drugs  · Each time you see a doctor, make sure he or she knows that you take drugs for heart failure. Before you fill any new prescription, ask the pharmacist if it's okay to take the new drug. Drugs that can make heart failure worse include:  ? Calcium channel blockers. These include nifedipine. If you need to take this type of drug for another health problem, your doctor will closely watch your health. ? Heart rhythm drugs. These include disopyramide and flecainide. These can treat a fast or uneven heart rhythm. ? Prescription NSAIDs. These include celecoxib (Celebrex) and diclofenac.  ? Certain medicines for diabetes. These include pioglitazone, rosiglitazone, and saxagliptin. Where can you learn more? Go to https://MoviePass.interclick. org and sign in to your eCareer account. Enter F842 in the [facility] box to learn more about \"Medicines to Avoid With Heart Failure: Care Instructions. \"     If you do not have an account, please click on the \"Sign Up Now\" link. Current as of: April 9, 2019  Content Version: 12.3  © 6482-8731 Healthwise, [employer]. Care instructions adapted under license by [facility] ([facility]). If you have questions about a medical condition or this instruction, always ask your healthcare professional. [place] [address] any warranty or liability for your use of this information.          Patient Education        Learning About Self-Care for Heart Failure  What is self-care for heart

## 2020-02-20 NOTE — PROGRESS NOTES
OFFICE VISIT: 2020    Radha Rosario-: 1950      HPI  Reason For Visit:  Jesika Eden is a 71 y.o.     3 Month Follow-Up (has been itching in both arms and torso, no rash); Health Maintenance (flu- [facility], pneumonia, diabetic eye, diabetic foot); and Other (would like to discuss getting handicap sticker for car)    Patient presents on follow-up for multiple health issues. Heart failure and is followed at [facility] for this. Congestive heart failure:  Medication:  Symptoms: Pretty well compensated at this point. He states that he feels good on this regimen. Most recent echo still shows ejection fraction of 20 to 25%. We do need to make sure that we are controlling risk factors well enough to prevent progression of disease  He is weighing every day. Coronary artery disease:  No anginal symptoms      Diabetes Mellitus Type 2  Diet compliance:  compliant most of the time  Nutrition Consultation Needed:  no  Medication:              Metformin 1000 mg twice daily  Medication compliance: There was some question as to whether he should be on it during his hospitalization. The hospitalist recommended that he stop it (jardiance)  Weight trend: increasing  Current exercise: He is trying to exercise as much as he can  Checking: Periodically  Home blood sugar records: fasting range: Controlled when he checks. Low BG:  no  Eye exam current (within one year): yes  Checking Feet regularly:  yes -no sores  ACE/ARB:  no  Aspirin: No: He experienced a GI bleed  Tobacco history: He  reports that he quit smoking about 10 years ago. His smoking use included cigars. He has a 30.00 pack-year smoking history.  He has never used smokeless tobacco.    Lab Results   Component Value Date    LABA1C 6.6 (H) 2020    LABA1C 5.4 2018    LABA1C 5.6 2018     Lab Results   Component Value Date    LABMICR 1.40 2018    CREATININE 1.5 (H) 2020       Hypertension:   BP today was   BP 09/23/2019 121     ALT 09/23/2019 15     AST 09/23/2019 [address] Outpatient Visit on 09/09/2019   Component Date Value    Sodium 09/09/2019 124*    Potassium 09/09/2019 3.5     Chloride 09/09/2019 79*    CO2 09/09/2019 24     Anion Gap 09/09/2019 21*    Glucose 09/09/2019 248*    BUN 09/09/2019 81*    CREATININE 09/09/2019 1.8*    GFR Non- 09/09/2019 38*    Calcium 09/09/2019 9.1      Copies of these are in the chart.     Current Outpatient Medications   Medication Sig Dispense Refill    benzonatate (TESSALON) 100 MG capsule Take 100 mg by mouth 3 times daily as needed for Cough      Multiple Vitamins-Minerals (THERAPEUTIC MULTIVITAMIN-MINERALS) tablet Take 1 tablet by mouth daily      Cholecalciferol (VITAMIN D3) 1.25 MG (02464 UT) CAPS Take 1 capsule by mouth      magnesium oxide (MAG-OX) 400 MG tablet Take 400 mg by mouth daily      metFORMIN (GLUCOPHAGE) 1000 MG tablet Take 1,000 mg by mouth 2 times daily (with meals)      metOLazone (ZAROXOLYN) 2.5 MG tablet Take 2.5 mg by mouth daily      ramipril (ALTACE) 1.25 MG capsule Take 1.25 mg by mouth daily      spironolactone (ALDACTONE) 25 MG tablet Take 25 mg by mouth daily      torsemide (DEMADEX) 100 MG tablet Take 100 mg by mouth every other day Take 2 tablets by mouth every other day      potassium chloride (KLOR-CON M) 20 MEQ extended release tablet TAKE 1 TABLET BY MOUTH TWICE DAILY 180 tablet 3    levothyroxine (SYNTHROID) 25 MCG tablet Take 1 tablet by mouth Daily 90 tablet 3    cyanocobalamin 1000 MCG/ML injection Inject 1 mL into the muscle every 30 days 10 mL 0    metoprolol succinate (TOPROL XL) 25 MG extended release tablet Take 37.5 mg by mouth 2 times daily Take 1.5 tablets by mouth BID      traZODone (DESYREL) 50 MG tablet Take 1 tablet by mouth nightly 90 tablet 3    clopidogrel (PLAVIX) 75 MG tablet Take 1 tablet by mouth daily 90 tablet 3    atorvastatin (LIPITOR) 40 MG tablet Take 1 tablet by mouth daily (Patient taking differently: Take 40 mg by mouth nightly ) 90 tablet 3    nitroGLYCERIN (NITROLINGUAL) 0.4 MG/SPRAY 0.4 mg spray Place 1 spray under the tongue every 5 minutes as needed for Chest pain 1 Bottle 0    triamcinolone (KENALOG) 0.1 % cream Apply topically 2 times daily. 80 g 5    Omega-3 Fatty Acids (FISH OIL) 1000 MG CAPS Take 3,000 mg by mouth daily      diphenhydrAMINE (BENADRYL) 25 MG tablet Take 50 mg by mouth nightly as needed for Itching      aspirin 325 MG tablet Take 325 mg by mouth daily. No current facility-administered medications for this visit.         Allergies: Codeine     Past Medical History:   Diagnosis Date    Arthritis     Basilar artery stenosis     CAD (coronary artery disease) 1997 2009    MI    Cancer ([facility] [address]     SKIN 6YRS AGO    CHF (congestive heart failure) (HCC)     Heel spur     Hyperlipidemia     Hypertension     Palliative care patient 10/17/2018    Sleep apnea     no c-pap    Type II or unspecified type diabetes mellitus without mention of complication, not stated as uncontrolled     Vertebral artery insufficiency        Family History   Problem Relation Age of Onset    Heart Disease Father     High Blood Pressure Father     Other Father         Esophageal Varices       Past Surgical History:   Procedure Laterality Date    ANGIOPLASTY Bilateral     basilar artery angioplasty    [place]    CARPAL TUNNEL RELEASE      bilateral    CATARACT REMOVAL Bilateral     CHOLECYSTECTOMY      CORONARY ARTERY BYPASS GRAFT  1997    3v    HEEL SPUR SURGERY      left    [place] EGD TRANSORAL BIOPSY SINGLE/MULTIPLE N/A 10/16/2018    Dr Chapito Kerns-w/control of bleeding-Active bleeding in the stomach from Dieulafoy lesion    PTCA  2009 [facility]    with stents    TONSILLECTOMY         Social History     Tobacco Use    Smoking status: Former Smoker     Packs/day: 1.00     Years: 30.00     Pack years: 30.00 Types: Cigars     Last attempt to quit: 3/3/2009     Years since quitting: 10.9    Smokeless tobacco: Never Used   Substance Use Topics    Alcohol use: No     Alcohol/week: 0.0 standard drinks        Review of Systems   Constitutional: Positive for fatigue. Negative for chills and fever (he is limited in his exercise capacity). HENT: Negative for congestion, ear pain and sore throat. Eyes: Negative for visual disturbance. Respiratory: Positive for shortness of breath (occasional orthopnea). Negative for cough, chest tightness and wheezing. Cardiovascular: Positive for leg swelling (but controlled with diuretics). Negative for chest pain and palpitations. Gastrointestinal: Negative for abdominal pain, diarrhea, nausea and vomiting. Endocrine: Negative for polyuria. Genitourinary: Negative for frequency and urgency. Musculoskeletal: Negative for back pain and neck pain. Skin: Negative for rash. Neurological: Positive for weakness (generalized). Negative for dizziness and headaches. Psychiatric/Behavioral: Negative for self-injury. The patient is not nervous/anxious. Physical Exam  Constitutional:       General: He is not in acute distress. Appearance: He is well-developed. HENT:      Head: Normocephalic and atraumatic. Right Ear: Hearing and external ear normal. No middle ear effusion. Left Ear: Hearing and external ear normal.      Nose: No mucosal edema (mild) or rhinorrhea (thin, clear). Mouth/Throat:      Pharynx: No posterior oropharyngeal erythema. Eyes:      General: No scleral icterus. Conjunctiva/sclera: Conjunctivae normal.      Pupils: Pupils are equal, round, and reactive to light. Neck:      Musculoskeletal: Normal range of motion and neck supple. Thyroid: No thyromegaly. Vascular: No carotid bruit or JVD. Cardiovascular:      Rate and Rhythm: Normal rate and regular rhythm. No extrasystoles are present.      Chest Wall: PMI is not

## 2020-03-03 ENCOUNTER — TELEPHONE (OUTPATIENT)
Dept: PRIMARY CARE CLINIC | Age: 70
End: 2020-03-03

## 2020-03-03 NOTE — TELEPHONE ENCOUNTER
----- Message from [address] [facility],[address], DO sent at 2/20/2020  7:43 PM CST -----  Thyroid function is on the low side of normal.  Electrolytes show mildly low sodium and potassium. Do recommend supplementing with additional potassium and continuing other medications. Lipids are not controlled at all. Recommend Crestor 5 mg nightly. Dispense #30 with 11 refills. Recheck lipids and ALT in 4 to 6 weeks. Hemoglobin A1c is 6.6. This is slightly higher than what we would like it to be. Try watching your carbohydrates in your diet and continue to exercise as much as tolerated. We may consider restarting medication such as Jardiance or Invokana if unable to get this down on your own  CBC shows a mild anemia there continues to be a relatively high eosinophil presents. This may indicate allergies or even asthma.   This has slightly improved from last evaluation

## 2020-03-05 ENCOUNTER — TELEPHONE (OUTPATIENT)
Dept: PRIMARY CARE CLINIC | Age: 70
End: 2020-03-05

## 2020-03-05 NOTE — TELEPHONE ENCOUNTER
Call placed to pt with apt date, time and location of his CT Lung screen. This is scheduled for 3/16/2020 @ 11:20 am at [facility]. Pt will need to arrive at 11:00 am to register. Pt understood.

## 2020-03-23 RX ORDER — CYANOCOBALAMIN 1000 UG/ML
1000 INJECTION INTRAMUSCULAR; SUBCUTANEOUS
Qty: 10 ML | Refills: 0 | Status: SHIPPED | OUTPATIENT
Start: 2020-03-23 | End: 2020-08-27

## 2020-04-16 ENCOUNTER — PATIENT MESSAGE (OUTPATIENT)
Dept: PRIMARY CARE CLINIC | Age: 70
End: 2020-04-16

## 2020-04-16 RX ORDER — RAMIPRIL 1.25 MG/1
1.25 CAPSULE ORAL DAILY
Qty: 90 CAPSULE | Refills: 3 | Status: SHIPPED | OUTPATIENT
Start: 2020-04-16 | End: 2020-07-07 | Stop reason: SDUPTHER

## 2020-05-06 RX ORDER — METOLAZONE 2.5 MG/1
2.5 TABLET ORAL DAILY
Qty: 30 TABLET | Refills: 5 | Status: SHIPPED | OUTPATIENT
Start: 2020-05-06 | End: 2020-06-12 | Stop reason: SDUPTHER

## 2020-05-27 ENCOUNTER — OFFICE VISIT (OUTPATIENT)
Dept: PRIMARY CARE CLINIC | Age: 70
End: 2020-05-27
Payer: MEDICARE

## 2020-05-27 VITALS
SYSTOLIC BLOOD PRESSURE: 108 MMHG | HEART RATE: 82 BPM | TEMPERATURE: 98.4 F | WEIGHT: 208.5 LBS | HEIGHT: 71 IN | DIASTOLIC BLOOD PRESSURE: 70 MMHG | BODY MASS INDEX: 29.19 KG/M2 | OXYGEN SATURATION: 97 %

## 2020-05-27 PROBLEM — E03.9 ACQUIRED HYPOTHYROIDISM: Status: ACTIVE | Noted: 2020-05-27

## 2020-05-27 PROCEDURE — G8417 CALC BMI ABV UP PARAM F/U: HCPCS | Performed by: PEDIATRICS

## 2020-05-27 PROCEDURE — 99214 OFFICE O/P EST MOD 30 MIN: CPT | Performed by: PEDIATRICS

## 2020-05-27 PROCEDURE — 3044F HG A1C LEVEL LT 7.0%: CPT | Performed by: PEDIATRICS

## 2020-05-27 PROCEDURE — 1123F ACP DISCUSS/DSCN MKR DOCD: CPT | Performed by: PEDIATRICS

## 2020-05-27 PROCEDURE — 1036F TOBACCO NON-USER: CPT | Performed by: PEDIATRICS

## 2020-05-27 PROCEDURE — 4040F PNEUMOC VAC/ADMIN/RCVD: CPT | Performed by: PEDIATRICS

## 2020-05-27 PROCEDURE — 3017F COLORECTAL CA SCREEN DOC REV: CPT | Performed by: PEDIATRICS

## 2020-05-27 PROCEDURE — 2022F DILAT RTA XM EVC RTNOPTHY: CPT | Performed by: PEDIATRICS

## 2020-05-27 PROCEDURE — G8427 DOCREV CUR MEDS BY ELIG CLIN: HCPCS | Performed by: PEDIATRICS

## 2020-05-27 ASSESSMENT — PATIENT HEALTH QUESTIONNAIRE - PHQ9
2. FEELING DOWN, DEPRESSED OR HOPELESS: 0
SUM OF ALL RESPONSES TO PHQ9 QUESTIONS 1 & 2: 0
1. LITTLE INTEREST OR PLEASURE IN DOING THINGS: 0
SUM OF ALL RESPONSES TO PHQ QUESTIONS 1-9: 0
SUM OF ALL RESPONSES TO PHQ QUESTIONS 1-9: 0

## 2020-05-27 ASSESSMENT — ENCOUNTER SYMPTOMS
SHORTNESS OF BREATH: 1
WHEEZING: 0
DIARRHEA: 0
VOMITING: 0
COUGH: 0
CHEST TIGHTNESS: 0
NAUSEA: 0
ABDOMINAL PAIN: 0
BACK PAIN: 0
SORE THROAT: 0

## 2020-05-27 NOTE — PATIENT INSTRUCTIONS
Patient Education        Peripheral Arterial Disease of the Leg: Care Instructions  Your Care Instructions  Peripheral arterial disease (PAD) occurs when the blood vessels (arteries) that supply blood to the legs, belly, pelvis, arms, or neck get too narrow. This reduces blood flow to that area. The legs are affected most often. Fatty buildup (plaque) in the arteries usually is the cause of PAD. This buildup is also called \"hardening\" of the arteries. Your risk of PAD increases if you smoke or have high cholesterol, high blood pressure, diabetes, or a family history of PAD. Many people do not have symptoms. If you do have symptoms, you may have weak or tired legs, difficulty walking or balancing, or pain. If you have pain, you might feel a tight, aching, or squeezing pain in the calf, foot, thigh, or buttock that occurs during exercise. The pain usually gets worse during exercise and goes away when you rest. If PAD gets worse, you may have symptoms of poor blood flow such as leg pain when you rest.  Medicines and lifestyle changes may help your symptoms and lower your risk of heart attack and stroke. In some cases, surgery or other treatment is needed. It is important that you follow up with your doctor. Follow-up care is a key part of your treatment and safety. Be sure to make and go to all appointments, and call your doctor if you are having problems. It's also a good idea to know your test results and keep a list of the medicines you take. How can you care for yourself at home? · Do not smoke. Smoking can make PAD worse. If you need help quitting, talk to your doctor about stop-smoking programs and medicines. These can increase your chances of quitting for good. · Take your medicines exactly as prescribed. Call your doctor if you think you are having a problem with your medicine. · If you take a blood thinner, such as aspirin, be sure to get instructions about how to take your medicine safely.  Blood thinners can cause serious bleeding problems. · Ask your doctor if a cardiac rehab program is right for you. Cardiac rehab can help you make lifestyle changes. In cardiac rehab, a team of health professionals provides education and support to help you make new, healthy habits. · Eat heart-healthy foods such as fruits, vegetables, whole grains, fish, lean meats, and low-fat or nonfat dairy foods. Limit sodium, sugar, and alcohol. · If your doctor recommends it, get more exercise. Walking is a good choice. Bit by bit, increase the amount you walk every day. Try for at least 30 minutes on most days of the week. If you have symptoms when you exercise, ask your doctor about a special exercise program that may help relieve your symptoms. · Stay at a healthy weight. Lose weight if you need to. · Take good care of your feet. ? Treat cuts and scrapes on your legs right away. Poor blood flow prevents (or slows) quick healing of even small cuts or scrapes. This is even more important if you have diabetes. ? Avoid shoes that are too tight or that rub your feet. Shoes should be comfortable and fit well. ? Avoid socks or stockings that are tight enough to leave elastic-band marks on your legs. Tight socks can make circulation problems worse. ? Keep your feet clean and moisturized to prevent drying and cracking. Place cotton or lamb's wool between your toes to prevent rubbing and to absorb moisture. ? If you have a sore on your leg or foot, keep it dry and cover it with a nonstick bandage until you see your doctor. When should you call for help? [id number] anytime you think you may need emergency care. For example, call if:  · You have symptoms of a heart attack. These may include:  ? Chest pain or pressure, or a strange feeling in the chest.  ? Sweating. ? Shortness of breath. ? Nausea or vomiting.   ? Pain, pressure, or a strange feeling in the back, neck, jaw, or upper belly or in one or both shoulders or questions about a medical condition or this instruction, always ask your healthcare professional. [address] any warranty or liability for your use of this information. Patient Education        Learning About Peripheral Arterial Disease of the Legs  What is peripheral arterial disease? Peripheral arterial disease (PAD) is narrowing or blockage of arteries in your arms and legs. The most common cause of PAD is the buildup of plaque on the inside of arteries. Plaque is made of extra cholesterol, calcium, and other material in your blood. Over time, plaque builds up in the walls of the arteries, including those that supply blood to your legs. This buildup leads to poor blood flow. When you have PAD, you have a risk of having plaque in other arteries in your body. This raises your risk of a heart attack and stroke. This information focuses on peripheral arterial disease of the legs, the area where it is most common. When you have PAD of the legs and you walk or exercise, your leg muscles may not get enough blood. This may cause symptoms, such as leg pain during exercise. Peripheral arterial disease is also called peripheral vascular disease. What are the symptoms? Many people who have PAD do not have any symptoms. But if you have symptoms, you may have weak or tired legs, difficulty walking or balancing, or pain. If you have pain, you might feel a tight, aching, or squeezing pain in the calf, thigh, or buttock. This pain usually happens after you have walked a certain distance. The pain goes away if you stop walking. This pain is called intermittent claudication. If PAD gets worse, you may have other symptoms that are caused by poor blood flow to your legs and feet. You may have:  · Cold or numb feet or toes. · Sores that are slow to heal.  · Leg or foot pain while you are at rest.  · Feet and toes that become pale from exercise or when elevated.   · Feet that turn red when

## 2020-05-27 NOTE — PROGRESS NOTES
[facility], [address]  Phone [phone]   Fax [phone]      OFFICE VISIT: 2020    Lynn Rosario-: 1950      HPI  Reason For Visit:  Go Keenan is a 71 y.o. Follow-up and Foot Pain (bilat foot pain)    Patient presents for in-house visit follow-up of multiple health issues. Present concerns:  He is having bilateral foot pain. Congestive heart failure:  Medication:   Zaroxolyn 2.5 mg daily   Altase 1.25 mg daily   Aldactone 25 mg daily    Torsemide 100 mg every other day   Toprol-XL 25 mg 1-1/2 tablets twice daily   Nitroglycerin 0.4 mg as needed  Symptoms: Presently compensated  He is weighing himself on a daily basis. He is also watching his sodium intake as much as possible      Coronary artery disease:  Medication:   As above with CHF   Aspirin 325 mg daily   Plavix 75 mg daily  Symptoms: No recent anginal symptoms      Diabetes Mellitus Type 2  Diet compliance:  compliant most of the time  Nutrition Consultation Needed:  no  Medication:              Metformin 1000 mg twice daily  Medication compliance:  There was some question as to whether he should be on it during his hospitalization.  The hospitalist recommended that he stop it (jardiance)  Weight trend: increasing  Current exercise: He is trying to exercise as much as he can  Checking: Periodically  Home blood sugar records: fasting range: Controlled when he checks. Low BG:  no  Eye exam current (within one year): yes  Checking Feet regularly:  yes -no sores  ACE/ARB:  no  Aspirin: No: He experienced a GI bleedTobacco history: He  reports that he quit smoking about 11 years ago. His smoking use included cigars. He has a 30.00 pack-year smoking history.  He has never used smokeless tobacco.    Lab Results   Component Value Date    LABA1C 6.6 (H) 2020    LABA1C 5.4 2018    LABA1C 5.6 2018     Lab Results   Component Value Date    LABMICR 1.40 2018    CREATININE daily      Cholecalciferol (VITAMIN D3) 1.25 MG (59186 UT) CAPS Take 1 capsule by mouth      magnesium oxide (MAG-OX) 400 MG tablet Take 400 mg by mouth daily      spironolactone (ALDACTONE) 25 MG tablet Take 25 mg by mouth daily      torsemide (DEMADEX) 100 MG tablet Take 100 mg by mouth every other day Take 2 tablets by mouth every other day      potassium chloride (KLOR-CON M) 20 MEQ extended release tablet TAKE 1 TABLET BY MOUTH TWICE DAILY 180 tablet 3    levothyroxine (SYNTHROID) 25 MCG tablet Take 1 tablet by mouth Daily 90 tablet 3    metoprolol succinate (TOPROL XL) 25 MG extended release tablet Take 37.5 mg by mouth 2 times daily Take 1.5 tablets by mouth BID      traZODone (DESYREL) 50 MG tablet Take 1 tablet by mouth nightly 90 tablet 3    clopidogrel (PLAVIX) 75 MG tablet Take 1 tablet by mouth daily 90 tablet 3    atorvastatin (LIPITOR) 40 MG tablet Take 1 tablet by mouth daily (Patient taking differently: Take 40 mg by mouth nightly ) 90 tablet 3    nitroGLYCERIN (NITROLINGUAL) 0.4 MG/SPRAY 0.4 mg spray Place 1 spray under the tongue every 5 minutes as needed for Chest pain 1 Bottle 0    triamcinolone (KENALOG) 0.1 % cream Apply topically 2 times daily. 80 g 5    Omega-3 Fatty Acids (FISH OIL) 1000 MG CAPS Take 3,000 mg by mouth daily      diphenhydrAMINE (BENADRYL) 25 MG tablet Take 50 mg by mouth nightly as needed for Itching      aspirin 325 MG tablet Take 325 mg by mouth daily. No current facility-administered medications for this visit.         Allergies: Codeine     Past Medical History:   Diagnosis Date    Arthritis     Basilar artery stenosis     CAD (coronary artery disease) 1997 2009    MI    Cancer ([facility] [address]     SKIN 6YRS AGO    [facility])     Heel spur     Hyperlipidemia     Hypertension     Palliative care patient 10/17/2018    Sleep apnea     no c-pap    Type II or unspecified type diabetes mellitus without mention of complication, not stated as uncontrolled     Vertebral artery insufficiency        Family History   Problem Relation Age of Onset    Heart Disease Father     High Blood Pressure Father     Other Father         Esophageal Varices       Past Surgical History:   Procedure Laterality Date    ANGIOPLASTY Bilateral     basilar artery angioplasty    [place]    CARPAL TUNNEL RELEASE      bilateral    CATARACT REMOVAL Bilateral     CHOLECYSTECTOMY      CORONARY ARTERY BYPASS GRAFT      3v    HEEL SPUR SURGERY      left    [place] EGD TRANSORAL BIOPSY SINGLE/MULTIPLE N/A 10/16/2018    Dr Mattie Kerns-w/control of bleeding-Active bleeding in the stomach from Dieulafoy lesion    PTCA  2009 [facility]    with stents    TONSILLECTOMY         Social History     Tobacco Use    Smoking status: Former Smoker     Packs/day: 1.00     Years: 30.00     Pack years: 30.00     Types: Cigars     Last attempt to quit: 3/3/2009     Years since quittin.2    Smokeless tobacco: Never Used   Substance Use Topics    Alcohol use: No     Alcohol/week: 0.0 standard drinks        Review of Systems   Constitutional: Positive for fatigue. Negative for chills and fever (he is limited in his exercise capacity). HENT: Negative for congestion, ear pain and sore throat. Eyes: Negative for visual disturbance. Respiratory: Positive for shortness of breath (occasional orthopnea). Negative for cough, chest tightness and wheezing. Cardiovascular: Positive for leg swelling (but controlled with diuretics). Negative for chest pain and palpitations. Gastrointestinal: Negative for abdominal pain, diarrhea, nausea and vomiting. Endocrine: Negative for polyuria. Genitourinary: Negative for frequency and urgency. Musculoskeletal: Negative. Negative for back pain and neck pain. Skin: Negative for rash. Neurological: Positive for weakness (generalized). Negative for dizziness and headaches.

## 2020-06-02 LAB
ALBUMIN SERPL-MCNC: 4.3 G/DL
ALP BLD-CCNC: 98 U/L
ALT SERPL-CCNC: 13 U/L
ANION GAP SERPL CALCULATED.3IONS-SCNC: 15 MMOL/L
AST SERPL-CCNC: 15 U/L
AVERAGE GLUCOSE: NORMAL
B-TYPE NATRIURETIC PEPTIDE: 4158 PG/ML
BASOPHILS ABSOLUTE: 0.16 /ΜL
BASOPHILS RELATIVE PERCENT: 1.7 %
BILIRUB SERPL-MCNC: 1.2 MG/DL (ref 0.1–1.4)
BUN BLDV-MCNC: 31 MG/DL
CALCIUM SERPL-MCNC: 9.9 MG/DL
CHLORIDE BLD-SCNC: 91 MMOL/L
CHOLESTEROL, TOTAL: 131 MG/DL
CHOLESTEROL/HDL RATIO: ABNORMAL
CO2: 28 MMOL/L
CREAT SERPL-MCNC: 1.83 MG/DL
CREATININE, URINE: 42.8
EOSINOPHILS ABSOLUTE: 1.17 /ΜL
EOSINOPHILS RELATIVE PERCENT: 12.8 %
GFR CALCULATED: 39.21
GLUCOSE BLD-MCNC: 97 MG/DL
HBA1C MFR BLD: 6.2 %
HCT VFR BLD CALC: 43.1 % (ref 41–53)
HDLC SERPL-MCNC: 28 MG/DL (ref 35–70)
HEMOGLOBIN: 14.3 G/DL (ref 13.5–17.5)
LDL CHOLESTEROL CALCULATED: 79 MG/DL (ref 0–160)
LYMPHOCYTES ABSOLUTE: 1.51 /ΜL
LYMPHOCYTES RELATIVE PERCENT: 16.5 %
MCH RBC QN AUTO: 31.6 PG
MCHC RBC AUTO-ENTMCNC: 33.2 G/DL
MCV RBC AUTO: 95.4 FL
MICROALBUMIN/CREAT 24H UR: <7 MG/G{CREAT}
MICROALBUMIN/CREAT UR-RTO: <3
MONOCYTES ABSOLUTE: 0.94 /ΜL
MONOCYTES RELATIVE PERCENT: 10.3 %
NEUTROPHILS ABSOLUTE: 5.34 /ΜL
NEUTROPHILS RELATIVE PERCENT: 58.4 %
NONHDLC SERPL-MCNC: ABNORMAL MG/DL
PLATELET # BLD: 165 K/ΜL
PMV BLD AUTO: 9.8 FL
POTASSIUM SERPL-SCNC: 3.5 MMOL/L
RBC # BLD: 4.52 10^6/ΜL
SODIUM BLD-SCNC: 1300 MMOL/L
T4 TOTAL: 1.19
TOTAL PROTEIN: 9.5
TRIGL SERPL-MCNC: 122 MG/DL
TSH SERPL DL<=0.05 MIU/L-ACNC: 5.83 UIU/ML
VLDLC SERPL CALC-MCNC: ABNORMAL MG/DL
WBC # BLD: 9.2 10^3/ML

## 2020-06-05 RX ORDER — TRAZODONE HYDROCHLORIDE 50 MG/1
50 TABLET ORAL NIGHTLY
Qty: 90 TABLET | Refills: 3 | Status: SHIPPED | OUTPATIENT
Start: 2020-06-05 | End: 2021-04-20

## 2020-06-05 NOTE — TELEPHONE ENCOUNTER
Received fax from pharmacy requesting refill on pts medication(s). Pt was last seen in office on 5/27/2020  and has a follow up scheduled for 8/27/2020. Will send request to  Dr. Sharon Carey  for patient.      Requested Prescriptions     Pending Prescriptions Disp Refills    traZODone (DESYREL) 50 MG tablet [Pharmacy Med Name: TRAZODONE HCL TABS 50MG] 90 tablet 3     Sig: TAKE 1 TABLET NIGHTLY

## 2020-06-08 ENCOUNTER — TELEPHONE (OUTPATIENT)
Dept: PRIMARY CARE CLINIC | Age: 70
End: 2020-06-08

## 2020-06-12 RX ORDER — METOLAZONE 2.5 MG/1
2.5 TABLET ORAL DAILY
Qty: 90 TABLET | Refills: 1 | Status: SHIPPED | OUTPATIENT
Start: 2020-06-12 | End: 2020-10-30

## 2020-07-01 ENCOUNTER — TELEMEDICINE (OUTPATIENT)
Dept: VASCULAR SURGERY | Age: 70
End: 2020-07-01
Payer: MEDICARE

## 2020-07-01 PROBLEM — I70.213 ATHEROSCLEROSIS OF NATIVE ARTERY OF BOTH LOWER EXTREMITIES WITH INTERMITTENT CLAUDICATION (HCC): Status: ACTIVE | Noted: 2020-07-01

## 2020-07-01 PROCEDURE — 3017F COLORECTAL CA SCREEN DOC REV: CPT | Performed by: NURSE PRACTITIONER

## 2020-07-01 PROCEDURE — G8427 DOCREV CUR MEDS BY ELIG CLIN: HCPCS | Performed by: NURSE PRACTITIONER

## 2020-07-01 PROCEDURE — 1036F TOBACCO NON-USER: CPT | Performed by: NURSE PRACTITIONER

## 2020-07-01 PROCEDURE — 1123F ACP DISCUSS/DSCN MKR DOCD: CPT | Performed by: NURSE PRACTITIONER

## 2020-07-01 PROCEDURE — 99213 OFFICE O/P EST LOW 20 MIN: CPT | Performed by: NURSE PRACTITIONER

## 2020-07-01 PROCEDURE — G8417 CALC BMI ABV UP PARAM F/U: HCPCS | Performed by: NURSE PRACTITIONER

## 2020-07-01 PROCEDURE — 4040F PNEUMOC VAC/ADMIN/RCVD: CPT | Performed by: NURSE PRACTITIONER

## 2020-07-01 NOTE — PROGRESS NOTES
Needs heart transplant mid 20's following at [facility]        2020    TELEHEALTH EVALUATION -- Audio/Visual (During WCXXK-44 public health emergency)    HPI:    Patient is located at [facility]  Provider is located at [facility]   Also present during call is wife    Brandee Del Castillo (:  1950) has requested an audio/video evaluation for the following concern(s):    Michelle Henderson has a history of peripheral vascular disease of the lower extremities. He has had this for a few weeks. His was tested because he has burning in his feet. He also has some discoloration of his feet when they hang dependent. He has a long history of carotid stenosis. He also has a low EF and has been considered for heart transplant. He is seen for this at [facility]. Current treatment includes clopidogrel 75 mg po qd, ASA EC daily. Michelle Henderson has not had new wounds. Recently, he reports no claudication. He admits he does not walk much      Prior to Visit Medications    Medication Sig Taking?  Authorizing Provider   metOLazone (ZAROXOLYN) 2.5 MG tablet Take 1 tablet by mouth daily Yes ROSS Colon DO   traZODone (DESYREL) 50 MG tablet Take 1 tablet by mouth nightly Yes JOSE ELIAS Evangelista   metFORMIN (GLUCOPHAGE) 1000 MG tablet Take 1 tablet by mouth 2 times daily (with meals) Yes ROSS Colon DO   ramipril (ALTACE) 1.25 MG capsule Take 1 capsule by mouth daily Yes ROSS Colon DO   cyanocobalamin 1000 MCG/ML injection Inject 1 mL into the muscle every 30 days Yes ROSS Colon DO   Multiple Vitamins-Minerals (THERAPEUTIC MULTIVITAMIN-MINERALS) tablet Take 1 tablet by mouth daily Yes Historical Provider, MD   Cholecalciferol (VITAMIN D3) 1.25 MG (94293 UT) CAPS Take 1 capsule by mouth Yes Historical Provider, MD   magnesium oxide (MAG-OX) 400 MG tablet Take 400 mg by mouth daily Yes Historical Provider, MD   spironolactone (ALDACTONE) 25 MG tablet Take 25 mg by mouth daily Yes Historical Provider, MD   torsemide (DEMADEX) 100 MG tablet Take 100 mg by mouth every other day Take 2 tablets by mouth every other day Yes Historical Provider, MD   potassium chloride (KLOR-CON M) 20 MEQ extended release tablet TAKE 1 TABLET BY MOUTH TWICE DAILY Yes ROSS Saunders DO   levothyroxine (SYNTHROID) 25 MCG tablet Take 1 tablet by mouth Daily Yes JOSE ELIAS Zhou   metoprolol succinate (TOPROL XL) 25 MG extended release tablet Take 37.5 mg by mouth 2 times daily Take 1.5 tablets by mouth BID Yes Historical Provider, MD   clopidogrel (PLAVIX) 75 MG tablet Take 1 tablet by mouth daily Yes ROSS Saunders DO   atorvastatin (LIPITOR) 40 MG tablet Take 1 tablet by mouth daily  Patient taking differently: Take 40 mg by mouth nightly  Yes JOSE ELIAS Lovell   nitroGLYCERIN (NITROLINGUAL) 0.4 MG/SPRAY 0.4 mg spray Place 1 spray under the tongue every 5 minutes as needed for Chest pain Yes ROSS Saunders DO   triamcinolone (KENALOG) 0.1 % cream Apply topically 2 times daily. Yes ROSS Saunders DO   Omega-3 Fatty Acids (FISH OIL) 1000 MG CAPS Take 3,000 mg by mouth daily Yes Historical Provider, MD   diphenhydrAMINE (BENADRYL) 25 MG tablet Take 50 mg by mouth nightly as needed for Itching Yes Historical Provider, MD   aspirin 325 MG tablet Take 325 mg by mouth daily.  Yes Historical Provider, MD       Social History     Tobacco Use    Smoking status: Former Smoker     Packs/day: 1.00     Years: 30.00     Pack years: 30.00     Types: Cigars     Last attempt to quit: 3/3/2009     Years since quittin.3    Smokeless tobacco: Never Used   Substance Use Topics    Alcohol use: No     Alcohol/week: 0.0 standard drinks    Drug use: No        Allergies   Allergen Reactions    Codeine Itching   ,   Past Medical History:   Diagnosis Date    Arthritis     Basilar artery stenosis     CAD (coronary artery disease) 1997    [place]    Cancer ([facility] [address]     SKIN 6YRS AGO    CHF (congestive heart failure) ([facility] [address]  Heel spur     Hyperlipidemia     Hypertension     Palliative care patient 10/17/2018    Sleep apnea     no c-pap    Type II or unspecified type diabetes mellitus without mention of complication, not stated as uncontrolled     Vertebral artery insufficiency    ,   Past Surgical History:   Procedure Laterality Date    ANGIOPLASTY Bilateral     basilar artery angioplasty    CARDIAC CATHETERIZATION  2018    [place]    CARPAL TUNNEL RELEASE      bilateral    CATARACT REMOVAL Bilateral     CHOLECYSTECTOMY      CORONARY ARTERY BYPASS GRAFT      3v    HEEL SPUR SURGERY      left    [place] EGD TRANSORAL BIOPSY SINGLE/MULTIPLE N/A 10/16/2018    Dr Milad Kerns-w/control of bleeding-Active bleeding in the stomach from Dieulafoy lesion    PTCA   [address]    with stents    TONSILLECTOMY     ,   Social History     Tobacco Use    Smoking status: Former Smoker     Packs/day: 1.00     Years: 30.00     Pack years: 30.00     Types: Cigars     Last attempt to quit: 3/3/2009     Years since quittin.3    Smokeless tobacco: Never Used   Substance Use Topics    Alcohol use: No     Alcohol/week: 0.0 standard drinks    Drug use: No   ,   Family History   Problem Relation Age of Onset    Heart Disease Father     High Blood Pressure Father     Other Father         Esophageal Varices   ,   Health Maintenance   Topic Date Due    AAA screen  1950    Hepatitis C screen  1950    Shingles Vaccine (1 of 2) 2000    Low dose CT lung screening  2005    Diabetic retinal exam  2017    Pneumococcal 65+ years Vaccine (2 of 2 - PPSV23) 2017    Diabetic foot exam  2019    Flu vaccine (1) 2020    Annual Wellness Visit (AWV)  10/31/2020    Colon cancer screen colonoscopy  2021    A1C test (Diabetic or Prediabetic)  2021    Lipid screen  2021    TSH testing  2021    Potassium monitoring  2021    Creatinine monitoring  2021  DTaP/Tdap/Td vaccine (2 - Td) 06/16/2024    Hepatitis A vaccine  Aged Out    Hib vaccine  Aged Out    Meningococcal (ACWY) vaccine  Aged Out       Review of Systems    Constitutional - no significant activity change, appetite change, or unexpected weight change. No fever or chills. No diaphoresis or significant fatigue. HENT - no significant rhinorrhea or epistaxis. No tinnitus or significant hearing loss. Eyes - no sudden vision change or amaurosis. Respiratory - has shortness of breath, no wheezing, or stridor. No apnea, cough, or chest tightness associated with shortness of breath. Cardiovascular - no chest pain, syncope, or significant dizziness. No palpitations or significant leg swelling.  has not had claudication. Gastrointestinal -  has not had abdominal swelling or pain. No blood in stool. No severe constipation, diarrhea, nausea, or vomiting. Genitourinary - No difficulty urinating, dysuria, frequency, or urgency. No flank pain or hematuria. Musculoskeletal - has not had back pain, gait disturbance, or myalgia. Skin - no color change, rash, pallor, or new wound. Neurologic - no dizziness, facial asymmetry, or light headedness. No seizures. No speech difficulty or lateralizing weakness. Hematologic - no easy bruising or excessive bleeding. Psychiatric - no severe anxiety or nervousness. No confusion. All other review of systems are negative. PHYSICAL EXAMINATION:    Due to this being a TeleHealth encounter, evaluation of the following organ systems is limited: Vitals/Constitutional/EENT/Resp/CV/GI//MS/Neuro/Skin/Heme-Lymph-Imm. Constitutional - well developed, well nourished. No diaphoresis or acute distress. HENT - head normocephalic. Right external ear canal appears normal.  Left external ear canal appears normal.  Septum appears midline. Eyes - conjunctiva normal.   No exudate. No icterus. Neck- ROM appears normal, no tracheal deviation.   Extremities -No

## 2020-07-07 RX ORDER — RAMIPRIL 1.25 MG/1
1.25 CAPSULE ORAL DAILY
Qty: 90 CAPSULE | Refills: 3 | Status: SHIPPED | OUTPATIENT
Start: 2020-07-07 | End: 2021-08-19

## 2020-07-07 NOTE — TELEPHONE ENCOUNTER
Received fax from pharmacy requesting refill on pts medication(s). Pt was last seen in office on 5/27/2020  and has a follow up scheduled for 8/27/2020. Will send request to  Dr. Wendy Vital  for patient.      Requested Prescriptions     Pending Prescriptions Disp Refills    ramipril (ALTACE) 1.25 MG capsule 90 capsule 3     Sig: Take 1 capsule by mouth daily

## 2020-07-13 RX ORDER — TRIAMCINOLONE ACETONIDE 1 MG/G
CREAM TOPICAL
Qty: 80 G | Refills: 5 | Status: SHIPPED | OUTPATIENT
Start: 2020-07-13 | End: 2021-08-30

## 2020-07-13 NOTE — TELEPHONE ENCOUNTER
Received fax from pharmacy requesting refill on pts medication(s). Pt was last seen in office on 5/27/2020  and has a follow up scheduled for 8/27/2020. Will send request to  Dr. Manuel Gillespie  for patient. Requested Prescriptions     Pending Prescriptions Disp Refills    triamcinolone (KENALOG) 0.1 % cream 80 g 5     Sig: Apply topically 2 times daily.

## 2020-07-19 ENCOUNTER — PATIENT MESSAGE (OUTPATIENT)
Dept: PRIMARY CARE CLINIC | Age: 70
End: 2020-07-19

## 2020-07-20 RX ORDER — SPIRONOLACTONE 25 MG/1
25 TABLET ORAL DAILY
Qty: 30 TABLET | Refills: 3 | Status: SHIPPED | OUTPATIENT
Start: 2020-07-20 | End: 2020-10-12 | Stop reason: SDUPTHER

## 2020-07-20 NOTE — TELEPHONE ENCOUNTER
From: Bharath Ornelas  To: Rock Fifi DO  Sent: 7/19/2020 9:25 AM CDT  Subject: Prescription Question    Need a refill on my Spironolactone 25mg. Please call in to [facility].  Thank you!!

## 2020-08-18 ENCOUNTER — TELEMEDICINE (OUTPATIENT)
Dept: PRIMARY CARE CLINIC | Age: 70
End: 2020-08-18
Payer: MEDICARE

## 2020-08-18 PROCEDURE — G8427 DOCREV CUR MEDS BY ELIG CLIN: HCPCS | Performed by: NURSE PRACTITIONER

## 2020-08-18 PROCEDURE — 4040F PNEUMOC VAC/ADMIN/RCVD: CPT | Performed by: NURSE PRACTITIONER

## 2020-08-18 PROCEDURE — 1123F ACP DISCUSS/DSCN MKR DOCD: CPT | Performed by: NURSE PRACTITIONER

## 2020-08-18 PROCEDURE — 3017F COLORECTAL CA SCREEN DOC REV: CPT | Performed by: NURSE PRACTITIONER

## 2020-08-18 PROCEDURE — 99213 OFFICE O/P EST LOW 20 MIN: CPT | Performed by: NURSE PRACTITIONER

## 2020-08-18 RX ORDER — CEPHALEXIN 500 MG/1
500 CAPSULE ORAL 3 TIMES DAILY
Qty: 21 CAPSULE | Refills: 0 | Status: SHIPPED | OUTPATIENT
Start: 2020-08-18 | End: 2020-08-25

## 2020-08-18 NOTE — PROGRESS NOTES
2020    TELEHEALTH EVALUATION -- Audio/Visual (During RPYMA-95 public health emergency)    Andree Kate is a 79 y.o. male who c/o of Insect Bite   medical conditions/complaints as noted below. HPI:    Andree Kate (:  1950) has requested an audio/video evaluation for the following concern(s):    Insect bite  He has been cleaning out an area that they have spiders in. He came inside and under his pants he felt a very sharp bites. Since that time the area has reddened and is getting swollen and warm to touch. The redness is spreading. Review of Systems   Constitutional: Negative for appetite change and unexpected weight change. HENT: Negative for congestion, ear pain, rhinorrhea and sore throat. Eyes: Negative for discharge and redness. Respiratory: Negative for cough, choking and wheezing. Cardiovascular: Negative for chest pain. Gastrointestinal: Negative for blood in stool, constipation and diarrhea. Genitourinary: Negative for decreased urine volume and dysuria. Skin: Positive for rash and wound. Neurological: Negative for weakness. Hematological: Negative for adenopathy. Psychiatric/Behavioral: Negative for suicidal ideas. Prior to Visit Medications    Medication Sig Taking? Authorizing Provider   cephALEXin (KEFLEX) 500 MG capsule Take 1 capsule by mouth 3 times daily for 7 days Yes JOSE ELIAS Velarde   spironolactone (ALDACTONE) 25 MG tablet Take 1 tablet by mouth daily  ROSS Paul DO   triamcinolone (KENALOG) 0.1 % cream Apply topically 2 times daily.   ROSS Paul DO   ramipril (ALTACE) 1.25 MG capsule Take 1 capsule by mouth daily  ROSS Paul DO   metOLazone (ZAROXOLYN) 2.5 MG tablet Take 1 tablet by mouth daily  ROSS Paul DO   traZODone (DESYREL) 50 MG tablet Take 1 tablet by mouth nightly  JOSE ELIAS Miller   metFORMIN (GLUCOPHAGE) 1000 MG tablet Take 1 tablet by mouth 2 times daily (with meals)  ROSS Landen Vanegas DO   cyanocobalamin 1000 MCG/ML injection Inject 1 mL into the muscle every 30 days  B Landen Vanegas, DO   Multiple Vitamins-Minerals (THERAPEUTIC MULTIVITAMIN-MINERALS) tablet Take 1 tablet by mouth daily  Historical Provider, MD   Cholecalciferol (VITAMIN D3) 1.25 MG (05126 UT) CAPS Take 1 capsule by mouth  Historical Provider, MD   magnesium oxide (MAG-OX) 400 MG tablet Take 400 mg by mouth daily  Historical Provider, MD   torsemide (DEMADEX) 100 MG tablet Take 100 mg by mouth every other day Take 2 tablets by mouth every other day  Historical Provider, MD   potassium chloride (KLOR-CON M) 20 MEQ extended release tablet TAKE 1 TABLET BY MOUTH TWICE DAILY  B Landen Vanegas DO   levothyroxine (SYNTHROID) 25 MCG tablet Take 1 tablet by mouth Daily  JOSE ELIAS Jorge   metoprolol succinate (TOPROL XL) 25 MG extended release tablet Take 37.5 mg by mouth 2 times daily Take 1.5 tablets by mouth BID  Historical Provider, MD   clopidogrel (PLAVIX) 75 MG tablet Take 1 tablet by mouth daily  B Landen Vanegas DO   atorvastatin (LIPITOR) 40 MG tablet Take 1 tablet by mouth daily  Patient taking differently: Take 40 mg by mouth nightly   JOSE ELIAS Lovell   nitroGLYCERIN (NITROLINGUAL) 0.4 MG/SPRAY 0.4 mg spray Place 1 spray under the tongue every 5 minutes as needed for Chest pain  B Landen Vanegas DO   Omega-3 Fatty Acids (FISH OIL) 1000 MG CAPS Take 3,000 mg by mouth daily  Historical Provider, MD   diphenhydrAMINE (BENADRYL) 25 MG tablet Take 50 mg by mouth nightly as needed for Itching  Historical Provider, MD   aspirin 325 MG tablet Take 325 mg by mouth daily. Historical Provider, MD       Social History     Tobacco Use    Smoking status: Former Smoker     Packs/day: 1.00     Years: 30.00     Pack years: 30.00     Types: Cigars     Last attempt to quit: 3/3/2009     Years since quittin.4    Smokeless tobacco: Never Used   Substance Use Topics    Alcohol use:  No Alcohol/week: 0.0 standard drinks    Drug use: No        Allergies   Allergen Reactions    Codeine Itching   ,   Past Medical History:   Diagnosis Date    Arthritis     Basilar artery stenosis     CAD (coronary artery disease) 1997    [place]    Cancer ([address]     SKIN 6YRS AGO    CHF (congestive heart failure) (HCC)     Heel spur     Hyperlipidemia     Hypertension     Palliative care patient 10/17/2018    Sleep apnea     no c-pap    Type II or unspecified type diabetes mellitus without mention of complication, not stated as uncontrolled     Vertebral artery insufficiency    ,   Past Surgical History:   Procedure Laterality Date    ANGIOPLASTY Bilateral     basilar artery angioplasty    CARDIAC CATHETERIZATION  2018    [place]    CARPAL TUNNEL RELEASE      bilateral    CATARACT REMOVAL Bilateral     CHOLECYSTECTOMY      CORONARY ARTERY BYPASS GRAFT      3v    HEEL SPUR SURGERY      left    [place] EGD TRANSORAL BIOPSY SINGLE/MULTIPLE N/A 10/16/2018    Dr Julian Kerns-w/control of bleeding-Active bleeding in the stomach from Dieulafoy lesion    PTCA   [address]    with stents    TONSILLECTOMY     ,   Social History     Tobacco Use    Smoking status: Former Smoker     Packs/day: 1.00     Years: 30.00     Pack years: 30.00     Types: Cigars     Last attempt to quit: 3/3/2009     Years since quittin.4    Smokeless tobacco: Never Used   Substance Use Topics    Alcohol use: No     Alcohol/week: 0.0 standard drinks    Drug use: No   ,   Family History   Problem Relation Age of Onset    Heart Disease Father     High Blood Pressure Father     Other Father         Esophageal Varices   ,   Immunization History   Administered Date(s) Administered    Influenza 10/01/2015    Influenza Vaccine, unspecified formulation 10/01/2016    Influenza, High Dose (Fluzone 65 yrs and older) 10/09/2018    Pneumococcal Conjugate 13-valent (Kaylen Mcfadden) 2016   ,   Health Maintenance   Topic Date Due    AAA screen  1950    Hepatitis C screen  1950    Shingles Vaccine (1 of 2) 07/08/2000    Low dose CT lung screening  07/08/2005    Diabetic retinal exam  02/09/2017    Pneumococcal 65+ years Vaccine (2 of 2 - PPSV23) 03/30/2017    Diabetic foot exam  12/11/2019    Flu vaccine (1) 09/01/2020    Annual Wellness Visit (AWV)  10/31/2020    Colon cancer screen colonoscopy  01/20/2021    A1C test (Diabetic or Prediabetic)  02/20/2021    Lipid screen  02/20/2021    TSH testing  02/20/2021    Potassium monitoring  02/20/2021    Creatinine monitoring  02/20/2021    DTaP/Tdap/Td vaccine (2 - Td) 06/16/2024    Hepatitis A vaccine  Aged Out    Hib vaccine  Aged Out    Meningococcal (ACWY) vaccine  Aged Out       PHYSICAL EXAMINATION:  [ INSTRUCTIONS:  \"[x]\" Indicates a positive item  \"[]\" Indicates a negative item  -- DELETE ALL ITEMS NOT EXAMINED]  Vital Signs: (As obtained by patient/caregiver or practitioner observation)    Blood pressure-  Heart rate-    Respiratory rate-    Temperature-  Pulse oximetry-     Constitutional: [x] Appears well-developed and well-nourished [x] No apparent distress      [] Abnormal-   Mental status  [x] Alert and awake  [x] Oriented to person/place/time [x]Able to follow commands      Eyes:  EOM    []  Normal  [] Abnormal-  Sclera  []  Normal  [] Abnormal -         Discharge [x]  None visible  [] Abnormal -    HENT:   [] Normocephalic, atraumatic.   [] Abnormal   [x] Mouth/Throat: Mucous membranes are moist.     External Ears [x] Normal  [] Abnormal-     Neck: [x] No visualized mass     Pulmonary/Chest: [x] Respiratory effort normal.  [x] No visualized signs of difficulty breathing or respiratory distress        [] Abnormal-      Musculoskeletal:   [] Normal gait with no signs of ataxia         [x] Normal range of motion of neck        [] Abnormal-       Neurological:        [x] No Facial Asymmetry (Cranial nerve 7 motor function) (limited exam to video visit) [] No gaze palsy        [] Abnormal-         Skin:        [] No significant exanthematous lesions or discoloration noted on facial skin         [x] Abnormal-bite on the with erythema surrounding. I do not appreciate any necrotic tissue           Psychiatric:       [x] Normal Affect [] No Hallucinations        [] Abnormal-     Other pertinent observable physical exam findings-     ASSESSMENT/PLAN:    ICD-10-CM    1. Infected bite of lower leg, unspecified laterality, initial encounter  S81.859A cephALEXin (KEFLEX) 500 MG capsule    L08.9    2. Cellulitis of lower extremity, unspecified laterality  L03.119 cephALEXin (KEFLEX) 500 MG capsule       Return if symptoms worsen or fail to improve. Lambert Rocha is a 79 y.o. male being evaluated by a Virtual Visit (video visit) encounter to address concerns as mentioned above. Verbal consent was given to conduct a teleheath visit. A caregiver was present when appropriate. Due to this being a TeleHealth encounter (During ENB-95 public health emergency), evaluation of the following organ systems was limited: Vitals/Constitutional/EENT/Resp/CV/GI//MS/Neuro/Skin/Heme-Lymph-Imm. Pursuant to the emergency declaration under the [facility], [address] [place] [address] authority and the [employer] Act, this Virtual Visit was conducted with patient's (and/or legal guardian's) consent, to reduce the patient's risk of exposure to COVID-19 and provide necessary medical care. The patient (and/or legal guardian) has also been advised to contact this office for worsening conditions or problems, and seek emergency medical treatment and/or call 911 if deemed necessary. Services were provided through a video synchronous discussion virtually to substitute for in-person clinic visit.  Patient was located at their individual home and provider was located at the office of [facility]

## 2020-08-20 ASSESSMENT — ENCOUNTER SYMPTOMS
CONSTIPATION: 0
BLOOD IN STOOL: 0
COUGH: 0
DIARRHEA: 0
SORE THROAT: 0
WHEEZING: 0
RHINORRHEA: 0
EYE REDNESS: 0
CHOKING: 0
EYE DISCHARGE: 0

## 2020-08-27 ENCOUNTER — OFFICE VISIT (OUTPATIENT)
Dept: PRIMARY CARE CLINIC | Age: 70
End: 2020-08-27
Payer: MEDICARE

## 2020-08-27 VITALS
SYSTOLIC BLOOD PRESSURE: 118 MMHG | WEIGHT: 207.25 LBS | DIASTOLIC BLOOD PRESSURE: 72 MMHG | BODY MASS INDEX: 29.02 KG/M2 | OXYGEN SATURATION: 97 % | HEART RATE: 58 BPM | HEIGHT: 71 IN | TEMPERATURE: 97.7 F

## 2020-08-27 PROCEDURE — 99214 OFFICE O/P EST MOD 30 MIN: CPT | Performed by: PEDIATRICS

## 2020-08-27 PROCEDURE — 1036F TOBACCO NON-USER: CPT | Performed by: PEDIATRICS

## 2020-08-27 PROCEDURE — 2022F DILAT RTA XM EVC RTNOPTHY: CPT | Performed by: PEDIATRICS

## 2020-08-27 PROCEDURE — 1123F ACP DISCUSS/DSCN MKR DOCD: CPT | Performed by: PEDIATRICS

## 2020-08-27 PROCEDURE — G8427 DOCREV CUR MEDS BY ELIG CLIN: HCPCS | Performed by: PEDIATRICS

## 2020-08-27 PROCEDURE — 3044F HG A1C LEVEL LT 7.0%: CPT | Performed by: PEDIATRICS

## 2020-08-27 PROCEDURE — 4040F PNEUMOC VAC/ADMIN/RCVD: CPT | Performed by: PEDIATRICS

## 2020-08-27 PROCEDURE — G8417 CALC BMI ABV UP PARAM F/U: HCPCS | Performed by: PEDIATRICS

## 2020-08-27 PROCEDURE — 3017F COLORECTAL CA SCREEN DOC REV: CPT | Performed by: PEDIATRICS

## 2020-08-27 RX ORDER — LEVOTHYROXINE SODIUM 0.05 MG/1
50 TABLET ORAL DAILY
Qty: 30 TABLET | Refills: 5 | Status: SHIPPED | OUTPATIENT
Start: 2020-08-27 | End: 2020-12-23 | Stop reason: DRUGHIGH

## 2020-08-27 RX ORDER — ROSUVASTATIN CALCIUM 40 MG/1
40 TABLET, COATED ORAL DAILY
Qty: 90 TABLET | Refills: 3 | Status: SHIPPED | OUTPATIENT
Start: 2020-08-27 | End: 2021-07-14

## 2020-08-27 ASSESSMENT — PATIENT HEALTH QUESTIONNAIRE - PHQ9
1. LITTLE INTEREST OR PLEASURE IN DOING THINGS: 0
SUM OF ALL RESPONSES TO PHQ QUESTIONS 1-9: 0
SUM OF ALL RESPONSES TO PHQ QUESTIONS 1-9: 0
SUM OF ALL RESPONSES TO PHQ9 QUESTIONS 1 & 2: 0
2. FEELING DOWN, DEPRESSED OR HOPELESS: 0

## 2020-08-27 ASSESSMENT — ENCOUNTER SYMPTOMS
VOMITING: 0
NAUSEA: 0
WHEEZING: 0
COUGH: 0
CHEST TIGHTNESS: 0
SORE THROAT: 0
SHORTNESS OF BREATH: 1
DIARRHEA: 0
ABDOMINAL PAIN: 0
BACK PAIN: 0

## 2020-08-27 NOTE — PROGRESS NOTES
[address] [facility], [address]  Phone [phone]   Fax [phone]      OFFICE VISIT: 2020    Latia Rosario-: 1950      HPI  Reason For Visit:  Remi Saint is a 79 y.o.     3 Month Follow-Up (completed labs ordered on 20 at [facility] records); Insect Bite (had virtual visit with Meredith Jody on 20 for spide bite on right knee, she prescribed keflex, finished on tuesday. Still having pain that wakes him at night and tenderness in right knee); and Health Maintenance (shingles- declined, pneumonia-unsure where 2nd dose, diabetic foot-due today, diabetic eye- 3yrs or more. AAA-declined)    Patient presents on follow-up for multiple health issues.   Present concerns:  No new concerns    Congestive heart failure:  Type: Ischemic cardiomyopathy, systolic, (HFr EF)  Primary rhythm: Sinus rhythm  Medication:   Beta-blockade: Toprol-XL 37.5 mg twice daily   R AAS therapy: Ramipril 1.25 mg daily   Diuretics:  Zaroxolyn 2.5 mg and Demadex 100 mg every other day   Aldosterone inhibition: Aldactone 25 mg daily   Additional meds: None  Symptoms: Doing well and presently compensated  Cardiology follow-up: He follows with cardiology down in [address] [place] [address] pro [id number]  Most recent echo: Done down in [place] within the last 6 months      Coronary Artery Disease:  Medications:   Beta-blockade: Toprol-XL 37.5 mg twice daily   RAAS Therapy: Ramipril 1.25 mg daily   Lipid Management: Changed to Crestor today 40 mg daily   Platelet Inhibition: Plavix 75 mg daily and aspirin 325 mg daily   Anti-anginal:  Nitroglycerin 0.4 mg as needed  Symptoms: He denies any chest pain  Nitroglycerin use: He has never used nitro in the recent past  Cardiology follow-up: Down in [place]  Additional studies: As above          Diabetes Mellitus Type 2  Diet compliance:  compliant most of the time  Nutrition Consultation Needed:  no  Medication:              Metformin 1000 mg twice Date    ALT 13 06/02/2020    AST 15 06/02/2020       Hypothyroidism:  Medication:   Synthroid 50 mcg daily  Medication compliance:  compliant most of the time  Patient is  taking his medication consistently on an empty stomach. Symptoms: none. Laboratory:  Lab Results   Component Value Date    TSH 5.83 06/02/2020    TSH 5.500 (H) 02/20/2020    TSH 5.490 (H) 11/09/2018     Lab Results   Component Value Date    T4FREE 1.0 11/09/2018    T4FREE 1.0 03/24/2017    T4FREE 1.0 03/30/2016        height is 5' 11\" (1.803 m) and weight is 207 lb 4 oz (94 kg). His temporal temperature is 97.7 °F (36.5 °C). His blood pressure is 118/72 and his pulse is 58. His oxygen saturation is 97%. Body mass index is 28.91 kg/m². I have reviewed the following with the Mr. Daphne Julian   Lab Review  No visits with results within 6 Month(s) from this visit.    Latest known visit with results is:   Orders Only on 02/20/2020   Component Date Value    WBC 02/20/2020 8.5     RBC 02/20/2020 4.31*    Hemoglobin 02/20/2020 13.2*    Hematocrit 02/20/2020 41.4*    MCV 02/20/2020 96.1*    MCH 02/20/2020 30.6     MCHC 02/20/2020 31.9*    RDW 02/20/2020 16.3*    Platelets 50/51/5128 153     MPV 02/20/2020 10.8     Neutrophils % 02/20/2020 62.8     Lymphocytes % 02/20/2020 13.5*    Monocytes % 02/20/2020 9.7     Eosinophils % 02/20/2020 12.2*    Basophils % 02/20/2020 1.4*    Neutrophils Absolute 02/20/2020 5.3     Immature Granulocytes # 02/20/2020 0.0     Lymphocytes Absolute 02/20/2020 1.1     Monocytes Absolute 02/20/2020 0.80     Eosinophils Absolute 02/20/2020 1.00*    Basophils Absolute 02/20/2020 0.10     Sodium 02/20/2020 132*    Potassium 02/20/2020 3.2*    Chloride 02/20/2020 87*    CO2 02/20/2020 26     Anion Gap 02/20/2020 19     Glucose 02/20/2020 122*    BUN 02/20/2020 44*    CREATININE 02/20/2020 1.5*    GFR Non- 02/20/2020 46*    Calcium 02/20/2020 9.8     Total Protein 02/20/2020 8.2 Chest pain 1 Bottle 0    Omega-3 Fatty Acids (FISH OIL) 1000 MG CAPS Take 3,000 mg by mouth daily      diphenhydrAMINE (BENADRYL) 25 MG tablet Take 50 mg by mouth nightly as needed for Itching      aspirin 325 MG tablet Take 325 mg by mouth daily. No current facility-administered medications for this visit. Allergies: Codeine     Past Medical History:   Diagnosis Date    Arthritis     Basilar artery stenosis     CAD (coronary artery disease) 1997    MI    Cancer ([address]     SKIN 6YRS AGO    CHF (congestive heart failure) (HCC)     Heel spur     Hyperlipidemia     Hypertension     Palliative care patient 10/17/2018    Sleep apnea     no c-pap    Type II or unspecified type diabetes mellitus without mention of complication, not stated as uncontrolled     Vertebral artery insufficiency        Family History   Problem Relation Age of Onset    Heart Disease Father     High Blood Pressure Father     Other Father         Esophageal Varices       Past Surgical History:   Procedure Laterality Date    ANGIOPLASTY Bilateral     basilar artery angioplasty    [place]  2018    [place]    CARPAL TUNNEL RELEASE      bilateral    CATARACT REMOVAL Bilateral     CHOLECYSTECTOMY      CORONARY ARTERY BYPASS GRAFT      3v    HEEL SPUR SURGERY      left    NC EGD TRANSORAL BIOPSY SINGLE/MULTIPLE N/A 10/16/2018    Dr Anayeli Kerns-w/control of bleeding-Active bleeding in the stomach from [place] lesion    PTCA  2009 [place]    with stents    TONSILLECTOMY         Social History     Tobacco Use    Smoking status: Former Smoker     Packs/day: 1.00     Years: 30.00     Pack years: 30.00     Types: Cigars     Last attempt to quit: 3/3/2009     Years since quittin.4    Smokeless tobacco: Never Used   Substance Use Topics    Alcohol use: No     Alcohol/week: 0.0 standard drinks        Review of Systems   Constitutional: Positive for fatigue.  Negative for chills and fever (he is limited in his exercise capacity). HENT: Negative for congestion, ear pain and sore throat. Eyes: Negative for visual disturbance. Respiratory: Positive for shortness of breath (occasional orthopnea ). Negative for cough, chest tightness and wheezing. Cardiovascular: Positive for leg swelling (Very rarely). Negative for chest pain and palpitations. Gastrointestinal: Negative for abdominal pain, diarrhea, nausea and vomiting. Endocrine: Negative for polyuria. Genitourinary: Negative for frequency and urgency. Musculoskeletal: Negative. Negative for back pain and neck pain. Skin: Negative for rash. Neurological: Positive for weakness (generalized). Negative for dizziness and headaches. Psychiatric/Behavioral: Negative for self-injury. The patient is not nervous/anxious. Physical Exam  Constitutional:       General: He is not in acute distress. Appearance: He is well-developed and normal weight. HENT:      Head: Normocephalic and atraumatic. Right Ear: Hearing, tympanic membrane, ear canal and external ear normal. No middle ear effusion. Left Ear: Hearing, tympanic membrane, ear canal and external ear normal.      Nose: Nose normal. No mucosal edema (mild) or rhinorrhea (thin, clear). Mouth/Throat:      Mouth: Mucous membranes are moist.      Pharynx: Oropharynx is clear. No posterior oropharyngeal erythema. Eyes:      General: No scleral icterus. Extraocular Movements: Extraocular movements intact. Conjunctiva/sclera: Conjunctivae normal.      Pupils: Pupils are equal, round, and reactive to light. Neck:      Musculoskeletal: Normal range of motion and neck supple. Thyroid: No thyromegaly. Vascular: No carotid bruit or JVD. Cardiovascular:      Rate and Rhythm: Normal rate and regular rhythm. No extrasystoles are present. Chest Wall: PMI is not displaced.       Pulses:           Dorsalis pedis pulses are 0 on the right side and 0 on the left side. Heart sounds: S1 normal and S2 normal. Murmur present. No friction rub. No gallop. No S3 sounds. Comments: Feet are slightly cyanotic. Pulmonary:      Effort: Pulmonary effort is normal. No respiratory distress. Breath sounds: No decreased breath sounds, wheezing, rhonchi or rales. Abdominal:      General: Bowel sounds are normal.      Palpations: Abdomen is soft. Tenderness: There is no abdominal tenderness. There is no guarding or rebound. Genitourinary:     Comments: Exam deferred  Musculoskeletal: Normal range of motion. General: No tenderness. Right lower le+ Edema present. Left lower le+ Edema present. Lymphadenopathy:      Cervical: No cervical adenopathy. Skin:     General: Skin is warm and dry. Capillary Refill: Capillary refill takes less than 2 seconds. Findings: No rash. Neurological:      General: No focal deficit present. Mental Status: He is alert and oriented to person, place, and time. Sensory: No sensory deficit (no numbness or tingling). Psychiatric:         Mood and Affect: Mood normal.         Behavior: Behavior normal.           ASSESSMENT      ICD-10-CM    1. Mixed hyperlipidemia  E78.2 rosuvastatin (CRESTOR) 40 MG tablet   2. Acquired hypothyroidism  E03.9 levothyroxine (SYNTHROID) 50 MCG tablet     TSH without Reflex   3. Coronary artery disease involving native coronary artery of native heart without angina pectoris  I25.10    4. Type 2 diabetes mellitus without complication, without long-term current use of insulin (HCC)  E11.9 Hemoglobin A1C     Microalbumin / Creatinine Urine Ratio   5. Essential hypertension  I10 CBC Auto Differential     Comprehensive Metabolic Panel     Microalbumin / Creatinine Urine Ratio   6. Hyperlipidemia with target LDL less than 100  E78.5 Lipid Panel   7. Chronic systolic congestive heart failure (HCC)  I50.22 Brain Natriuretic Peptide   8.  Atherosclerosis of native artery of both lower extremities with intermittent claudication ([facility]  I70.213    9. Stage 3 chronic kidney disease (HCC)  N18.3    10. B12 deficiency  E53.8 Vitamin B12   11. Hyperuricemia  E79.0 Uric Acid         PLAN    1. Mixed hyperlipidemia  We will switch him from Lipitor 40 mg to Crestor 40 mg nightly  - rosuvastatin (CRESTOR) 40 MG tablet; Take 1 tablet by mouth daily  Dispense: 90 tablet; Refill: 3    2. Acquired hypothyroidism  We are going to increase him from 25 mcg to 50 mcg of Synthroid today  - levothyroxine (SYNTHROID) 50 MCG tablet; Take 1 tablet by mouth Daily  Dispense: 30 tablet; Refill: 5  - TSH without Reflex; Future    3. Coronary artery disease involving native coronary artery of native heart without angina pectoris  Stable without any anginal symptoms. Continue the same regimen    4. Type 2 diabetes mellitus without complication, without long-term current use of insulin ([facility]  Doing very well from a diabetes standpoint excellently controlled. No changes needed  - Hemoglobin A1C; Future  - Microalbumin / Creatinine Urine Ratio; Future    5. Essential hypertension  Blood pressure is excellently controlled on present regimen.  - CBC Auto Differential; Future  - Comprehensive Metabolic Panel; Future  - Microalbumin / Creatinine Urine Ratio; Future    6. Hyperlipidemia with target LDL less than 100  We are going to adjust his lipid management today to get this under better control  - Lipid Panel; Future    7. Chronic systolic congestive heart failure (HCC)  Presently compensated on medications  - Brain Natriuretic Peptide; Future    8. Atherosclerosis of native artery of both lower extremities with intermittent claudication (HCC)  Stable    9. Stage 3 chronic kidney disease ([facility]  This is also stable but slightly elevated creatinine due to mild dehydration    10. B12 deficiency  We will need to check a B12 level  He has he is stopped this replacement  - Vitamin B12; Future    11. Hyperuricemia  We discussed treating for hyperuricemia but we are going to hold off now as per his request.  If he has any gout symptoms he will let us know and we will initiate therapy at that time  - Uric Acid; Future      Orders Placed This Encounter   Procedures    CBC Auto Differential    Comprehensive Metabolic Panel    Hemoglobin A1C    Lipid Panel    Microalbumin / Creatinine Urine Ratio    Vitamin B12    Uric Acid    TSH without Reflex    Brain Natriuretic Peptide        Return in about 3 months (around 11/27/2020) for 30.      This was an in-house visit

## 2020-09-30 LAB
ALBUMIN SERPL-MCNC: 3.8 G/DL
ALP BLD-CCNC: 59 U/L
ALT SERPL-CCNC: 17 U/L
ANION GAP SERPL CALCULATED.3IONS-SCNC: 14 MMOL/L
AST SERPL-CCNC: 12 U/L
AVERAGE GLUCOSE: NORMAL
BASOPHILS ABSOLUTE: 0.12 /ΜL
BASOPHILS RELATIVE PERCENT: 1.9 %
BILIRUB SERPL-MCNC: 0.6 MG/DL (ref 0.1–1.4)
BUN BLDV-MCNC: 28 MG/DL
CALCIUM SERPL-MCNC: 9.3 MG/DL
CHLORIDE BLD-SCNC: 98 MMOL/L
CHOLESTEROL, TOTAL: 194 MG/DL
CHOLESTEROL/HDL RATIO: ABNORMAL
CO2: 25 MMOL/L
CREAT SERPL-MCNC: 1.44 MG/DL
CREATININE, URINE: 116.8
EOSINOPHILS ABSOLUTE: 0.99 /ΜL
EOSINOPHILS RELATIVE PERCENT: 15.5 %
GFR CALCULATED: 51.55
GLUCOSE BLD-MCNC: 135 MG/DL
HBA1C MFR BLD: 5.3 %
HCT VFR BLD CALC: 37.6 % (ref 41–53)
HDLC SERPL-MCNC: 32 MG/DL (ref 35–70)
HEMOGLOBIN: 12.5 G/DL (ref 13.5–17.5)
LDL CHOLESTEROL CALCULATED: 128 MG/DL (ref 0–160)
LYMPHOCYTES ABSOLUTE: 0 /ΜL
LYMPHOCYTES RELATIVE PERCENT: 16.4 %
MCH RBC QN AUTO: 32.8 PG
MCHC RBC AUTO-ENTMCNC: 33.2 G/DL
MCV RBC AUTO: 98.7 FL
MICROALBUMIN/CREAT 24H UR: 7.1 MG/G{CREAT}
MICROALBUMIN/CREAT UR-RTO: 6
MONOCYTES ABSOLUTE: 3.63 /ΜL
MONOCYTES RELATIVE PERCENT: 9.1 %
NEUTROPHILS ABSOLUTE: 0.3 /ΜL
NEUTROPHILS RELATIVE PERCENT: 56.8 %
NONHDLC SERPL-MCNC: ABNORMAL MG/DL
PDW BLD-RTO: 54.4 %
PLATELET # BLD: 177 K/ΜL
PMV BLD AUTO: 10.1 FL
POTASSIUM SERPL-SCNC: 3.8 MMOL/L
RBC # BLD: 3.81 10^6/ΜL
SODIUM BLD-SCNC: 133 MMOL/L
TOTAL PROTEIN: 7.7
TRIGL SERPL-MCNC: 172 MG/DL
TSH SERPL DL<=0.05 MIU/L-ACNC: 3.26 UIU/ML
VITAMIN B-12: 369
VLDLC SERPL CALC-MCNC: ABNORMAL MG/DL
WBC # BLD: 6.4 10^3/ML

## 2020-10-01 ENCOUNTER — TELEPHONE (OUTPATIENT)
Dept: PRIMARY CARE CLINIC | Age: 70
End: 2020-10-01

## 2020-10-01 NOTE — TELEPHONE ENCOUNTER
----- Message from [address] [facility],[address], DO sent at 9/30/2020  3:42 PM CDT -----  There is no significant protein excretion in the urine. Hemoglobin continues to be quite variable. This most likely reflects volume status changes. Recommend continue to watch for potential blood loss via GI tract in the stools  Lipids are significantly increased from what they were 4 months ago. This is very concerning as this does set you up at increased risk for additional heart attack and stroke. It is absolutely essential that you take your Crestor on a nightly basis. If you have been taking Crestor then we are going to need to add something to this. 1 option is to see if we can get Repatha again this is the injectable medication for cholesterol. It is somewhat easier to get with insurance companies now compared to what it was even a year ago. Vitamin B12 level is in the normal range this is less than 400. Sometimes we can see symptoms of neuropathic etiology with B12 levels less than 400. If you are experiencing any of the symptoms, we can supplement with oral B12 supplementation or even B12 injections. Hemoglobin A1c is 5.3 which indicates excellent blood sugar control over the past 3 months. Thyroid is normal.  Your metabolic profile is normal.  This includes kidney and liver functions as well as electrolytes.

## 2020-10-12 RX ORDER — SPIRONOLACTONE 25 MG/1
25 TABLET ORAL DAILY
Qty: 90 TABLET | Refills: 3 | Status: SHIPPED | OUTPATIENT
Start: 2020-10-12 | End: 2021-08-27

## 2020-10-12 NOTE — TELEPHONE ENCOUNTER
Received fax from pharmacy requesting refill on pts medication(s). Pt was last seen in office on 8/27/2020  and has a follow up scheduled for 11/30/2020. Will send request to  Dr. Kylah Almanza  for patient.      Requested Prescriptions     Pending Prescriptions Disp Refills    spironolactone (ALDACTONE) 25 MG tablet 30 tablet 3     Sig: Take 1 tablet by mouth daily

## 2020-10-30 RX ORDER — METOLAZONE 2.5 MG/1
2.5 TABLET ORAL DAILY
Qty: 90 TABLET | Refills: 3 | Status: SHIPPED | OUTPATIENT
Start: 2020-10-30

## 2020-11-06 ENCOUNTER — PATIENT MESSAGE (OUTPATIENT)
Dept: PRIMARY CARE CLINIC | Age: 70
End: 2020-11-06

## 2020-11-10 RX ORDER — PREGABALIN 75 MG/1
75 CAPSULE ORAL 2 TIMES DAILY
Qty: 60 CAPSULE | Refills: 5 | Status: SHIPPED | OUTPATIENT
Start: 2020-11-10 | End: 2020-11-30 | Stop reason: SINTOL

## 2020-11-17 RX ORDER — LEVOTHYROXINE SODIUM 0.05 MG/1
50 TABLET ORAL DAILY
Qty: 90 TABLET | Refills: 3 | Status: SHIPPED | OUTPATIENT
Start: 2020-11-17 | End: 2020-11-30

## 2020-11-30 ENCOUNTER — OFFICE VISIT (OUTPATIENT)
Dept: PRIMARY CARE CLINIC | Age: 70
End: 2020-11-30
Payer: MEDICARE

## 2020-11-30 VITALS
HEART RATE: 104 BPM | WEIGHT: 206.5 LBS | HEIGHT: 71 IN | OXYGEN SATURATION: 99 % | BODY MASS INDEX: 28.91 KG/M2 | TEMPERATURE: 96.7 F | SYSTOLIC BLOOD PRESSURE: 122 MMHG | DIASTOLIC BLOOD PRESSURE: 80 MMHG

## 2020-11-30 PROCEDURE — 2022F DILAT RTA XM EVC RTNOPTHY: CPT | Performed by: PEDIATRICS

## 2020-11-30 PROCEDURE — G8417 CALC BMI ABV UP PARAM F/U: HCPCS | Performed by: PEDIATRICS

## 2020-11-30 PROCEDURE — 3017F COLORECTAL CA SCREEN DOC REV: CPT | Performed by: PEDIATRICS

## 2020-11-30 PROCEDURE — G8484 FLU IMMUNIZE NO ADMIN: HCPCS | Performed by: PEDIATRICS

## 2020-11-30 PROCEDURE — 1036F TOBACCO NON-USER: CPT | Performed by: PEDIATRICS

## 2020-11-30 PROCEDURE — 4040F PNEUMOC VAC/ADMIN/RCVD: CPT | Performed by: PEDIATRICS

## 2020-11-30 PROCEDURE — 1123F ACP DISCUSS/DSCN MKR DOCD: CPT | Performed by: PEDIATRICS

## 2020-11-30 PROCEDURE — 3044F HG A1C LEVEL LT 7.0%: CPT | Performed by: PEDIATRICS

## 2020-11-30 PROCEDURE — 99214 OFFICE O/P EST MOD 30 MIN: CPT | Performed by: PEDIATRICS

## 2020-11-30 PROCEDURE — G8427 DOCREV CUR MEDS BY ELIG CLIN: HCPCS | Performed by: PEDIATRICS

## 2020-11-30 ASSESSMENT — ENCOUNTER SYMPTOMS
SHORTNESS OF BREATH: 1
CHEST TIGHTNESS: 0
VOMITING: 0
BACK PAIN: 0
ABDOMINAL PAIN: 0
COUGH: 0
WHEEZING: 0
DIARRHEA: 0
NAUSEA: 0
SORE THROAT: 0

## 2020-11-30 ASSESSMENT — LIFESTYLE VARIABLES
HOW OFTEN DURING THE LAST YEAR HAVE YOU HAD A FEELING OF GUILT OR REMORSE AFTER DRINKING: 0
AUDIT-C TOTAL SCORE: 1
HAS A RELATIVE, FRIEND, DOCTOR, OR ANOTHER HEALTH PROFESSIONAL EXPRESSED CONCERN ABOUT YOUR DRINKING OR SUGGESTED YOU CUT DOWN: 0
HOW MANY STANDARD DRINKS CONTAINING ALCOHOL DO YOU HAVE ON A TYPICAL DAY: 0
HOW OFTEN DO YOU HAVE A DRINK CONTAINING ALCOHOL: 1
HAVE YOU OR SOMEONE ELSE BEEN INJURED AS A RESULT OF YOUR DRINKING: 0
HOW OFTEN DO YOU HAVE SIX OR MORE DRINKS ON ONE OCCASION: 0
HOW OFTEN DURING THE LAST YEAR HAVE YOU FAILED TO DO WHAT WAS NORMALLY EXPECTED FROM YOU BECAUSE OF DRINKING: 0
HOW OFTEN DURING THE LAST YEAR HAVE YOU FOUND THAT YOU WERE NOT ABLE TO STOP DRINKING ONCE YOU HAD STARTED: 0
AUDIT TOTAL SCORE: 1
HOW OFTEN DURING THE LAST YEAR HAVE YOU BEEN UNABLE TO REMEMBER WHAT HAPPENED THE NIGHT BEFORE BECAUSE YOU HAD BEEN DRINKING: 0
HOW OFTEN DURING THE LAST YEAR HAVE YOU NEEDED AN ALCOHOLIC DRINK FIRST THING IN THE MORNING TO GET YOURSELF GOING AFTER A NIGHT OF HEAVY DRINKING: 0

## 2020-11-30 ASSESSMENT — PATIENT HEALTH QUESTIONNAIRE - PHQ9
SUM OF ALL RESPONSES TO PHQ9 QUESTIONS 1 & 2: 0
SUM OF ALL RESPONSES TO PHQ QUESTIONS 1-9: 0
1. LITTLE INTEREST OR PLEASURE IN DOING THINGS: 0
2. FEELING DOWN, DEPRESSED OR HOPELESS: 0

## 2020-11-30 NOTE — PATIENT INSTRUCTIONS
Personalized Preventive Plan for Elsa Form - 11/30/2020  Medicare offers a range of preventive health benefits. Some of the tests and screenings are paid in full while other may be subject to a deductible, co-insurance, and/or copay. Some of these benefits include a comprehensive review of your medical history including lifestyle, illnesses that may run in your family, and various assessments and screenings as appropriate. After reviewing your medical record and screening and assessments performed today your provider may have ordered immunizations, labs, imaging, and/or referrals for you. A list of these orders (if applicable) as well as your Preventive Care list are included within your After Visit Summary for your review. Other Preventive Recommendations:    · A preventive eye exam performed by an eye specialist is recommended every 1-2 years to screen for glaucoma; cataracts, macular degeneration, and other eye disorders. · A preventive dental visit is recommended every 6 months. · Try to get at least 150 minutes of exercise per week or 10,000 steps per day on a pedometer . · Order or download the FREE \"Exercise & Physical Activity: Your Everyday Guide\" from [employer]. Call 7-770.372.3278 or search [employer] online. · You need 5507-2874 mg of calcium and 4587-0678 IU of vitamin D per day. It is possible to meet your calcium requirement with diet alone, but a vitamin D supplement is usually necessary to meet this goal.  · When exposed to the sun, use a sunscreen that protects against both UVA and UVB radiation with an SPF of 30 or greater. Reapply every 2 to 3 hours or after sweating, drying off with a towel, or swimming. · Always wear a seat belt when traveling in a car. Always wear a helmet when riding a bicycle or motorcycle.   Patient Education        Well Visit, Over 72: Care Instructions  Your Care Instructions     Physical exams can help you stay healthy. Your doctor has checked your overall health and may have suggested ways to take good care of yourself. He or she also may have recommended tests. At home, you can help prevent illness with healthy eating, regular exercise, and other steps. Follow-up care is a key part of your treatment and safety. Be sure to make and go to all appointments, and call your doctor if you are having problems. It's also a good idea to know your test results and keep a list of the medicines you take. How can you care for yourself at home? Reach and stay at a healthy weight. This will lower your risk for many problems, such as obesity, diabetes, heart disease, and high blood pressure. Get at least 30 minutes of exercise on most days of the week. Walking is a good choice. You also may want to do other activities, such as running, swimming, cycling, or playing tennis or team sports. Do not smoke. Smoking can make health problems worse. If you need help quitting, talk to your doctor about stop-smoking programs and medicines. These can increase your chances of quitting for good. Protect your skin from too much sun. When you're outdoors from 10 a.m. to 4 p.m., stay in the shade or cover up with clothing and a hat with a wide brim. Wear sunglasses that block UV rays. Even when it's cloudy, put broad-spectrum sunscreen (SPF 30 or higher) on any exposed skin. See a dentist one or two times a year for checkups and to have your teeth cleaned. Wear a seat belt in the car. Follow your doctor's advice about when to have certain tests. These tests can spot problems early. For men and women  Cholesterol. Your doctor will tell you how often to have this done based on your overall health and other things that can increase your risk for heart attack and stroke. Blood pressure. Have your blood pressure checked during a routine doctor visit.  Your doctor will tell you how often to check your blood pressure based on your age, your blood pressure results, and other factors. Diabetes. Ask your doctor whether you should have tests for diabetes. Vision. Experts recommend that you have yearly exams for glaucoma and other age-related eye problems. Hearing. Tell your doctor if you notice any change in your hearing. You can have tests to find out how well you hear. Colon cancer tests. Keep having colon cancer tests as your doctor recommends. You can have one of several types of tests. Heart attack and stroke risk. At least every 4 to 6 years, you should have your risk for heart attack and stroke assessed. Your doctor uses factors such as your age, blood pressure, cholesterol, and whether you smoke or have diabetes to show what your risk for a heart attack or stroke is over the next 10 years. Osteoporosis. Talk to your doctor about whether you should have a bone density test to find out whether you have thinning bones. Ask your doctor if you need to take a calcium plus vitamin D supplement. You may be able to get enough calcium and vitamin D through your diet. For women  Pap test and pelvic exam. You may no longer need a Pap test. Talk with your doctor about whether to stop or continue to have Pap tests. Breast exam and mammogram. Ask how often you should have a mammogram, which is an X-ray of your breasts. A mammogram can spot breast cancer before it can be felt and when it is easiest to treat. Thyroid disease. Talk to your doctor about whether to have your thyroid checked as part of a regular physical exam. Women have an increased chance of a thyroid problem. For men  Prostate exam. Talk to your doctor about whether you should have a blood test (called a PSA test) for prostate cancer. Experts recommend that you discuss the benefits and risks of the test with your doctor before you decide whether to have this test. Some experts say that men ages 79 and older no longer need testing. Abdominal aortic aneurysm.  Ask your doctor whether you should have a test to check for an aneurysm. You may need a test if you ever smoked or if your parent, brother, sister, or child has had an aneurysm. When should you call for help? Watch closely for changes in your health, and be sure to contact your doctor if you have any problems or symptoms that concern you. Where can you learn more? Go to https://chpepiceweb.[employer]. org and sign in to your Trumaker account. Enter A645 in the YourEncore box to learn more about \"Well Visit, Over 65: Care Instructions. \"     If you do not have an account, please click on the \"Sign Up Now\" link. Current as of: May 27, 2020               Content Version: 12.6  © 2006-2020 [email or web address]. Care instructions adapted under license by [facility] ([facility]). If you have questions about a medical condition or this instruction, always ask your healthcare professional. [place] [address] any warranty or liability for your use of this information. Patient Education        Learning About Dental Care for Older Adults  Dental care for older adults: Overview  Dental care for older people is much the same as for younger adults. But older adults do have concerns that younger adults do not. Older adults may have problems with gum disease and decay on the roots of their teeth. They may need missing teeth replaced or broken fillings fixed. Or they may have dentures that need to be cared for. Some older adults may have trouble holding a toothbrush. You can help remind the person you are caring for to brush and floss their teeth or to clean their dentures. In some cases, you may need to do the brushing and other dental care tasks. People who have trouble using their hands or who have dementia may need this extra help. How can you help with dental care?   Normal dental care  To keep the teeth and gums healthy:  Brush the teeth with fluoride toothpaste twice a day--in the morning and at night--and floss for them. It may be easiest to have the person sit and face away from you, and to sit or stand behind them. That way you can steady their head against your arm as you reach around to floss and brush their teeth. Choose a place that has good lighting and is comfortable for both of you. Before you begin, gather your supplies. You will need gloves, floss, a toothbrush, and a container to hold water if you are not near a sink. Wash and dry your hands well and put on gloves. Start by flossing:  Gently work a piece of floss between each of the teeth toward the gums. A plastic flossing tool may make this easier, and they are available at [facility]. Curve the floss around each tooth into a U-shape and gently slide it under the gum line. Move the floss firmly up and down several times to scrape off the plaque. After you've finished flossing, throw away the used floss and begin brushing:  Wet the brush and apply toothpaste. Place the brush at a 45-degree angle where the teeth meet the gums. Press firmly, and move the brush in small circles over the surface of the teeth. Be careful not to brush too hard. Vigorous brushing can make the gums pull away from the teeth and can scratch the tooth enamel. Brush all surfaces of the teeth, on the tongue side and on the cheek side. Pay special attention to the front teeth and all surfaces of the back teeth. Brush chewing surfaces with short back-and-forth strokes. After you've finished, help the person rinse the remaining toothpaste from their mouth. Where can you learn more? Go to https://Superprotonicmaicol.[employer]. org and sign in to your Glassbeam account. Enter A802 in the Blink Messenger box to learn more about \"Learning About Dental Care for Older Adults. \"     If you do not have an account, please click on the \"Sign Up Now\" link. Current as of: December 9, 2019               Content Version: 12.6  © 7124-5869 [employer].    Care instructions adapted under license by [facility] ([facility]). If you have questions about a medical condition or this instruction, always ask your healthcare professional. [address] any warranty or liability for your use of this information. Patient Education        Learning About Dental Care  What is basic dental care? Basic dental care involves brushing and flossing your teeth regularly to remove plaque. Plaque is a thin film of bacteria that sticks to teeth above and below the gum line. It can build up and harden into tartar, which makes it harder to give the teeth a good cleaning. Tartar usually has to be removed by a dental hygienist.  The bacteria in plaque use sugars to make acids. These acids can damage the gums and teeth. Be sure to see your dentist and dental hygienist for regular checkups and cleanings. How can dental care affect your health? Practicing basic dental care:  Removes plaque that can cause gum disease and tooth decay. Tooth decay can lead to a hole in the tooth (cavity). Helps prevent bad breath. Brushing and flossing rid your mouth of the bacteria that cause bad breath. Helps keep teeth white by preventing staining from food, drinks, and tobacco.  Makes it possible for your teeth to last a lifetime. What can you do to prevent dental problems? Brush your teeth twice a day, in the morning and at night. Use a toothbrush with soft, rounded-end bristles and a head that is small enough to reach all parts of your teeth and mouth. Replace your toothbrush every 3 to 4 months. Use a fluoride toothpaste. Place the brush at a 45-degree angle where the teeth meet the gums. Press firmly, and gently rock the brush back and forth using small circular movements. Brush chewing surfaces vigorously with short back-and-forth strokes. Brush your tongue from back to front. Floss at least once a day.  Choose the type and flavor you like best.  Schedule checkups and cleanings as often as your dentist recommends it. Eat a healthy diet to help keep your gums healthy and your teeth strong. Choose foods that are good for your teeth, such as whole grains, vegetables, fruits, and foods that are low in saturated fat and sodium. Mozzarella and other cheeses, peanuts, yogurt, and milk are good for your teeth. Sugar-free chewing gum (especially gum that contains xylitol) is also a good choice. Avoid foods that contain a lot of sugar, especially sticky, sweet foods like taffy. Don't snack before bedtime. Food left on the teeth is more likely to cause tooth decay overnight. Don't smoke or use smokeless tobacco. Tobacco can make tooth decay worse. If you need help quitting, talk to your doctor about stop-smoking programs and medicines. These can increase your chances of quitting for good. Where can you learn more? Go to https://SPD Control Systemsperylaneweb.[employer]. org and sign in to your CryptoSeal account. Enter I765 in the Insmed box to learn more about \"Learning About Dental Care. \"     If you do not have an account, please click on the \"Sign Up Now\" link. Current as of: March 25, 2020               Content Version: 12.6  © 9175-9325 [employer]. Care instructions adapted under license by [facility] ([facility]). If you have questions about a medical condition or this instruction, always ask your healthcare professional. [address] any warranty or liability for your use of this information.

## 2020-11-30 NOTE — PROGRESS NOTES
daily  Medication compliance: Nori Hdz was some question as to whether he should be on it during his hospitalization.  The hospitalist recommended that he stop it (jardiance)  Weight trend: increasing  Current exercise: He is trying to exercise as much as he can  Checking: Periodically  Home blood sugar records: fasting range: Controlled when he checks. Low BG:  no  Eye exam current (within one year): yes  Checking Feet regularly:  yes -no sores  ACE/ARB:  Ramipril 1.25mg daily  Tobacco history: He  reports that he quit smoking about 11 years ago. His smoking use included cigars. He has a 30.00 pack-year smoking history. He has never used smokeless tobacco.    Lab Results   Component Value Date    LABA1C 5.3 09/23/2020    LABA1C 6.2 06/02/2020    LABA1C 6.6 (H) 02/20/2020     Lab Results   Component Value Date    LABMICR 1.40 11/09/2018    CREATININE 1.44 09/23/2020       Hypertension:   BP today was   BP Readings from Last 1 Encounters:   11/30/20 122/80      Recent BP readings:    BP Readings from Last 3 Encounters:   11/30/20 122/80   08/27/20 118/72   05/27/20 108/70     Medication   Ramipril 1.25 mg daily   Spironolactone 25 mg daily   Demadex 100 mg every other day    Potassium chloride 20 mEq twice daily   Zaroxolyn 2.5 mg daily as needed  Medication compliance:  compliant most of the time  Home blood pressure monitoring: Yes - well controlled. He is adherent to a low sodium diet. Symptoms: none  Laboratory:  Lab Results   Component Value Date    BUN 28 09/23/2020    CREATININE 1.44 09/23/2020       Hyperlipidemia:   Medication:   rosuvastatin (Crestor)  Low Fat, Low Choleterol Diet:  yes - he tries  Myalgias or GI upset: no  The patient exercises intermittently.   Laboratory:    Lab Results   Component Value Date    CHOL 194 09/23/2020    TRIG 172 09/23/2020    HDL 32 (A) 09/23/2020    LDLCALC 128 09/23/2020    LDLDIRECT 111 02/14/2013      Lab Results   Component Value Date    ALT 17 09/23/2020    AST 12 09/23/2020       Hypothyroidism:  Medication:   Synthroid 50 mcg daily  Medication compliance:  compliant most of the time  Patient is  taking his medication consistently on an empty stomach. Symptoms: none. Laboratory:  Lab Results   Component Value Date    TSH 3.26 09/23/2020    TSH 5.83 06/02/2020    TSH 5.500 (H) 02/20/2020     Lab Results   Component Value Date    T4FREE 1.0 11/09/2018    T4FREE 1.0 03/24/2017    T4FREE 1.0 03/30/2016       CKD 3:  He is not on any nephrotoxic drugs at this time. Laboratory:  Lab Results   Component Value Date    BUN 28 09/23/2020    CREATININE 1.44 09/23/2020          height is 5' 11\" (1.803 m) and weight is 206 lb 8 oz (93.7 kg). His temporal temperature is 96.7 °F (35.9 °C). His blood pressure is 122/80 and his pulse is 104. His oxygen saturation is 99%. Body mass index is 28.8 kg/m².     I have reviewed the following with the Mr. Keith Andujar   Lab Review  Orders Only on 09/30/2020   Component Date Value    Sodium 09/23/2020 133     Chloride 09/23/2020 98     Potassium 09/23/2020 3.8     BUN 09/23/2020 28     CREATININE 09/23/2020 1.44     Glucose 09/23/2020 135     AST 09/23/2020 12     ALT 09/23/2020 17     Calcium 09/23/2020 9.3     Total Protein 09/23/2020 7.7     CO2 09/23/2020 25     Alb 09/23/2020 3.8     Alkaline Phosphatase 09/23/2020 59     Total Bilirubin 09/23/2020 0.6     Gfr Calculated 09/23/2020 51.55     Anion Gap 09/23/2020 14.0     Hemoglobin A1C 09/23/2020 5.3     TSH 09/23/2020 3.26     Vitamin B-12 09/23/2020 369     Cholesterol, Total 09/23/2020 194     HDL 09/23/2020 32*    LDL Calculated 09/23/2020 128     Triglycerides 09/23/2020 172     WBC 09/23/2020 6.4     RBC 09/23/2020 3.81     Hemoglobin 09/23/2020 12.5*    Hematocrit 09/23/2020 37.6*    MCV 09/23/2020 98.7     MCH 09/23/2020 32.8     MCHC 09/23/2020 33.2     Platelets 72/46/9344 177     RDW 09/23/2020 54.4     MPV 09/23/2020 10.1     Neutrophils % 09/23/2020 56.8     Lymphocytes % 09/23/2020 16.4     Monocytes % 09/23/2020 9.1     Eosinophils % 09/23/2020 15.5     Basophils % 09/23/2020 1.9     Neutrophils Absolute 09/23/2020 0.3     Lymphocytes Absolute 09/23/2020 0.0     Monocytes Absolute 09/23/2020 3.63     Eosinophils Absolute 09/23/2020 0.99     Basophils Absolute 09/23/2020 0.12     Microalbumin Creatinine * 09/23/2020 6     Microalb, Ur 09/23/2020 7.1     Creatinine, Urine 09/23/2020 116.8    Orders Only on 08/27/2020   Component Date Value    WBC 06/02/2020 9.2     Hemoglobin 06/02/2020 14.3     Hematocrit 06/02/2020 43.1     Platelets 46/09/3699 165     Neutrophils % 06/02/2020 58.4     Lymphocytes % 06/02/2020 16.5     Monocytes % 06/02/2020 10.3     Eosinophils % 06/02/2020 12.8     Basophils % 06/02/2020 1.7     Neutrophils Absolute 06/02/2020 5.34     Lymphocytes Absolute 06/02/2020 1.51     Monocytes Absolute 06/02/2020 0.94     Eosinophils Absolute 06/02/2020 1.17     Basophils Absolute 06/02/2020 0.16     RBC 06/02/2020 4.52     MCV 06/02/2020 95.4     MCH 06/02/2020 31.6     MCHC 06/02/2020 33.2     MPV 06/02/2020 9.8     Sodium 06/02/2020 1,300     Chloride 06/02/2020 91     Potassium 06/02/2020 3.5     BUN 06/02/2020 31     CREATININE 06/02/2020 1.83     Glucose 06/02/2020 97     AST 06/02/2020 15     ALT 06/02/2020 13     Calcium 06/02/2020 9.9     Total Protein 06/02/2020 9.5     CO2 06/02/2020 28     Alb 06/02/2020 4.3     Alkaline Phosphatase 06/02/2020 98     Total Bilirubin 06/02/2020 1.2     Gfr Calculated 06/02/2020 39.21     Anion Gap 06/02/2020 15.0     Cholesterol, Total 06/02/2020 131     HDL 06/02/2020 28*    LDL Calculated 06/02/2020 79     Triglycerides 06/02/2020 122     Hemoglobin A1C 06/02/2020 6.2     BNP 06/02/2020 4,158     Microalbumin Creatinine * 06/02/2020 <3     Microalb, Ur 06/02/2020 <7     Creatinine, Urine 06/02/2020 42.8     TSH 06/02/2020 5.83     T4, Total 06/02/2020 1.19      Copies of these are in the chart. Current Outpatient Medications   Medication Sig Dispense Refill    metOLazone (ZAROXOLYN) 2.5 MG tablet Take 1 tablet by mouth daily 90 tablet 3    spironolactone (ALDACTONE) 25 MG tablet Take 1 tablet by mouth daily 90 tablet 3    levothyroxine (SYNTHROID) 50 MCG tablet Take 1 tablet by mouth Daily 30 tablet 5    rosuvastatin (CRESTOR) 40 MG tablet Take 1 tablet by mouth daily 90 tablet 3    triamcinolone (KENALOG) 0.1 % cream Apply topically 2 times daily. 80 g 5    ramipril (ALTACE) 1.25 MG capsule Take 1 capsule by mouth daily 90 capsule 3    traZODone (DESYREL) 50 MG tablet Take 1 tablet by mouth nightly 90 tablet 3    metFORMIN (GLUCOPHAGE) 1000 MG tablet Take 1 tablet by mouth 2 times daily (with meals) 180 tablet 3    Multiple Vitamins-Minerals (THERAPEUTIC MULTIVITAMIN-MINERALS) tablet Take 1 tablet by mouth daily      Cholecalciferol (VITAMIN D3) 1.25 MG (62386 UT) CAPS Take 1 capsule by mouth      magnesium oxide (MAG-OX) 400 MG tablet Take 400 mg by mouth daily      torsemide (DEMADEX) 100 MG tablet Take 100 mg by mouth every other day Take 2 tablets by mouth every other day      potassium chloride (KLOR-CON M) 20 MEQ extended release tablet TAKE 1 TABLET BY MOUTH TWICE DAILY 180 tablet 3    clopidogrel (PLAVIX) 75 MG tablet Take 1 tablet by mouth daily 90 tablet 3    nitroGLYCERIN (NITROLINGUAL) 0.4 MG/SPRAY 0.4 mg spray Place 1 spray under the tongue every 5 minutes as needed for Chest pain 1 Bottle 0    Omega-3 Fatty Acids (FISH OIL) 1000 MG CAPS Take 3,000 mg by mouth daily      diphenhydrAMINE (BENADRYL) 25 MG tablet Take 50 mg by mouth nightly as needed for Itching      aspirin 325 MG tablet Take 325 mg by mouth daily. No current facility-administered medications for this visit.         Allergies: Codeine     Past Medical History:   Diagnosis Date    Arthritis     Basilar artery stenosis     CAD (coronary artery disease) 1997    MI    Cancer [facility])     SKIN 6YRS AGO    CHF (congestive heart failure) (HCC)     Heel spur     Hyperlipidemia     Hypertension     Palliative care patient 10/17/2018    Sleep apnea     no c-pap    Type II or unspecified type diabetes mellitus without mention of complication, not stated as uncontrolled     Vertebral artery insufficiency        Family History   Problem Relation Age of Onset    Heart Disease Father     High Blood Pressure Father     Other Father         Esophageal Varices       Past Surgical History:   Procedure Laterality Date    ANGIOPLASTY Bilateral     basilar artery angioplasty    CARDIAC [place]TERIZATION  2018    [place]    CARPAL TUNNEL RELEASE      bilateral    CATARACT REMOVAL Bilateral     CHOLECYSTECTOMY      CORONARY ARTERY BYPASS GRAFT      3v    HEEL SPUR SURGERY      left    [place] EGD TRANSORAL BIOPSY SINGLE/MULTIPLE N/A 10/16/2018    Dr Luc Kerns-w/control of bleeding-Active bleeding in the stomach from [place] lesion    PTCA  2009 [place]    with stents    TONSILLECTOMY         Social History     Tobacco Use    Smoking status: Former Smoker     Packs/day: 1.00     Years: 30.00     Pack years: 30.00     Types: Cigars     Last attempt to quit: 3/3/2009     Years since quittin.7    Smokeless tobacco: Never Used   Substance Use Topics    Alcohol use: No     Alcohol/week: 0.0 standard drinks        Review of Systems   Constitutional: Positive for fatigue. Negative for chills and fever (he is limited in his exercise capacity). HENT: Negative for congestion, ear pain and sore throat. Eyes: Negative for visual disturbance. Respiratory: Positive for shortness of breath (occasional orthopnea ). Negative for cough, chest tightness and wheezing. Cardiovascular: Positive for leg swelling (Very rarely). Negative for chest pain and palpitations.    Gastrointestinal: Negative for abdominal pain, diarrhea, nausea and vomiting. Endocrine: Negative for polyuria. Genitourinary: Negative for frequency and urgency. Musculoskeletal: Negative. Negative for back pain and neck pain. Skin: Negative for rash. Neurological: Positive for weakness (generalized). Negative for dizziness and headaches. Psychiatric/Behavioral: Negative for self-injury. The patient is not nervous/anxious. Physical Exam  Constitutional:       General: He is not in acute distress. Appearance: He is well-developed and normal weight. HENT:      Head: Normocephalic and atraumatic. Right Ear: Hearing, tympanic membrane, ear canal and external ear normal. No middle ear effusion. Left Ear: Hearing, tympanic membrane, ear canal and external ear normal.      Nose: Nose normal. No mucosal edema (mild) or rhinorrhea (thin, clear). Mouth/Throat:      Mouth: Mucous membranes are moist.      Pharynx: Oropharynx is clear. No posterior oropharyngeal erythema. Eyes:      General: No scleral icterus. Extraocular Movements: Extraocular movements intact. Conjunctiva/sclera: Conjunctivae normal.      Pupils: Pupils are equal, round, and reactive to light. Neck:      Musculoskeletal: Normal range of motion and neck supple. Thyroid: No thyromegaly. Vascular: No carotid bruit or JVD. Cardiovascular:      Rate and Rhythm: Normal rate and regular rhythm. No extrasystoles are present. Chest Wall: PMI is not displaced. Pulses:           Dorsalis pedis pulses are 0 on the right side and 0 on the left side. Heart sounds: S1 normal and S2 normal. Murmur present. No friction rub. No gallop. No S3 sounds. Comments: Feet are slightly cyanotic. Pulmonary:      Effort: Pulmonary effort is normal. No respiratory distress. Breath sounds: No decreased breath sounds, wheezing, rhonchi or rales. Abdominal:      General: Bowel sounds are normal.      Palpations: Abdomen is soft. Tenderness:  There is no abdominal tenderness. There is no guarding or rebound. Genitourinary:     Comments: Exam deferred  Musculoskeletal: Normal range of motion. General: No tenderness. Right lower le+ Edema present. Left lower le+ Edema present. Lymphadenopathy:      Cervical: No cervical adenopathy. Skin:     General: Skin is warm and dry. Capillary Refill: Capillary refill takes less than 2 seconds. Findings: No rash. Neurological:      General: No focal deficit present. Mental Status: He is alert and oriented to person, place, and time. Sensory: No sensory deficit (no numbness or tingling). Psychiatric:         Mood and Affect: Mood normal.         Behavior: Behavior normal.         ASSESSMENT      ICD-10-CM    1. Type 2 diabetes mellitus without complication, without long-term current use of insulin (HCC)  E11.9 Comprehensive Metabolic Panel     Microalbumin / Creatinine Urine Ratio     Hemoglobin A1C   2. Essential hypertension  I10 CBC Auto Differential     Comprehensive Metabolic Panel     Microalbumin / Creatinine Urine Ratio   3. Hyperlipidemia with target LDL less than 100  E78.5    4. Chronic systolic congestive heart failure (HCC)  I50.22 Microalbumin / Creatinine Urine Ratio     Brain Natriuretic Peptide   5. Coronary artery disease involving native coronary artery of native heart without angina pectoris  I25.10 Microalbumin / Creatinine Urine Ratio   6. Acquired hypothyroidism  E03.9 T4, Free     TSH without Reflex   7. Atherosclerosis of native artery of both lower extremities with intermittent claudication ([facility] [address]  I70.213    8. Routine general medical examination at a health care facility  Z00.00 CBC Auto Differential     Comprehensive Metabolic Panel     Lipid Panel     Microalbumin / Creatinine Urine Ratio     T4, Free     TSH without Reflex     Psa screening     Hemoglobin A1C   9.  Encounter for screening for malignant neoplasm of prostate  Z12.5 Psa screening PLAN    1. Type 2 diabetes mellitus without complication, without long-term current use of insulin (HCC)  Doing well on present regimen  - Comprehensive Metabolic Panel; Future  - Microalbumin / Creatinine Urine Ratio; Future  - Hemoglobin A1C; Future    2. Essential hypertension  The current medical regimen is effective;  continue present plan and medications. - CBC Auto Differential; Future  - Comprehensive Metabolic Panel; Future  - Microalbumin / Creatinine Urine Ratio; Future    3. Hyperlipidemia with target LDL less than 100  Doing well on crestor    4. Chronic systolic congestive heart failure (HCC)  compensated  - Microalbumin / Creatinine Urine Ratio; Future  - Brain Natriuretic Peptide; Future    5. Coronary artery disease involving native coronary artery of native heart without angina pectoris  Stable without any chest symptoms  - Microalbumin / Creatinine Urine Ratio; Future    6. Acquired hypothyroidism  Recheck thyroids  - T4, Free; Future  - TSH without Reflex; Future    7. Atherosclerosis of native artery of both lower extremities with intermittent claudication (HCC)  Stable and moving. 8. Routine general medical examination at a health care facility  Routine age-appropriate anticipatory guidance was provided. Annual wellness visit was performed in a separate note and issues were addressed as identified.   - CBC Auto Differential; Future  - Comprehensive Metabolic Panel; Future  - Lipid Panel; Future  - Microalbumin / Creatinine Urine Ratio; Future  - T4, Free; Future  - TSH without Reflex; Future  - Psa screening; Future  - Hemoglobin A1C; Future    9. Encounter for screening for malignant neoplasm of prostate  Check psa  - Psa screening; Future      No orders of the defined types were placed in this encounter. Return in 3 months (on 2/28/2021) for Medicare Annual Wellness Visit in 1 year, 27.        This was an in-house visit          Medicare Annual Wellness Visit  Name: Charlotte Storey Abdiel Perkins Date: 2020   MRN: [id number] Sex: Male   Age: 79 y.o. Ethnicity: Non-/Non    : 1950 Race: Martin Webb is here for Medicare AWV; Discuss Medications (stopped taking lyrica due to side effects. ); and Health Maintenance (diabetic eye-eye vision in mall, flu shot at [facility], declined colon screen)    Screenings for behavioral, psychosocial and functional/safety risks, and cognitive dysfunction are all negative except as indicated below. These results, as well as other patient data from the [address] form, are documented in Flowsheets linked to this Encounter. Allergies   Allergen Reactions    Codeine Itching         Prior to Visit Medications    Medication Sig Taking? Authorizing Provider   metOLazone (ZAROXOLYN) 2.5 MG tablet Take 1 tablet by mouth daily Yes ROSS Gonzalez DO   spironolactone (ALDACTONE) 25 MG tablet Take 1 tablet by mouth daily Yes ROSS Gonzalez DO   levothyroxine (SYNTHROID) 50 MCG tablet Take 1 tablet by mouth Daily Yes ROSS Gonzalez DO   rosuvastatin (CRESTOR) 40 MG tablet Take 1 tablet by mouth daily Yes ROSS Gonzalez DO   triamcinolone (KENALOG) 0.1 % cream Apply topically 2 times daily.  Yes ROSS Gonzalez DO   ramipril (ALTACE) 1.25 MG capsule Take 1 capsule by mouth daily Yes ROSS Gonzalez DO   traZODone (DESYREL) 50 MG tablet Take 1 tablet by mouth nightly Yes Kelly Merlin, APRN   metFORMIN (GLUCOPHAGE) 1000 MG tablet Take 1 tablet by mouth 2 times daily (with meals) Yes ROSS Gonzalez DO   Multiple Vitamins-Minerals (THERAPEUTIC MULTIVITAMIN-MINERALS) tablet Take 1 tablet by mouth daily Yes Historical Provider, MD   Cholecalciferol (VITAMIN D3) 1.25 MG (10692 UT) CAPS Take 1 capsule by mouth Yes Historical Provider, MD   magnesium oxide (MAG-OX) 400 MG tablet Take 400 mg by mouth daily Yes Historical Provider, MD   torsemide (DEMADEX) 100 MG tablet Take 100 mg by mouth Héctor       CareTeam (Including outside providers/suppliers regularly involved in providing care):   Patient Care Team:  [facility], DO as PCP - General (Pediatrics)  [address] [place] [address], DO as PCP - [facility] Empaneled Provider    Wt Readings from Last 3 Encounters:   11/30/20 206 lb 8 oz (93.7 kg)   08/27/20 207 lb 4 oz (94 kg)   05/27/20 208 lb 8 oz (94.6 kg)     Vitals:    11/30/20 1255 11/30/20 1256   BP:  122/80   Site:  Left Upper Arm   Position:  Sitting   Cuff Size:  Large Adult   Pulse:  104   Temp: 96.7 °F (35.9 °C) 96.7 °F (35.9 °C)   TempSrc: Temporal Temporal   SpO2:  99%   Weight:  206 lb 8 oz (93.7 kg)   Height:  5' 11\" (1.803 m)     Body mass index is 28.8 kg/m². Based upon direct observation of the patient, evaluation of cognition reveals recent and remote memory intact. Physical exam as documented elsewhere in a separate note    Patient's complete Health Risk Assessment and screening values have been reviewed and are found in Flowsheets. The following problems were reviewed today and where indicated follow up appointments were made and/or referrals ordered. Positive Risk Factor Screenings with Interventions:       General Health and ACP:  General  In general, how would you say your health is?: Very Good  In the past 7 days, have you experienced any of the following?  New or Increased Pain, New or Increased Fatigue, Loneliness, Social Isolation, Stress or Anger?: None of These  Do you get the social and emotional support that you need?: Yes  Do you have a Living Will?: Yes  Advance Directives     Power of  Living Will ACP-Advance Directive ACP-Power of     Not on File Not on File Filed [address] [facility] Risk Interventions:  · No issues identified    Health Habits/Nutrition:  Health Habits/Nutrition  Do you exercise for at least 20 minutes 2-3 times per week?: Yes  Have you lost any weight without trying in the past 3 months?: No  Do you eat fewer than 2 meals per day?: No  Have you seen a dentist within the past year?: (!) No  Body mass index: (!) 28.8  Health Habits/Nutrition Interventions:  · Dental exam overdue:  patient encouraged to make appointment with his/her dentist    Personalized Preventive Plan   Current Health Maintenance Status  Immunization History   Administered Date(s) Administered    Influenza 10/01/2015    Influenza Vaccine, unspecified formulation 10/01/2016    Influenza Virus Vaccine 10/13/2020    Influenza, High Dose (Fluzone 65 yrs and older) 10/09/2018, 10/22/2019    Pneumococcal Conjugate 13-valent ([id number]) 03/30/2016    Tdap (Boostrix, Adacel) 06/16/2014        Health Maintenance   Topic Date Due    AAA screen  1950    Hepatitis C screen  1950    Shingles Vaccine (1 of 2) 07/08/2000    Low dose CT lung screening  07/08/2005    Colon cancer screen colonoscopy  05/31/2015    Diabetic retinal exam  02/09/2017    Pneumococcal 65+ years Vaccine (2 of 2 - PPSV23) 03/30/2017    Annual Wellness Visit (AWV)  09/18/2019    Diabetic foot exam  12/11/2019    A1C test (Diabetic or Prediabetic)  09/23/2021    Lipid screen  09/23/2021    TSH testing  09/23/2021    Potassium monitoring  09/23/2021    Creatinine monitoring  09/23/2021    DTaP/Tdap/Td vaccine (2 - Td) 06/16/2024    Flu vaccine  Completed    Hepatitis A vaccine  Aged Out    Hib vaccine  Aged Out    Meningococcal (ACWY) vaccine  Aged Out     Recommendations for [employer] Due: see orders and patient instructions/AVS.  . Recommended screening schedule for the next 5-10 years is provided to the patient in written form: see Patient Instructions/AVS.    Jake Leach was seen today for medicare awv, discuss medications and health maintenance.     Diagnoses and all orders for this visit:    Type 2 diabetes mellitus without complication, without long-term current use of insulin ([address]    Essential hypertension    Hyperlipidemia with target LDL less than

## 2020-12-23 ENCOUNTER — TELEPHONE (OUTPATIENT)
Dept: PRIMARY CARE CLINIC | Age: 70
End: 2020-12-23

## 2020-12-23 LAB
ALBUMIN SERPL-MCNC: 3.9 G/DL
ALP BLD-CCNC: 70 U/L
ALT SERPL-CCNC: 15 U/L
ANION GAP SERPL CALCULATED.3IONS-SCNC: 14 MMOL/L
AST SERPL-CCNC: 11 U/L
AVERAGE GLUCOSE: NORMAL
BASOPHILS ABSOLUTE: 0 /ΜL
BASOPHILS RELATIVE PERCENT: 1.5 %
BILIRUB SERPL-MCNC: 0.7 MG/DL (ref 0.1–1.4)
BUN BLDV-MCNC: 42 MG/DL
CALCIUM SERPL-MCNC: 9.8 MG/DL
CHLORIDE BLD-SCNC: 96 MMOL/L
CHOLESTEROL, TOTAL: 89 MG/DL
CHOLESTEROL/HDL RATIO: ABNORMAL
CO2: 27 MMOL/L
CREAT SERPL-MCNC: 1.83 MG/DL
EOSINOPHILS ABSOLUTE: 1.16 /ΜL
EOSINOPHILS RELATIVE PERCENT: 16.2 %
GFR CALCULATED: 39
GLUCOSE BLD-MCNC: 117 MG/DL
HBA1C MFR BLD: 5.4 %
HCT VFR BLD CALC: 38.9 % (ref 41–53)
HDLC SERPL-MCNC: 27 MG/DL (ref 35–70)
HEMOGLOBIN: 12.8 G/DL (ref 13.5–17.5)
LDL CHOLESTEROL CALCULATED: 40 MG/DL (ref 0–160)
LYMPHOCYTES ABSOLUTE: 0 /ΜL
LYMPHOCYTES RELATIVE PERCENT: 13.4 %
MCH RBC QN AUTO: 32.7 PG
MCHC RBC AUTO-ENTMCNC: 32.9 G/DL
MCV RBC AUTO: 99.2 FL
MONOCYTES ABSOLUTE: 5.12 /ΜL
MONOCYTES RELATIVE PERCENT: 9.4 %
NEUTROPHILS ABSOLUTE: 0.3 /ΜL
NEUTROPHILS RELATIVE PERCENT: 59.2 %
NONHDLC SERPL-MCNC: ABNORMAL MG/DL
PDW BLD-RTO: 15 %
PLATELET # BLD: 181 K/ΜL
PMV BLD AUTO: 10.6 FL
POTASSIUM SERPL-SCNC: 3.4 MMOL/L
RBC # BLD: 3.92 10^6/ΜL
SODIUM BLD-SCNC: 134 MMOL/L
T4 FREE: 1.34
TOTAL PROTEIN: 8.2
TRIGL SERPL-MCNC: 109 MG/DL
TSH SERPL DL<=0.05 MIU/L-ACNC: 5.22 UIU/ML
VLDLC SERPL CALC-MCNC: ABNORMAL MG/DL
WBC # BLD: 8.7 10^3/ML

## 2020-12-23 RX ORDER — LEVOTHYROXINE SODIUM 0.07 MG/1
75 TABLET ORAL DAILY
Qty: 90 TABLET | Refills: 1 | Status: SHIPPED | OUTPATIENT
Start: 2020-12-23 | End: 2021-06-03

## 2020-12-24 NOTE — TELEPHONE ENCOUNTER
----- Message from [address] [facility], DO sent at 12/23/2020  6:15 PM CST -----  Thyroid is slightly on the low side. Recommend increasing Synthroid to 75 mcg daily. Recheck TSH and free T4 in 4 to 6 weeks. Hemoglobin A1c is 5.4 which indicates excellent blood sugar control the past 3 months. Your metabolic profile is normal.  This includes kidney and liver functions as well as electrolytes. It looks like you are a little bit on the dry side. Continue to try to minimize sodium intake but may liberalize fluids slightly or back off on diuretics slightly if tolerated. CBC shows slight improvement in hemoglobin and hematocrit. Otherwise unremarkable. PSA is normal.  Lipids are excellently controlled. HDL cholesterol however is slightly low. Recommend to try to exercise as much as you can to get this good cholesterol value up.

## 2020-12-24 NOTE — TELEPHONE ENCOUNTER
Called patient, spoke with: Patient regarding the results of the patients most recent labs. I advised Patient of Dr. West Perez recommendations. Patient did voice understanding    Rx sent to pharmacy for pt.      Requested Prescriptions     Signed Prescriptions Disp Refills    levothyroxine (SYNTHROID) 75 MCG tablet 90 tablet 1     Sig: Take 1 tablet by mouth daily     Authorizing Provider: Wyatt Mcrae     Ordering User: Michelle Harrison

## 2021-04-20 DIAGNOSIS — G47.00 INSOMNIA, UNSPECIFIED TYPE: Primary | ICD-10-CM

## 2021-04-20 RX ORDER — TRAZODONE HYDROCHLORIDE 50 MG/1
50 TABLET ORAL NIGHTLY
Qty: 90 TABLET | Refills: 3 | Status: SHIPPED | OUTPATIENT
Start: 2021-04-20 | End: 2021-12-01 | Stop reason: SDUPTHER

## 2021-04-20 NOTE — TELEPHONE ENCOUNTER
Received fax from pharmacy requesting refill on pts medication(s). Pt was last seen in office on 11/30/2020  and has a follow up scheduled for 6/1/2021. Will send request to  Dr. Tejal Forman  for patient.      Requested Prescriptions     Pending Prescriptions Disp Refills    traZODone (DESYREL) 50 MG tablet [Pharmacy Med Name: TRAZODONE HCL TABS 50MG] 90 tablet 3     Sig: TAKE 1 TABLET NIGHTLY

## 2021-05-30 DIAGNOSIS — E11.9 TYPE 2 DIABETES MELLITUS WITHOUT COMPLICATION, WITHOUT LONG-TERM CURRENT USE OF INSULIN (HCC): Primary | ICD-10-CM

## 2021-06-01 ENCOUNTER — OFFICE VISIT (OUTPATIENT)
Dept: PRIMARY CARE CLINIC | Age: 71
End: 2021-06-01
Payer: MEDICARE

## 2021-06-01 VITALS
WEIGHT: 202 LBS | HEIGHT: 71 IN | OXYGEN SATURATION: 98 % | HEART RATE: 78 BPM | SYSTOLIC BLOOD PRESSURE: 116 MMHG | BODY MASS INDEX: 28.28 KG/M2 | DIASTOLIC BLOOD PRESSURE: 72 MMHG | TEMPERATURE: 98.4 F

## 2021-06-01 DIAGNOSIS — E11.9 TYPE 2 DIABETES MELLITUS WITHOUT COMPLICATION, WITHOUT LONG-TERM CURRENT USE OF INSULIN (HCC): Primary | ICD-10-CM

## 2021-06-01 DIAGNOSIS — Z12.5 ENCOUNTER FOR PROSTATE CANCER SCREENING: ICD-10-CM

## 2021-06-01 DIAGNOSIS — N18.32 STAGE 3B CHRONIC KIDNEY DISEASE (HCC): ICD-10-CM

## 2021-06-01 DIAGNOSIS — Z12.11 SCREEN FOR COLON CANCER: ICD-10-CM

## 2021-06-01 DIAGNOSIS — I50.22 CHRONIC SYSTOLIC CONGESTIVE HEART FAILURE (HCC): ICD-10-CM

## 2021-06-01 DIAGNOSIS — I10 ESSENTIAL HYPERTENSION: ICD-10-CM

## 2021-06-01 DIAGNOSIS — Z11.4 SCREENING FOR HIV (HUMAN IMMUNODEFICIENCY VIRUS): ICD-10-CM

## 2021-06-01 DIAGNOSIS — I70.213 ATHEROSCLEROSIS OF NATIVE ARTERY OF BOTH LOWER EXTREMITIES WITH INTERMITTENT CLAUDICATION (HCC): ICD-10-CM

## 2021-06-01 DIAGNOSIS — E78.5 HYPERLIPIDEMIA WITH TARGET LDL LESS THAN 100: ICD-10-CM

## 2021-06-01 DIAGNOSIS — I25.10 CORONARY ARTERY DISEASE INVOLVING NATIVE CORONARY ARTERY OF NATIVE HEART WITHOUT ANGINA PECTORIS: ICD-10-CM

## 2021-06-01 DIAGNOSIS — E03.9 ACQUIRED HYPOTHYROIDISM: ICD-10-CM

## 2021-06-01 DIAGNOSIS — Z11.59 ENCOUNTER FOR HEPATITIS C SCREENING TEST FOR LOW RISK PATIENT: ICD-10-CM

## 2021-06-01 PROCEDURE — 1036F TOBACCO NON-USER: CPT | Performed by: PEDIATRICS

## 2021-06-01 PROCEDURE — G8417 CALC BMI ABV UP PARAM F/U: HCPCS | Performed by: PEDIATRICS

## 2021-06-01 PROCEDURE — 1123F ACP DISCUSS/DSCN MKR DOCD: CPT | Performed by: PEDIATRICS

## 2021-06-01 PROCEDURE — 3046F HEMOGLOBIN A1C LEVEL >9.0%: CPT | Performed by: PEDIATRICS

## 2021-06-01 PROCEDURE — 3017F COLORECTAL CA SCREEN DOC REV: CPT | Performed by: PEDIATRICS

## 2021-06-01 PROCEDURE — 4040F PNEUMOC VAC/ADMIN/RCVD: CPT | Performed by: PEDIATRICS

## 2021-06-01 PROCEDURE — 2022F DILAT RTA XM EVC RTNOPTHY: CPT | Performed by: PEDIATRICS

## 2021-06-01 PROCEDURE — G8427 DOCREV CUR MEDS BY ELIG CLIN: HCPCS | Performed by: PEDIATRICS

## 2021-06-01 PROCEDURE — G0439 PPPS, SUBSEQ VISIT: HCPCS | Performed by: PEDIATRICS

## 2021-06-01 PROCEDURE — 99214 OFFICE O/P EST MOD 30 MIN: CPT | Performed by: PEDIATRICS

## 2021-06-01 RX ORDER — CEFDINIR 300 MG/1
300 CAPSULE ORAL 2 TIMES DAILY
Qty: 20 CAPSULE | Refills: 0 | Status: SHIPPED | OUTPATIENT
Start: 2021-06-01 | End: 2021-06-11

## 2021-06-01 SDOH — ECONOMIC STABILITY: FOOD INSECURITY: WITHIN THE PAST 12 MONTHS, THE FOOD YOU BOUGHT JUST DIDN'T LAST AND YOU DIDN'T HAVE MONEY TO GET MORE.: NEVER TRUE

## 2021-06-01 SDOH — ECONOMIC STABILITY: FOOD INSECURITY: WITHIN THE PAST 12 MONTHS, YOU WORRIED THAT YOUR FOOD WOULD RUN OUT BEFORE YOU GOT MONEY TO BUY MORE.: NEVER TRUE

## 2021-06-01 ASSESSMENT — PATIENT HEALTH QUESTIONNAIRE - PHQ9
1. LITTLE INTEREST OR PLEASURE IN DOING THINGS: 0
2. FEELING DOWN, DEPRESSED OR HOPELESS: 0
SUM OF ALL RESPONSES TO PHQ9 QUESTIONS 1 & 2: 0
SUM OF ALL RESPONSES TO PHQ QUESTIONS 1-9: 0

## 2021-06-01 ASSESSMENT — ENCOUNTER SYMPTOMS
NAUSEA: 0
VOMITING: 0
DIARRHEA: 0
ABDOMINAL PAIN: 0
CHEST TIGHTNESS: 0
WHEEZING: 0
COUGH: 0
SORE THROAT: 0
BACK PAIN: 0
SHORTNESS OF BREATH: 1

## 2021-06-01 ASSESSMENT — LIFESTYLE VARIABLES: HOW OFTEN DO YOU HAVE A DRINK CONTAINING ALCOHOL: 0

## 2021-06-01 ASSESSMENT — SOCIAL DETERMINANTS OF HEALTH (SDOH): HOW HARD IS IT FOR YOU TO PAY FOR THE VERY BASICS LIKE FOOD, HOUSING, MEDICAL CARE, AND HEATING?: NOT HARD AT ALL

## 2021-06-01 NOTE — PROGRESS NOTES
[address] [facility], [address]  Phone [phone]   Fax [phone]      OFFICE VISIT: 2021    Charity Espinoza Abraham-: 1950      [facility]  Reason For Visit:  Debbi Jaimes is a 79 y.o. Medicare AWV, Sinus Problem (sinus issues for the past couple weeks, he is having drainage with cough. No fever. He has been using OTC nasal sprays with no relief. Took sudafed but could not sleep. ), and Health Maintenance (eye- Dr. Nirmal Rodríguez, )    Patient presents for routine follow-up evaluation. He also presents multiple other health issues. Present concerns:  He has been having a lot of sinus drainage and sinus pressure for the past couple of weeks. He has been using over-the-counter nasal sprays but this has not been very helpful. He has tried some Sudafed as well but again this was not very helpful and did interfere with his sleep. He is coughing in this is productive for thick white secretions. He also has some sinus pressure but no fever. His only allergy is that to codeine. Diabetes Mellitus Type 2  Diet compliance:  compliant most of the time  Nutrition Consultation Needed:  no  Medication:              Metformin 1000 mg twice daily  Medication compliance: Darien Bowen was some question as to whether he should be on it during his hospitalization.  The hospitalist recommended that he stop it (jardiance)  Weight trend: Weight is down 4 pounds from 2020  Current exercise: He is trying to exercise as much as he can  Checking: Periodically  Home blood sugar records: fasting range: Controlled when he checks. Low BG:  no  Eye exam current (within one year): yes  Checking Feet regularly:  yes -no sores  ACE/ARB:  Ramipril 1.25mg daily  Tobacco history: He  reports that he quit smoking about 12 years ago. His smoking use included cigars. He has a 30.00 pack-year smoking history.  He has never used smokeless tobacco.    Lab Results   Component Value Date    LABA1C 5.4 12/18/2020    LABA1C 5.3 09/23/2020    LABA1C 6.2 06/02/2020     Lab Results   Component Value Date    LABMICR 1.40 11/09/2018    CREATININE 1.83 12/18/2020       Congestive heart failure:  Type: Ischemic cardiomyopathy, systolic, (HFr EF)  Primary rhythm: Sinus rhythm  Medication:              Beta-blockade:            Toprol-XL 37.5 mg twice daily              R AAS therapy:           Ramipril 1.25 mg daily              Diuretics:                     Zaroxolyn 2.5 mg and       Demadex 100 mg every other day              Aldosterone inhibition: Aldactone 25 mg daily              Additional meds:         None  Symptoms: Doing well and presently compensated  Cardiology follow-up: He follows with cardiology down in [address] [place] [address] Ave.: [address] [place] [address]  (12/18/2020)  Most recent echo: Done down in [place] within the last 6 months        Coronary Artery Disease:  Medications:              Beta-blockade:           Toprol-XL 37.5 mg twice daily              RAAS Therapy:           Ramipril 1.25 mg daily              Lipid Management:     Crestor 40 mg nightly              Platelet Inhibition:        Plavix 75 mg daily and aspirin 325 mg daily              Anti-anginal:                Nitroglycerin 0.4 mg as needed  Symptoms: He denies any chest pain  Nitroglycerin use: He has never used nitro in the recent past  Cardiology follow-up: [place], [place]  Additional studies: As above        Hyperlipidemia:   Medication:   rosuvastatin (Crestor)  Low Fat, Low Choleterol Diet:  yes - he tries  Myalgias or GI upset: no  The patient exercises regularly.   Laboratory:    Lab Results   Component Value Date    CHOL 89 12/18/2020    TRIG 109 12/18/2020    HDL 27 (A) 12/18/2020    LDLCALC 40 12/18/2020    LDLDIRECT 111 02/14/2013      Lab Results   Component Value Date    ALT 15 12/18/2020    AST 11 12/18/2020       Hypothyroidism:  Medication:   Synthroid 75 mcg daily  Medication compliance:  compliant most of the time  Patient is  taking his medication consistently on an empty stomach. Symptoms: fatigue. Laboratory:  Lab Results   Component Value Date    TSH 5.22 12/18/2020    TSH 3.26 09/23/2020    TSH 5.83 06/02/2020     Lab Results   Component Value Date    T4FREE 1.34 12/18/2020    T4FREE 1.0 11/09/2018    T4FREE 1.0 03/24/2017       CKD 3:  He is not on any nephrotoxic drugs at this time. He is on RAAS therapy  No recent microalbumin measurement   most recent BUN and creatinine were 42 and 1.83 respectively on 12/18/2020       height is 5' 11\" (1.803 m) and weight is 202 lb (91.6 kg). His temporal temperature is 98.4 °F (36.9 °C). His blood pressure is 116/72 and his pulse is 78. His oxygen saturation is 98%. Body mass index is 28.17 kg/m².     I have reviewed the following with the Mr. Cande Huang   Lab Review  Orders Only on 12/23/2020   Component Date Value    Cholesterol, Total 12/18/2020 89     HDL 12/18/2020 27*    LDL Calculated 12/18/2020 40     Triglycerides 12/18/2020 109     WBC 12/18/2020 8.7     RBC 12/18/2020 3.92     Hemoglobin 12/18/2020 12.8*    Hematocrit 12/18/2020 38.9*    MCV 12/18/2020 99.2     MCH 12/18/2020 32.7     MCHC 12/18/2020 32.9     Platelets 71/28/6306 181     RDW 12/18/2020 15.0     MPV 12/18/2020 10.6     Neutrophils % 12/18/2020 59.2     Lymphocytes % 12/18/2020 13.4     Monocytes % 12/18/2020 9.4     Eosinophils % 12/18/2020 16.2     Basophils % 12/18/2020 1.5     Neutrophils Absolute 12/18/2020 0.3     Lymphocytes Absolute 12/18/2020 0.0     Monocytes Absolute 12/18/2020 5.12     Eosinophils Absolute 12/18/2020 1.16     Basophils Absolute 12/18/2020 0.0     Sodium 12/18/2020 134     Chloride 12/18/2020 96     Potassium 12/18/2020 3.4     BUN 12/18/2020 42     CREATININE 12/18/2020 1.83     Glucose 12/18/2020 117     AST 12/18/2020 11     ALT 12/18/2020 15     Calcium 12/18/2020 9.8     Total Protein 12/18/2020 8.2     CO2 12/18/2020 27     Albumin 12/18/2020 3.9     Alkaline Phosphatase 12/18/2020 70     Total Bilirubin 12/18/2020 0.7     Gfr Calculated 12/18/2020 39     Anion Gap 12/18/2020 14     T4 Free 12/18/2020 1.34     TSH 12/18/2020 5.22     Hemoglobin A1C 12/18/2020 5.4      Copies of these are in the chart. Current Outpatient Medications   Medication Sig Dispense Refill    cefdinir (OMNICEF) 300 MG capsule Take 1 capsule by mouth 2 times daily for 10 days 20 capsule 0    traZODone (DESYREL) 50 MG tablet Take 1 tablet by mouth nightly 90 tablet 3    levothyroxine (SYNTHROID) 75 MCG tablet Take 1 tablet by mouth daily 90 tablet 1    metOLazone (ZAROXOLYN) 2.5 MG tablet Take 1 tablet by mouth daily 90 tablet 3    spironolactone (ALDACTONE) 25 MG tablet Take 1 tablet by mouth daily 90 tablet 3    rosuvastatin (CRESTOR) 40 MG tablet Take 1 tablet by mouth daily 90 tablet 3    triamcinolone (KENALOG) 0.1 % cream Apply topically 2 times daily.  80 g 5    ramipril (ALTACE) 1.25 MG capsule Take 1 capsule by mouth daily 90 capsule 3    metFORMIN (GLUCOPHAGE) 1000 MG tablet Take 1 tablet by mouth 2 times daily (with meals) 180 tablet 3    Multiple Vitamins-Minerals (THERAPEUTIC MULTIVITAMIN-MINERALS) tablet Take 1 tablet by mouth daily      Cholecalciferol (VITAMIN D3) 1.25 MG (23743 UT) CAPS Take 1 capsule by mouth      magnesium oxide (MAG-OX) 400 MG tablet Take 400 mg by mouth daily      torsemide (DEMADEX) 100 MG tablet Take 100 mg by mouth every other day Take 2 tablets by mouth every other day      potassium chloride (KLOR-CON M) 20 MEQ extended release tablet TAKE 1 TABLET BY MOUTH TWICE DAILY 180 tablet 3    clopidogrel (PLAVIX) 75 MG tablet Take 1 tablet by mouth daily 90 tablet 3    nitroGLYCERIN (NITROLINGUAL) 0.4 MG/SPRAY 0.4 mg spray Place 1 spray under the tongue every 5 minutes as needed for Chest pain 1 Bottle 0    Omega-3 Fatty Acids (FISH OIL) 1000 MG CAPS Take 3,000 mg by mouth daily      diphenhydrAMINE (BENADRYL) 25 MG tablet Take 50 mg by mouth nightly as needed for Itching      aspirin 325 MG tablet Take 325 mg by mouth daily. No current facility-administered medications for this visit. Allergies: Codeine     Past Medical History:   Diagnosis Date    Arthritis     Basilar artery stenosis     CAD (coronary artery disease) 1997    MI    Cancer ([address]     SKIN 6YRS AGO    CHF (congestive heart failure) (HCC)     Heel spur     Hyperlipidemia     Hypertension     Palliative care patient 10/17/2018    Sleep apnea     no c-pap    Type II or unspecified type diabetes mellitus without mention of complication, not stated as uncontrolled     Vertebral artery insufficiency        Family History   Problem Relation Age of Onset    Heart Disease Father     High Blood Pressure Father     Other Father         Esophageal Varices       Past Surgical History:   Procedure Laterality Date    ANGIOPLASTY Bilateral     basilar artery angioplasty    CARDIAC [place]IZATION  2018    [place]    CARPAL TUNNEL RELEASE      bilateral    CATARACT REMOVAL Bilateral     CHOLECYSTECTOMY      CORONARY ARTERY BYPASS GRAFT      3v    HEEL SPUR SURGERY      left    [place] EGD TRANSORAL BIOPSY SINGLE/MULTIPLE N/A 10/16/2018    Dr Nav Kerns-w/control of bleeding-Active bleeding in the stomach from Dieulafoy lesion    PTCA   [place]    with stents    TONSILLECTOMY         Social History     Tobacco Use    Smoking status: Former Smoker     Packs/day: 1.00     Years: 30.00     Pack years: 30.00     Types: Cigars     Quit date: 3/3/2009     Years since quittin.2    Smokeless tobacco: Never Used   Substance Use Topics    Alcohol use: No     Alcohol/week: 0.0 standard drinks        Review of Systems   Constitutional: Positive for fatigue. Negative for chills and fever (he is limited in his exercise capacity). HENT: Negative for congestion, ear pain and sore throat. Eyes: Negative for visual disturbance.    Respiratory: Positive for shortness of breath (occasional orthopnea ). Negative for cough, chest tightness and wheezing. Cardiovascular: Positive for leg swelling (Very rarely). Negative for chest pain and palpitations. Gastrointestinal: Negative for abdominal pain, diarrhea, nausea and vomiting. Endocrine: Negative for polyuria. Genitourinary: Negative for frequency and urgency. Musculoskeletal: Negative. Negative for back pain and neck pain. Skin: Negative for rash. Neurological: Positive for weakness (generalized). Negative for dizziness and headaches. Psychiatric/Behavioral: Negative for self-injury. The patient is not nervous/anxious. Physical Exam  Constitutional:       General: He is not in acute distress. Appearance: He is well-developed and normal weight. HENT:      Head: Normocephalic and atraumatic. Right Ear: Hearing, tympanic membrane, ear canal and external ear normal. No middle ear effusion. Left Ear: Hearing, tympanic membrane, ear canal and external ear normal.      Nose: Nose normal. No mucosal edema (mild) or rhinorrhea (thin, clear). Mouth/Throat:      Mouth: Mucous membranes are moist.      Pharynx: Oropharynx is clear. No posterior oropharyngeal erythema. Eyes:      General: No scleral icterus. Extraocular Movements: Extraocular movements intact. Conjunctiva/sclera: Conjunctivae normal.      Pupils: Pupils are equal, round, and reactive to light. Neck:      Thyroid: No thyromegaly. Vascular: No carotid bruit or JVD. Cardiovascular:      Rate and Rhythm: Normal rate and regular rhythm. No extrasystoles are present. Chest Wall: PMI is not displaced. Pulses:           Dorsalis pedis pulses are 0 on the right side and 0 on the left side. Heart sounds: S1 normal and S2 normal. Murmur heard. No friction rub. No gallop. No S3 sounds. Comments: Feet are slightly cyanotic.   Pulmonary:      Effort: Pulmonary effort is normal. No T4, Free     TSH without Reflex   9. Screen for colon cancer  Z12.11 Cologuard (For External Results Only)   10. Screening for HIV (human immunodeficiency virus)  Z11.4 HIV Rapid 1&2   11. Encounter for hepatitis C screening test for low risk patient  Z11.59 Hepatitis C Antibody   12. Encounter for prostate cancer screening  Z12.5 PSA screening         PLAN    1. Type 2 diabetes mellitus without complication, without long-term current use of insulin (HCC)  Blood sugars are now excellently controlled with hemoglobin A1c of 5.4. We will recheck A1c at next blood draw. He is checking blood sugars periodically and it is consistently well. Is also losing weight. - Comprehensive Metabolic Panel; Future  - Microalbumin / Creatinine Urine Ratio; Future  - Hemoglobin A1C; Future    2. Chronic systolic congestive heart failure (HCC)  Stable and now clinically compensated. BNP had decreased from 4000-20 700. He is gradually increasing his activity level and doing well without any significant symptoms.  - Brain Natriuretic Peptide; Future    3. Atherosclerosis of native artery of both lower extremities with intermittent claudication (HCC)  Stable without any symptoms of claudication recently. He is gradually increasing his ambulation. He still feels some weakness in his lower extremities bilaterally    4. Essential hypertension  Blood pressure is also excellently controlled. Continue the same  - CBC Auto Differential; Future  - Comprehensive Metabolic Panel; Future  - Microalbumin / Creatinine Urine Ratio; Future    5. Hyperlipidemia with target LDL less than 100  Lipids are at target from an LDL standpoint. He is trying to increase his activity level which will improve his HDL cholesterol. Continue present rosuvastatin therapy  - Lipid Panel; Future    6. Coronary artery disease involving native coronary artery of native heart without angina pectoris  Stable without any anginal symptoms.   He is following with psychosocial and functional/safety risks, and cognitive dysfunction are all negative except as indicated below. These results, as well as other patient data from the [address] form, are documented in Flowsheets linked to this Encounter. Allergies   Allergen Reactions    Codeine Itching         Prior to Visit Medications    Medication Sig Taking? Authorizing Provider   cefdinir (OMNICEF) 300 MG capsule Take 1 capsule by mouth 2 times daily for 10 days Yes B Dimple Ponds, DO   traZODone (DESYREL) 50 MG tablet Take 1 tablet by mouth nightly Yes JOSE ELIAS Miller   levothyroxine (SYNTHROID) 75 MCG tablet Take 1 tablet by mouth daily Yes B Dimple Ponds, DO   metOLazone (ZAROXOLYN) 2.5 MG tablet Take 1 tablet by mouth daily Yes B Dimple Ponds, DO   spironolactone (ALDACTONE) 25 MG tablet Take 1 tablet by mouth daily Yes B Dimple Ponds, DO   rosuvastatin (CRESTOR) 40 MG tablet Take 1 tablet by mouth daily Yes B Dimple Ponds, DO   triamcinolone (KENALOG) 0.1 % cream Apply topically 2 times daily.  Yes B Dimple Ponds, DO   ramipril (ALTACE) 1.25 MG capsule Take 1 capsule by mouth daily Yes B Dimple Ponds, DO   metFORMIN (GLUCOPHAGE) 1000 MG tablet Take 1 tablet by mouth 2 times daily (with meals) Yes B Dimple Ponds, DO   Multiple Vitamins-Minerals (THERAPEUTIC MULTIVITAMIN-MINERALS) tablet Take 1 tablet by mouth daily Yes Historical Provider, MD   Cholecalciferol (VITAMIN D3) 1.25 MG (10958 UT) CAPS Take 1 capsule by mouth Yes Historical Provider, MD   magnesium oxide (MAG-OX) 400 MG tablet Take 400 mg by mouth daily Yes Historical Provider, MD   torsemide (DEMADEX) 100 MG tablet Take 100 mg by mouth every other day Take 2 tablets by mouth every other day Yes Historical Provider, MD   potassium chloride (KLOR-CON M) 20 MEQ extended release tablet TAKE 1 TABLET BY MOUTH TWICE DAILY Yes B Dimple Ponds, DO   clopidogrel (PLAVIX) 75 MG tablet Take 1 tablet by mouth daily Yes ROSS Stuart DO   nitroGLYCERIN (NITROLINGUAL) 0.4 MG/SPRAY 0.4 mg spray Place 1 spray under the tongue every 5 minutes as needed for Chest pain Yes ROSS Stuart, DO   Omega-3 Fatty Acids (FISH OIL) 1000 MG CAPS Take 3,000 mg by mouth daily Yes Historical Provider, MD   diphenhydrAMINE (BENADRYL) 25 MG tablet Take 50 mg by mouth nightly as needed for Itching Yes Historical Provider, MD   aspirin 325 MG tablet Take 325 mg by mouth daily.  Yes Historical Provider, MD         Past Medical History:   Diagnosis Date    Arthritis     Basilar artery stenosis     CAD (coronary artery disease) 1997 2009    MI    Cancer ([address]     SKIN 6YRS AGO    CH[facility]congest[facility] heart failure) ([facility])     Heel spur     Hyperlipidemia     Hypertension     Palliative care patient 10/17/2018    Sleep apnea     no c-pap    Type II or unspecified type diabetes mellitus without mention of complication, not stated as uncontrolled     Vertebral artery insufficiency        Past Surgical History:   Procedure Laterality Date    ANGIOPLASTY Bilateral     basilar artery angioplasty    CARDIAC CATHETERIZATION  11/30/2018    [place]    CARPAL TUNNEL RELEASE      bilateral    CATARACT REMOVAL Bilateral     CHOLECYSTECTOMY      CORONARY ARTERY BYPASS GRAFT  1997    3v    HEEL SPUR SURGERY      left    [place] EGD TRANSORAL BIOPSY SINGLE/MULTIPLE N/A 10/16/2018    Dr Boone Kerns-w/control of bleeding-Active bleeding in the stomach from Dieulafoy lesion    PTCA  2009 [address]    with stents    TONSILLECTOMY           Family History   Problem Relation Age of Onset    Heart Disease Father     High Blood Pressure Father     Other Father         Esophageal Varices       CareTeam (Including outside providers/suppliers regularly involved in providing care):   Patient Care Team:  Margo Yanez DO as PCP - General (Pediatrics)  ROSS Stuart DO as PCP [facility] Empaneled Provider    Wt Readings from Last 3 Encounters:   06/01/21 202 lb (91.6 kg)   11/30/20 206 lb 8 oz (93.7 kg)   08/27/20 207 lb 4 oz (94 kg)     Vitals:    06/01/21 0937   BP: 116/72   Site: Left Upper Arm   Position: Sitting   Cuff Size: Large Adult   Pulse: 78   Temp: 98.4 °F (36.9 °C)   TempSrc: Temporal   SpO2: 98%   Weight: 202 lb (91.6 kg)   Height: 5' 11\" (1.803 m)     Body mass index is 28.17 kg/m². Based upon direct observation of the patient, evaluation of cognition reveals recent and remote memory intact. Physical exam as documented elsewhere in a separate note    Patient's complete Health Risk Assessment and screening values have been reviewed and are found in Flowsheets. The following problems were reviewed today and where indicated follow up appointments were made and/or referrals ordered. Positive Risk Factor Screenings with Interventions:            General Health and ACP:  General  In general, how would you say your health is?: Fair  In the past 7 days, have you experienced any of the following?  New or Increased Pain, New or Increased Fatigue, Loneliness, Social Isolation, Stress or Anger?: None of These  Do you get the social and emotional support that you need?: Yes  Do you have a Living Will?: Yes  Advance Directives     Power of [address] [place] [address] Will ACP-Advance Directive ACP-Power of     Not on File Not on File Not on File Not on File      General Health Risk Interventions:  · No issue identified    Health Habits/Nutrition:  Health Habits/Nutrition  Do you exercise for at least 20 minutes 2-3 times per week?: (!) No  Have you lost any weight without trying in the past 3 months?: No  Do you eat only one meal per day?: No  Have you seen the dentist within the past year?: N/A - wear dentures  Body mass index: (!) 28.17  Health Habits/Nutrition Interventions:  · Inadequate physical activity:  educational materials provided to promote increased physical activity       Personalized Preventive Plan   Current Health Maintenance Status  Immunization History   Administered Date(s) Administered    COVID-19, Moderna, PF, 100mcg/0.5mL 12/22/2020, 01/20/2021    Influenza 10/01/2015    Influenza Vaccine, unspecified formulation 10/01/2016    Influenza Virus Vaccine 10/13/2020    Influenza, High Dose (Fluzone 65 yrs and older) 10/09/2018, 10/22/2019    Pneumococcal Conjugate 13-valent ([id number]) 03/30/2016    Tdap (Boostrix, Adacel) 06/16/2014        Health Maintenance   Topic Date Due    AAA screen  Never done    Hepatitis C screen  Never done    Shingles Vaccine (1 of 2) Never done    Low dose CT lung screening  Never done    Colon cancer screen colonoscopy  05/31/2015    Diabetic retinal exam  02/09/2017    Pneumococcal 65+ years Vaccine (2 of 2 - PPSV23) 03/30/2017    Annual Wellness Visit (AWV)  Never done    Diabetic foot exam  12/11/2019    A1C test (Diabetic or Prediabetic)  12/18/2021    Lipid screen  12/18/2021    TSH testing  12/18/2021    Potassium monitoring  12/18/2021    Creatinine monitoring  12/18/2021    DTaP/Tdap/Td vaccine (2 - Td) 06/16/2024    Flu vaccine  Completed    COVID-19 Vaccine  Completed    Hepatitis A vaccine  Aged Out    Hib vaccine  Aged Out    Meningococcal (ACWY) vaccine  Aged Out     Recommendations for [employer] Due: see orders and patient instructions/AVS.  . Recommended screening schedule for the next 5-10 years is provided to the patient in written form: see Patient Instructions/AVS.    Ronak Tejada was seen today for medicare awv, sinus problem and health maintenance. Diagnoses and all orders for this visit:    Type 2 diabetes mellitus without complication, without long-term current use of insulin (HCC)  -     Comprehensive Metabolic Panel; Future  -     Microalbumin / Creatinine Urine Ratio; Future  -     Hemoglobin A1C; Future    Chronic systolic congestive heart failure (HCC)  -     Brain Natriuretic Peptide;  Future    Atherosclerosis of native artery of

## 2021-06-01 NOTE — TELEPHONE ENCOUNTER
Received fax from pharmacy requesting refill on pts medication(s). Pt was last seen in office on 11/30/2020  and has a follow up scheduled for 6/1/2021. Will send request to  Dr. Shandra Kan  for patient.      Requested Prescriptions     Pending Prescriptions Disp Refills    metFORMIN (GLUCOPHAGE) 1000 MG tablet [Pharmacy Med Name: METFORMIN 1000MG TABLETS] 180 tablet 3     Sig: TAKE 1 TABLET BY MOUTH TWICE DAILY WITH MEALS

## 2021-06-01 NOTE — PATIENT INSTRUCTIONS
Patient Education        Low Sodium Diet (2,000 Milligram): Care Instructions  Overview     Limiting sodium can be an important part of managing some health problems. The most common source of sodium is salt. People get most of the salt in their diet from canned, prepared, and packaged foods. Fast food and restaurant meals also are very high in sodium. Your doctor will probably limit your sodium to less than 2,000 milligrams (mg) a day. This limit counts all the sodium in prepared and packaged foods and any salt you add to your food. Follow-up care is a key part of your treatment and safety. Be sure to make and go to all appointments, and call your doctor if you are having problems. It's also a good idea to know your test results and keep a list of the medicines you take. How can you care for yourself at home? Read food labels  · Read labels on cans and food packages. The labels tell you how much sodium is in each serving. Make sure that you look at the serving size. If you eat more than the serving size, you have eaten more sodium. · Food labels also tell you the Percent Daily Value for sodium. Choose products with low Percent Daily Values for sodium. · Be aware that sodium can come in forms other than salt, including monosodium glutamate (MSG), sodium citrate, and sodium bicarbonate (baking soda). MSG is often added to Asian food. When you eat out, you can sometimes ask for food without MSG or added salt. Buy low-sodium foods  · Buy foods that are labeled \"unsalted\" (no salt added), \"sodium-free\" (less than 5 mg of sodium per serving), or \"low-sodium\" (140 mg or less of sodium per serving). Foods labeled \"reduced-sodium\" and \"light sodium\" may still have too much sodium. Be sure to read the label to see how much sodium you are getting. · Buy fresh vegetables, or frozen vegetables without added sauces. Buy low-sodium versions of canned vegetables, soups, and other canned goods.   Prepare low-sodium meals · Cut back on the amount of salt you use in cooking. This will help you adjust to the taste. Do not add salt after cooking. One teaspoon of salt has about 2,300 mg of sodium. · Take the salt shaker off the table. · Flavor your food with garlic, lemon juice, onion, vinegar, herbs, and spices. Do not use soy sauce, lite soy sauce, steak sauce, onion salt, garlic salt, celery salt, or ketchup on your food. · Use low-sodium salad dressings, sauces, and ketchup. Or make your own salad dressings and sauces without adding salt. · Use less salt (or none) when recipes call for it. You can often use half the salt a recipe calls for without losing flavor. Other foods such as rice, pasta, and grains do not need added salt. · Rinse canned vegetables, and cook them in fresh water. This removes somebut not allof the salt. · Avoid water that is naturally high in sodium or that has been treated with water softeners, which add sodium. If you buy bottled water, read the label and choose a sodium-free brand. Avoid high-sodium foods  · Avoid eating:  ? Smoked, cured, salted, and canned meat, fish, and poultry. ? Ham, mejias, hot dogs, and luncheon meats. ? Regular, hard, and processed cheese and regular peanut butter. ? Crackers with salted tops, and other salted snack foods such as pretzels, chips, and salted popcorn. ? Frozen prepared meals, unless labeled low-sodium. ? Canned and dried soups, broths, and bouillon, unless labeled sodium-free or low-sodium. ? Canned vegetables, unless labeled sodium-free or low-sodium. ? [place] fries, pizza, tacos, and other fast foods. ? Pickles, olives, ketchup, and other condiments, especially soy sauce, unless labeled sodium-free or low-sodium. Where can you learn more? Go to https://yasir.healthmYwindow. org and sign in to your FTRANS account. Enter W597 in the [facility] box to learn more about \"Low Sodium Diet (2,000 Milligram): Care Instructions. \"     If you do not have an account, please click on the \"Sign Up Now\" link. Current as of: December 17, 2020               Content Version: 12.8  © 2006-2021 Healthwise, [employer]. Care instructions adapted under license by [facility] ([facility]). If you have questions about a medical condition or this instruction, always ask your healthcare professional. [address] any warranty or liability for your use of this information. Patient Education        Learning About Low-Sodium Foods  What foods are low in sodium? The foods you eat contain nutrients, such as vitamins and minerals. Sodium is a nutrient. Your body needs the right amount to stay healthy and work as it should. You can use the list below to help you make choices about which foods to eat. Fruits  · Fresh, frozen, canned, or dried fruit  Vegetables  · Fresh or frozen vegetables, with no added salt  · Canned vegetables, low-sodium or with no added salt  Grains  · Bagels without salted tops  · Cereal with no added salt  · Corn tortillas  · Crackers with no added salt  · Oatmeal, cooked without salt  · Popcorn with no salt  · Pasta and noodles, cooked without salt  · Rice, cooked without salt  · Unsalted pretzels  Dairy and dairy alternatives  · Butter, unsalted  · Cream cheese  · Ice cream  · Milk  · Soy milk  Meats and other protein foods  · Beans and peas, canned with no salt  · Eggs  · Fresh fish (not smoked)  · Fresh meats (not smoked or cured)  · Nuts and nut butter, prepared without salt  · Poultry, not packaged with sodium solution  · Tofu, unseasoned  · Tuna, canned without salt  Seasonings  · Garlic  · Herbs and spices  · Lemon juice  · Mustard  · Olive oil  · Salt-free seasoning mixes  · Vinegar  Work with your doctor to find out how much of this nutrient you need. Depending on your health, you may need more or less of it in your diet. Where can you learn more? Go to https://chkevineb.healthSupplierSync. org and sign in to your DOZ account. Enter T850 in the [facility] box to learn more about \"Learning About Low-Sodium Foods. \"     If you do not have an account, please click on the \"Sign Up Now\" link. Current as of: December 17, 2020               Content Version: 12.8  © 2006-2021 [employer]. Care instructions adapted under license by [facility] [place] ([facility]). If you have questions about a medical condition or this instruction, always ask your healthcare professional. [address] any warranty or liability for your use of this information. Patient Education        How to Read a Food Label to Limit Sodium: Care Instructions  Overview  Limiting sodium can be an important part of managing some health problems. Processed foods, fast food, and restaurant foods are the major sources of dietary sodium. The most common name for sodium is salt. Most packaged foods have a Nutrition Facts label. This will tell you how much sodium is in one serving of food. Follow-up care is a key part of your treatment and safety. Be sure to make and go to all appointments, and call your doctor if you are having problems. It's also a good idea to know your test results and keep a list of the medicines you take. How can you care for yourself at home? Read ingredient lists on food labels  · Read the list of ingredients on food labels to help you find how much sodium is in a food. The label lists the ingredients in a food in descending order (from the most to the least). If salt or sodium is high on the list, there may be a lot of sodium in the food. · Know that sodium has different names. Sodium is also called monosodium glutamate (MSG, common in [facility] food), sodium citrate, sodium alginate, and sodium phosphate. Read Nutrition Facts labels  · On most foods, there is a Nutrition Facts label. This will tell you how much sodium is in one serving of food. Look at both the serving size and the sodium amount.  The serving size is located at the top of the label, usually right under the \"Nutrition Facts\" title. The amount of sodium is given in the list under the title. It is given in milligrams (mg). ? Check the serving size carefully. A single serving is often very small, and you may eat more than one serving. If this is the case, you will eat more sodium than listed on the label. For example, if the serving size for a canned soup is 1 cup and the sodium amount is 470 mg, if you have 2 cups you will eat 940 mg of sodium. · The nutrition facts for fresh fruits and vegetables are not listed on the food. They may be listed somewhere in the store. These foods usually have no sodium or low sodium. · The Nutrition Facts label also gives you the Percent Daily Value for sodium. This is how much of the recommended amount of sodium a serving contains. The daily value for sodium is less than 2,300 mg. So if the Percent Daily Value says 50%, this means one serving is giving you half of this, or 1,150 mg. Buy low-sodium foods  · Look for foods that are made with less sodium. Watch for the following words on the label. ? \"Unsalted\" means there is no sodium added to the food. But there may be sodium already in the food naturally. ? \"Sodium-free\" means a serving has less than 5 mg of sodium. ? \"Very low sodium\" means a serving has 35 mg or less of sodium. ? \"Low-sodium\" means a serving has 140 mg or less of sodium. · \"Reduced-sodium\" means that there is 25% less sodium than what the food normally has. This is still usually too much sodium. Try not to buy foods with this on the label. · Buy fresh vegetables, or frozen vegetables without added sauces. Buy low-sodium versions of canned vegetables, soups, and other canned goods. Where can you learn more? Go to https://yasir.c-crowd. org and sign in to your [employer] account.  Enter [id number] in the [facility] box to learn more about \"How to Read a Food Label to help. Follow the directions for cleaning the machine. · Use saline (saltwater) nasal washes. This can help keep your nasal passages open and wash out mucus and bacteria. You can buy saline nose drops at a grocery store or drugstore. Or you can make your own at home by adding 1 teaspoon of salt and 1 teaspoon of baking soda to 2 cups of distilled water. If you make your own, fill a bulb syringe with the solution, insert the tip into your nostril, and squeeze gently. Birdie Laci your nose. · Put a hot, wet towel or a warm gel pack on your face 3 or 4 times a day for 5 to 10 minutes each time. · Try a decongestant nasal spray like oxymetazoline (Afrin). Do not use it for more than 3 days in a row. Using it for more than 3 days can make your congestion worse. When should you call for help? Call your doctor now or seek immediate medical care if:    · You have new or worse swelling or redness in your face or around your eyes.     · You have a new or higher fever. Watch closely for changes in your health, and be sure to contact your doctor if:    · You have new or worse facial pain.     · The mucus from your nose becomes thicker (like pus) or has new blood in it.     · You are not getting better as expected. Where can you learn more? Go to https://Helioz R&Dpepiceweb.[employer]. org and sign in to your Sparks account. Enter X057 in the [facility] box to learn more about \"Sinusitis: Care Instructions. \"     If you do not have an account, please click on the \"Sign Up Now\" link. Current as of: December 2, 2020               Content Version: 12.8  © 6302-3387 Healthwise, [employer]. Care instructions adapted under license by [facility]. If you have questions about a medical condition or this instruction, always ask your healthcare professional. [address] any warranty or liability for your use of this information.          Patient Education        Well Visit, Over 72: Care Instructions  Overview     Well visits can help you stay healthy. Your doctor has checked your overall health and may have suggested ways to take good care of yourself. Your doctor also may have recommended tests. At home, you can help prevent illness with healthy eating, regular exercise, and other steps. Follow-up care is a key part of your treatment and safety. Be sure to make and go to all appointments, and call your doctor if you are having problems. It's also a good idea to know your test results and keep a list of the medicines you take. How can you care for yourself at home? · Get screening tests that you and your doctor decide on. Screening helps find diseases before any symptoms appear. · Eat healthy foods. Choose fruits, vegetables, whole grains, protein, and low-fat dairy foods. Limit fat, especially saturated fat. Reduce salt in your diet. · Limit alcohol. If you are a man, have no more than 2 drinks a day or 14 drinks a week. If you are a woman, have no more than 1 drink a day or 7 drinks a week. Since alcohol affects older adults differently, you may want to limit alcohol even more. Or you may not want to drink at all. · Get at least 30 minutes of exercise on most days of the week. Walking is a good choice. You also may want to do other activities, such as running, swimming, cycling, or playing tennis or team sports. · Reach and stay at a healthy weight. This will lower your risk for many problems, such as obesity, diabetes, heart disease, and high blood pressure. · Do not smoke. Smoking can make health problems worse. If you need help quitting, talk to your doctor about stop-smoking programs and medicines. These can increase your chances of quitting for good. · Care for your mental health. It is easy to get weighed down by worry and stress. Learn strategies to manage stress, like deep breathing and mindfulness, and stay connected with your family and community.  If you find you often feel sad for your use of this information. Patient Education        Learning About Being Physically Active  What is physical activity? Being physically active means doing any kind of activity that gets your body moving. The types of physical activity that can help you get fit and stay healthy include:  · Aerobic or \"cardio\" activities. These make your heart beat faster and make you breathe harder, such as brisk walking, riding a bike, or running. They strengthen your heart and lungs and build up your endurance. · Strength training activities. These make your muscles work against, or \"resist,\" something. Examples include lifting weights or doing push-ups. These activities help tone and strengthen your muscles and bones. · Stretches. These let you move your joints and muscles through their full range of motion. Stretching helps you be more flexible. What are the benefits of being active? Being active is one of the best things you can do for your health. It helps you to:  · Feel stronger and have more energy to do all the things you like to do. · Focus better at school or work. · Feel, think, and sleep better. · Reach and stay at a healthy weight. · Lose fat and build lean muscle. · Lower your risk for serious health problems, including diabetes, heart attack, high blood pressure, and some cancers. · Keep your heart, lungs, bones, muscles, and joints strong and healthy. How can you make being active part of your life? Start slowly. Make it your long-term goal to get at least 30 minutes of exercise on most days of the week. Walking is a good choice. You also may want to do other activities, such as running, swimming, cycling, or playing tennis or team sports. Pick activities that you likeones that make your heart beat faster, your muscles stronger, and your muscles and joints more flexible. If you find more than one thing you like doing, do them all. You don't have to do the same thing every day.   Get you start. Follow any special advice your doctor gives you for getting a smart start. Where can you learn more? Go to https://chpepiceweb.[facility]. org and sign in to your [email or web address] account. Enter Q091 in the [employer] box to learn more about \"Learning About Being Physically Active. \"     If you do not have an account, please click on the \"Sign Up Now\" link. Current as of: September 10, 2020               Content Version: 12.8  © 2006-2021 Healthwise, [employer]. Care instructions adapted under license by [facility] ([facility]). If you have questions about a medical condition or this instruction, always ask your healthcare professional. [address] any warranty or liability for your use of this information.

## 2021-06-03 DIAGNOSIS — E03.9 ACQUIRED HYPOTHYROIDISM: ICD-10-CM

## 2021-06-03 RX ORDER — LEVOTHYROXINE SODIUM 0.07 MG/1
75 TABLET ORAL DAILY
Qty: 90 TABLET | Refills: 3 | Status: SHIPPED | OUTPATIENT
Start: 2021-06-03 | End: 2021-11-13 | Stop reason: SDUPTHER

## 2021-06-03 NOTE — TELEPHONE ENCOUNTER
Received fax from pharmacy requesting refill on pts medication(s). Pt was last seen in office on 6/1/2021  and has a follow up scheduled for Visit date not found. Will send request to  Dr. Edwina Gooden  for patient.      Requested Prescriptions     Pending Prescriptions Disp Refills    SYNTHROID 75 MCG tablet [Pharmacy Med Name: SYNTHROID TABS 75MCG] 90 tablet 3     Sig: TAKE 1 TABLET DAILY

## 2021-06-17 DIAGNOSIS — Z12.11 SCREEN FOR COLON CANCER: ICD-10-CM

## 2021-06-18 ENCOUNTER — TELEPHONE (OUTPATIENT)
Dept: PRIMARY CARE CLINIC | Age: 71
End: 2021-06-18

## 2021-06-18 DIAGNOSIS — Z12.11 SCREEN FOR COLON CANCER: Primary | ICD-10-CM

## 2021-06-18 NOTE — TELEPHONE ENCOUNTER
----- Message from JOSE ELIAS Reinoso sent at 6/17/2021  2:08 PM CDT -----  Cologuard is positive.   Recommend referral to gastroenterology for further evaluation and colonoscopy

## 2021-07-01 ENCOUNTER — TELEPHONE (OUTPATIENT)
Dept: GASTROENTEROLOGY | Age: 71
End: 2021-07-01

## 2021-07-01 NOTE — TELEPHONE ENCOUNTER
Patient: Gwyn Rhodes    YOB: 1950      Clearance was received on July 1, 2021.    for Endoscopy / Colonoscopy scheduled for: 7/6/21    Patient may discontinue the use of PLAVIX  for 5  days prior to the procedure.     IS Lovenox required:  NO    PATIENT NOTIFIED ON:  7/1/21      Clearance scanned into media

## 2021-07-06 ENCOUNTER — ANESTHESIA (OUTPATIENT)
Dept: ENDOSCOPY | Age: 71
End: 2021-07-06
Payer: MEDICARE

## 2021-07-06 ENCOUNTER — HOSPITAL ENCOUNTER (OUTPATIENT)
Age: 71
Setting detail: OUTPATIENT SURGERY
Discharge: HOME | End: 2021-07-06
Attending: INTERNAL MEDICINE | Admitting: INTERNAL MEDICINE
Payer: MEDICARE

## 2021-07-06 ENCOUNTER — ANESTHESIA EVENT (OUTPATIENT)
Dept: ENDOSCOPY | Age: 71
End: 2021-07-06
Payer: MEDICARE

## 2021-07-06 VITALS
OXYGEN SATURATION: 98 % | TEMPERATURE: 98.6 F | RESPIRATION RATE: 18 BRPM | BODY MASS INDEX: 26.88 KG/M2 | DIASTOLIC BLOOD PRESSURE: 73 MMHG | WEIGHT: 192 LBS | SYSTOLIC BLOOD PRESSURE: 90 MMHG | HEIGHT: 71 IN | HEART RATE: 92 BPM

## 2021-07-06 VITALS
TEMPERATURE: 97 F | SYSTOLIC BLOOD PRESSURE: 100 MMHG | RESPIRATION RATE: 21 BRPM | OXYGEN SATURATION: 97 % | DIASTOLIC BLOOD PRESSURE: 54 MMHG

## 2021-07-06 LAB
GLUCOSE BLD-MCNC: 144 MG/DL (ref 70–99)
PERFORMED ON: ABNORMAL

## 2021-07-06 PROCEDURE — 7100000011 HC PHASE II RECOVERY - ADDTL 15 MIN: Performed by: INTERNAL MEDICINE

## 2021-07-06 PROCEDURE — 2709999900 HC NON-CHARGEABLE SUPPLY: Performed by: INTERNAL MEDICINE

## 2021-07-06 PROCEDURE — 82947 ASSAY GLUCOSE BLOOD QUANT: CPT

## 2021-07-06 PROCEDURE — 6360000002 HC RX W HCPCS: Performed by: NURSE ANESTHETIST, CERTIFIED REGISTERED

## 2021-07-06 PROCEDURE — 3700000000 HC ANESTHESIA ATTENDED CARE: Performed by: INTERNAL MEDICINE

## 2021-07-06 PROCEDURE — 45385 COLONOSCOPY W/LESION REMOVAL: CPT | Performed by: INTERNAL MEDICINE

## 2021-07-06 PROCEDURE — 45380 COLONOSCOPY AND BIOPSY: CPT | Performed by: INTERNAL MEDICINE

## 2021-07-06 PROCEDURE — 3609010600 HC COLONOSCOPY POLYPECTOMY SNARE/COLD BIOPSY: Performed by: INTERNAL MEDICINE

## 2021-07-06 PROCEDURE — 3700000001 HC ADD 15 MINUTES (ANESTHESIA): Performed by: INTERNAL MEDICINE

## 2021-07-06 PROCEDURE — 88305 TISSUE EXAM BY PATHOLOGIST: CPT

## 2021-07-06 PROCEDURE — 7100000010 HC PHASE II RECOVERY - FIRST 15 MIN: Performed by: INTERNAL MEDICINE

## 2021-07-06 PROCEDURE — 2580000003 HC RX 258: Performed by: INTERNAL MEDICINE

## 2021-07-06 PROCEDURE — 2500000003 HC RX 250 WO HCPCS: Performed by: NURSE ANESTHETIST, CERTIFIED REGISTERED

## 2021-07-06 RX ORDER — SODIUM CHLORIDE, SODIUM LACTATE, POTASSIUM CHLORIDE, CALCIUM CHLORIDE 600; 310; 30; 20 MG/100ML; MG/100ML; MG/100ML; MG/100ML
INJECTION, SOLUTION INTRAVENOUS CONTINUOUS
Status: DISCONTINUED | OUTPATIENT
Start: 2021-07-06 | End: 2021-07-06 | Stop reason: HOSPADM

## 2021-07-06 RX ORDER — LIDOCAINE HYDROCHLORIDE 10 MG/ML
INJECTION, SOLUTION INFILTRATION; PERINEURAL PRN
Status: DISCONTINUED | OUTPATIENT
Start: 2021-07-06 | End: 2021-07-06 | Stop reason: SDUPTHER

## 2021-07-06 RX ORDER — PROPOFOL 10 MG/ML
INJECTION, EMULSION INTRAVENOUS PRN
Status: DISCONTINUED | OUTPATIENT
Start: 2021-07-06 | End: 2021-07-06 | Stop reason: SDUPTHER

## 2021-07-06 RX ADMIN — LIDOCAINE HYDROCHLORIDE 50 MG: 10 INJECTION, SOLUTION INFILTRATION; PERINEURAL at 08:02

## 2021-07-06 RX ADMIN — PROPOFOL 20 MG: 10 INJECTION, EMULSION INTRAVENOUS at 08:17

## 2021-07-06 RX ADMIN — PROPOFOL 20 MG: 10 INJECTION, EMULSION INTRAVENOUS at 08:06

## 2021-07-06 RX ADMIN — PROPOFOL 20 MG: 10 INJECTION, EMULSION INTRAVENOUS at 08:11

## 2021-07-06 RX ADMIN — PROPOFOL 30 MG: 10 INJECTION, EMULSION INTRAVENOUS at 08:08

## 2021-07-06 RX ADMIN — PROPOFOL 80 MG: 10 INJECTION, EMULSION INTRAVENOUS at 08:04

## 2021-07-06 RX ADMIN — PROPOFOL 20 MG: 10 INJECTION, EMULSION INTRAVENOUS at 08:19

## 2021-07-06 RX ADMIN — PROPOFOL 20 MG: 10 INJECTION, EMULSION INTRAVENOUS at 08:22

## 2021-07-06 RX ADMIN — SODIUM CHLORIDE, POTASSIUM CHLORIDE, SODIUM LACTATE AND CALCIUM CHLORIDE: 600; 310; 30; 20 INJECTION, SOLUTION INTRAVENOUS at 07:57

## 2021-07-06 RX ADMIN — PROPOFOL 20 MG: 10 INJECTION, EMULSION INTRAVENOUS at 08:13

## 2021-07-06 ASSESSMENT — LIFESTYLE VARIABLES: SMOKING_STATUS: 1

## 2021-07-06 ASSESSMENT — PAIN SCALES - GENERAL
PAINLEVEL_OUTOF10: 0
PAINLEVEL_OUTOF10: 0

## 2021-07-06 ASSESSMENT — PAIN - FUNCTIONAL ASSESSMENT: PAIN_FUNCTIONAL_ASSESSMENT: 0-10

## 2021-07-06 NOTE — ANESTHESIA PRE PROCEDURE
Department of Anesthesiology  Preprocedure Note       Name:  Ellen Gonzalez   Age:  79 y.o.  :  1950                                          MRN:  [id number]         Date:  2021      Surgeon: Hudson Steven):  Tien Lockhart MD    Procedure: Procedure(s):  COLORECTAL CANCER SCREENING, NOT HIGH RISK    Medications prior to admission:   Prior to Admission medications    Medication Sig Start Date End Date Taking? Authorizing Provider   levothyroxine (SYNTHROID) 75 MCG tablet Take 1 tablet by mouth Daily 6/3/21   JOSE ELIAS Smith   metFORMIN (GLUCOPHAGE) 1000 MG tablet Take 1 tablet by mouth 2 times daily (with meals) 21   ROSS Gonzalez DO   traZODone (DESYREL) 50 MG tablet Take 1 tablet by mouth nightly 21   JOSE ELIAS Smith   metOLazone (ZAROXOLYN) 2.5 MG tablet Take 1 tablet by mouth daily 10/30/20   ROSS Gonzalez DO   spironolactone (ALDACTONE) 25 MG tablet Take 1 tablet by mouth daily 10/12/20   ROSS Gonzalez DO   rosuvastatin (CRESTOR) 40 MG tablet Take 1 tablet by mouth daily 20   ROSS Gonzalez DO   triamcinolone (KENALOG) 0.1 % cream Apply topically 2 times daily.  20   ROSS Gonzalez DO   ramipril (ALTACE) 1.25 MG capsule Take 1 capsule by mouth daily 20   ROSS Gonzalez DO   Multiple Vitamins-Minerals (THERAPEUTIC MULTIVITAMIN-MINERALS) tablet Take 1 tablet by mouth daily    Historical Provider, MD   Cholecalciferol (VITAMIN D3) 1.25 MG (04537 UT) CAPS Take 1 capsule by mouth    Historical Provider, MD   magnesium oxide (MAG-OX) 400 MG tablet Take 400 mg by mouth daily    Historical Provider, MD   torsemide (DEMADEX) 100 MG tablet Take 100 mg by mouth every other day Take 2 tablets by mouth every other day    Historical Provider, MD   potassium chloride (KLOR-CON M) 20 MEQ extended release tablet TAKE 1 TABLET BY MOUTH TWICE DAILY 20   ROSS Gonzalez DO   clopidogrel (PLAVIX) 75 MG tablet Take 1 tablet by mouth daily 10/18/18   B Doraine Blank, DO   nitroGLYCERIN (NITROLINGUAL) 0.4 MG/SPRAY 0.4 mg spray Place 1 spray under the tongue every 5 minutes as needed for Chest pain 10/27/17   B Doraine Blank, DO   Omega-3 Fatty Acids (FISH OIL) 1000 MG CAPS Take 3,000 mg by mouth daily    Historical Provider, MD   diphenhydrAMINE (BENADRYL) 25 MG tablet Take 50 mg by mouth nightly as needed for Itching    Historical Provider, MD   aspirin 325 MG tablet Take 325 mg by mouth daily. Historical Provider, MD       Current medications:    Current Facility-Administered Medications   Medication Dose Route Frequency Provider Last Rate Last Admin    lactated ringers infusion   Intravenous Continuous Andrea Brock MD           Allergies:     Allergies   Allergen Reactions    Codeine Itching       Problem List:    Patient Active Problem List   Diagnosis Code    Carotid artery occlusion without infarction I65.29    CAD (coronary artery disease) I25.10    DM2 (diabetes mellitus, type 2) ([facility] [address] E11.9    HTN (hypertension) I10    Hyperlipidemia with target LDL less than 100 E78.5    Chronic systolic congestive heart failure (HCC) I50.22    GI bleed K92.2    NSAID long-term use Z79.1    Hypotension due to hypovolemia I95.89, E86.1    Acute blood loss anemia D62    Chest pain R07.9    Dieulafoy lesion of stomach K31.82    Pleural effusion, bilateral J90    Bilateral carotid artery stenosis I65.23    Syncope and collapse R55    Acute renal failure superimposed on stage 3 chronic kidney disease (HCC) N17.9, N18.30    Hyponatremia E87.1    TIA (transient ischemic attack) G45.9    Weakness R53.1    Visual disturbance H53.9    Acquired hypothyroidism E03.9    Atherosclerosis of native artery of both lower extremities with intermittent claudication (HCC) I70.213    Stage 3b chronic kidney disease (HCC) N18.32       Past Medical History:        Diagnosis Date    Arthritis     Basilar artery stenosis     CAD (coronary artery disease) 1997    [place]    Cancer [facility])     SKIN 6YRS AGO    CHF (congestive heart failure) (HCC)     Heel spur     Hyperlipidemia     Hypertension     Palliative care patient 10/17/2018    Sleep apnea     no c-pap    Type II or unspecified type diabetes mellitus without mention of complication, not stated as uncontrolled     Vertebral artery insufficiency        Past Surgical History:        Procedure Laterality Date    ANGIOPLASTY Bilateral     basilar artery angioplasty    CARDIAC CATHETERIZATION  2018    [place]    CARPAL TUNNEL RELEASE      bilateral    CATARACT REMOVAL Bilateral     CHOLECYSTECTOMY      CORONARY ARTERY BYPASS GRAFT      3v    HEEL SPUR SURGERY      left    [place] EGD TRANSORAL BIOPSY SINGLE/MULTIPLE N/A 10/16/2018    Dr Samy Kerns-w/control of bleeding-Active bleeding in the stomach from Dieulafoy lesion    PTCA  2009 [facility]    with stents    TONSILLECTOMY         Social History:    Social History     Tobacco Use    Smoking status: Former Smoker     Packs/day: 1.00     Years: 30.00     Pack years: 30.00     Types: Cigars     Quit date: 3/3/2009     Years since quittin.3    Smokeless tobacco: Never Used   Substance Use Topics    Alcohol use: No     Alcohol/week: 0.0 standard drinks                                Counseling given: Not Answered      Vital Signs (Current): There were no vitals filed for this visit.                                            BP Readings from Last 3 Encounters:   21 116/72   20 122/80   20 118/72       NPO Status:                                                                                 BMI:   Wt Readings from Last 3 Encounters:   21 202 lb (91.6 kg)   20 206 lb 8 oz (93.7 kg)   20 207 lb 4 oz (94 kg)     There is no height or weight on file to calculate BMI.    CBC:   Lab Results   Component Value Date    WBC 8.7 2020    RBC 3.92 2020    HGB 12.8 2020 HCT 38.9 12/18/2020    HCT 38.7 09/06/2011    MCV 99.2 12/18/2020    RDW 15.0 12/18/2020     12/18/2020     09/06/2011       CMP:   Lab Results   Component Value Date     12/18/2020     09/06/2011    K 3.4 12/18/2020    K 3.6 07/20/2019    K 4.9 09/06/2011    CL 96 12/18/2020     09/06/2011    CO2 27 12/18/2020    BUN 42 12/18/2020    CREATININE 1.83 12/18/2020    CREATININE 1.0 09/06/2011    LABGLOM 39 12/18/2020    LABGLOM 46 02/20/2020    GLUCOSE 117 12/18/2020    PROT 8.2 02/20/2020    PROT 6.8 02/13/2013    CALCIUM 9.8 12/18/2020    BILITOT 0.7 12/18/2020    ALKPHOS 70 12/18/2020    ALKPHOS 42 09/06/2011    AST 11 12/18/2020    ALT 15 12/18/2020       POC Tests: No results for input(s): POCGLU, POCNA, POCK, POCCL, POCBUN, POCHEMO, POCHCT in the last 72 hours. Coags:   Lab Results   Component Value Date    PROTIME 15.9 10/23/2019    PROTIME 11.56 09/06/2011    INR 1.34 10/23/2019    APTT 34.0 10/23/2019       HCG (If Applicable): No results found for: PREGTESTUR, PREGSERUM, HCG, HCGQUANT     ABGs: No results found for: PHART, PO2ART, LVT0JHM, XEG2TDN, BEART, U6WDPEAQ     Type & Screen (If Applicable):  No results found for: LABABO, LABRH    Drug/Infectious Status (If Applicable):  No results found for: HIV, HEPCAB    COVID-19 Screening (If Applicable): No results found for: COVID19        Anesthesia Evaluation  Patient summary reviewed  Airway: Mallampati: II  TM distance: >3 FB   Neck ROM: full  Mouth opening: > = 3 FB Dental:    (+) upper dentures      Pulmonary:normal exam    (+) sleep apnea:  current smoker          Patient did not smoke on day of surgery.                  Cardiovascular:    (+) hypertension: moderate, CAD: no interval change, CHF:,       ECG reviewed  Rhythm: regular             Beta Blocker:  Not on Beta Blocker         Neuro/Psych:   (+) TIA,             GI/Hepatic/Renal:             Endo/Other:    (+) DiabetesType II DM, using insulin, hypothyroidism::., .                 Abdominal:             Vascular: Other Findings:           Anesthesia Plan      general and TIVA     ASA 3       Induction: intravenous. Anesthetic plan and risks discussed with patient. Use of blood products discussed with patient whom.                    JOSE ELIAS Gonzalez - ASHLY   7/6/2021

## 2021-07-06 NOTE — OP NOTE
Patient: Fredi Cast : 1950  Med Rec#: [id number] Acc#: [id number]   Primary Care Provider MIKE Felton Aas, DO    Date of Procedure:  2021    Endoscopist: Katia Garibay MD    Referring Provider: Leonel Moralez DO,     Operation Performed:   Colonoscopy with snare polypectomy  Colonoscopy with biopsy    Indications: screening, +Cologard    Anesthesia:  Sedation was administered by anesthesia who monitored the patient during the procedure. I met with Fredi Cast prior to procedure. We discussed the procedure itself, and I have discussed the risks of endoscopy (including-- but not limited to-- pain, discomfort, bleeding potentially requiring second endoscopic procedure and/or blood transfusion, organ perforation requiring operative repair, damage to organs near the colon, infection, aspiration, cardiopulmonary/allergic reaction), benefits, indications to endoscopy. Additionally, we discussed options other than colonoscopy. The patient expressed understanding. All questions answered. The patient decided to proceed with the procedure. Signed informed consent was placed on the chart. Blood Loss: minimal    Withdrawal time: > 6 minutes  Bowel Prep: adequate     Complications: no immediate complications    DESCRIPTION OF PROCEDURE:     A time out was performed. After written informed consent was obtained, the patient was placed in the left lateral position. The perianal area was inspected, and a digital rectal exam was performed. A rectal exam was performed: normal tone, no palpable lesions. At this point, a forward viewing Olympus colonoscope was inserted into the anus and carefully advanced to the cecum. The cecum was identified by the ileocecal valve and the appendiceal orifice. The colonoscope was then slowly withdrawn with careful inspection of the mucosa in a linear and circumferential fashion. The scope was retroflexed in the rectum.  Suction was utilized during the procedure to remove as much air as possible from the bowel. The colonoscope was removed from the patient, and the procedure was terminated. Findings are listed below. Findings: The mucosa appeared normal throughout the entire examined colon     In the right colon, two small 7 mm sessile polyps were removed completely with cold snare polypectomy. \    In the the splenic flexure, a 5 mm sessile polyp was removed completely with cold snare polypectomy. In the the sigmoid, a 3 mm sessile polyp was removed completely with forceps polypectomy. There was evidence of diverticular disease throughout the left colon. Retroflexion in the rectum was normal and revealed no further abnormalities         Recommendations:  1. Repeat colonoscopy: pending pathology - max of 3 yrs for screening  2. Await biopsy results-you will receive a letter with your results within 7-10 days    Findings and recommendations were discussed w/ the patient. A copy of the images was provided.     Eladio Nash MD  7/6/2021  8:25 AM

## 2021-07-06 NOTE — H&P
Patient Name: [place]  : 1950  MRN: [id number]  DATE: 21    Allergies: Allergies   Allergen Reactions    Codeine Itching        ENDOSCOPY  History and Physical    Procedure:    [] Diagnostic Colonoscopy       [x] Screening Colonoscopy  [] EGD      [] ERCP      [] EUS       [] Other    [x] Previous office notes/History and Physical reviewed from the patients chart. Please see EMR for further details of HPI. I have examined the patient's status immediately prior to the procedure and:      Indications/HPI:    []Abdominal Pain   []Cancer- GI/Lung     []Fhx of colon CA/polyps  []History of Polyps  []Barretts            []Melena  []Abnormal Imaging              []Dysphagia              []Persistent Pneumonia   []Anemia                            []Food Impaction        []History of Polyps  [] GI Bleed             []Pulmonary nodule/Mass   []Change in bowel habits []Heartburn/Reflux  []Rectal Bleed (BRBPR)  []Chest Pain - Non Cardiac []Heme (+) Stool []Ulcers  []Constipation  []Hemoptysis  []Varices  []Diarrhea  []Hypoxemia    []Nausea/Vomiting   [x]Screening   []Crohns/Colitis  [x]Other: +cologard    Anesthesia:   [x] MAC [] Moderate Sedation   [] General   [] None     ROS: 12 pt Review of Symptoms was negative unless mentioned above    Medications:   Prior to Admission medications    Medication Sig Start Date End Date Taking?  Authorizing Provider   levothyroxine (SYNTHROID) 75 MCG tablet Take 1 tablet by mouth Daily 6/3/21   JOSE ELIAS Mccord   metFORMIN (GLUCOPHAGE) 1000 MG tablet Take 1 tablet by mouth 2 times daily (with meals) 21   ROSS Eldridge DO   traZODone (DESYREL) 50 MG tablet Take 1 tablet by mouth nightly 21   JOSE ELIAS Mccord   metOLazone (ZAROXOLYN) 2.5 MG tablet Take 1 tablet by mouth daily 10/30/20   ROSS Eldridge DO   spironolactone (ALDACTONE) 25 MG tablet Take 1 tablet by mouth daily 10/12/20   ROSS Eldridge DO rosuvastatin (CRESTOR) 40 MG tablet Take 1 tablet by mouth daily 8/27/20   ROSS Nick DO   triamcinolone (KENALOG) 0.1 % cream Apply topically 2 times daily. 7/13/20   ROSS Nick DO   ramipril (ALTACE) 1.25 MG capsule Take 1 capsule by mouth daily 7/7/20   ROSS Nick DO   Multiple Vitamins-Minerals (THERAPEUTIC MULTIVITAMIN-MINERALS) tablet Take 1 tablet by mouth daily    Historical Provider, MD   magnesium oxide (MAG-OX) 400 MG tablet Take 400 mg by mouth daily    Historical Provider, MD   torsemide (DEMADEX) 100 MG tablet Take 100 mg by mouth every other day Take 2 tablets by mouth every other day    Historical Provider, MD   potassium chloride (KLOR-CON M) 20 MEQ extended release tablet TAKE 1 TABLET BY MOUTH TWICE DAILY 2/20/20   ROSS Nick DO   clopidogrel (PLAVIX) 75 MG tablet Take 1 tablet by mouth daily 10/18/18   ROSS Nick DO   nitroGLYCERIN (NITROLINGUAL) 0.4 MG/SPRAY 0.4 mg spray Place 1 spray under the tongue every 5 minutes as needed for Chest pain 10/27/17   ROSS Nick DO   Omega-3 Fatty Acids (FISH OIL) 1000 MG CAPS Take 3,000 mg by mouth daily    Historical Provider, MD   diphenhydrAMINE (BENADRYL) 25 MG tablet Take 50 mg by mouth nightly as needed for Itching    Historical Provider, MD   aspirin 325 MG tablet Take 325 mg by mouth daily.     Historical Provider, MD       Past Medical History:  Past Medical History:   Diagnosis Date    Arthritis     Basilar artery stenosis     CAD (coronary artery disease) 1997 2009    [place]    Cancer ([address]     SKIN 6YRS AGO    Carotid artery stenosis     bilateral    Cerebral artery occlusion with cerebral infarction ([address]     TIA due to hypotension    CHF (congestive heart failure) (HCC)     Dieulafoy lesion of stomach     Heel spur     Hyperlipidemia     Hypertension     Palliative care patient 10/17/2018    Sleep apnea     no c-pap    Squamous cell cancer of skin of earlobe, left     SCC    Type II or unspecified type diabetes mellitus without mention of complication, not stated as uncontrolled     Vertebral artery insufficiency        Past Surgical History:  Past Surgical History:   Procedure Laterality Date    ANGIOPLASTY Bilateral     basilar artery angioplasty    CARDIAC CAT[place]IZATION  2018    [place]    CARDIAC DEFIBRILLATOR PLACEMENT      medtronic    CARPAL TUNNEL RELEASE      bilateral    CATARACT REMOVAL Bilateral     CHOLECYSTECTOMY      CORONARY ARTERY BYPASS GRAFT      3v    HEEL SPUR SURGERY      left    [place] EGD TRANSORAL BIOPSY SINGLE/MULTIPLE N/A 10/16/2018    Dr Randall Kerns-w/control of bleeding-Active bleeding in the stomach from Dieulafoy lesion    PTCA  2009 [place]    with stents    TONSILLECTOMY         Social History:  Social History     Tobacco Use    Smoking status: Former Smoker     Packs/day: 1.00     Years: 30.00     Pack years: 30.00     Types: Cigars     Quit date: 3/3/2009     Years since quittin.3    Smokeless tobacco: Never Used   Vaping Use    Vaping Use: Never used   Substance Use Topics    Alcohol use: No     Alcohol/week: 0.0 standard drinks    Drug use: No       Vital Signs:   Vitals:    21 0743   BP: 94/78   Pulse: 90   Resp: 18   Temp: 98.8 °F (37.1 °C)   SpO2: 100%        Physical Exam:  Cardiac:  [x]WNL  []Comments:  Pulmonary:  [x]WNL   []Comments:  Neuro/Mental Status:  [x]WNL  []Comments:  Abdominal:  [x]WNL    []Comments:  Other:   []WNL  []Comments:    Informed Consent:  The risks and benefits of the procedure have been discussed with either the patient or if they cannot consent, their representative. Assessment:  Patient examined and appropriate for planned sedation and procedure. Plan:  Proceed with planned sedation and procedure as above.          Karen Tran MD

## 2021-07-06 NOTE — ANESTHESIA POSTPROCEDURE EVALUATION
Department of Anesthesiology  Postprocedure Note    Patient: Gayla Castro  MRN: [id number]  [place]: 1950  Date of evaluation: 7/6/2021  Time:  8:25 AM     Procedure Summary     Date: 07/06/21 Room / Location: [address]    Anesthesia Start: 0800 Anesthesia Stop: 0825    Procedure: COLONOSCOPY POLYPECTOMY SNARE/COLD BIOPSY (N/A ) Diagnosis: (SCREEN, HX POLYPS, +COLOGUARD)    Surgeons: Sara Arroyo MD Responsible Provider: JOSE ELIAS Ledbetter CRNA    Anesthesia Type: general, TIVA ASA Status: 3          Anesthesia Type: general, TIVA    Delroy Phase I: Delroy Score: 10    Delroy Phase II:      Last vitals: Reviewed and per EMR flowsheets.        Anesthesia Post Evaluation    Patient location during evaluation: bedside  Patient participation: complete - patient participated  Level of consciousness: responsive to verbal stimuli  Pain score: 0  Airway patency: patent  Nausea & Vomiting: no nausea and no vomiting  Complications: no  Cardiovascular status: hemodynamically stable  Respiratory status: acceptable, spontaneous ventilation and room air  Hydration status: euvolemic

## 2021-07-13 DIAGNOSIS — E78.2 MIXED HYPERLIPIDEMIA: ICD-10-CM

## 2021-07-14 RX ORDER — ROSUVASTATIN CALCIUM 40 MG/1
40 TABLET, COATED ORAL EVERY EVENING
Qty: 90 TABLET | Refills: 3 | Status: SHIPPED | OUTPATIENT
Start: 2021-07-14 | End: 2022-11-02

## 2021-07-14 NOTE — TELEPHONE ENCOUNTER
Received fax from pharmacy requesting refill on pts medication(s). Pt was last seen in office on 6/1/2021  and has a follow up scheduled for 12/1/2021. Will send request to  Dr. Renee Wagner  for patient.      Requested Prescriptions     Pending Prescriptions Disp Refills    rosuvastatin (CRESTOR) 40 MG tablet [Pharmacy Med Name: ROSUVASTATIN TABS 40MG] 90 tablet 3     Sig: TAKE 1 TABLET DAILY

## 2021-08-19 RX ORDER — RAMIPRIL 1.25 MG/1
CAPSULE ORAL
Qty: 90 CAPSULE | Refills: 3 | Status: SHIPPED | OUTPATIENT
Start: 2021-08-19 | End: 2022-08-15

## 2021-08-25 ENCOUNTER — PATIENT MESSAGE (OUTPATIENT)
Dept: PRIMARY CARE CLINIC | Age: 71
End: 2021-08-25

## 2021-08-25 NOTE — TELEPHONE ENCOUNTER
From: Roddy Tolbert  To: [id number], [place]  Sent: 8/25/2021 11:29 AM CDT  Subject: Prescription Question    could you please call me in something for cough. I have a nagging cough and keep getting strange looks when I am out in public (people thinking I have the reick). Those little \"pearls\" you ordered in the past seemed to work as good as anything. Thanks in advance and hope you're well!

## 2021-08-26 RX ORDER — BENZONATATE 100 MG/1
100 CAPSULE ORAL 2 TIMES DAILY PRN
Qty: 30 CAPSULE | Refills: 1 | Status: SHIPPED | OUTPATIENT
Start: 2021-08-26 | End: 2021-09-02

## 2021-08-27 RX ORDER — SPIRONOLACTONE 25 MG/1
25 TABLET ORAL DAILY
Qty: 90 TABLET | Refills: 3 | Status: SHIPPED | OUTPATIENT
Start: 2021-08-27 | End: 2022-08-22

## 2021-08-27 NOTE — TELEPHONE ENCOUNTER
Received fax from pharmacy requesting refill on pts medication(s). Pt was last seen in office on 6/1/2021  and has a follow up scheduled for 12/1/2021. Will send request to  Dr. Chris Alan  for authorization.      Requested Prescriptions     Pending Prescriptions Disp Refills    spironolactone (ALDACTONE) 25 MG tablet [Pharmacy Med Name: SPIRONOLACTONE TABS 25MG] 90 tablet 3     Sig: Take 1 tablet by mouth daily

## 2021-08-30 ENCOUNTER — PATIENT MESSAGE (OUTPATIENT)
Dept: PRIMARY CARE CLINIC | Age: 71
End: 2021-08-30

## 2021-08-30 DIAGNOSIS — L30.9 DERMATITIS: ICD-10-CM

## 2021-08-30 RX ORDER — TORSEMIDE 100 MG/1
100 TABLET ORAL EVERY OTHER DAY
Qty: 30 TABLET | Refills: 3 | Status: SHIPPED | OUTPATIENT
Start: 2021-08-30 | End: 2021-09-07 | Stop reason: SDUPTHER

## 2021-08-30 RX ORDER — TRIAMCINOLONE ACETONIDE 1 MG/G
CREAM TOPICAL
Qty: 80 G | Refills: 5 | Status: SHIPPED | OUTPATIENT
Start: 2021-08-30

## 2021-08-30 NOTE — TELEPHONE ENCOUNTER
From: Andrew Felty  To: Paul Haskins DO  Sent: 8/30/2021 9:16 AM CDT  Subject: Prescription Question    I received a message from [employer] that stated that your office had declined a refill for my diuretic (Demadex) and that I needed to be seen in the office before a refill would be approved. This makes NO sense to me. I have an appointment scheduled for 12/1/2021 (the date your office gave me). If I had needed to be seen sooner YOU should have said so at the time (6 months is what was told to me). I will be out of Demadex by the end of this week, as which point in time I will look elsewhere for aphysician to authorize the refill and to take care of my needs in the future. Needless to say. I am not pleased.

## 2021-08-30 NOTE — TELEPHONE ENCOUNTER
Received fax from pharmacy requesting refill on pts medication(s). Pt was last seen in office on 6/1/2021  and has a follow up scheduled for 12/1/2021. Will send request to  Dr. Willam Foster  for patient.      Requested Prescriptions     Pending Prescriptions Disp Refills    triamcinolone (KENALOG) 0.1 % cream [Pharmacy Med Name: TRIAMCINOLONE CREAM 80GM 0.1%] 80 g 8     Sig: APPLY TOPICALLY TWICE A DAY

## 2021-09-03 NOTE — TELEPHONE ENCOUNTER
[facility] called to clarify rx.      torsemide (DEMADEX) 100 MG tablet [[id number]]     Order Details  Dose: 100 mg Route: Oral Frequency: EVERY OTHER DAY   Dispense Quantity: 30 tablet Refills: 3          Sig: Take 1 tablet by mouth every other day Take 2 tablets by mouth every other day

## 2021-09-07 RX ORDER — TORSEMIDE 100 MG/1
TABLET ORAL
Qty: 90 TABLET | Refills: 3 | Status: SHIPPED | OUTPATIENT
Start: 2021-09-07 | End: 2021-12-01 | Stop reason: SDUPTHER

## 2021-09-23 DIAGNOSIS — E87.6 HYPOKALEMIA: ICD-10-CM

## 2021-09-23 RX ORDER — POTASSIUM CHLORIDE 20 MEQ/1
20 TABLET, EXTENDED RELEASE ORAL 2 TIMES DAILY
Qty: 180 TABLET | Refills: 3 | Status: SHIPPED | OUTPATIENT
Start: 2021-09-23 | End: 2022-02-25 | Stop reason: SDUPTHER

## 2021-09-23 NOTE — TELEPHONE ENCOUNTER
Received fax from pharmacy requesting refill on pts medication(s). Pt was last seen in office on 6/1/2021  and has a follow up scheduled for 12/1/2021. Will send request to  Dr. Galdino Issa  for authorization.      Requested Prescriptions     Pending Prescriptions Disp Refills    potassium chloride (KLOR-CON M) 20 MEQ extended release tablet 180 tablet 3     Sig: Take 1 tablet by mouth 2 times daily TAKE 1 TABLET BY MOUTH TWICE DAILY

## 2021-11-13 DIAGNOSIS — E03.9 ACQUIRED HYPOTHYROIDISM: ICD-10-CM

## 2021-11-15 RX ORDER — LEVOTHYROXINE SODIUM 0.07 MG/1
75 TABLET ORAL DAILY
Qty: 90 TABLET | Refills: 3 | Status: SHIPPED | OUTPATIENT
Start: 2021-11-15 | End: 2021-12-01 | Stop reason: DRUGHIGH

## 2021-11-15 NOTE — TELEPHONE ENCOUNTER
Received fax from pharmacy requesting refill on pts medication(s). Pt was last seen in office on 6/1/2021  and has a follow up scheduled for 12/1/2021. Will send request to  Dr. Raya Beckman  for authorization.      Requested Prescriptions     Pending Prescriptions Disp Refills    levothyroxine (SYNTHROID) 75 MCG tablet 90 tablet 3     Sig: Take 1 tablet by mouth Daily

## 2021-11-30 DIAGNOSIS — Z11.59 ENCOUNTER FOR HEPATITIS C SCREENING TEST FOR LOW RISK PATIENT: ICD-10-CM

## 2021-11-30 DIAGNOSIS — I10 ESSENTIAL HYPERTENSION: ICD-10-CM

## 2021-11-30 DIAGNOSIS — I70.213 ATHEROSCLEROSIS OF NATIVE ARTERY OF BOTH LOWER EXTREMITIES WITH INTERMITTENT CLAUDICATION (HCC): ICD-10-CM

## 2021-11-30 DIAGNOSIS — N18.32 STAGE 3B CHRONIC KIDNEY DISEASE (HCC): ICD-10-CM

## 2021-11-30 DIAGNOSIS — I50.22 CHRONIC SYSTOLIC CONGESTIVE HEART FAILURE (HCC): ICD-10-CM

## 2021-11-30 DIAGNOSIS — E87.6 HYPOKALEMIA: ICD-10-CM

## 2021-11-30 DIAGNOSIS — E03.9 ACQUIRED HYPOTHYROIDISM: Primary | ICD-10-CM

## 2021-11-30 DIAGNOSIS — Z12.5 ENCOUNTER FOR SCREENING FOR MALIGNANT NEOPLASM OF PROSTATE: ICD-10-CM

## 2021-11-30 DIAGNOSIS — Z11.4 SCREENING FOR HIV (HUMAN IMMUNODEFICIENCY VIRUS): Primary | ICD-10-CM

## 2021-11-30 DIAGNOSIS — E78.2 MIXED HYPERLIPIDEMIA: ICD-10-CM

## 2021-11-30 DIAGNOSIS — E11.9 TYPE 2 DIABETES MELLITUS WITHOUT COMPLICATION, WITHOUT LONG-TERM CURRENT USE OF INSULIN (HCC): ICD-10-CM

## 2021-11-30 LAB
ALBUMIN SERPL-MCNC: 3.5 G/DL
ALP BLD-CCNC: 71 U/L
ALT SERPL-CCNC: 14 U/L
ANION GAP SERPL CALCULATED.3IONS-SCNC: 17 MMOL/L
ANTIBODY: 0.1
AST SERPL-CCNC: 10 U/L
AVERAGE GLUCOSE: ABNORMAL
B-TYPE NATRIURETIC PEPTIDE: 3907 PG/ML
BASOPHILS ABSOLUTE: 0.1 /ΜL
BASOPHILS RELATIVE PERCENT: 1.4 %
BILIRUB SERPL-MCNC: 1 MG/DL (ref 0.1–1.4)
BUN BLDV-MCNC: 63 MG/DL
CALCIUM SERPL-MCNC: 9.1 MG/DL
CHLORIDE BLD-SCNC: 90 MMOL/L
CHOLESTEROL, TOTAL: 151 MG/DL
CHOLESTEROL/HDL RATIO: ABNORMAL
CO2: 25 MMOL/L
CREAT SERPL-MCNC: 1.78 MG/DL
EOSINOPHILS ABSOLUTE: 0.47 /ΜL
EOSINOPHILS RELATIVE PERCENT: 6.4 %
GFR CALCULATED: 40.25
GLUCOSE BLD-MCNC: 128 MG/DL
HBA1C MFR BLD: 6.5 %
HCT VFR BLD CALC: 44.6 % (ref 41–53)
HDLC SERPL-MCNC: 27 MG/DL (ref 35–70)
HEMOGLOBIN: 14.7 G/DL (ref 13.5–17.5)
LDL CHOLESTEROL CALCULATED: 102 MG/DL (ref 0–160)
LYMPHOCYTES ABSOLUTE: 1.11 /ΜL
LYMPHOCYTES RELATIVE PERCENT: 15.2 %
MCH RBC QN AUTO: 30.9 PG
MCHC RBC AUTO-ENTMCNC: 33 G/DL
MCV RBC AUTO: 93.9 FL
MONOCYTES ABSOLUTE: 0.86 /ΜL
MONOCYTES RELATIVE PERCENT: 11.8 %
NEUTROPHILS ABSOLUTE: 4.73 /ΜL
NEUTROPHILS RELATIVE PERCENT: 64.8 %
NONHDLC SERPL-MCNC: ABNORMAL MG/DL
PDW BLD-RTO: 18.6 %
PLATELET # BLD: 128 K/ΜL
PMV BLD AUTO: 10.6 FL
POTASSIUM SERPL-SCNC: 3.7 MMOL/L
PROSTATE SPECIFIC ANTIGEN: 0.77 NG/ML
RBC # BLD: 4.75 10^6/ΜL
SODIUM BLD-SCNC: 129 MMOL/L
T4 FREE: 1.21
TOTAL PROTEIN: 7.5
TRIGL SERPL-MCNC: 112 MG/DL
TSH SERPL DL<=0.05 MIU/L-ACNC: 6.79 UIU/ML
VLDLC SERPL CALC-MCNC: ABNORMAL MG/DL
WBC # BLD: 7.3 10^3/ML

## 2021-12-01 ENCOUNTER — HOSPITAL ENCOUNTER (EMERGENCY)
Age: 71
Discharge: HOME | End: 2021-12-01
Payer: MEDICARE

## 2021-12-01 ENCOUNTER — APPOINTMENT (OUTPATIENT)
Dept: GENERAL RADIOLOGY | Age: 71
End: 2021-12-01
Payer: MEDICARE

## 2021-12-01 ENCOUNTER — TELEPHONE (OUTPATIENT)
Dept: PRIMARY CARE CLINIC | Age: 71
End: 2021-12-01

## 2021-12-01 ENCOUNTER — OFFICE VISIT (OUTPATIENT)
Dept: PRIMARY CARE CLINIC | Age: 71
End: 2021-12-01
Payer: MEDICARE

## 2021-12-01 VITALS
DIASTOLIC BLOOD PRESSURE: 70 MMHG | RESPIRATION RATE: 18 BRPM | WEIGHT: 210 LBS | SYSTOLIC BLOOD PRESSURE: 98 MMHG | HEART RATE: 79 BPM | TEMPERATURE: 98.6 F | OXYGEN SATURATION: 100 % | BODY MASS INDEX: 29.29 KG/M2

## 2021-12-01 VITALS
DIASTOLIC BLOOD PRESSURE: 68 MMHG | BODY MASS INDEX: 28.28 KG/M2 | HEIGHT: 71 IN | HEART RATE: 88 BPM | TEMPERATURE: 97.3 F | WEIGHT: 202 LBS | SYSTOLIC BLOOD PRESSURE: 104 MMHG | OXYGEN SATURATION: 99 %

## 2021-12-01 DIAGNOSIS — N18.32 STAGE 3B CHRONIC KIDNEY DISEASE (HCC): ICD-10-CM

## 2021-12-01 DIAGNOSIS — E11.9 TYPE 2 DIABETES MELLITUS WITHOUT COMPLICATION, WITHOUT LONG-TERM CURRENT USE OF INSULIN (HCC): Primary | ICD-10-CM

## 2021-12-01 DIAGNOSIS — I95.9 HYPOTENSION, UNSPECIFIED HYPOTENSION TYPE: ICD-10-CM

## 2021-12-01 DIAGNOSIS — I50.22 CHRONIC SYSTOLIC CONGESTIVE HEART FAILURE (HCC): ICD-10-CM

## 2021-12-01 DIAGNOSIS — R60.9 PERIPHERAL EDEMA: ICD-10-CM

## 2021-12-01 DIAGNOSIS — E78.5 HYPERLIPIDEMIA WITH TARGET LDL LESS THAN 100: ICD-10-CM

## 2021-12-01 DIAGNOSIS — E87.8 ELECTROLYTE ABNORMALITY: ICD-10-CM

## 2021-12-01 DIAGNOSIS — I10 PRIMARY HYPERTENSION: ICD-10-CM

## 2021-12-01 DIAGNOSIS — S42.201A CLOSED FRACTURE OF PROXIMAL END OF RIGHT HUMERUS, UNSPECIFIED FRACTURE MORPHOLOGY, INITIAL ENCOUNTER: Primary | ICD-10-CM

## 2021-12-01 DIAGNOSIS — F51.01 PRIMARY INSOMNIA: ICD-10-CM

## 2021-12-01 DIAGNOSIS — E03.9 ACQUIRED HYPOTHYROIDISM: ICD-10-CM

## 2021-12-01 DIAGNOSIS — G47.00 INSOMNIA, UNSPECIFIED TYPE: ICD-10-CM

## 2021-12-01 DIAGNOSIS — E87.1 HYPONATREMIA: ICD-10-CM
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
## 2021-12-01 DIAGNOSIS — I25.10 CORONARY ARTERY DISEASE INVOLVING NATIVE CORONARY ARTERY OF NATIVE HEART WITHOUT ANGINA PECTORIS: ICD-10-CM

## 2021-12-01 LAB
ALBUMIN SERPL-MCNC: 4.7 G/DL (ref 3.5–5.2)
ALP BLD-CCNC: 85 U/L (ref 40–130)
ALT SERPL-CCNC: 9 U/L (ref 5–41)
ANION GAP SERPL CALCULATED.3IONS-SCNC: 19 MMOL/L (ref 7–19)
AST SERPL-CCNC: 15 U/L (ref 5–40)
BASOPHILS ABSOLUTE: 0.1 K/UL (ref 0–0.2)
BASOPHILS RELATIVE PERCENT: 0.7 % (ref 0–1)
BILIRUB SERPL-MCNC: 1.1 MG/DL (ref 0.2–1.2)
BUN BLDV-MCNC: 62 MG/DL (ref 8–23)
CALCIUM SERPL-MCNC: 9.8 MG/DL (ref 8.8–10.2)
CHLORIDE BLD-SCNC: 84 MMOL/L (ref 98–111)
CO2: 23 MMOL/L (ref 22–29)
CREAT SERPL-MCNC: 1.9 MG/DL (ref 0.5–1.2)
EOSINOPHILS ABSOLUTE: 0.2 K/UL (ref 0–0.6)
EOSINOPHILS RELATIVE PERCENT: 1.5 % (ref 0–5)
GFR AFRICAN AMERICAN: 42
GFR NON-AFRICAN AMERICAN: 35
GLUCOSE BLD-MCNC: 153 MG/DL (ref 74–109)
HCT VFR BLD CALC: 46.3 % (ref 42–52)
HEMOGLOBIN: 14.9 G/DL (ref 14–18)
IMMATURE GRANULOCYTES #: 0.1 K/UL
INR BLD: 1.31 (ref 0.88–1.18)
LYMPHOCYTES ABSOLUTE: 0.9 K/UL (ref 1.1–4.5)
LYMPHOCYTES RELATIVE PERCENT: 7.3 % (ref 20–40)
MCH RBC QN AUTO: 30.8 PG (ref 27–31)
MCHC RBC AUTO-ENTMCNC: 32.2 G/DL (ref 33–37)
MCV RBC AUTO: 95.9 FL (ref 80–94)
MONOCYTES ABSOLUTE: 0.9 K/UL (ref 0–0.9)
MONOCYTES RELATIVE PERCENT: 7.8 % (ref 0–10)
NEUTROPHILS ABSOLUTE: 9.7 K/UL (ref 1.5–7.5)
NEUTROPHILS RELATIVE PERCENT: 82.2 % (ref 50–65)
PDW BLD-RTO: 18.5 % (ref 11.5–14.5)
PLATELET # BLD: 148 K/UL (ref 130–400)
PMV BLD AUTO: 10.2 FL (ref 9.4–12.4)
POTASSIUM REFLEX MAGNESIUM: 3.7 MMOL/L (ref 3.5–5)
PROTHROMBIN TIME: 16.4 SEC (ref 12–14.6)
RBC # BLD: 4.83 M/UL (ref 4.7–6.1)
SODIUM BLD-SCNC: 126 MMOL/L (ref 136–145)
TOTAL PROTEIN: 8 G/DL (ref 6.6–8.7)
WBC # BLD: 11.8 K/UL (ref 4.8–10.8)

## 2021-12-01 PROCEDURE — 2580000003 HC RX 258: Performed by: NURSE PRACTITIONER

## 2021-12-01 PROCEDURE — G8484 FLU IMMUNIZE NO ADMIN: HCPCS | Performed by: PEDIATRICS

## 2021-12-01 PROCEDURE — 6360000002 HC RX W HCPCS: Performed by: NURSE PRACTITIONER

## 2021-12-01 PROCEDURE — 85610 PROTHROMBIN TIME: CPT

## 2021-12-01 PROCEDURE — 3017F COLORECTAL CA SCREEN DOC REV: CPT | Performed by: PEDIATRICS

## 2021-12-01 PROCEDURE — 73030 X-RAY EXAM OF SHOULDER: CPT

## 2021-12-01 PROCEDURE — 99283 EMERGENCY DEPT VISIT LOW MDM: CPT

## 2021-12-01 PROCEDURE — 96361 HYDRATE IV INFUSION ADD-ON: CPT

## 2021-12-01 PROCEDURE — 4040F PNEUMOC VAC/ADMIN/RCVD: CPT | Performed by: PEDIATRICS

## 2021-12-01 PROCEDURE — 96374 THER/PROPH/DIAG INJ IV PUSH: CPT

## 2021-12-01 PROCEDURE — 80053 COMPREHEN METABOLIC PANEL: CPT

## 2021-12-01 PROCEDURE — G8427 DOCREV CUR MEDS BY ELIG CLIN: HCPCS | Performed by: PEDIATRICS

## 2021-12-01 PROCEDURE — 6370000000 HC RX 637 (ALT 250 FOR IP): Performed by: NURSE PRACTITIONER

## 2021-12-01 PROCEDURE — 3044F HG A1C LEVEL LT 7.0%: CPT | Performed by: PEDIATRICS

## 2021-12-01 PROCEDURE — 2022F DILAT RTA XM EVC RTNOPTHY: CPT | Performed by: PEDIATRICS

## 2021-12-01 PROCEDURE — 96375 TX/PRO/DX INJ NEW DRUG ADDON: CPT

## 2021-12-01 PROCEDURE — 36415 COLL VENOUS BLD VENIPUNCTURE: CPT

## 2021-12-01 PROCEDURE — G8417 CALC BMI ABV UP PARAM F/U: HCPCS | Performed by: PEDIATRICS

## 2021-12-01 PROCEDURE — 1123F ACP DISCUSS/DSCN MKR DOCD: CPT | Performed by: PEDIATRICS

## 2021-12-01 PROCEDURE — 1036F TOBACCO NON-USER: CPT | Performed by: PEDIATRICS

## 2021-12-01 PROCEDURE — 85025 COMPLETE CBC W/AUTO DIFF WBC: CPT

## 2021-12-01 PROCEDURE — 99215 OFFICE O/P EST HI 40 MIN: CPT | Performed by: PEDIATRICS

## 2021-12-01 RX ORDER — METOPROLOL SUCCINATE 25 MG/1
37.5 TABLET, EXTENDED RELEASE ORAL DAILY
Qty: 45 TABLET | Refills: 11 | Status: SHIPPED
Start: 2021-12-01 | End: 2021-12-01 | Stop reason: SDUPTHER

## 2021-12-01 RX ORDER — 0.9 % SODIUM CHLORIDE 0.9 %
500 INTRAVENOUS SOLUTION INTRAVENOUS ONCE
Status: DISCONTINUED | OUTPATIENT
Start: 2021-12-01 | End: 2021-12-01

## 2021-12-01 RX ORDER — METOPROLOL SUCCINATE 25 MG/1
25 TABLET, EXTENDED RELEASE ORAL DAILY
Qty: 30 TABLET | Refills: 11 | Status: CANCELLED | OUTPATIENT
Start: 2021-12-01 | End: 2022-11-26

## 2021-12-01 RX ORDER — METOPROLOL SUCCINATE 25 MG/1
37.5 TABLET, EXTENDED RELEASE ORAL DAILY
Qty: 45 TABLET | Refills: 11 | Status: SHIPPED | OUTPATIENT
Start: 2021-12-01

## 2021-12-01 RX ORDER — LEVOTHYROXINE SODIUM 88 UG/1
88 TABLET ORAL DAILY
Qty: 90 TABLET | Refills: 2 | Status: SHIPPED | OUTPATIENT
Start: 2021-12-01 | End: 2022-09-01 | Stop reason: DRUGHIGH

## 2021-12-01 RX ORDER — 0.9 % SODIUM CHLORIDE 0.9 %
500 INTRAVENOUS SOLUTION INTRAVENOUS ONCE
Status: COMPLETED | OUTPATIENT
Start: 2021-12-01 | End: 2021-12-01

## 2021-12-01 RX ORDER — OXYCODONE AND ACETAMINOPHEN 7.5; 325 MG/1; MG/1
1 TABLET ORAL EVERY 6 HOURS PRN
Qty: 12 TABLET | Refills: 0 | Status: SHIPPED | OUTPATIENT
Start: 2021-12-01 | End: 2021-12-04

## 2021-12-01 RX ORDER — FENTANYL CITRATE 50 UG/ML
25 INJECTION, SOLUTION INTRAMUSCULAR; INTRAVENOUS ONCE
Status: COMPLETED | OUTPATIENT
Start: 2021-12-01 | End: 2021-12-01

## 2021-12-01 RX ORDER — TORSEMIDE 100 MG/1
TABLET ORAL
Qty: 180 TABLET | Refills: 3 | Status: SHIPPED | OUTPATIENT
Start: 2021-12-01

## 2021-12-01 RX ORDER — OXYCODONE AND ACETAMINOPHEN 7.5; 325 MG/1; MG/1
1 TABLET ORAL ONCE
Status: COMPLETED | OUTPATIENT
Start: 2021-12-01 | End: 2021-12-01

## 2021-12-01 RX ORDER — TRAZODONE HYDROCHLORIDE 100 MG/1
150 TABLET ORAL NIGHTLY
Qty: 135 TABLET | Refills: 3 | Status: SHIPPED | OUTPATIENT
Start: 2021-12-01 | End: 2022-02-02 | Stop reason: SDUPTHER

## 2021-12-01 RX ORDER — METOPROLOL SUCCINATE 25 MG/1
25 TABLET, EXTENDED RELEASE ORAL DAILY
COMMUNITY
End: 2021-12-01 | Stop reason: DRUGHIGH

## 2021-12-01 RX ORDER — ONDANSETRON 2 MG/ML
4 INJECTION INTRAMUSCULAR; INTRAVENOUS ONCE
Status: COMPLETED | OUTPATIENT
Start: 2021-12-01 | End: 2021-12-01

## 2021-12-01 RX ADMIN — SODIUM CHLORIDE 500 ML: 9 INJECTION, SOLUTION INTRAVENOUS at 21:20

## 2021-12-01 RX ADMIN — FENTANYL CITRATE 25 MCG: 50 INJECTION INTRAMUSCULAR; INTRAVENOUS at 20:11

## 2021-12-01 RX ADMIN — OXYCODONE HYDROCHLORIDE AND ACETAMINOPHEN 1 TABLET: 7.5; 325 TABLET ORAL at 21:16

## 2021-12-01 RX ADMIN — ONDANSETRON 4 MG: 2 INJECTION INTRAMUSCULAR; INTRAVENOUS at 20:11

## 2021-12-01 ASSESSMENT — ENCOUNTER SYMPTOMS
SORE THROAT: 0
COUGH: 0
SHORTNESS OF BREATH: 1
BACK PAIN: 0
DIARRHEA: 0
CHEST TIGHTNESS: 0
NAUSEA: 0
ABDOMINAL PAIN: 0
WHEEZING: 0
VOMITING: 0

## 2021-12-01 ASSESSMENT — PAIN SCALES - GENERAL
PAINLEVEL_OUTOF10: 9
PAINLEVEL_OUTOF10: 7
PAINLEVEL_OUTOF10: 8

## 2021-12-01 NOTE — PROGRESS NOTES
Colby Morgan [address], [address]  Phone [phone]   Fax [phone]      OFFICE VISIT: 2021    [place]by [place]-: 1950      HPI  Reason For Visit:  Doyle Herbert is a 70 y.o.     6 Month Follow-Up, Discuss Labs (discuss labs from 21), Swelling (fluid build up around both ankles, it is getting worse and would like to discuss increasing medicatio), Insomnia (trouble sleeping, he increased trazadone to 100mg. Would like to discuss. ), and Medication Refill (metoprolol)    Patient presents on follow-up for multiple health issues. Present concerns: He would like to review his labs that he had performed here on 2021. These are noted below    _Interpretation____________________________________________________________________  Hemoglobin A1c is 6.5 which indicates appropriate control of blood sugar over the past 3 months. Your WBC, (infection fighting ability) Hgb and Hct, (oxygen carrying cells) are normal; as is your percentage of each cell type. BNP is elevated but lower than it was a year ago. This is a measure of heart failure. TSH is elevated. This is suggestive of hypothyroidism. Recommend increase Synthroid to 88 mcg daily. Recheck TSH and free T4 in 4 to 6 weeks. PSA is normal at 0.77. Lipids are significantly elevated compared to 11 months ago. These are not controlled well enough to prevent future coronary events. If you have not been taking your Crestor 40 mg nightly, I would recommend restarting it. If you have been taking this, then we may need to consider an alternate medication such as Repatha or similar medication. Metabolic profile shows significant volume contraction/dehydration. Sodium is also low. This indicates likely overuse of diuretics. If possible we would like to back off on the diuretics and minimize sodium intake.   This obviously must be balanced out with fluid balance in order to prevent congestive heart failure symptoms. ______________________________________________________________________________      He is having some increase in his ankle edema. We like to discuss options in this regard. He is presently taking:   Spironolactone 25 mg daily   Torsemide 100 mg daily   Zaroxolyn 2.5 mg daily  Symptoms as above  He is also having some orthopnea and significant dyspnea on exertion that is new. Insomnia  He is also having some difficulty sleeping. Medication:   Trazodone 100 mg nightly  Symptoms: As above, he would like to discuss increasing this dose. He needs a refill of his metoprolol      Diabetes Mellitus Type 2  Diet compliance:  compliant most of the time  Nutrition Consultation Needed:  no  Medication:              Metformin 1000 mg twice daily  Medication compliance:  There was some question as to whether he should be on it during his hospitalization.  The hospitalist recommended that he stop it (jardiance)  Weight trend: Weight is unchanged from 6 months ago  Current exercise: He is trying to exercise as much as he can  Checking: Periodically  Home blood sugar records: fasting range: Controlled when he checks. Low BG:  no  Eye exam current (within one year): yes  Checking Feet regularly:  yes -no sores  ACE/ARB:  Ramipril 1.25mg daily  Tobacco history: He  reports that he quit smoking about 12 years ago. His smoking use included cigars. He has a 30.00 pack-year smoking history.  He has never used smokeless tobacco.    Lab Results   Component Value Date    LABA1C 6.5 (H) 11/30/2021    LABA1C 5.4 12/18/2020    LABA1C 5.3 09/23/2020     Lab Results   Component Value Date    LABMICR 1.40 11/09/2018    CREATININE 1.78 (H) 11/29/2021       Hypertension:   BP today was   BP Readings from Last 1 Encounters:   12/01/21 104/68      Recent BP readings:    BP Readings from Last 3 Encounters:   12/01/21 104/68   07/06/21 90/73   07/06/21 (!) 100/54     Medication   Toprol XL 25 mg 1/2 tablets (37.5 mg daily)   Ramipril 1.255 mg daily   Spironolactone 25 mg daily  Medication compliance:  compliant most of the time  Home blood pressure monitoring: Yes -and well controlled. He is adherent to a low sodium diet. Symptoms: none  Laboratory:  Lab Results   Component Value Date    BUN 63 (H) 11/29/2021    CREATININE 1.78 (H) 11/29/2021       Hyperlipidemia:   Medication:   rosuvastatin (Crestor) 40 mg nightly  Low Fat, Low Choleterol Diet:  yes    Myalgias or GI upset: no  The patient exercises intermittently.   Laboratory:    Lab Results   Component Value Date    CHOL 151 11/29/2021    TRIG 112 11/29/2021    HDL 27 (L) 11/29/2021    LDLCALC 102 (H) 11/29/2021    LDLDIRECT 111 02/14/2013      Lab Results   Component Value Date    ALT 14 11/29/2021    AST 10 11/29/2021       Congestive heart failure:  Type: Ischemic cardiomyopathy, systolic, (HFr EF)  Primary rhythm: Sinus rhythm  Medication:              Beta-blockade:            Toprol-XL 37.5 mg twice daily              R AAS therapy:            Ramipril 1.25 mg daily              Diuretics:                     Zaroxolyn 2.5 mg daily                                                  Demadex 100 mg every other day daily              Aldosterone inhibition: Aldactone 25 mg daily              Additional meds:         None  Symptoms: Doing well and presently compensated  Cardiology follow-up: He follows with cardiology down in [address] [place] [address] Ave.: [address] [place] [address]  (12/18/2020)  Most recent echo: Done down in [place] within the last 6 months        Coronary Artery Disease:  Medications:              Beta-blockade:           Toprol-XL 37.5 mg twice daily              RAAS Therapy:           Ramipril 1.25 mg daily              Lipid Management:     Crestor 40 mg nightly              Platelet Inhibition:        Plavix 75 mg daily and aspirin 325 mg daily              Anti-anginal:                Nitroglycerin 0.4 mg as needed  Symptoms: He denies any chest pain  Nitroglycerin use: He has never used nitro in the recent past  Cardiology follow-up: [place], [place]  Additional studies: As above        CKD 3:  He is not on any nephrotoxic drugs at this time. He is on RAAS therapy  No recent microalbumin measurement   most recent BUN and creatinine were      height is 5' 11\" (1.803 m) and weight is 202 lb (91.6 kg). His temporal temperature is 97.3 °F (36.3 °C). His blood pressure is 104/68 and his pulse is 88. His oxygen saturation is 99%. Body mass index is 28.17 kg/m².     I have reviewed the following with the Mr. Joyce Easley   Lab Review  Orders Only on 11/30/2021   Component Date Value    Antibody Screen 11/29/2021 0.1    Orders Only on 11/30/2021   Component Date Value    BNP 11/29/2021 3,907*    WBC 11/29/2021 7.3     RBC 11/29/2021 4.75     Hemoglobin 11/29/2021 14.7     Hematocrit 11/29/2021 44.6     MCV 11/29/2021 93.9     MCH 11/29/2021 30.9     MCHC 11/29/2021 33.0     Platelets 15/93/0555 128*    RDW 11/29/2021 18.6*    MPV 11/29/2021 10.6*    Neutrophils % 11/29/2021 64.8     Lymphocytes % 11/29/2021 15.2*    Monocytes % 11/29/2021 11.8*    Eosinophils % 11/29/2021 6.4*    Basophils % 11/29/2021 1.4*    Neutrophils Absolute 11/29/2021 4.73     Lymphocytes Absolute 11/29/2021 1.11     Monocytes Absolute 11/29/2021 0.86*    Eosinophils Absolute 11/29/2021 0.47     Basophils Absolute 11/29/2021 0.10     Sodium 11/29/2021 129*    Chloride 11/29/2021 90*    Potassium 11/29/2021 3.7     BUN 11/29/2021 63*    CREATININE 11/29/2021 1.78*    Glucose 11/29/2021 128*    AST 11/29/2021 10     ALT 11/29/2021 14     Calcium 11/29/2021 9.1     Total Protein 11/29/2021 7.5     CO2 11/29/2021 25     Albumin 11/29/2021 3.5     Alkaline Phosphatase 11/29/2021 71     Total Bilirubin 11/29/2021 1.0     Gfr Calculated 11/29/2021 40.25     Anion Gap 11/29/2021 17.0*    Hemoglobin A1C 11/30/2021 6.5*    Cholesterol, Total 11/29/2021 151     HDL 11/29/2021 27*    LDL Calculated 11/29/2021 102*    Triglycerides 11/29/2021 112     T4 Free 11/29/2021 1.21     TSH 11/29/2021 6.79*    PSA 11/29/2021 0.77    Admission on 07/06/2021, Discharged on 07/06/2021   Component Date Value    POC Glucose 07/06/2021 144*    Performed on 07/06/2021 [facility]      Copies of these are in the chart. Current Outpatient Medications   Medication Sig Dispense Refill    metoprolol succinate (TOPROL XL) 25 MG extended release tablet Take 1.5 tablets by mouth daily 1.5 tablet daily.  45 tablet 11    traZODone (DESYREL) 100 MG tablet Take 1.5 tablets by mouth nightly 135 tablet 3    torsemide (DEMADEX) 100 MG tablet Take 2 tablets by mouth every day 180 tablet 3    Evolocumab 140 MG/ML SOAJ Inject 140 mg into the skin every 14 days 6 pen 3    levothyroxine (SYNTHROID) 88 MCG tablet Take 1 tablet by mouth daily 90 tablet 2    potassium chloride (KLOR-CON M) 20 MEQ extended release tablet Take 1 tablet by mouth 2 times daily TAKE 1 TABLET BY MOUTH TWICE DAILY 180 tablet 3    triamcinolone (KENALOG) 0.1 % cream APPLY TOPICALLY TWICE A DAY 80 g 5    spironolactone (ALDACTONE) 25 MG tablet Take 1 tablet by mouth daily 90 tablet 3    ramipril (ALTACE) 1.25 MG capsule TAKE 1 CAPSULE DAILY 90 capsule 3    rosuvastatin (CRESTOR) 40 MG tablet Take 1 tablet by mouth every evening 90 tablet 3    metFORMIN (GLUCOPHAGE) 1000 MG tablet Take 1 tablet by mouth 2 times daily (with meals) 180 tablet 3    metOLazone (ZAROXOLYN) 2.5 MG tablet Take 1 tablet by mouth daily 90 tablet 3    Multiple Vitamins-Minerals (THERAPEUTIC MULTIVITAMIN-MINERALS) tablet Take 1 tablet by mouth daily      magnesium oxide (MAG-OX) 400 MG tablet Take 400 mg by mouth daily      clopidogrel (PLAVIX) 75 MG tablet Take 1 tablet by mouth daily 90 tablet 3    nitroGLYCERIN (NITROLINGUAL) 0.4 MG/SPRAY 0.4 mg spray Place 1 spray under the tongue every 5 minutes as needed for Chest pain 1 Bottle 0    Omega-3 Fatty Acids (FISH OIL) 1000 MG CAPS Take 3,000 mg by mouth daily      diphenhydrAMINE (BENADRYL) 25 MG tablet Take 50 mg by mouth nightly as needed for Itching      aspirin 325 MG tablet Take 325 mg by mouth daily. No current facility-administered medications for this visit.        Allergies: Codeine     Past Medical History:   Diagnosis Date    Arthritis     Basilar artery stenosis     CAD (coronary artery disease) 1997 2009    MI    Cancer ([facility] [address]     SKIN 6YRS AGO    Carotid artery stenosis     bilateral    Cerebral artery occlusion with cerebral infarction ([facility] [address]     TIA due to hypotension    CHF (congestive heart failure) (HCC)     Dieulafoy lesion of stomach     Heel spur     Hyperlipidemia     Hypertension     Palliative care patient 10/17/2018    Sleep apnea     no c-pap    Squamous cell cancer of skin of earlobe, left     SCC    Type II or unspecified type diabetes mellitus without mention of complication, not stated as uncontrolled     Vertebral artery insufficiency        Family History   Problem Relation Age of Onset    Heart Disease Father     High Blood Pressure Father     Other Father         Esophageal Varices       Past Surgical History:   Procedure Laterality Date    ANGIOPLASTY Bilateral     basilar artery angioplasty    CARDIAC CATHETERIZATION  11/30/2018    [facility]    CARDIAC DEFIBRILLATOR PLACEMENT      medtronic    CARPAL TUNNEL RELEASE      bilateral    CATARACT REMOVAL Bilateral     CHOLECYSTECTOMY      COLONOSCOPY N/A 07/06/2021    Dr Gil Dipak disease-AP x 3, 3 yr recall    CORONARY ARTERY BYPASS GRAFT  1997    3v    HEEL SPUR SURGERY      left    LA EGD TRANSORAL BIOPSY SINGLE/MULTIPLE N/A 10/16/2018    Dr Blackman Fearing Kerns-w/control of bleeding-Active bleeding in the stomach from Dieulafoy lesion    PTCA  2009 [place]    with stents    TONSILLECTOMY         Social History     Tobacco Use    Smoking status: Former Smoker     Packs/day: 1.00     Years: 30.00     Pack years: 30.00     Types: Cigars     Quit date: 3/3/2009     Years since quittin.7    Smokeless tobacco: Never Used   Substance Use Topics    Alcohol use: No     Alcohol/week: 0.0 standard drinks        Review of Systems   Constitutional: Positive for fatigue. Negative for chills and fever (he is limited in his exercise capacity). HENT: Negative for congestion, ear pain and sore throat. Eyes: Negative for visual disturbance. Respiratory: Positive for shortness of breath (occasional orthopnea ). Negative for cough, chest tightness and wheezing. Cardiovascular: Positive for leg swelling (Very rarely). Negative for chest pain and palpitations. Gastrointestinal: Negative for abdominal pain, diarrhea, nausea and vomiting. Endocrine: Negative for polyuria. Genitourinary: Negative for frequency and urgency. Musculoskeletal: Negative. Negative for back pain and neck pain. Skin: Negative for rash. Neurological: Positive for weakness (generalized). Negative for dizziness and headaches. Psychiatric/Behavioral: Negative for self-injury. The patient is not nervous/anxious. Physical Exam  Constitutional:       General: He is not in acute distress. Appearance: He is well-developed and normal weight. HENT:      Head: Normocephalic and atraumatic. Right Ear: Hearing, tympanic membrane, ear canal and external ear normal. No middle ear effusion. Left Ear: Hearing, tympanic membrane, ear canal and external ear normal.      Nose: Nose normal. No mucosal edema (mild) or rhinorrhea (thin, clear). Mouth/Throat:      Mouth: Mucous membranes are moist.      Pharynx: Oropharynx is clear. No posterior oropharyngeal erythema. Eyes:      General: No scleral icterus. Extraocular Movements: Extraocular movements intact. Conjunctiva/sclera: Conjunctivae normal.      Pupils: Pupils are equal, round, and reactive to light.    Neck:      Thyroid: No I25.10 metoprolol succinate (TOPROL XL) 25 MG extended release tablet     Evolocumab 140 MG/ML SOAJ   5. Chronic systolic congestive heart failure (HCC)  I50.22 metoprolol succinate (TOPROL XL) 25 MG extended release tablet     torsemide (DEMADEX) 100 MG tablet     Evolocumab 140 MG/ML SOAJ   6. Stage 3b chronic kidney disease (HCC)  N18.32    7. Acquired hypothyroidism  E03.9    8. Primary insomnia  F51.01    9. Peripheral edema  R60.9    10. Insomnia, unspecified type  G47.00 traZODone (DESYREL) 100 MG tablet   11. Electrolyte abnormality  E87.8 Comprehensive Metabolic Panel         PLAN    1. Type 2 diabetes mellitus without complication, without long-term current use of insulin ([facility])  Diabetes is presently controlled with a hemoglobin A1c of 6.5. This is the maximum that we would like to have for an A1c value for him. We did discuss an SGLT2 inhibitor which may provide a little bit better control of his diabetes and also provide some protection from a heart failure standpoint. We have been on a medication such as this in the past, but this has been cost prohibitive for him. He is going to try to watch his diet and exercise as tolerated to keep this under control    2. Primary hypertension  Blood pressure is appropriately controlled even on the lower side. He is not having any orthostatic symptoms. We will continue with present medication regimen. We are going to increase his Demadex to 1 tablet twice daily. He should utilize twice daily on days when he has substantial edema or symptoms of orthopnea or significant increase in dyspnea on exertion. He should also do so if he has weight gain of greater than 3 pounds overnight or 5 pounds in a week. He can maintain twice a day dosing for 2 to 3 days maximum, and he should try going back down to once daily dosing.   We are going to need to check a metabolic profile within 1 week to make sure that he is not experiencing a progressive uremia and that his electrolytes are in the tolerable range  - metoprolol succinate (TOPROL XL) 25 MG extended release tablet; Take 1.5 tablets by mouth daily 1.5 tablet daily. Dispense: 45 tablet; Refill: 11  - torsemide (DEMADEX) 100 MG tablet; Take 2 tablets by mouth every day  Dispense: 180 tablet; Refill: 3    3. Hyperlipidemia with target LDL less than 100  Lipids are not appropriately controlled on rosuvastatin 40 mg nightly alone. We discussed multiple options in this regard. He is already watching his diet and very diligent in a low-fat low-cholesterol diet. I do not feel we would gain much from adding Zetia at this time. He is also not at goal from a heart failure standpoint or ischemic cardiomyopathy standpoint. We would like him to be 70 or less for an LDL. We are going to try adding Repatha to his medication regimen. Hopefully his insurance will cover this medication and we can dramatically decrease his risk of subsequent myocardial infarction given his low ejection fraction, myocardial infarction would likely be unsurvivable from a cardiac standpoint  - Evolocumab 140 MG/ML SOAJ; Inject 140 mg into the skin every 14 days  Dispense: 6 pen; Refill: 3    4. Coronary artery disease involving native coronary artery of native heart without angina pectoris  Continue with metoprolol and optimize lipid control in order to prevent future events. Need to preserve every bit of cardiac tissue that he has  - metoprolol succinate (TOPROL XL) 25 MG extended release tablet; Take 1.5 tablets by mouth daily 1.5 tablet daily. Dispense: 45 tablet; Refill: 11  - Evolocumab 140 MG/ML SOAJ; Inject 140 mg into the skin every 14 days  Dispense: 6 pen; Refill: 3    5. Chronic systolic congestive heart failure (HCC)  BNP is elevated. He is clinically hypervolemic. This is both by physical exam and symptoms. I am however very concerned with his elevated BUN.   He does not have any evidence of present GI bleed or alternate source of elevated BUN. We are going to have to treat his fluid overload status particularly with his symptoms of orthopnea and dyspnea on exertion. These are symptoms of pulmonary vascular congestion. We will follow his metabolic profile within 1 week after increasing his torsemide to twice daily  - metoprolol succinate (TOPROL XL) 25 MG extended release tablet; Take 1.5 tablets by mouth daily 1.5 tablet daily. Dispense: 45 tablet; Refill: 11  - torsemide (DEMADEX) 100 MG tablet; Take 2 tablets by mouth every day  Dispense: 180 tablet; Refill: 3  - Evolocumab 140 MG/ML SOAJ; Inject 140 mg into the skin every 14 days  Dispense: 6 pen; Refill: 3    6. Stage 3b chronic kidney disease ([facility] [address]  Renal function has remained stable. We will follow this closely as well particularly with increasing his diuretics. We are going to utilize loop diuretic to have the minimal impact on his renal function    7. Acquired hypothyroidism  We are going to increase his Synthroid dose to 88 mcg, from 75 mcg daily. This should bring his TSH back down into the normal range. We did discuss side effect profile and symptoms of too much thyroid replacement for hypothyroidism. We will watch for the symptoms. 8. Primary insomnia  We are going to increase his trazodone to 150 mg and potentially 200 mg in order to produce effective sleep    9. Peripheral edema  Increase torsemide dose to twice daily for up to 2 to 3 days in a row when symptoms are suggestive particularly of pulmonary vascular congestion    10. Insomnia, unspecified type  Increase trazodone as noted above  - traZODone (DESYREL) 100 MG tablet; Take 1.5 tablets by mouth nightly  Dispense: 135 tablet; Refill: 3    11. Electrolyte abnormality  Repeat metabolic profile in 1 week with the above changes  - Comprehensive Metabolic Panel; Future      Orders Placed This Encounter   Procedures    Comprehensive Metabolic Panel        Return in about 3 months (around 3/1/2022) for 30. This was an in-house visit  I did spend greater than 1 hour on this encounter with a significant component being in counseling and teaching

## 2021-12-01 NOTE — TELEPHONE ENCOUNTER
----- Message from [id number] [facility],[address], DO sent at 11/30/2021  7:05 PM CST -----  Hemoglobin A1c is 6.5 which indicates appropriate control of blood sugar over the past 3 months. Your WBC, (infection fighting ability) Hgb and Hct, (oxygen carrying cells) are normal; as is your percentage of each cell type. BNP is elevated but lower than it was a year ago. This is a measure of heart failure. TSH is elevated. This is suggestive of hypothyroidism. Recommend increase Synthroid to 88 mcg daily. Recheck TSH and free T4 in 4 to 6 weeks. PSA is normal at 0.77. Lipids are significantly elevated compared to 11 months ago. These are not controlled well enough to prevent future coronary events. If you have not been taking your Crestor 40 mg nightly, I would recommend restarting it. If you have been taking this, then we may need to consider an alternate medication such as Repatha or similar medication. Metabolic profile shows significant volume contraction/dehydration. Sodium is also low. This indicates likely overuse of diuretics. If possible we would like to back off on the diuretics and minimize sodium intake. This obviously must be balanced out with fluid balance in order to prevent congestive heart failure symptoms.

## 2021-12-01 NOTE — TELEPHONE ENCOUNTER
----- Message from Lidya Ibrahim DO sent at 11/30/2021  7:00 PM CST -----  Hepatitis C antibody is negative, meaning normal.HIV screening is nonreactive, meaning normal

## 2021-12-02 NOTE — ED PROVIDER NOTES
[facility] EMERGENCY DEPT  eMERGENCY dEPARTMENT eNCOUnter      Pt Name: Ward Dowling  MRN: [id number]  [place] 1950  Date of evaluation: 12/1/2021  Provider: JOSE ELIAS Gutierrez    CHIEF COMPLAINT       Chief Complaint   Patient presents with    Shoulder Injury     fell down stairs. landed on R shoulder         HISTORY OF PRESENT ILLNESS   (Location/Symptom, Timing/Onset,Context/Setting, Quality, Duration, Modifying Factors, Severity)  Note limiting factors. Ward Dowling is a 70 y.o. male who presents to the emergency department with r shoulder pain after falling down a few steps and  Landing on his r shoulder. Pt denies any dizziness or chest pain. HE said he tripped. He did not hit his head or LOC     The history is provided by the patient. Shoulder Injury  This is a new problem. NursingNotes were reviewed. REVIEW OF SYSTEMS    (2-9 systems for level 4, 10 or more for level 5)     Review of Systems   Musculoskeletal: Positive for arthralgias and myalgias. Except as noted above the remainder of the review of systems was reviewed and negative.        PAST MEDICAL HISTORY     Past Medical History:   Diagnosis Date    Arthritis     Basilar artery stenosis     CAD (coronary artery disease) 1997 2009    MI    Cancer ([address]     SKIN 6YRS AGO    Carotid artery stenosis     bilateral    Cerebral artery occlusion with cerebral infarction ([address]     TIA due to hypotension    CHF (congestive heart failure) (HCC)     Dieulafoy lesion of stomach     Heel spur     Hyperlipidemia     Hypertension     Palliative care patient 10/17/2018    Sleep apnea     no c-pap    Squamous cell cancer of skin of earlobe, left     SCC    Type II or unspecified type diabetes mellitus without mention of complication, not stated as uncontrolled     Vertebral artery insufficiency          SURGICALHISTORY       Past Surgical History:   Procedure Laterality Date    ANGIOPLASTY Bilateral     basilar artery angioplasty    C[place]IAC [place]/2018    [place]    CARDIAC DEFIBRILLATOR PLACEMENT      medtronic    CARPAL TUNNEL RELEASE      bilateral    CATARACT REMOVAL Bilateral     CHOLECYSTECTOMY      COLONOSCOPY N/A 07/06/2021    Dr Wilson Janelle disease-AP x 3, 3 yr recall    CORONARY ARTERY BYPASS GRAFT  1997    3v    HEEL SPUR SURGERY      left    VT EGD TRANSORAL BIOPSY SINGLE/MULTIPLE N/A 10/16/2018    Dr Rony Kerns-w/control of bleeding-Active bleeding in the stomach from Dieulafoy lesion    PTCA  2009 [place]    with stents   [facility]       Discharge Medication List as of 12/1/2021  9:52 PM      CONTINUE these medications which have NOT CHANGED    Details   metoprolol succinate (TOPROL XL) 25 MG extended release tablet Take 1.5 tablets by mouth daily 1.5 tablet daily. , Disp-45 tablet, R-11Normal      traZODone (DESYREL) 100 MG tablet Take 1.5 tablets by mouth nightly, Disp-135 tablet, R-3Normal      torsemide (DEMADEX) 100 MG tablet Take 2 tablets by mouth every day, Disp-180 tablet, R-3Normal      Evolocumab 140 MG/ML SOAJ Inject 140 mg into the skin every 14 days, Disp-6 pen, R-3Normal      levothyroxine (SYNTHROID) 88 MCG tablet Take 1 tablet by mouth daily, Disp-90 tablet, R-2Normal      potassium chloride (KLOR-CON M) 20 MEQ extended release tablet Take 1 tablet by mouth 2 times daily TAKE 1 TABLET BY MOUTH TWICE DAILY, Disp-180 tablet, R-3Normal      triamcinolone (KENALOG) 0.1 % cream APPLY TOPICALLY TWICE A DAY, Disp-80 g, R-5, Normal      spironolactone (ALDACTONE) 25 MG tablet Take 1 tablet by mouth daily, Disp-90 tablet, R-3Normal      ramipril (ALTACE) 1.25 MG capsule TAKE 1 CAPSULE DAILY, Disp-90 capsule, R-3Normal      rosuvastatin (CRESTOR) 40 MG tablet Take 1 tablet by mouth every evening, Disp-90 tablet, R-3Normal      metFORMIN (GLUCOPHAGE) 1000 MG tablet Take 1 tablet by mouth 2 times daily (with meals), Disp-180 tablet, R-3Normal      metOLazone (ZAROXOLYN) 2.5 MG tablet Take 1 tablet by mouth daily, Disp-90 tablet,R-3Normal      Multiple Vitamins-Minerals (THERAPEUTIC MULTIVITAMIN-MINERALS) tablet Take 1 tablet by mouth dailyHistorical Med      magnesium oxide (MAG-OX) 400 MG tablet Take 400 mg by mouth dailyHistorical Med      clopidogrel (PLAVIX) 75 MG tablet Take 1 tablet by mouth daily, Disp-90 tablet, R-3Normal      nitroGLYCERIN (NITROLINGUAL) 0.4 MG/SPRAY 0.4 mg spray Place 1 spray under the tongue every 5 minutes as needed for Chest pain, Disp-1 Bottle, R-0Normal      Omega-3 Fatty Acids (FISH OIL) 1000 MG CAPS Take 3,000 mg by mouth daily      diphenhydrAMINE (BENADRYL) 25 MG tablet Take 50 mg by mouth nightly as needed for Itching      aspirin 325 MG tablet Take 325 mg by mouth daily.              ALLERGIES     Codeine    FAMILY HISTORY       Family History   Problem Relation Age of Onset    Heart Disease Father     High Blood Pressure Father     Other Father         Esophageal Varices          SOCIAL HISTORY       Social History     Socioeconomic History    Marital status:      Spouse name: Not on file    Number of children: Not on file    Years of education: Not on file    Highest education level: Not on file   Occupational History    Not on file   Tobacco Use    Smoking status: Former Smoker     Packs/day: 1.00     Years: 30.00     Pack years: 30.00     Types: Cigars     Quit date: 3/3/2009     Years since quittin.7    Smokeless tobacco: Never Used   Vaping Use    Vaping Use: Never used   Substance and Sexual Activity    Alcohol use: No     Alcohol/week: 0.0 standard drinks    Drug use: No    Sexual activity: Not on file   Other Topics Concern    Not on file   Social History Narrative    Not on file     Social Determinants of Health     Financial Resource Strain: Low Risk     Difficulty of Paying Living Expenses: Not hard at all   Food Insecurity: No Food Insecurity    Worried About Running Out of Food in the Last Year: Never true    Ran Out of Food in the Last Year: Never true   Transportation Needs:     Lack of Transportation (Medical): Not on file    Lack of Transportation (Non-Medical): Not on file   Physical Activity:     Days of Exercise per Week: Not on file    Minutes of Exercise per Session: Not on file   Stress:     Feeling of Stress : Not on file   Social Connections:     Frequency of Communication with Friends and Family: Not on file    Frequency of Social Gatherings with Friends and Family: Not on file    Attends Holiness Services: Not on file    Active Member of [address] [place] [address] [employer] or Organizations: Not on file    Attends Club or Organization Meetings: Not on file    Marital Status: Not on file   Intimate Partner Violence:     Fear of Current or Ex-Partner: Not on file    Emotionally Abused: Not on file    Physically Abused: Not on file    Sexually Abused: Not on file   Housing Stability:     Unable to Pay for Housing in the Last Year: Not on file    Number of [place] in the Last Year: Not on file    Unstable Housing in the Last Year: Not on file       SCREENINGS      @FLOW([id number])@      PHYSICAL EXAM    (up to 7 for level 4, 8 or more for level 5)     ED Triage Vitals [12/01/21 1941]   BP Temp Temp src Pulse Resp SpO2 Height Weight   88/77 98.6 °F (37 °C) -- 79 18 100 % -- 210 lb (95.3 kg)       Physical Exam  Vitals and nursing note reviewed. Constitutional:       Appearance: He is well-developed. HENT:      Head: Normocephalic and atraumatic. Eyes:      General: No scleral icterus. Right eye: No discharge. Left eye: No discharge. Cardiovascular:      Rate and Rhythm: Normal rate. Pulmonary:      Effort: No respiratory distress. Musculoskeletal:      Right shoulder: Swelling, deformity and tenderness present. Decreased range of motion. Normal pulse. Arms:       Cervical back: Normal range of motion and neck supple.    Neurological: Mental Status: He is alert. Psychiatric:         Behavior: Behavior normal.         DIAGNOSTIC RESULTS     EKG: All EKG's are interpreted by the Emergency Department Physician who either signs or Co-signsthis chart in the absence of a cardiologist.        RADIOLOGY:   Miesha Mass such as CT, Ultrasound and MRI are read by the radiologist. Plain radiographic images are visualized and preliminarily interpreted by the emergency physician with the below findings:      Interpretation per the Radiologist below, if available at the time of this note:    XR SHOULDER RIGHT (MIN 2 VIEWS)   Final Result   1. Acute proximal right humerus fracture, as described. 2. Degenerative osteoarthritis. Signed by Dr Rupali Baron            ED BEDSIDEULTRASOUND:   Performed by ED Physician -none    LABS:  Labs Reviewed   CBC WITH AUTO DIFFERENTIAL - Abnormal; Notable for the following components:       Result Value    WBC 11.8 (*)     MCV 95.9 (*)     MCHC 32.2 (*)     RDW 18.5 (*)     Neutrophils % 82.2 (*)     Lymphocytes % 7.3 (*)     Neutrophils Absolute 9.7 (*)     Lymphocytes Absolute 0.9 (*)     All other components within normal limits   COMPREHENSIVE METABOLIC PANEL W/ REFLEX TO MG FOR LOW K - Abnormal; Notable for the following components:    Sodium 126 (*)     Chloride 84 (*)     Glucose 153 (*)     BUN 62 (*)     CREATININE 1.9 (*)     GFR Non- 35 (*)     GFR  42 (*)     All other components within normal limits   PROTIME-INR - Abnormal; Notable for the following components:    Protime 16.4 (*)     INR 1.31 (*)     All other components within normal limits       All other labs were within normal range or not returned as of this dictation.     EMERGENCY DEPARTMENT COURSE and DIFFERENTIALDIAGNOSIS/MDM:   Vitals:    Vitals:    12/01/21 1941 12/01/21 2005   BP: 88/77 98/70   Pulse: 79    Resp: 18    Temp: 98.6 °F (37 °C)    SpO2: 100%    Weight: 210 lb (95.3 kg)            MDM Xrays texted to Dr Georgetta Siemens. Pt will follow with Dr Georgetta Siemens on Friday in the office. PLaced in a sling. Pt has a low sodium and a low blood pressure. He denies dizziness or weakness. No altered mental status. Was in the PCP office today and also had a low sodium and low bp. Offered pt some fluids but because of his CHF they declined. They said he would intake some salt when he got home. Wife said these have been chronic issues for the last few years      CONSULTS:  None    PROCEDURES:  Unless otherwise noted below, none     Procedures    FINAL IMPRESSION      1. Closed fracture of proximal end of right humerus, unspecified fracture morphology, initial encounter    2. Hyponatremia    3. Hypotension, unspecified hypotension type    4.  Fall, initial encounter        DISPOSITION/PLAN   DISPOSITION Decision To Discharge 12/01/2021 09:22:55 PM      PATIENT REFERRED TO:  Nisa Wilkinson MD  [facility] [address]  [phone]    Schedule an appointment as soon as possible for a visit   call for appt on friday      DISCHARGE MEDICATIONS:  Discharge Medication List as of 12/1/2021  9:52 PM             (Please note that portions of this note were completed with a voice recognitionprogram.  Efforts were made to edit the dictations but occasionally words are mis-transcribed.)    JOSE ELIAS Martinez (electronically signed)          JOSE ELIAS Martinez  12/02/21 0045

## 2021-12-23 ENCOUNTER — HOSPITAL ENCOUNTER (EMERGENCY)
Age: 71
Discharge: LEFT BEFORE DISCHARGE | End: 2021-12-23

## 2021-12-23 VITALS
RESPIRATION RATE: 18 BRPM | TEMPERATURE: 97.5 F | DIASTOLIC BLOOD PRESSURE: 49 MMHG | HEIGHT: 71 IN | HEART RATE: 71 BPM | OXYGEN SATURATION: 98 % | SYSTOLIC BLOOD PRESSURE: 83 MMHG | BODY MASS INDEX: 29.4 KG/M2 | WEIGHT: 210 LBS

## 2021-12-27 ENCOUNTER — TELEPHONE (OUTPATIENT)
Dept: PRIMARY CARE CLINIC | Age: 71
End: 2021-12-27

## 2021-12-27 DIAGNOSIS — K14.8 TONGUE LESION: Primary | ICD-10-CM

## 2021-12-27 NOTE — TELEPHONE ENCOUNTER
pts wife left msg on nurse line stating that pts has a spot on his tongue that started bleeding profusely on Thursday. She took him to the ER cause he had lost copious amounts of blood. They sat there for 2 hours and were never seen so it quit and they left and went home. Saturday it started again but not as bad. He has not been on his Plavix for 3 weeks. He has him on a soft diet but she needs to know what to do about getting this carterized or fixed so he can eat. She would like a call back at [phone]. Will send to provider for recommendations.

## 2021-12-27 NOTE — TELEPHONE ENCOUNTER
Wife states patient is not taking plavix or asa, hasnt been in around 2-3 weeks. Would like to go back to see dr Jose Coleman ent. Told her if any issues arise, go to ER for ENT consult.

## 2021-12-27 NOTE — TELEPHONE ENCOUNTER
We do not have any electrocautery equipment here to cauterize the lesion. ENT would readily be able to do this. We can refer to them to try and get this set up. This can be done in [place], order is ENT in [address] [place] [address], Dr. Mame Morocho. The other option would be to go back to the emergency department, and they can notify ENT on call. I would also recommend decreasing aspirin to 81 mg daily. It takes 5 days for platelets to recover from Plavix.   We are getting close to that now

## 2021-12-28 ENCOUNTER — TELEPHONE (OUTPATIENT)
Dept: OTOLARYNGOLOGY | Facility: CLINIC | Age: 71
End: 2021-12-28

## 2021-12-28 NOTE — TELEPHONE ENCOUNTER
We received a referral for a lesion on the tongue. Per Sumi Peña, the patient needs to be referred to an oral surgeon. Faxed messaged to referring provider to contact an oral surgeon.

## 2021-12-29 ENCOUNTER — TELEPHONE (OUTPATIENT)
Dept: PRIMARY CARE CLINIC | Age: 71
End: 2021-12-29

## 2022-01-04 ENCOUNTER — TELEPHONE (OUTPATIENT)
Dept: PRIMARY CARE CLINIC | Age: 72
End: 2022-01-04

## 2022-01-04 NOTE — TELEPHONE ENCOUNTER
Left msg for pt to call office back to get him scheduled for a pre op exam for his upcoming surgery.

## 2022-01-06 ENCOUNTER — OFFICE VISIT (OUTPATIENT)
Dept: PRIMARY CARE CLINIC | Age: 72
End: 2022-01-06
Payer: MEDICARE

## 2022-01-06 VITALS
HEIGHT: 71 IN | BODY MASS INDEX: 25.76 KG/M2 | WEIGHT: 184 LBS | TEMPERATURE: 97.8 F | HEART RATE: 88 BPM | SYSTOLIC BLOOD PRESSURE: 114 MMHG | DIASTOLIC BLOOD PRESSURE: 68 MMHG | OXYGEN SATURATION: 98 %

## 2022-01-06 DIAGNOSIS — N18.32 STAGE 3B CHRONIC KIDNEY DISEASE (HCC): ICD-10-CM

## 2022-01-06 DIAGNOSIS — Z01.810 ENCOUNTER FOR PRE-OPERATIVE CARDIOVASCULAR CLEARANCE: Primary | ICD-10-CM

## 2022-01-06 DIAGNOSIS — I50.42 CHRONIC COMBINED SYSTOLIC AND DIASTOLIC CONGESTIVE HEART FAILURE (HCC): ICD-10-CM

## 2022-01-06 DIAGNOSIS — I70.213 ATHEROSCLEROSIS OF NATIVE ARTERY OF BOTH LOWER EXTREMITIES WITH INTERMITTENT CLAUDICATION (HCC): ICD-10-CM

## 2022-01-06 DIAGNOSIS — E11.9 TYPE 2 DIABETES MELLITUS WITHOUT COMPLICATION, WITHOUT LONG-TERM CURRENT USE OF INSULIN (HCC): ICD-10-CM

## 2022-01-06 PROCEDURE — 93000 ELECTROCARDIOGRAM COMPLETE: CPT | Performed by: PEDIATRICS

## 2022-01-06 PROCEDURE — G8417 CALC BMI ABV UP PARAM F/U: HCPCS | Performed by: PEDIATRICS

## 2022-01-06 PROCEDURE — 1123F ACP DISCUSS/DSCN MKR DOCD: CPT | Performed by: PEDIATRICS

## 2022-01-06 PROCEDURE — 1036F TOBACCO NON-USER: CPT | Performed by: PEDIATRICS

## 2022-01-06 PROCEDURE — 3046F HEMOGLOBIN A1C LEVEL >9.0%: CPT | Performed by: PEDIATRICS

## 2022-01-06 PROCEDURE — 99214 OFFICE O/P EST MOD 30 MIN: CPT | Performed by: PEDIATRICS

## 2022-01-06 PROCEDURE — 2022F DILAT RTA XM EVC RTNOPTHY: CPT | Performed by: PEDIATRICS

## 2022-01-06 PROCEDURE — G8427 DOCREV CUR MEDS BY ELIG CLIN: HCPCS | Performed by: PEDIATRICS

## 2022-01-06 PROCEDURE — 3017F COLORECTAL CA SCREEN DOC REV: CPT | Performed by: PEDIATRICS

## 2022-01-06 PROCEDURE — G8484 FLU IMMUNIZE NO ADMIN: HCPCS | Performed by: PEDIATRICS

## 2022-01-06 PROCEDURE — 4040F PNEUMOC VAC/ADMIN/RCVD: CPT | Performed by: PEDIATRICS

## 2022-01-06 ASSESSMENT — ENCOUNTER SYMPTOMS
VOMITING: 0
WHEEZING: 0
CHEST TIGHTNESS: 0
DIARRHEA: 0
ABDOMINAL PAIN: 0
SHORTNESS OF BREATH: 1
NAUSEA: 0
COUGH: 0
SORE THROAT: 0
BACK PAIN: 0

## 2022-01-06 NOTE — PROGRESS NOTES
[facility], [address]  Phone [phone]   Fax [phone]      OFFICE VISIT: 2022    Leonid Will Abraham-: 1950      hospitals  Reason For Visit:  Marlys Jean Baptiste is a 70 y.o. Pre-op Exam (needing surg clearance for [facility] on . )    Patient presents for preoperative clearance. He is pending surgery at the orthopedic [place] of [address] [place] [address] on . He is going to be having shoulder surgery on the right shoulder. His primary risk factor is his cardiac issues with stable coronary artery disease without any angina, and his congestive heart failure. From a heart failure standpoint he is followed in [place]. I do not have access to the most recent echoes or even the cardiology notes. Most recent echo that I can see in our system was on 2020 which showed a ejection fraction of 20 to 25% and grade 3 diastolic dysfunction. Most recent BMP was 3907 on 2021    Risk stratification:  Has not had any recent myocardial infarction (6 months. Age is 70    He has slightly fluid overloaded with S3 and mild JVD. He does not have aortic stenosis    He continues 100% paced. No PVCs were noted. Most recent lipid profile was unremarkable without evidence of acidosis or hypokalemia. BUN/creatinine are acceptable with a GFR of 35 (BUN 62 creatinine 1.9)  Oxygen saturation 98% on room air. Liver enzymes are normal.  He is mobile and presently working in gainful employment. He denies any chest pain with 4 METS of exertion. He has not had any bleeding disorders. He does not have any history of clotting disorders. He does not have any history of problems with anesthesia in the past.  Mallampati score is class II      Surgery that is scheduled is reverse shoulder replacement, representing an intermediate risk procedure.     Complication rates (perioperative cardiac event) determined by multifactorial risk index reveals him to be class 3 out of 4.  This equates to a 14% risk by Ellan Dimitris criteria 15% risk Zeldin criteria and 20% risk by Detsky criteria  Risk stratification is for a significant cardiac event. Given that this is an elective, non-major surgery, perioperative complications decrease to < 10%. Medications impacting surgery include:   Plavix 75 mg daily. This will need to be discontinued 5 to 7 days prior to the procedure. He has not had any stents placed within the past year. Aspirin 325 mg daily    This will need to be discontinued 5 to 7 days prior to the procedure   Metformin 1000 mg twice daily    Should be discontinued 24 hours prior to the procedure and restarted after a     normal creatinine determination and at least 24 hours postoperatively    We will want to be very sparing on fluid resuscitation. He can easily be tipped into failure. I would recommend that he continue Beta Blockade and RAAS therapy throughout his hospitalization. (assuming absence of overt heart failure)  Recommend slow transfusion for Hgb <10, but doubtful this will be necessary. I definitely recommend recheck CBC prior to his surgery and would recommend type and screen. Even though will be unlikely he would require transfusion with this particular surgery, it would be good to have blood available. Also recommend assessing volume status prior to surgery. Judicious use of crystalloid is also recommended as noted above. If he does appear to be progressing towards pulmonary vascular congestion, he does respond to diuretics. Nitrates and or Hydralazine can also be utilized should his blood pressure support that.       height is 5' 11\" (1.803 m) and weight is 184 lb (83.5 kg). His temporal temperature is 97.8 °F (36.6 °C). His blood pressure is 114/68 and his pulse is 88. His oxygen saturation is 98%. Body mass index is 25.66 kg/m².     I have reviewed the following with the Mr. Zak Jackson   Lab Review  Admission on 12/01/2021, Discharged on 12/01/2021   Component Date Value    WBC 12/01/2021 11.8*    RBC 12/01/2021 4.83     Hemoglobin 12/01/2021 14.9     Hematocrit 12/01/2021 46.3     MCV 12/01/2021 95.9*    MCH 12/01/2021 30.8     MCHC 12/01/2021 32.2*    RDW 12/01/2021 18.5*    Platelets 50/19/7158 148     MPV 12/01/2021 10.2     Neutrophils % 12/01/2021 82.2*    Lymphocytes % 12/01/2021 7.3*    Monocytes % 12/01/2021 7.8     Eosinophils % 12/01/2021 1.5     Basophils % 12/01/2021 0.7     Neutrophils Absolute 12/01/2021 9.7*    Immature Granulocytes # 12/01/2021 0.1     Lymphocytes Absolute 12/01/2021 0.9*    Monocytes Absolute 12/01/2021 0.90     Eosinophils Absolute 12/01/2021 0.20     Basophils Absolute 12/01/2021 0.10     Sodium 12/01/2021 126*    Potassium reflex Magnesi* 12/01/2021 3.7     Chloride 12/01/2021 84*    CO2 12/01/2021 23     Anion Gap 12/01/2021 19     Glucose 12/01/2021 153*    BUN 12/01/2021 62*    CREATININE 12/01/2021 1.9*    GFR Non- 12/01/2021 35*    GFR  12/01/2021 42*    Calcium 12/01/2021 9.8     Total Protein 12/01/2021 8.0     Albumin 12/01/2021 4.7     Total Bilirubin 12/01/2021 1.1     Alkaline Phosphatase 12/01/2021 85     ALT 12/01/2021 9     AST 12/01/2021 15     Protime 12/01/2021 16.4*    INR 12/01/2021 1.31*   Orders Only on 11/30/2021   Component Date Value    Antibody Screen 11/29/2021 0.1    Orders Only on 11/30/2021   Component Date Value    BNP 11/29/2021 3,907*    WBC 11/29/2021 7.3     RBC 11/29/2021 4.75     Hemoglobin 11/29/2021 14.7     Hematocrit 11/29/2021 44.6     MCV 11/29/2021 93.9     MCH 11/29/2021 30.9     MCHC 11/29/2021 33.0     Platelets 34/96/7541 128*    RDW 11/29/2021 18.6*    MPV 11/29/2021 10.6*    Neutrophils % 11/29/2021 64.8     Lymphocytes % 11/29/2021 15.2*    Monocytes % 11/29/2021 11.8*    Eosinophils % 11/29/2021 6.4*    Basophils % 11/29/2021 1.4*    Neutrophils Absolute 11/29/2021 4.73  Lymphocytes Absolute 11/29/2021 1.11     Monocytes Absolute 11/29/2021 0.86*    Eosinophils Absolute 11/29/2021 0.47     Basophils Absolute 11/29/2021 0.10     Sodium 11/29/2021 129*    Chloride 11/29/2021 90*    Potassium 11/29/2021 3.7     BUN 11/29/2021 63*    CREATININE 11/29/2021 1.78*    Glucose 11/29/2021 128*    AST 11/29/2021 10     ALT 11/29/2021 14     Calcium 11/29/2021 9.1     Total Protein 11/29/2021 7.5     CO2 11/29/2021 25     Albumin 11/29/2021 3.5     Alkaline Phosphatase 11/29/2021 71     Total Bilirubin 11/29/2021 1.0     Gfr Calculated 11/29/2021 40.25     Anion Gap 11/29/2021 17.0*    Hemoglobin A1C 11/30/2021 6.5*    Cholesterol, Total 11/29/2021 151     HDL 11/29/2021 27*    LDL Calculated 11/29/2021 102*    Triglycerides 11/29/2021 112     T4 Free 11/29/2021 1.21     TSH 11/29/2021 6.79*    PSA 11/29/2021 0.77      Copies of these are in the chart. Current Outpatient Medications   Medication Sig Dispense Refill    Misc. Devices (RAISED TOILET SEAT/LOCK & ARMS) MISC Use with existing toilet to facilitate transfers. 1 each 0    metoprolol succinate (TOPROL XL) 25 MG extended release tablet Take 1.5 tablets by mouth daily 1.5 tablet daily.  45 tablet 11    traZODone (DESYREL) 100 MG tablet Take 1.5 tablets by mouth nightly 135 tablet 3    torsemide (DEMADEX) 100 MG tablet Take 2 tablets by mouth every day 180 tablet 3    levothyroxine (SYNTHROID) 88 MCG tablet Take 1 tablet by mouth daily 90 tablet 2    potassium chloride (KLOR-CON M) 20 MEQ extended release tablet Take 1 tablet by mouth 2 times daily TAKE 1 TABLET BY MOUTH TWICE DAILY 180 tablet 3    triamcinolone (KENALOG) 0.1 % cream APPLY TOPICALLY TWICE A DAY 80 g 5    spironolactone (ALDACTONE) 25 MG tablet Take 1 tablet by mouth daily 90 tablet 3    ramipril (ALTACE) 1.25 MG capsule TAKE 1 CAPSULE DAILY 90 capsule 3    rosuvastatin (CRESTOR) 40 MG tablet Take 1 tablet by mouth every evening 90 tablet 3    metFORMIN (GLUCOPHAGE) 1000 MG tablet Take 1 tablet by mouth 2 times daily (with meals) 180 tablet 3    metOLazone (ZAROXOLYN) 2.5 MG tablet Take 1 tablet by mouth daily 90 tablet 3    Multiple Vitamins-Minerals (THERAPEUTIC MULTIVITAMIN-MINERALS) tablet Take 1 tablet by mouth daily      magnesium oxide (MAG-OX) 400 MG tablet Take 400 mg by mouth daily      clopidogrel (PLAVIX) 75 MG tablet Take 1 tablet by mouth daily 90 tablet 3    nitroGLYCERIN (NITROLINGUAL) 0.4 MG/SPRAY 0.4 mg spray Place 1 spray under the tongue every 5 minutes as needed for Chest pain 1 Bottle 0    Omega-3 Fatty Acids (FISH OIL) 1000 MG CAPS Take 3,000 mg by mouth daily      diphenhydrAMINE (BENADRYL) 25 MG tablet Take 50 mg by mouth nightly as needed for Itching      aspirin 325 MG tablet Take 325 mg by mouth daily. No current facility-administered medications for this visit.        Allergies: Codeine     Past Medical History:   Diagnosis Date    Arthritis     Basilar artery stenosis     CAD (coronary artery disease) 1997 2009    MI    Cancer ([facility]     SKIN 6YRS AGO    Carotid artery stenosis     bilateral    Cerebral artery occlusion with cerebral infarction ([address]     TIA due to hypotension    CHF (congestive heart failure) (HCC)     Dieulafoy lesion of stomach     Heel spur     Hyperlipidemia     Hypertension     Palliative care patient 10/17/2018    Sleep apnea     no c-pap    Squamous cell cancer of skin of earlobe, left     SCC    Type II or unspecified type diabetes mellitus without mention of complication, not stated as uncontrolled     Vertebral artery insufficiency        Family History   Problem Relation Age of Onset    Heart Disease Father     High Blood Pressure Father     Other Father         Esophageal Varices       Past Surgical History:   Procedure Laterality Date    ANGIOPLASTY Bilateral     basilar artery angioplasty    CARDIAC CATHETERIZATION  11/30/2018    [place]    CARDIAC DEFIBRILLATOR PLACEMENT      medtronic    CARPAL TUNNEL RELEASE      bilateral    CATARACT REMOVAL Bilateral     CHOLECYSTECTOMY      COLONOSCOPY N/A 2021    Dr Boogie Brennan disease-AP x 3, 3 yr recall    CORONARY ARTERY BYPASS GRAFT      3v    HEEL SPUR SURGERY      left    MI EGD TRANSORAL BIOPSY SINGLE/MULTIPLE N/A 10/16/2018    Dr Patricio Cranker Jones-w/control of bleeding-Active bleeding in the stomach from Dieulafoy lesion    PTCA  2009 [place]    with stents    TONSILLECTOMY         Social History     Tobacco Use    Smoking status: Former Smoker     Packs/day: 1.00     Years: 30.00     Pack years: 30.00     Types: Cigars     Quit date: 3/3/2009     Years since quittin.8    Smokeless tobacco: Never Used   Substance Use Topics    Alcohol use: No     Alcohol/week: 0.0 standard drinks        Review of Systems   Constitutional: Positive for fatigue. Negative for chills and fever (he is limited in his exercise capacity). HENT: Negative for congestion, ear pain and sore throat. Eyes: Negative for visual disturbance. Respiratory: Positive for shortness of breath (occasional orthopnea ). Negative for cough, chest tightness and wheezing. Cardiovascular: Positive for leg swelling (Very rarely). Negative for chest pain and palpitations. Gastrointestinal: Negative for abdominal pain, diarrhea, nausea and vomiting. Endocrine: Negative for polyuria. Genitourinary: Negative for frequency and urgency. Musculoskeletal: Positive for arthralgias (right shoulder since his fall). Negative for back pain and neck pain. Skin: Negative for rash. Neurological: Positive for weakness (generalized). Negative for dizziness and headaches. Psychiatric/Behavioral: Negative for self-injury. The patient is not nervous/anxious. Physical Exam  Constitutional:       General: He is not in acute distress. Appearance: He is well-developed and normal weight.    HENT:      Head: Normocephalic and atraumatic. Right Ear: Hearing, tympanic membrane, ear canal and external ear normal. No middle ear effusion. Left Ear: Hearing, tympanic membrane, ear canal and external ear normal.      Nose: Nose normal. No mucosal edema (mild) or rhinorrhea (thin, clear). Mouth/Throat:      Mouth: Mucous membranes are moist.      Pharynx: Oropharynx is clear. No posterior oropharyngeal erythema. Eyes:      General: No scleral icterus. Extraocular Movements: Extraocular movements intact. Conjunctiva/sclera: Conjunctivae normal.      Pupils: Pupils are equal, round, and reactive to light. Neck:      Thyroid: No thyromegaly. Vascular: No carotid bruit or JVD. Cardiovascular:      Rate and Rhythm: Normal rate and regular rhythm. No extrasystoles are present. Chest Wall: PMI is not displaced. Pulses:           Dorsalis pedis pulses are 0 on the right side and 0 on the left side. Heart sounds: S1 normal and S2 normal. Murmur heard. No friction rub. No gallop. No S3 sounds. Comments: Feet are slightly cyanotic. Pulmonary:      Effort: Pulmonary effort is normal. No respiratory distress. Breath sounds: No decreased breath sounds, wheezing, rhonchi or rales. Abdominal:      General: Bowel sounds are normal.      Palpations: Abdomen is soft. Tenderness: There is no abdominal tenderness. There is no guarding or rebound. Genitourinary:     Comments: Exam deferred  Musculoskeletal:         General: No tenderness. Normal range of motion. Cervical back: Normal range of motion and neck supple. Right lower le+ Edema present. Left lower le+ Edema present. Lymphadenopathy:      Cervical: No cervical adenopathy. Skin:     General: Skin is warm and dry. Capillary Refill: Capillary refill takes less than 2 seconds. Findings: No rash. Neurological:      General: No focal deficit present.       Mental Status: He is alert and oriented to person, place, and time. Sensory: No sensory deficit (no numbness or tingling). Psychiatric:         Mood and Affect: Mood normal.         Behavior: Behavior normal.           ECG results    ECG shows what appears to be an AV sequential pacer with demand pacing evidence of atrial and ventricular pacing. He is 100% paced with a rate of 85. There is flattening of T waves in V5 and slight inverted T waves in V6. Most recent comparison ECG is from 10/23/2019  This ECG is similar to from 10/25/2019 with notably flat T waves in V5 and V6.    ASSESSMENT      ICD-10-CM    1. Encounter for pre-operative cardiovascular clearance  Z01.810 XR CHEST STANDARD (2 VW)     CBC Auto Differential     Comprehensive Metabolic Panel     EKG 12 Lead     EKG 12 Lead     Shower chair   2. Chronic combined systolic and diastolic congestive heart failure ([address]  I50.42 Shower chair   3. Atherosclerosis of native artery of both lower extremities with intermittent claudication ([address]  I70.213    4. Type 2 diabetes mellitus without complication, without long-term current use of insulin (HCC)  E11.9    5. Stage 3b chronic kidney disease ([address]  N18.32          PLAN    1. Encounter for pre-operative cardiovascular clearance  We will perform labs just prior to his surgery. He has had recent laboratory studies as noted in the chart above. Assessment was based upon his physical exam and these laboratory studies. He is approved to proceed to the operating room. He does understand that he has higher risk than the average but the risks are reasonable in his present condition. See comments in the body of the note as to recommendations for the perioperative period  - XR CHEST STANDARD (2 VW); Future  - CBC Auto Differential; Future  - Comprehensive Metabolic Panel; Future  - EKG 12 Lead; Future  - EKG 12 Lead  - Shower chair    2. Chronic combined systolic and diastolic congestive heart failure (HCC)  Clinically compensated.   He is however somewhat frail. We will order him a shower chair as he will likely have more difficulty once he loses use of his right arm (dominant arm)  - Shower chair    3. Atherosclerosis of native artery of both lower extremities with intermittent claudication ([address]  This is stable and clinically nonprogressive. We will maintain laboratory assessment and optimal control of risk factors via medical management    4. Type 2 diabetes mellitus without complication, without long-term current use of insulin ([address]  Appropriately controlled on present medication regimen. We will continue the same    5. Stage 3b chronic kidney disease ([address]  Stable and most likely underestimating actual renal function due to hypovolemic status at the time of laboratory studies. He does respond to diuretics well. Orders Placed This Encounter   Procedures    XR CHEST STANDARD (2 VW)    CBC Auto Differential    Comprehensive Metabolic Panel    EKG 12 Lead    Shower chair        Return in about 1 month (around 2/6/2022) for 30. This was an in-house visit.

## 2022-02-02 DIAGNOSIS — G47.00 INSOMNIA, UNSPECIFIED TYPE: ICD-10-CM

## 2022-02-02 DIAGNOSIS — E11.9 TYPE 2 DIABETES MELLITUS WITHOUT COMPLICATION, WITHOUT LONG-TERM CURRENT USE OF INSULIN (HCC): ICD-10-CM

## 2022-02-02 RX ORDER — TRAZODONE HYDROCHLORIDE 100 MG/1
150 TABLET ORAL NIGHTLY
Qty: 135 TABLET | Refills: 3 | Status: SHIPPED | OUTPATIENT
Start: 2022-02-02

## 2022-02-25 DIAGNOSIS — E87.6 HYPOKALEMIA: ICD-10-CM

## 2022-02-25 RX ORDER — POTASSIUM CHLORIDE 20 MEQ/1
20 TABLET, EXTENDED RELEASE ORAL 2 TIMES DAILY
Qty: 180 TABLET | Refills: 3 | Status: SHIPPED | OUTPATIENT
Start: 2022-02-25

## 2022-02-25 NOTE — TELEPHONE ENCOUNTER
Received fax from pharmacy requesting refill on pts medication(s). Pt was last seen in office on 1/6/2022  and has a follow up scheduled for 3/1/2022. Will send request to  Dr. Adina Qureshi  for authorization.      Requested Prescriptions     Pending Prescriptions Disp Refills    potassium chloride (KLOR-CON M) 20 MEQ extended release tablet 180 tablet 3     Sig: Take 1 tablet by mouth 2 times daily TAKE 1 TABLET BY MOUTH TWICE DAILY

## 2022-03-01 ENCOUNTER — OFFICE VISIT (OUTPATIENT)
Dept: PRIMARY CARE CLINIC | Age: 72
End: 2022-03-01
Payer: MEDICARE

## 2022-03-01 VITALS
WEIGHT: 195 LBS | SYSTOLIC BLOOD PRESSURE: 110 MMHG | DIASTOLIC BLOOD PRESSURE: 68 MMHG | HEIGHT: 71 IN | HEART RATE: 84 BPM | OXYGEN SATURATION: 100 % | BODY MASS INDEX: 27.3 KG/M2 | TEMPERATURE: 98.4 F

## 2022-03-01 DIAGNOSIS — E78.5 HYPERLIPIDEMIA WITH TARGET LDL LESS THAN 100: ICD-10-CM

## 2022-03-01 DIAGNOSIS — I50.22 CHRONIC SYSTOLIC CONGESTIVE HEART FAILURE (HCC): ICD-10-CM

## 2022-03-01 DIAGNOSIS — I10 PRIMARY HYPERTENSION: ICD-10-CM

## 2022-03-01 DIAGNOSIS — N18.32 TYPE 2 DIABETES MELLITUS WITH STAGE 3B CHRONIC KIDNEY DISEASE, WITHOUT LONG-TERM CURRENT USE OF INSULIN (HCC): Primary | ICD-10-CM

## 2022-03-01 DIAGNOSIS — R53.83 FATIGUE, UNSPECIFIED TYPE: ICD-10-CM

## 2022-03-01 DIAGNOSIS — E11.22 TYPE 2 DIABETES MELLITUS WITH STAGE 3B CHRONIC KIDNEY DISEASE, WITHOUT LONG-TERM CURRENT USE OF INSULIN (HCC): Primary | ICD-10-CM

## 2022-03-01 DIAGNOSIS — I25.10 CORONARY ARTERY DISEASE INVOLVING NATIVE CORONARY ARTERY OF NATIVE HEART WITHOUT ANGINA PECTORIS: ICD-10-CM

## 2022-03-01 PROCEDURE — G8484 FLU IMMUNIZE NO ADMIN: HCPCS | Performed by: PEDIATRICS

## 2022-03-01 PROCEDURE — 3017F COLORECTAL CA SCREEN DOC REV: CPT | Performed by: PEDIATRICS

## 2022-03-01 PROCEDURE — 1123F ACP DISCUSS/DSCN MKR DOCD: CPT | Performed by: PEDIATRICS

## 2022-03-01 PROCEDURE — 2022F DILAT RTA XM EVC RTNOPTHY: CPT | Performed by: PEDIATRICS

## 2022-03-01 PROCEDURE — G8427 DOCREV CUR MEDS BY ELIG CLIN: HCPCS | Performed by: PEDIATRICS

## 2022-03-01 PROCEDURE — 99214 OFFICE O/P EST MOD 30 MIN: CPT | Performed by: PEDIATRICS

## 2022-03-01 PROCEDURE — 3046F HEMOGLOBIN A1C LEVEL >9.0%: CPT | Performed by: PEDIATRICS

## 2022-03-01 PROCEDURE — 1036F TOBACCO NON-USER: CPT | Performed by: PEDIATRICS

## 2022-03-01 PROCEDURE — 4040F PNEUMOC VAC/ADMIN/RCVD: CPT | Performed by: PEDIATRICS

## 2022-03-01 PROCEDURE — G8417 CALC BMI ABV UP PARAM F/U: HCPCS | Performed by: PEDIATRICS

## 2022-03-01 ASSESSMENT — ENCOUNTER SYMPTOMS
DIARRHEA: 0
ABDOMINAL PAIN: 0
VOMITING: 0
NAUSEA: 0
CHEST TIGHTNESS: 0
BACK PAIN: 0
SHORTNESS OF BREATH: 1
SORE THROAT: 0
WHEEZING: 0
COUGH: 0

## 2022-03-01 NOTE — PROGRESS NOTES
Sergeigelacio Morgan [address], [address]  Phone [phone]   Fax [phone]      OFFICE VISIT: 3/1/2022    Marty Rosario-: 1950      HPI  Reason For Visit:  June Arroyo is a 70 y.o.     3 Month Follow-Up (Bg staying good, routine check up. )    Patient presents for routine follow-up for multiple health issues. Present concerns:  No new concerns. He did not have Right shoulder surgery   No problems with the procedure. He decided to not do the surgery. We discussed potential for shoulder injection if he desires. Diabetes Mellitus Type 2  Diet compliance:  compliant most of the time  Nutrition Consultation Needed:  no  Medication:              Metformin 1000 mg twice daily  Medication compliance: Nurys Gomez was some question as to whether he should be on it during his hospitalization.  The hospitalist recommended that he stop it (jardiance)  Weight trend: Weight is up 9 lb from 2022  Current exercise: He is trying to exercise as much as he can  Checking: Periodically  Home blood sugar records: fasting range: Controlled when he checks. Low BG:  no  Eye exam current (within one year): yes  Checking Feet regularly:  yes -no sores  ACE/ARB:  Ramipril 1.25mg daily  Tobacco history: He  reports that he quit smoking about 13 years ago. His smoking use included cigars. He has a 30.00 pack-year smoking history.  He has never used smokeless tobacco.    Lab Results   Component Value Date    LABA1C 6.5 (H) 2021    LABA1C 5.4 2020    LABA1C 5.3 2020     Lab Results   Component Value Date    LABMICR 1.40 2018    CREATININE 1.9 (H) 2021       Hypertension:   BP today was   BP Readings from Last 1 Encounters:   22 110/68      Recent BP readings:    BP Readings from Last 3 Encounters:   22 110/68   22 114/68   21 98/70     Medication              Toprol XL 25 mg 1/2 tablets (37.5 mg daily)              Ramipril 1.255 mg daily Spironolactone 25 mg daily   Medication compliance:  compliant most of the time  Home blood pressure monitoring: yes and controlled    He is adherent to a low sodium diet. Symptoms: none  Laboratory:  Lab Results   Component Value Date    BUN 62 (H) 12/01/2021    CREATININE 1.9 (H) 12/01/2021       Hyperlipidemia:   Medication:   rosuvastatin (Crestor)  Low Fat, Low Choleterol Diet:  yes - he tries  Myalgias or GI upset: no  The patient exercises intermittently.   Laboratory:    Lab Results   Component Value Date    CHOL 151 11/29/2021    TRIG 112 11/29/2021    HDL 27 (L) 11/29/2021    LDLCALC 102 (H) 11/29/2021    LDLDIRECT 111 02/14/2013      Lab Results   Component Value Date    ALT 9 12/01/2021    AST 15 12/01/2021       Congestive heart failure:  Type: Ischemic cardiomyopathy, systolic, (HFr EF)  Primary rhythm: Sinus rhythm  Medication:              Beta-blockade:            Toprol-XL 37.5 mg twice daily              R AAS therapy:            Ramipril 1.25 mg daily              Diuretics:                     Zaroxolyn 2.5 mg daily                                                  Demadex 100 mg every other day daily              Aldosterone inhibition: Aldactone 25 mg daily              Additional meds:         None  Symptoms: Doing well and presently compensated  Cardiology follow-up: He follows with cardiology down in [place])  Most recent echo: Done down in [place] within the last 6 months        Coronary Artery Disease:  Medications:              Beta-blockade:           Toprol-XL 37.5 mg twice daily              RAAS Therapy:           Ramipril 1.25 mg daily              Lipid Management:     Crestor 40 mg nightly              Platelet Inhibition:        Plavix 75 mg daily and aspirin 325 mg daily              Anti-anginal:                Nitroglycerin 0.4 mg as needed  Symptoms: He denies any chest pain  Nitroglycerin use: He has never used nitro in the recent past  Cardiology follow-up: [place], [place]  Additional studies: As above        CKD 3:  He is not on any nephrotoxic drugs at this time. He is on RAAS therapy  No recent microalbumin measurement   most recent BUN and creatinine were  62 and 1.9 respectively with a GFR of 35 (12/1/2021)  CO2 = 23  K = 3.7  NA = 126  Present diuretics including Demadex 200 mg once daily  Zaroxolyn 2.5 mg daily only on prn.       height is 5' 11\" (1.803 m) and weight is 195 lb (88.5 kg). His temporal temperature is 98.4 °F (36.9 °C). His blood pressure is 110/68 and his pulse is 84. His oxygen saturation is 100%. Body mass index is 27.2 kg/m².     I have reviewed the following with the Mr. Zak Jackson   Lab Review  Admission on 12/01/2021, Discharged on 12/01/2021   Component Date Value    WBC 12/01/2021 11.8*    RBC 12/01/2021 4.83     Hemoglobin 12/01/2021 14.9     Hematocrit 12/01/2021 46.3     MCV 12/01/2021 95.9*    MCH 12/01/2021 30.8     MCHC 12/01/2021 32.2*    RDW 12/01/2021 18.5*    Platelets 23/15/8455 148     MPV 12/01/2021 10.2     Neutrophils % 12/01/2021 82.2*    Lymphocytes % 12/01/2021 7.3*    Monocytes % 12/01/2021 7.8     Eosinophils % 12/01/2021 1.5     Basophils % 12/01/2021 0.7     Neutrophils Absolute 12/01/2021 9.7*    Immature Granulocytes # 12/01/2021 0.1     Lymphocytes Absolute 12/01/2021 0.9*    Monocytes Absolute 12/01/2021 0.90     Eosinophils Absolute 12/01/2021 0.20     Basophils Absolute 12/01/2021 0.10     Sodium 12/01/2021 126*    Potassium reflex Magnesi* 12/01/2021 3.7     Chloride 12/01/2021 84*    CO2 12/01/2021 23     Anion Gap 12/01/2021 19     Glucose 12/01/2021 153*    BUN 12/01/2021 62*    CREATININE 12/01/2021 1.9*    GFR Non- 12/01/2021 35*    GFR  12/01/2021 42*    Calcium 12/01/2021 9.8     Total Protein 12/01/2021 8.0     Albumin 12/01/2021 4.7     Total Bilirubin 12/01/2021 1.1     Alkaline Phosphatase 12/01/2021 85     ALT 12/01/2021 9     AST 12/01/2021 15     Protime 12/01/2021 16.4*    INR 12/01/2021 1.31*   Orders Only on 11/30/2021   Component Date Value    Antibody Screen 11/29/2021 0.1    Orders Only on 11/30/2021   Component Date Value    BNP 11/29/2021 3,907*    WBC 11/29/2021 7.3     RBC 11/29/2021 4.75     Hemoglobin 11/29/2021 14.7     Hematocrit 11/29/2021 44.6     MCV 11/29/2021 93.9     MCH 11/29/2021 30.9     MCHC 11/29/2021 33.0     Platelets 83/23/1664 128*    RDW 11/29/2021 18.6*    MPV 11/29/2021 10.6*    Neutrophils % 11/29/2021 64.8     Lymphocytes % 11/29/2021 15.2*    Monocytes % 11/29/2021 11.8*    Eosinophils % 11/29/2021 6.4*    Basophils % 11/29/2021 1.4*    Neutrophils Absolute 11/29/2021 4.73     Lymphocytes Absolute 11/29/2021 1.11     Monocytes Absolute 11/29/2021 0.86*    Eosinophils Absolute 11/29/2021 0.47     Basophils Absolute 11/29/2021 0.10     Sodium 11/29/2021 129*    Chloride 11/29/2021 90*    Potassium 11/29/2021 3.7     BUN 11/29/2021 63*    CREATININE 11/29/2021 1.78*    Glucose 11/29/2021 128*    AST 11/29/2021 10     ALT 11/29/2021 14     Calcium 11/29/2021 9.1     Total Protein 11/29/2021 7.5     CO2 11/29/2021 25     Albumin 11/29/2021 3.5     Alkaline Phosphatase 11/29/2021 71     Total Bilirubin 11/29/2021 1.0     Gfr Calculated 11/29/2021 40.25     Anion Gap 11/29/2021 17.0*    Hemoglobin A1C 11/30/2021 6.5*    Cholesterol, Total 11/29/2021 151     HDL 11/29/2021 27*    LDL Calculated 11/29/2021 102*    Triglycerides 11/29/2021 112     T4 Free 11/29/2021 1.21     TSH 11/29/2021 6.79*    PSA 11/29/2021 0.77      Copies of these are in the chart.     Current Outpatient Medications   Medication Sig Dispense Refill    potassium chloride (KLOR-CON M) 20 MEQ extended release tablet Take 1 tablet by mouth 2 times daily TAKE 1 TABLET BY MOUTH TWICE DAILY 180 tablet 3    metFORMIN (GLUCOPHAGE) 1000 MG tablet Take 1 tablet by mouth 2 times daily (with meals) 180 tablet 3    traZODone (DESYREL) 100 MG tablet Take 1.5 tablets by mouth nightly 135 tablet 3    Misc. Devices (RAISED TOILET SEAT/LOCK & ARMS) MISC Use with existing toilet to facilitate transfers. 1 each 0    metoprolol succinate (TOPROL XL) 25 MG extended release tablet Take 1.5 tablets by mouth daily 1.5 tablet daily. 45 tablet 11    torsemide (DEMADEX) 100 MG tablet Take 2 tablets by mouth every day 180 tablet 3    levothyroxine (SYNTHROID) 88 MCG tablet Take 1 tablet by mouth daily 90 tablet 2    triamcinolone (KENALOG) 0.1 % cream APPLY TOPICALLY TWICE A DAY 80 g 5    spironolactone (ALDACTONE) 25 MG tablet Take 1 tablet by mouth daily 90 tablet 3    ramipril (ALTACE) 1.25 MG capsule TAKE 1 CAPSULE DAILY 90 capsule 3    rosuvastatin (CRESTOR) 40 MG tablet Take 1 tablet by mouth every evening 90 tablet 3    metOLazone (ZAROXOLYN) 2.5 MG tablet Take 1 tablet by mouth daily 90 tablet 3    Multiple Vitamins-Minerals (THERAPEUTIC MULTIVITAMIN-MINERALS) tablet Take 1 tablet by mouth daily      magnesium oxide (MAG-OX) 400 MG tablet Take 400 mg by mouth daily      clopidogrel (PLAVIX) 75 MG tablet Take 1 tablet by mouth daily 90 tablet 3    nitroGLYCERIN (NITROLINGUAL) 0.4 MG/SPRAY 0.4 mg spray Place 1 spray under the tongue every 5 minutes as needed for Chest pain 1 Bottle 0    Omega-3 Fatty Acids (FISH OIL) 1000 MG CAPS Take 3,000 mg by mouth daily      diphenhydrAMINE (BENADRYL) 25 MG tablet Take 50 mg by mouth nightly as needed for Itching      aspirin 325 MG tablet Take 81 mg by mouth daily        No current facility-administered medications for this visit.        Allergies: Codeine     Past Medical History:   Diagnosis Date    Arthritis     Basilar artery stenosis     CAD (coronary artery disease) 1997 2009    MI    Cancer [facility])     SKIN 6YRS AGO    Carotid artery stenosis     bilateral    Cerebral artery occlusion with cerebral infarction decreased breath sounds, wheezing, rhonchi or rales. Abdominal:      General: Bowel sounds are normal.      Palpations: Abdomen is soft. Tenderness: There is no abdominal tenderness. There is no guarding or rebound. Genitourinary:     Comments: Exam deferred  Musculoskeletal:         General: No swelling or tenderness. Normal range of motion. Cervical back: Normal range of motion and neck supple. Right lower leg: No edema. Left lower leg: No edema. Lymphadenopathy:      Cervical: No cervical adenopathy. Skin:     General: Skin is warm and dry. Capillary Refill: Capillary refill takes less than 2 seconds. Findings: No rash. Neurological:      General: No focal deficit present. Mental Status: He is alert and oriented to person, place, and time. Sensory: No sensory deficit (no numbness or tingling). Psychiatric:         Mood and Affect: Mood normal.         Behavior: Behavior normal.         ASSESSMENT      ICD-10-CM    1. Type 2 diabetes mellitus with stage 3b chronic kidney disease, without long-term current use of insulin (HCC)  E11.22 Comprehensive Metabolic Panel    K64.70 Hemoglobin A1C     Microalbumin / Creatinine Urine Ratio     T4, Free     TSH   2. Primary hypertension  I10 CBC with Auto Differential     Comprehensive Metabolic Panel     Microalbumin / Creatinine Urine Ratio   3. Hyperlipidemia with target LDL less than 100  E78.5 Lipid Panel   4. Chronic systolic congestive heart failure (HCC)  I50.22 Brain Natriuretic Peptide   5. Coronary artery disease involving native coronary artery of native heart without angina pectoris  I25.10    6. Fatigue, unspecified type  R53.83 T4, Free     TSH         PLAN    1. Type 2 diabetes mellitus with stage 3b chronic kidney disease, without long-term current use of insulin (HCC)  Blood sugars have been very well controlled over time. We will continue with present medication regimen as he is doing very well.   - Comprehensive Metabolic Panel; Future  - Hemoglobin A1C; Future  - Microalbumin / Creatinine Urine Ratio; Future  - T4, Free; Future  - TSH; Future    2. Primary hypertension  Blood pressure is excellently controlled. Continue the same regimen  - CBC with Auto Differential; Future  - Comprehensive Metabolic Panel; Future  - Microalbumin / Creatinine Urine Ratio; Future    3. Hyperlipidemia with target LDL less than 100  Recheck lipids on therapy. Our goal is less than 70 preferred but absolutely less than 100  - Lipid Panel; Future    4. Chronic systolic congestive heart failure ([address]  He is very well compensated on present medication regimen. Continue the same. Check BNP to compare to baseline  - Brain Natriuretic Peptide; Future    5. Coronary artery disease involving native coronary artery of native heart without angina pectoris  Stable without any anginal symptoms. Continue present medication regimen    6. Fatigue, unspecified type  Recheck thyroid with labs  - T4, Free; Future  - TSH; Future      Orders Placed This Encounter   Procedures    CBC with Auto Differential    Comprehensive Metabolic Panel    Brain Natriuretic Peptide    Hemoglobin A1C    Lipid Panel    Microalbumin / Creatinine Urine Ratio    T4, Free    TSH        Return in about 6 months (around 9/1/2022) for 30. This has been an in-house visit.

## 2022-04-22 NOTE — TELEPHONE ENCOUNTER
Received fax from pharmacy requesting refill on pts medication(s). Pt was last seen in office on 8/27/2020  and has a follow up scheduled for 11/30/2020. Will send request to  Dr. Julia Dhaliwal  for patient.      Requested Prescriptions     Pending Prescriptions Disp Refills    levothyroxine (SYNTHROID) 25 MCG tablet [Pharmacy Med Name: L-THYROXINE TAB 25MCG] 90 tablet 3     Sig: TAKE 1 TABLET DAILY
Statement Selected

## 2022-07-14 ENCOUNTER — TELEPHONE (OUTPATIENT)
Dept: PRIMARY CARE CLINIC | Age: 72
End: 2022-07-14

## 2022-08-11 ENCOUNTER — TELEMEDICINE (OUTPATIENT)
Dept: PRIMARY CARE CLINIC | Age: 72
End: 2022-08-11
Payer: MEDICARE

## 2022-08-11 DIAGNOSIS — Z00.00 MEDICARE ANNUAL WELLNESS VISIT, SUBSEQUENT: Primary | ICD-10-CM

## 2022-08-11 PROBLEM — E78.00 PURE HYPERCHOLESTEROLEMIA: Status: ACTIVE | Noted: 2022-08-11

## 2022-08-11 PROBLEM — B35.4 TINEA CORPORIS: Status: ACTIVE | Noted: 2022-08-11

## 2022-08-11 PROBLEM — C44.90 MALIGNANT NEOPLASM OF SKIN: Status: ACTIVE | Noted: 2022-08-11

## 2022-08-11 PROBLEM — I44.7 LBBB (LEFT BUNDLE BRANCH BLOCK): Status: ACTIVE | Noted: 2018-12-03

## 2022-08-11 PROBLEM — G45.0 VERTEBROBASILAR ARTERY SYNDROME: Status: ACTIVE | Noted: 2022-08-11

## 2022-08-11 PROBLEM — R53.83 FATIGUE: Status: ACTIVE | Noted: 2019-01-09

## 2022-08-11 PROBLEM — I77.9 CAROTID ARTERIAL DISEASE (HCC): Status: ACTIVE | Noted: 2019-03-21

## 2022-08-11 PROBLEM — Z95.810 ICD (IMPLANTABLE CARDIOVERTER-DEFIBRILLATOR) IN PLACE: Status: ACTIVE | Noted: 2019-07-22

## 2022-08-11 PROBLEM — E78.2 MIXED HYPERLIPIDEMIA: Status: ACTIVE | Noted: 2022-08-11

## 2022-08-11 PROBLEM — N20.0 CALCULUS OF KIDNEY: Status: ACTIVE | Noted: 2022-08-11

## 2022-08-11 PROBLEM — H72.90 PERFORATION OF TYMPANIC MEMBRANE: Status: ACTIVE | Noted: 2022-08-11

## 2022-08-11 PROCEDURE — 1123F ACP DISCUSS/DSCN MKR DOCD: CPT | Performed by: PEDIATRICS

## 2022-08-11 PROCEDURE — 3017F COLORECTAL CA SCREEN DOC REV: CPT | Performed by: PEDIATRICS

## 2022-08-11 PROCEDURE — G0439 PPPS, SUBSEQ VISIT: HCPCS | Performed by: PEDIATRICS

## 2022-08-11 RX ORDER — CETIRIZINE HYDROCHLORIDE 10 MG/1
10 TABLET ORAL DAILY
COMMUNITY

## 2022-08-11 SDOH — ECONOMIC STABILITY: FOOD INSECURITY: WITHIN THE PAST 12 MONTHS, THE FOOD YOU BOUGHT JUST DIDN'T LAST AND YOU DIDN'T HAVE MONEY TO GET MORE.: NEVER TRUE

## 2022-08-11 SDOH — ECONOMIC STABILITY: FOOD INSECURITY: WITHIN THE PAST 12 MONTHS, YOU WORRIED THAT YOUR FOOD WOULD RUN OUT BEFORE YOU GOT MONEY TO BUY MORE.: NEVER TRUE

## 2022-08-11 ASSESSMENT — PATIENT HEALTH QUESTIONNAIRE - PHQ9
SUM OF ALL RESPONSES TO PHQ9 QUESTIONS 1 & 2: 0
SUM OF ALL RESPONSES TO PHQ QUESTIONS 1-9: 0
2. FEELING DOWN, DEPRESSED OR HOPELESS: 0
SUM OF ALL RESPONSES TO PHQ QUESTIONS 1-9: 0
SUM OF ALL RESPONSES TO PHQ QUESTIONS 1-9: 0
1. LITTLE INTEREST OR PLEASURE IN DOING THINGS: 0
SUM OF ALL RESPONSES TO PHQ QUESTIONS 1-9: 0

## 2022-08-11 ASSESSMENT — LIFESTYLE VARIABLES: HOW OFTEN DO YOU HAVE A DRINK CONTAINING ALCOHOL: NEVER

## 2022-08-11 ASSESSMENT — SOCIAL DETERMINANTS OF HEALTH (SDOH): HOW HARD IS IT FOR YOU TO PAY FOR THE VERY BASICS LIKE FOOD, HOUSING, MEDICAL CARE, AND HEATING?: NOT HARD AT ALL

## 2022-08-11 NOTE — PATIENT INSTRUCTIONS
Personalized Preventive Plan for Mary Alston - 8/11/2022  Medicare offers a range of preventive health benefits. Some of the tests and screenings are paid in full while other may be subject to a deductible, co-insurance, and/or copay. Some of these benefits include a comprehensive review of your medical history including lifestyle, illnesses that may run in your family, and various assessments and screenings as appropriate. After reviewing your medical record and screening and assessments performed today your provider may have ordered immunizations, labs, imaging, and/or referrals for you. A list of these orders (if applicable) as well as your Preventive Care list are included within your After Visit Summary for your review. Other Preventive Recommendations:    A preventive eye exam performed by an eye specialist is recommended every 1-2 years to screen for glaucoma; cataracts, macular degeneration, and other eye disorders. A preventive dental visit is recommended every 6 months. Try to get at least 150 minutes of exercise per week or 10,000 steps per day on a pedometer . Order or download the FREE \"Exercise & Physical Activity: Your Everyday Guide\" from [employer]. Call 9-557.388.7760 or search [employer] online. You need 3144-6083 mg of calcium and 1368-3847 IU of vitamin D per day. It is possible to meet your calcium requirement with diet alone, but a vitamin D supplement is usually necessary to meet this goal.  When exposed to the sun, use a sunscreen that protects against both UVA and UVB radiation with an SPF of 30 or greater. Reapply every 2 to 3 hours or after sweating, drying off with a towel, or swimming. Always wear a seat belt when traveling in a car. Always wear a helmet when riding a bicycle or motorcycle. Heart-Healthy Diet   Sodium, Fat, and Cholesterol Controlled Diet       What Is a Heart Healthy Diet?    A heart-healthy diet is one that limits sodium , certain types of fat , and cholesterol . This type of diet is recommended for:   People with any form of cardiovascular disease (eg, coronary heart disease , peripheral vascular disease , previous heart attack , previous stroke )   People with risk factors for cardiovascular disease, such as high blood pressure , high cholesterol , or diabetes   Anyone who wants to lower their risk of developing cardiovascular disease   Sodium    Sodium is a mineral found in many foods. In general, most people consume much more sodium than they need. Diets high in sodium can increase blood pressure and lead to edema (water retention). On a heart-healthy diet, you should consume no more than 2,300 mg (milligrams) of sodium per dayabout the amount in one teaspoon of table salt. The foods highest in sodium include table salt (about 50% sodium), processed foods, convenience foods, and preserved foods. Cholesterol    Cholesterol is a fat-like, waxy substance in your blood. Our bodies make some cholesterol. It is also found in animal products, with the highest amounts in fatty meat, egg yolks, whole milk, cheese, shellfish, and organ meats. On a heart-healthy diet, you should limit your cholesterol intake to less than 200 mg per day. It is normal and important to have some cholesterol in your bloodstream. But too much cholesterol can cause plaque to build up within your arteries, which can eventually lead to a heart attack or stroke. The two types of cholesterol that are most commonly referred to are:   Low-density lipoprotein (LDL) cholesterol  Also known as bad cholesterol, this is the cholesterol that tends to build up along your arteries. Bad cholesterol levels are increased by eating fats that are saturated or hydrogenated. Optimal level of this cholesterol is less than 100. Over 130 starts to get risky for heart disease.    High-density lipoprotein (HDL) cholesterol  Also known as good cholesterol, this type of cholesterol actually carries cholesterol away from your arteries and may, therefore, help lower your risk of having a heart attack. You want this level to be high (ideally greater than 60). It is a risk to have a level less than 40. You can raise this good cholesterol by eating olive oil, canola oil, avocados, or nuts. Exercise raises this level, too. Fat    Fat is calorie dense and packs a lot of calories into a small amount of food. Even though fats should be limited due to their high calorie content, not all fats are bad. In fact, some fats are quite healthful. Fat can be broken down into four main types. The good-for-you fats are:   Monounsaturated fat  found in oils such as olive and canola, avocados, and nuts and natural nut butters; can decrease cholesterol levels, while keeping levels of HDL cholesterol high   Polyunsaturated fat  found in oils such as safflower, sunflower, soybean, corn, and sesame; can decrease total cholesterol and LDL cholesterol   Omega-3 fatty acids  particularly those found in fatty fish (such as salmon, trout, tuna, mackerel, herring, and sardines); can decrease risk of arrhythmias, decrease triglyceride levels, and slightly lower blood pressure   The fats that you want to limit are:   Saturated fat  found in animal products, many fast foods, and a few vegetables; increases total blood cholesterol, including LDL levels   Animal fats that are saturated include: butter, lard, whole-milk dairy products, meat fat, and poultry skin   Vegetable fats that are saturated include: hydrogenated shortening, palm oil, coconut oil, cocoa butter   Hydrogenated or trans fat  found in margarine and vegetable shortening, most shelf stable snack foods, and fried foods; increases LDL and decreases HDL     It is generally recommended that you limit your total fat for the day to less than 30% of your total calories.  If you follow an 1800-calorie heart healthy diet, for example, this would mean 60 grams of fat or less per day. Saturated fat and trans fat in your diet raises your blood cholesterol the most, much more than dietary cholesterol does. For this reason, on a heart-healthy diet, less than 7% of your calories should come from saturated fat and ideally 0% from trans fat. On an 1800-calorie diet, this translates into less than 14 grams of saturated fat per day, leaving 46 grams of fat to come from mono- and polyunsaturated fats.    Food Choices on a Heart Healthy Diet   Food Category   Foods Recommended   Foods to Avoid   Grains   Breads and rolls without salted tops Most dry and cooked cereals Unsalted crackers and breadsticks Low-sodium or homemade breadcrumbs or stuffing All rice and pastas   Breads, rolls, and crackers with salted tops High-fat baked goods (eg, muffins, donuts, pastries) Quick breads, self-rising flour, and biscuit mixes Regular bread crumbs Instant hot cereals Commercially prepared rice, pasta, or stuffing mixes   Vegetables   Most fresh, frozen, and low-sodium canned vegetables Low-sodium and salt-free vegetable juices Canned vegetables if unsalted or rinsed   Regular canned vegetables and juices, including sauerkraut and pickled vegetables Frozen vegetables with sauces Commercially prepared potato and vegetable mixes   Fruits   Most fresh, frozen, and canned fruits All fruit juices   Fruits processed with salt or sodium   Milk   Nonfat or low-fat (1%) milk Nonfat or low-fat yogurt Cottage cheese, low-fat ricotta, cheeses labeled as low-fat and low-sodium   Whole milk Reduced-fat (2%) milk Malted and chocolate milk Full fat yogurt Most cheeses (unless low-fat and low salt) Buttermilk (no more than 1 cup per week)   Meats and Beans   Lean cuts of fresh or frozen beef, veal, lamb, or pork (look for the word loin) Fresh or frozen poultry without the skin Fresh or frozen fish and some shellfish Egg whites and egg substitutes (Limit whole eggs to three per week) Tofu Nuts or seeds For products low in sodium, look for sodium free, very low sodium, low sodium, no added salt, and unsalted   Skip the salt when cooking or at the table; if food needs more flavor, get creative and try out different herbs and spices. Garlic and onion also add substantial flavor to foods. Trim any visible fat off meat and poultry before cooking, and drain the fat off after gandhi. Use cooking methods that require little or no added fat, such as grilling, boiling, baking, poaching, broiling, roasting, steaming, stir-frying, and sauting. Avoid fast food and convenience food. They tend to be high in saturated and trans fat and have a lot of added salt. Talk to a registered dietitian for individualized diet advice. Last Reviewed: March 2011 Mercy Samuel MS, MPH, RD   Updated: 3/29/2011     Heart-Healthy Diet   Sodium, Fat, and Cholesterol Controlled Diet       What Is a Heart Healthy Diet? A heart-healthy diet is one that limits sodium , certain types of fat , and cholesterol . This type of diet is recommended for:   People with any form of cardiovascular disease (eg, coronary heart disease , peripheral vascular disease , previous heart attack , previous stroke )   People with risk factors for cardiovascular disease, such as high blood pressure , high cholesterol , or diabetes   Anyone who wants to lower their risk of developing cardiovascular disease   Sodium    Sodium is a mineral found in many foods. In general, most people consume much more sodium than they need. Diets high in sodium can increase blood pressure and lead to edema (water retention). On a heart-healthy diet, you should consume no more than 2,300 mg (milligrams) of sodium per dayabout the amount in one teaspoon of table salt. The foods highest in sodium include table salt (about 50% sodium), processed foods, convenience foods, and preserved foods. Cholesterol    Cholesterol is a fat-like, waxy substance in your blood.  Our bodies make some cholesterol. It is also found in animal products, with the highest amounts in fatty meat, egg yolks, whole milk, cheese, shellfish, and organ meats. On a heart-healthy diet, you should limit your cholesterol intake to less than 200 mg per day. It is normal and important to have some cholesterol in your bloodstream. But too much cholesterol can cause plaque to build up within your arteries, which can eventually lead to a heart attack or stroke. The two types of cholesterol that are most commonly referred to are:   Low-density lipoprotein (LDL) cholesterol  Also known as bad cholesterol, this is the cholesterol that tends to build up along your arteries. Bad cholesterol levels are increased by eating fats that are saturated or hydrogenated. Optimal level of this cholesterol is less than 100. Over 130 starts to get risky for heart disease. High-density lipoprotein (HDL) cholesterol  Also known as good cholesterol, this type of cholesterol actually carries cholesterol away from your arteries and may, therefore, help lower your risk of having a heart attack. You want this level to be high (ideally greater than 60). It is a risk to have a level less than 40. You can raise this good cholesterol by eating olive oil, canola oil, avocados, or nuts. Exercise raises this level, too. Fat    Fat is calorie dense and packs a lot of calories into a small amount of food. Even though fats should be limited due to their high calorie content, not all fats are bad. In fact, some fats are quite healthful. Fat can be broken down into four main types.    The good-for-you fats are:   Monounsaturated fat  found in oils such as olive and canola, avocados, and nuts and natural nut butters; can decrease cholesterol levels, while keeping levels of HDL cholesterol high   Polyunsaturated fat  found in oils such as safflower, sunflower, soybean, corn, and sesame; can decrease total cholesterol and LDL cholesterol   Omega-3 fatty acids  particularly those found in fatty fish (such as salmon, trout, tuna, mackerel, herring, and sardines); can decrease risk of arrhythmias, decrease triglyceride levels, and slightly lower blood pressure   The fats that you want to limit are:   Saturated fat  found in animal products, many fast foods, and a few vegetables; increases total blood cholesterol, including LDL levels   Animal fats that are saturated include: butter, lard, whole-milk dairy products, meat fat, and poultry skin   Vegetable fats that are saturated include: hydrogenated shortening, palm oil, coconut oil, cocoa butter   Hydrogenated or trans fat  found in margarine and vegetable shortening, most shelf stable snack foods, and fried foods; increases LDL and decreases HDL     It is generally recommended that you limit your total fat for the day to less than 30% of your total calories. If you follow an 1800-calorie heart healthy diet, for example, this would mean 60 grams of fat or less per day. Saturated fat and trans fat in your diet raises your blood cholesterol the most, much more than dietary cholesterol does. For this reason, on a heart-healthy diet, less than 7% of your calories should come from saturated fat and ideally 0% from trans fat. On an 1800-calorie diet, this translates into less than 14 grams of saturated fat per day, leaving 46 grams of fat to come from mono- and polyunsaturated fats.    Food Choices on a Heart Healthy Diet   Food Category   Foods Recommended   Foods to Avoid   Grains   Breads and rolls without salted tops Most dry and cooked cereals Unsalted crackers and breadsticks Low-sodium or homemade breadcrumbs or stuffing All rice and pastas   Breads, rolls, and crackers with salted tops High-fat baked goods (eg, muffins, donuts, pastries) Quick breads, self-rising flour, and biscuit mixes Regular bread crumbs Instant hot cereals Commercially prepared rice, pasta, or stuffing mixes   Vegetables   Most fresh, frozen, and low-sodium canned vegetables Low-sodium and salt-free vegetable juices Canned vegetables if unsalted or rinsed   Regular canned vegetables and juices, including sauerkraut and pickled vegetables Frozen vegetables with sauces Commercially prepared potato and vegetable mixes   Fruits   Most fresh, frozen, and canned fruits All fruit juices   Fruits processed with salt or sodium   Milk   Nonfat or low-fat (1%) milk Nonfat or low-fat yogurt Cottage cheese, low-fat ricotta, cheeses labeled as low-fat and low-sodium   Whole milk Reduced-fat (2%) milk Malted and chocolate milk Full fat yogurt Most cheeses (unless low-fat and low salt) Buttermilk (no more than 1 cup per week)   Meats and Beans   Lean cuts of fresh or frozen beef, veal, lamb, or pork (look for the word loin) Fresh or frozen poultry without the skin Fresh or frozen fish and some shellfish Egg whites and egg substitutes (Limit whole eggs to three per week) Tofu Nuts or seeds (unsalted, dry-roasted), low-sodium peanut butter Dried peas, beans, and lentils   Any smoked, cured, salted, or canned meat, fish, or poultry (including mejias, chipped beef, cold cuts, hot dogs, sausages, sardines, and anchovies) Poultry skins Breaded and/or fried fish or meats Canned peas, beans, and lentils Salted nuts   Fats and Oils   Olive oil and canola oil Low-sodium, low-fat salad dressings and mayonnaise   Butter, margarine, coconut and palm oils, mejias fat   Snacks, Sweets, and Condiments   Low-sodium or unsalted versions of broths, soups, soy sauce, and condiments Pepper, herbs, and spices; vinegar, lemon, or lime juice Low-fat frozen desserts (yogurt, sherbet, fruit bars) Sugar, cocoa powder, honey, syrup, jam, and preserves Low-fat, trans-fat free cookies, cakes, and pies Rajinder and animal crackers, fig bars, bryn snaps   High-fat desserts Broth, soups, gravies, and sauces, made from instant mixes or other high-sodium ingredients Salted snack foods Canned olives Meat tenderizers, seasoning salt, and most flavored vinegars   Beverages   Low-sodium carbonated beverages Tea and coffee in moderation Soy milk   Commercially softened water   Suggestions   Make whole grains, fruits, and vegetables the base of your diet. Choose heart-healthy fats such as canola, olive, and flaxseed oil, and foods high in heart-healthy fats, such as nuts, seeds, soybeans, tofu, and fish. Eat fish at least twice per week; the fish highest in omega-3 fatty acids and lowest in mercury include salmon, herring, mackerel, sardines, and canned chunk light tuna. If you eat fish less than twice per week or have high triglycerides, talk to your doctor about taking fish oil supplements. Read food labels. For products low in fat and cholesterol, look for fat free, low-fat, cholesterol free, saturated fat free, and trans fat freeAlso scan the Nutrition Facts Label, which lists saturated fat, trans fat, and cholesterol amounts. For products low in sodium, look for sodium free, very low sodium, low sodium, no added salt, and unsalted   Skip the salt when cooking or at the table; if food needs more flavor, get creative and try out different herbs and spices. Garlic and onion also add substantial flavor to foods. Trim any visible fat off meat and poultry before cooking, and drain the fat off after gandhi. Use cooking methods that require little or no added fat, such as grilling, boiling, baking, poaching, broiling, roasting, steaming, stir-frying, and sauting. Avoid fast food and convenience food. They tend to be high in saturated and trans fat and have a lot of added salt. Talk to a registered dietitian for individualized diet advice. Last Reviewed: March 2011 Mercy Samuel MS, MPH, RD   Updated: 3/29/2011     Heart-Healthy Diet   Sodium, Fat, and Cholesterol Controlled Diet       What Is a Heart Healthy Diet?    A heart-healthy diet is one that limits sodium , certain types of fat , and cholesterol . This type of diet is recommended for:   People with any form of cardiovascular disease (eg, coronary heart disease , peripheral vascular disease , previous heart attack , previous stroke )   People with risk factors for cardiovascular disease, such as high blood pressure , high cholesterol , or diabetes   Anyone who wants to lower their risk of developing cardiovascular disease   Sodium    Sodium is a mineral found in many foods. In general, most people consume much more sodium than they need. Diets high in sodium can increase blood pressure and lead to edema (water retention). On a heart-healthy diet, you should consume no more than 2,300 mg (milligrams) of sodium per dayabout the amount in one teaspoon of table salt. The foods highest in sodium include table salt (about 50% sodium), processed foods, convenience foods, and preserved foods. Cholesterol    Cholesterol is a fat-like, waxy substance in your blood. Our bodies make some cholesterol. It is also found in animal products, with the highest amounts in fatty meat, egg yolks, whole milk, cheese, shellfish, and organ meats. On a heart-healthy diet, you should limit your cholesterol intake to less than 200 mg per day. It is normal and important to have some cholesterol in your bloodstream. But too much cholesterol can cause plaque to build up within your arteries, which can eventually lead to a heart attack or stroke. The two types of cholesterol that are most commonly referred to are:   Low-density lipoprotein (LDL) cholesterol  Also known as bad cholesterol, this is the cholesterol that tends to build up along your arteries. Bad cholesterol levels are increased by eating fats that are saturated or hydrogenated. Optimal level of this cholesterol is less than 100. Over 130 starts to get risky for heart disease.    High-density lipoprotein (HDL) cholesterol  Also known as good cholesterol, this type of cholesterol actually carries cholesterol away from your arteries and may, therefore, help lower your risk of having a heart attack. You want this level to be high (ideally greater than 60). It is a risk to have a level less than 40. You can raise this good cholesterol by eating olive oil, canola oil, avocados, or nuts. Exercise raises this level, too. Fat    Fat is calorie dense and packs a lot of calories into a small amount of food. Even though fats should be limited due to their high calorie content, not all fats are bad. In fact, some fats are quite healthful. Fat can be broken down into four main types. The good-for-you fats are:   Monounsaturated fat  found in oils such as olive and canola, avocados, and nuts and natural nut butters; can decrease cholesterol levels, while keeping levels of HDL cholesterol high   Polyunsaturated fat  found in oils such as safflower, sunflower, soybean, corn, and sesame; can decrease total cholesterol and LDL cholesterol   Omega-3 fatty acids  particularly those found in fatty fish (such as salmon, trout, tuna, mackerel, herring, and sardines); can decrease risk of arrhythmias, decrease triglyceride levels, and slightly lower blood pressure   The fats that you want to limit are:   Saturated fat  found in animal products, many fast foods, and a few vegetables; increases total blood cholesterol, including LDL levels   Animal fats that are saturated include: butter, lard, whole-milk dairy products, meat fat, and poultry skin   Vegetable fats that are saturated include: hydrogenated shortening, palm oil, coconut oil, cocoa butter   Hydrogenated or trans fat  found in margarine and vegetable shortening, most shelf stable snack foods, and fried foods; increases LDL and decreases HDL     It is generally recommended that you limit your total fat for the day to less than 30% of your total calories. If you follow an 1800-calorie heart healthy diet, for example, this would mean 60 grams of fat or less per day.    Saturated fat and trans fat in your diet raises your blood cholesterol the most, much more than dietary cholesterol does. For this reason, on a heart-healthy diet, less than 7% of your calories should come from saturated fat and ideally 0% from trans fat. On an 1800-calorie diet, this translates into less than 14 grams of saturated fat per day, leaving 46 grams of fat to come from mono- and polyunsaturated fats.    Food Choices on a Heart Healthy Diet   Food Category   Foods Recommended   Foods to Avoid   Grains   Breads and rolls without salted tops Most dry and cooked cereals Unsalted crackers and breadsticks Low-sodium or homemade breadcrumbs or stuffing All rice and pastas   Breads, rolls, and crackers with salted tops High-fat baked goods (eg, muffins, donuts, pastries) Quick breads, self-rising flour, and biscuit mixes Regular bread crumbs Instant hot cereals Commercially prepared rice, pasta, or stuffing mixes   Vegetables   Most fresh, frozen, and low-sodium canned vegetables Low-sodium and salt-free vegetable juices Canned vegetables if unsalted or rinsed   Regular canned vegetables and juices, including sauerkraut and pickled vegetables Frozen vegetables with sauces Commercially prepared potato and vegetable mixes   Fruits   Most fresh, frozen, and canned fruits All fruit juices   Fruits processed with salt or sodium   Milk   Nonfat or low-fat (1%) milk Nonfat or low-fat yogurt Cottage cheese, low-fat ricotta, cheeses labeled as low-fat and low-sodium   Whole milk Reduced-fat (2%) milk Malted and chocolate milk Full fat yogurt Most cheeses (unless low-fat and low salt) Buttermilk (no more than 1 cup per week)   Meats and Beans   Lean cuts of fresh or frozen beef, veal, lamb, or pork (look for the word loin) Fresh or frozen poultry without the skin Fresh or frozen fish and some shellfish Egg whites and egg substitutes (Limit whole eggs to three per week) Tofu Nuts or seeds (unsalted, dry-roasted), low-sodium peanut butter Dried peas, beans, and lentils   Any smoked, cured, salted, or canned meat, fish, or poultry (including mejias, chipped beef, cold cuts, hot dogs, sausages, sardines, and anchovies) Poultry skins Breaded and/or fried fish or meats Canned peas, beans, and lentils Salted nuts   Fats and Oils   Olive oil and canola oil Low-sodium, low-fat salad dressings and mayonnaise   Butter, margarine, coconut and palm oils, mejias fat   Snacks, Sweets, and Condiments   Low-sodium or unsalted versions of broths, soups, soy sauce, and condiments Pepper, herbs, and spices; vinegar, lemon, or lime juice Low-fat frozen desserts (yogurt, sherbet, fruit bars) Sugar, cocoa powder, honey, syrup, jam, and preserves Low-fat, trans-fat free cookies, cakes, and pies Rajinder and animal crackers, fig bars, bryn snaps   High-fat desserts Broth, soups, gravies, and sauces, made from instant mixes or other high-sodium ingredients Salted snack foods Canned olives Meat tenderizers, seasoning salt, and most flavored vinegars   Beverages   Low-sodium carbonated beverages Tea and coffee in moderation Soy milk   Commercially softened water   Suggestions   Make whole grains, fruits, and vegetables the base of your diet. Choose heart-healthy fats such as canola, olive, and flaxseed oil, and foods high in heart-healthy fats, such as nuts, seeds, soybeans, tofu, and fish. Eat fish at least twice per week; the fish highest in omega-3 fatty acids and lowest in mercury include salmon, herring, mackerel, sardines, and canned chunk light tuna. If you eat fish less than twice per week or have high triglycerides, talk to your doctor about taking fish oil supplements. Read food labels. For products low in fat and cholesterol, look for fat free, low-fat, cholesterol free, saturated fat free, and trans fat freeAlso scan the Nutrition Facts Label, which lists saturated fat, trans fat, and cholesterol amounts.    For products low in sodium, look for sodium free, very low sodium, low sodium, no added salt, and unsalted   Skip the salt when cooking or at the table; if food needs more flavor, get creative and try out different herbs and spices. Garlic and onion also add substantial flavor to foods. Trim any visible fat off meat and poultry before cooking, and drain the fat off after gandhi. Use cooking methods that require little or no added fat, such as grilling, boiling, baking, poaching, broiling, roasting, steaming, stir-frying, and sauting. Avoid fast food and convenience food. They tend to be high in saturated and trans fat and have a lot of added salt. Talk to a registered dietitian for individualized diet advice. Last Reviewed: March 2011 Devin Frank MS, MPH, RD   Updated: 3/29/2011       Preventing Osteoporosis: After Your Visit  Your Care Instructions  Osteoporosis means the bones are weak and thin enough that they can break easily. The older you are, the more likely you are to get osteoporosis. But with plenty of calcium, vitamin D, and exercise, you can help prevent osteoporosis. The preteen and teen years are a key time for bone building. With the help of calcium, vitamin D, and exercise in those early years and beyond, the bones reach their peak density and strength by age 27. After age 27, your bones naturally start to thin and weaken. The stronger your bones are at around age 27, the lower your risk for osteoporosis. But no matter what your age and risk are, your bones still need calcium, vitamin D, and exercise to stay strong. Also avoid smoking, and limit alcohol. Smoking and heavy alcohol use can make your bones thinner. Talk to your doctor about any special risks you might have, such as having a close relative with osteoporosis or taking a medicine that can weaken bones. Your doctor can tell you the best ways to protect your bones from thinning. Follow-up care is a key part of your treatment and safety.  Be sure to make and go to all appointments, and call your doctor if you are having problems. It's also a good idea to know your test results and keep a list of the medicines you take. How can you care for yourself at home? Get enough calcium and vitamin D. The [place] [employer] recommends adults younger than age 46 need 1,000 mg of calcium and 600 IU of vitamin D each day. Women ages 46 to 79 need 1,200 mg of calcium and 600 IU of vitamin D each day. Men ages 46 to 79 need 1,000 mg of calcium and 600 IU of vitamin D each day. Adults 71 and older need 1,200 mg of calcium and 800 IU of vitamin D each day. Eat foods rich in calcium, like yogurt, cheese, milk, and dark green vegetables. Eat foods rich in vitamin D, like eggs, fatty fish, cereal, and fortified milk. Get some sunshine. Your body uses sunshine to make its own vitamin D. The safest time to be out in the sun is before 10 a.m. or after 3 p.m. Avoid getting sunburned. Sunburn can increase your risk of skin cancer. Talk to your doctor about taking a calcium plus vitamin D supplement. Ask about what type of calcium is right for you, and how much to take at a time. Adults ages 23 to 48 should not get more than 2,500 mg of calcium and 4,000 IU of vitamin D each day, whether it is from supplements and/or food. Adults ages 46 and older should not get more than 2,000 mg of calcium and 4,000 IU of vitamin D each day from supplements and/or food. Get regular bone-building exercise. Weight-bearing and resistance exercises keep bones healthy by working the muscles and bones against gravity. Start out at an exercise level that feels right for you. Add a little at a time until you can do the following:  Do 30 minutes of weight-bearing exercise on most days of the week. Walking, jogging, stair climbing, and dancing are good choices. Do resistance exercises with weights or elastic bands 2 to 3 days a week. Limit alcohol. Drink no more than 1 alcohol drink a day if you are a woman. Drink no more than 2 alcohol drinks a day if you are a man. Do not smoke. Smoking can make bones thin faster. If you need help quitting, talk to your doctor about stop-smoking programs and medicines. These can increase your chances of quitting for good. When should you call for help? Watch closely for changes in your health, and be sure to contact your doctor if:  You need help with a healthy eating plan. You need help with an exercise plan    © 3095-4144 [email or web address]. Care instructions adapted under license by [facility]. This care instruction is for use with your licensed healthcare professional. If you have questions about a medical condition or this instruction, always ask your healthcare professional. [address] any warranty or liability for your use of this information. Content Version: 9.4.50394; Last Revised: June 20, 2011                Keep Your Memory [place]       Many factors can affect your ability to remembera hectic lifestyle, aging, stress, chronic disease, and certain medicines. But, there are steps you can take to sharpen your mind and help preserve your memory. Challenge Your Brain   Regularly challenging your mind may help keeps it in top shape. Good mental exercises include:   Crossword puzzlesUse a dictionary if you need it; you will learn more that way. Brainteasers Try some! Crafts, such as wood working and sewing   Hobbies, such as gardening and building model airplanes   SocializingVisit old friends or join groups to meet new ones.    Reading   Learning a new language   Taking a class, whether it be art history or timmy chi   TravelingExperience the food, history, and culture of your destination   Learning to use a computer   Going to museums, the theater, or thought-provoking movies   Changing things in your daily life, such as reversing your pattern in the grocery store or brushing your teeth using your nondominant hand   Use Memory Aids   There is no need to remember every detail on your own. These memory aids can help:   Calendars and day planners   Electronic organizers to store all sorts of helpful informationThese devices can \"beep\" to remind you of appointments. A book of days to record birthdays, anniversaries, and other occasions that occur on the same date every year   Detailed \"to-do\" lists and strategically placed sticky notes   Quick \"study\" sessionsBefore a gathering, review who will be there so their names will be fresh in your mind. Establish routinesFor example, keep your keys, wallet, and umbrella in the same place all the time or take medicine with your 8:00 AM glass of juice   Live a Healthy Life   Many actions that will keep your body strong will do the same for your mind. For example:   Talk to Your Doctor About Herbs and Supplements    Malnutrition and vitamin deficiencies can impair your mental function. For example, vitamin B12 deficiency can cause a range of symptoms, including confusion. But, what if your nutritional needs are being met? Can herbs and supplements still offer a benefit? Researchers have investigated a range of natural remedies, such as ginkgo , ginseng , and the supplement phosphatidylserine (PS). So far, though, the evidence is inconsistent as to whether these products can improve memory or thinking. If you are interested in taking herbs and supplements, talk to your doctor first because they may interact with other medicines that you are taking. Exercise Regularly    Among the many benefits of regular exercise are increased blood flow to the brain and decreased risk of certain diseases that can interfere with memory function. One study found that even moderate exercise has a beneficial effect.  Examples of \"moderate\" exercise include:   Playing 18 holes of golf once a week, without a cart   Playing tennis twice a week   Walking one mile per day   Manage Stress    It can be tough to remember what is important when your mind is cluttered. Make time for relaxation. Choose activities that calm you down, and make it routine. Manage Chronic Conditions    Side effects of high blood pressure , diabetes, and heart disease can interfere with mental function. Many of the lifestyle steps discussed here can help manage these conditions. Strive to eat a healthy diet, exercise regularly, get stress under control, and follow your doctor's advice for your condition. Minimize Medications    Talk to your doctor about the medicines that you take. Some may be unnecessary. Also, healthy lifestyle habits may lower the need for certain drugs. Last Reviewed: April 2010 Joseph Mendiola MD   Updated: 4/13/2010     Keeping Home a [address] [facility]       As we get older, changes in balance, gait, strength, vision, hearing, and cognition make even the most youthful senior more prone to accidents. Falls are one of the leading health risks for older people. This increased risk of falling is related to:   Aging process (eg, decreased muscle strength, slowed reflexes)   Higher incidence of chronic health problems (eg, arthritis, diabetes) that may limit mobility, agility or sensory awareness   Side effects of medicine (eg, dizziness, blurred vision)especially medicines like prescription pain medicines and drugs used to treat mental health conditions   Depending on the brittleness of your bones, the consequences of a fall can be serious and long lasting. Home Life   Research by the Association of A[facility]) shows that some home accidents among older adults can be prevented by making simple lifestyle changes and basic modifications and repairs to the home environment. Here are some lifestyle changes that experts recommend:   Have your hearing and vision checked regularly. Be sure to wear prescription glasses that are right for you. Speak to your doctor or pharmacist about the possible side effects of your medicines.  A number of medicines can cause dizziness. If you have problems with sleep, talk to your doctor. Limit your intake of alcohol. If necessary, use a cane or walker to help maintain your balance. Wear supportive, rubber-soled shoes, even at home. If you live in a region that gets wintry weather, you may want to put special cleats on your shoes to prevent you from slipping on the snow and ice. Exercise regularly to help maintain muscle tone, agility, and balance. Always hold the banister when going up or down stairs. Also, use  bars when getting in or out of the bath or shower, or using the toilet. To avoid dizziness, get up slowly from a lying down position. Sit up first, dangling your legs for a minute or two before rising to a standing position. Overall Home Safety Check   According to the Consumer Product Safety Commision's \"Older Consumer Home Safety Checklist,\" it is important to check for potential hazards in each room. And remember, proper lighting is an essential factor in home safety. If you cannot see clearly, you are more likely to fall. Important questions to ask yourself include:   Are lamp, electric, extension, and telephone cords placed out of the flow of traffic and maintained in good condition? Have frayed cords been replaced? Are all small rugs and runners slip resistant? If not, you can secure them to the floor with a special double-sided carpet tape. Are smoke detectors properly locatedone on every floor of your home and one outside of every sleeping area? Are they in good working order? Are batteries replaced at least once a year? Do you have a well-maintained carbon monoxide detector outside every sleeping are in your home? Does your furniture layout leave plenty of space to maneuver between and around chairs, tables, beds, and sofas? Are hallways, stairs and passages between rooms well lit? Can you reach a lamp without getting out of bed? Are floor surfaces well maintained? Shag rugs, high-pile carpeting, tile floors, and polished wood floors can be particularly slippery. Stairs should always have handrails and be carpeted or fitted with a non-skid tread. Is your telephone easily reachable. Is the cord safely tucked away? Room by Room   According to the Association of Aging, bathrooms and yolanda are the two most potentially hazardous rooms in your home. In the Kitchen    Be sure your stove is in proper working order and always make sure burners and the oven are off before you go out or go to sleep. Keep pots on the back burners, turn handles away from the front of the stove, and keep stove clean and free of grease build-up. Kitchen ventilation systems and range exhausts should be working properly. Keep flammable objects such as towels and pot holders away from the cooking area except when in use. Make sure kitchen curtains are tied back. Move cords and appliances away from the sink and hot surfaces. If extension cords are needed, install wiring guides so they do not hang over the sink, range, or working areas. Look for coffee pots, kettles and toaster ovens with automatic shut-offs. Keep a mop handy in the kitchen so you can wipe up spills instantly. You should also have a small fire extinguisher. Arrange your kitchen with frequently used items on lower shelves to avoid the need to stand on a stepstool to reach them. Make sure countertops are well-lit to avoid injuries while cutting and preparing food. In the Bathroom    Use a non-slip mat or decals in the tub and shower, since wet, soapy tile or porcelain surfaces are extremely slippery. Make sure bathroom rugs are non-skid or tape them firmly to the floor. Bathtubs should have at least one, preferably two, grab bars, firmly attached to structural supports in the wall.  (Do not use built-in soap holders or glass shower doors as grab bars.)    Tub seats fitted with non-slip material on the legs allow you to wash sitting down. For people with limited mobility, bathtub transfer benches allow you to slide safely into the tub. Raised toilet seats and toilet safety rails are helpful for those with knee or hip problems. In the [facility]    Make sure you use a nightlight and that the area around your bed is clear of potential obstacles. Be careful with electric blankets and never go to sleep with a heating pad, which can cause serious burns even if on a low setting. Use fire-resistant mattress covers and pillows, and NEVER smoke in bed. Keep a phone next to the bed that is programmed to dial 911 at the push of a button. If you have a chronic condition, you may want to sign on with an automatic call-in service. Typically the system includes a small pendant that connects directly to an emergency medical voice-response system. You should also make arrangements to stay in contact with someonefriend, neighbor, family memberon a regular schedule. Fire Prevention   According to the [employer] (Smoke Alarms for Every) [address] [place] [address], senior citizens are one of the two highest risk groups for death and serious injuries due to residential fires. When cooking, wear short-sleeved items, never a bulky long-sleeved robe. The [facility]'s Safety Checklist for Older Consumers emphasizes the importance of checking basements, garages, workshops and storage areas for fire hazards, such as volatile liquids, piles of old rags or clothing and overloaded circuits. Never smoke in bed or when lying down on a couch or recliner chair. Small portable electric or kerosene heaters are responsible for many home fires and should be used cautiously if at all. If you do use one, be sure to keep them away from flammable materials. In case of fire, make sure you have a pre-established emergency exit plan. Have a professional check your fireplace and other fuel-burning appliances yearly.     Helping Hands   Baby boomers entering the bruno years will continue to see the development of new products to help older adults live safely and independently in spite of age-related changes. Making Life More Livable  , by Catie Rothman, lists over 1,000 products for \"living well in the mature years,\" such as bathing and mobility aids, household security devices, ergonomically designed knives and peelers, and faucet valves and knobs for temperature control. Medical supply stores and organizations are good sources of information about products that improve your quality of life and insure your safety.      Last Reviewed: November 2009 Kerri Harrison MD   Updated: 3/7/2011

## 2022-08-11 NOTE — PROGRESS NOTES
Medicare Annual Wellness Visit    Beau Fischer is called by phone in his home for Medicare AWV    Assessment & Plan   Medicare annual wellness visit, subsequent    Recommendations for Preventive Services Due: see orders and patient instructions/AVS.  Recommended screening schedule for the next 5-10 years is provided to the patient in written form: see Patient Instructions/AVS.     No follow-ups on file. Subjective   Chioma Lora is doing fair. He does work 2 days a week to stay active. He does have a great family support system. He is very cautious with his ambulation and uses a cane much of the time. Patient's complete Health Risk Assessment and screening values have been reviewed and are found in Flowsheets. The following problems were reviewed today and where indicated follow up appointments were made and/or referrals ordered. Positive Risk Factor Screenings with Interventions:    Fall Risk:  Do you feel unsteady or are you worried about falling? : (!) yes (very cautious when walking. Uses cane)  2 or more falls in past year?: (!) yes (fell and broke his arm)  Fall with injury in past year?: (!) yes (bruised rib)   Fall Risk Interventions:    Home safety tips provided  Home exercises provided to promote strength and balance  Patient declines any further evaluation/treatment for this issue    Cognitive: Words recalled: 2 Words Recalled (Unable to recall the 3rd word. CHAIR)  Clock Drawing Test (CDT):  (Clcok drawing task not done due to the VV per phone.)  Total Score Interpretation: Abnormal Mini-Cog  Did the patient refuse to take the cognition test?: No  Cognitive Impairment Interventions:  Patient declines any further evaluation/treatment for cognitive impairment  Pt is aware and states his memory is not as sharp as it once was.             General Health and ACP:  General  In general, how would you say your health is?: Fair  In the past 7 days, have you experienced any of the following: New or Increased Pain, New or Increased Fatigue, Loneliness, Social Isolation, Stress or Anger?: No  Do you get the social and emotional support that you need?: Yes  Do you have a Living Will?: Yes    Advance Directives       Power of  Living Will ACP-Advance Directive ACP-Power of     Not on File Not on File Not on File Not on File          General Health Risk Interventions:  Pt will bring a copy of the Living Will to the office at the next appointment. Hearing/Vision:  Do you or your family notice any trouble with your hearing that hasn't been managed with hearing aids?: (!) Yes (left ear deafness)  Do you have difficulty driving, watching TV, or doing any of your daily activities because of your eyesight?: No  Have you had an eye exam within the past year?: (!) No  No results found. Hearing/Vision Interventions:  Hearing concerns:  patient declines any further evaluation/treatment for hearing issues  Vision concerns:  patient encouraged to make appointment with his/her eye specialist            Objective      Patient-Reported Vitals  No data recorded     Recent memory is impaired with only 2 words recalled. He states his memory is not as sharp as it once was. Remote memory is not tested due to the VV per phone. Allergies   Allergen Reactions    Codeine Itching     Prior to Visit Medications    Medication Sig Taking? Authorizing Provider   cetirizine (ZYRTEC) 10 MG tablet Take 10 mg by mouth in the morning. Yes Historical Provider, MD   potassium chloride (KLOR-CON M) 20 MEQ extended release tablet Take 1 tablet by mouth 2 times daily TAKE 1 TABLET BY MOUTH TWICE DAILY Yes JOSE ELIAS Bergman   metFORMIN (GLUCOPHAGE) 1000 MG tablet Take 1 tablet by mouth 2 times daily (with meals) Yes B Maurice Workmant, DO   traZODone (DESYREL) 100 MG tablet Take 1.5 tablets by mouth nightly Yes B Maurice Duet, DO   Misc.  Devices (RAISED TOILET SEAT/LOCK & ARMS) MISC Use with existing toilet to facilitate transfers. Yes ROSS Orozco DO   metoprolol succinate (TOPROL XL) 25 MG extended release tablet Take 1.5 tablets by mouth daily 1.5 tablet daily. Yes ROSS Orozco DO   torsemide (DEMADEX) 100 MG tablet Take 2 tablets by mouth every day Yes ROSS Orozco DO   levothyroxine (SYNTHROID) 88 MCG tablet Take 1 tablet by mouth daily Yes ROSS Orozco DO   triamcinolone (KENALOG) 0.1 % cream APPLY TOPICALLY TWICE A DAY Yes ROSS Orozco DO   spironolactone (ALDACTONE) 25 MG tablet Take 1 tablet by mouth daily  Patient taking differently: Take 25 mg by mouth in the morning and 25 mg before bedtime.  Yes ROSS Orozco DO   ramipril (ALTACE) 1.25 MG capsule TAKE 1 CAPSULE DAILY Yes ROSS Orozco DO   rosuvastatin (CRESTOR) 40 MG tablet Take 1 tablet by mouth every evening Yes ROSS Orozco DO   metOLazone (ZAROXOLYN) 2.5 MG tablet Take 1 tablet by mouth daily Yes ROSS Orozco DO   Multiple Vitamins-Minerals (THERAPEUTIC MULTIVITAMIN-MINERALS) tablet Take 1 tablet by mouth daily Yes Historical Provider, MD   magnesium oxide (MAG-OX) 400 MG tablet Take 400 mg by mouth daily Yes Historical Provider, MD   clopidogrel (PLAVIX) 75 MG tablet Take 1 tablet by mouth daily Yes ROSS Orozco DO   nitroGLYCERIN (NITROLINGUAL) 0.4 MG/SPRAY 0.4 mg spray Place 1 spray under the tongue every 5 minutes as needed for Chest pain Yes ROSS Orozco DO   Omega-3 Fatty Acids (FISH OIL) 1000 MG CAPS Take 3,000 mg by mouth daily Yes Historical Provider, MD   aspirin 81 MG EC tablet Take 81 mg by mouth daily  Yes Historical Provider, MD   diphenhydrAMINE (BENADRYL) 25 MG tablet Take 50 mg by mouth nightly as needed for Itching  Patient not taking: Reported on 8/11/2022  Historical Provider, MD [place] (Including outside providers/suppliers regularly involved in providing care):   Patient Care Team:  Gracie Umana DO as PCP - General (Pediatrics)  ROSS Leonor Felty, DO as PCP - [facility]     Reviewed and updated this visit:  Allergies  Meds       Lab work orders are in from Dr. Shwetha Street. Pt reminded to come in fasting for these. Health Maintenance was reviewed with the patient and updated. He does not was the CT Scan of lung due to all of the scans done at [facility]. Destiney Perdomo, was evaluated through a synchronous (real-time) audio-video encounter. The patient (or guardian if applicable) is aware that this is a billable service, which includes applicable co-pays. This Virtual Visit was conducted with patient's (and/or legal guardian's) consent. The visit was conducted pursuant to the emergency declaration under the [address] [place] [address] [place] authority and the [employer] General Act. Patient identification was verified, and a caregiver was present when appropriate. The patient was located at Home: [address] [place] [address]. Provider was located at [facility] ([place] [address]): [address]  [employer],  [address] Celestine Gaines LPN, [birth year], performed the documented evaluation under the direct supervision of the attending physician. This encounter was performed under my, B. Leonor Felty, DOs, direct supervision, 8/11/2022.

## 2022-08-15 RX ORDER — RAMIPRIL 1.25 MG/1
1.25 CAPSULE ORAL DAILY
Qty: 90 CAPSULE | Refills: 3 | Status: SHIPPED | OUTPATIENT
Start: 2022-08-15

## 2022-08-15 NOTE — TELEPHONE ENCOUNTER
Received fax from pharmacy requesting refill on pts medication(s). Pt was last seen in office on 8/11/2022  and has a follow up scheduled for 9/1/2022. Will send request to  Dr. Queenie Singh  for authorization.      Requested Prescriptions     Pending Prescriptions Disp Refills    ramipril (ALTACE) 1.25 MG capsule [Pharmacy Med Name: RAMIPRIL CAPS 1.25MG] 90 capsule 3     Sig: TAKE 1 CAPSULE DAILY

## 2022-08-22 RX ORDER — SPIRONOLACTONE 25 MG/1
25 TABLET ORAL 2 TIMES DAILY
Qty: 90 TABLET | Refills: 3 | Status: SHIPPED | OUTPATIENT
Start: 2022-08-22

## 2022-08-22 NOTE — TELEPHONE ENCOUNTER
Received fax from pharmacy requesting refill on pts medication(s). Pt was last seen in office on 8/11/2022  and has a follow up scheduled for 9/1/2022. Will send request to  Dr. Mai Caal  for authorization.      Requested Prescriptions     Pending Prescriptions Disp Refills    spironolactone (ALDACTONE) 25 MG tablet [Pharmacy Med Name: SPIRONOLACTONE TABS 25MG] 90 tablet 3     Sig: TAKE 1 TABLET DAILY

## 2022-08-30 DIAGNOSIS — E78.5 HYPERLIPIDEMIA WITH TARGET LDL LESS THAN 100: ICD-10-CM

## 2022-08-30 DIAGNOSIS — I50.22 CHRONIC SYSTOLIC CONGESTIVE HEART FAILURE (HCC): ICD-10-CM

## 2022-08-30 DIAGNOSIS — R53.83 FATIGUE, UNSPECIFIED TYPE: ICD-10-CM

## 2022-08-30 DIAGNOSIS — I10 PRIMARY HYPERTENSION: ICD-10-CM

## 2022-08-30 DIAGNOSIS — E11.22 TYPE 2 DIABETES MELLITUS WITH STAGE 3B CHRONIC KIDNEY DISEASE, WITHOUT LONG-TERM CURRENT USE OF INSULIN (HCC): ICD-10-CM

## 2022-08-30 DIAGNOSIS — N18.32 TYPE 2 DIABETES MELLITUS WITH STAGE 3B CHRONIC KIDNEY DISEASE, WITHOUT LONG-TERM CURRENT USE OF INSULIN (HCC): ICD-10-CM

## 2022-08-30 LAB
ALBUMIN SERPL-MCNC: 4.3 G/DL (ref 3.5–5.2)
ALP BLD-CCNC: 95 U/L (ref 40–130)
ALT SERPL-CCNC: 7 U/L (ref 5–41)
ANION GAP SERPL CALCULATED.3IONS-SCNC: 18 MMOL/L (ref 7–19)
AST SERPL-CCNC: 13 U/L (ref 5–40)
BASOPHILS ABSOLUTE: 0.1 K/UL (ref 0–0.2)
BASOPHILS RELATIVE PERCENT: 1.5 % (ref 0–1)
BILIRUB SERPL-MCNC: 1.1 MG/DL (ref 0.2–1.2)
BUN BLDV-MCNC: 54 MG/DL (ref 8–23)
CALCIUM SERPL-MCNC: 9.5 MG/DL (ref 8.8–10.2)
CHLORIDE BLD-SCNC: 90 MMOL/L (ref 98–111)
CHOLESTEROL, TOTAL: 135 MG/DL (ref 160–199)
CO2: 22 MMOL/L (ref 22–29)
CREAT SERPL-MCNC: 2.1 MG/DL (ref 0.5–1.2)
CREATININE URINE: 30.7 MG/DL (ref 4.2–622)
EOSINOPHILS ABSOLUTE: 0.6 K/UL (ref 0–0.6)
EOSINOPHILS RELATIVE PERCENT: 8.1 % (ref 0–5)
GFR AFRICAN AMERICAN: 38
GFR NON-AFRICAN AMERICAN: 31
GLUCOSE BLD-MCNC: 133 MG/DL (ref 74–109)
HBA1C MFR BLD: 6.2 % (ref 4–6)
HCT VFR BLD CALC: 42.9 % (ref 42–52)
HDLC SERPL-MCNC: 30 MG/DL (ref 55–121)
HEMOGLOBIN: 13.8 G/DL (ref 14–18)
IMMATURE GRANULOCYTES #: 0 K/UL
LDL CHOLESTEROL CALCULATED: 90 MG/DL
LYMPHOCYTES ABSOLUTE: 1.2 K/UL (ref 1.1–4.5)
LYMPHOCYTES RELATIVE PERCENT: 15.6 % (ref 20–40)
MCH RBC QN AUTO: 32.5 PG (ref 27–31)
MCHC RBC AUTO-ENTMCNC: 32.2 G/DL (ref 33–37)
MCV RBC AUTO: 100.9 FL (ref 80–94)
MICROALBUMIN UR-MCNC: <1.2 MG/DL (ref 0–19)
MICROALBUMIN/CREAT UR-RTO: NORMAL MG/G
MONOCYTES ABSOLUTE: 1 K/UL (ref 0–0.9)
MONOCYTES RELATIVE PERCENT: 13.4 % (ref 0–10)
NEUTROPHILS ABSOLUTE: 4.6 K/UL (ref 1.5–7.5)
NEUTROPHILS RELATIVE PERCENT: 61 % (ref 50–65)
PDW BLD-RTO: 17.4 % (ref 11.5–14.5)
PLATELET # BLD: 120 K/UL (ref 130–400)
PMV BLD AUTO: 11.1 FL (ref 9.4–12.4)
POTASSIUM SERPL-SCNC: 4.4 MMOL/L (ref 3.5–5)
PRO-BNP: 3471 PG/ML (ref 0–900)
RBC # BLD: 4.25 M/UL (ref 4.7–6.1)
SODIUM BLD-SCNC: 130 MMOL/L (ref 136–145)
T4 FREE: 1.41 NG/DL (ref 0.93–1.7)
TOTAL PROTEIN: 7.6 G/DL (ref 6.6–8.7)
TRIGL SERPL-MCNC: 77 MG/DL (ref 0–149)
TSH SERPL DL<=0.05 MIU/L-ACNC: 6.43 UIU/ML (ref 0.27–4.2)
WBC # BLD: 7.5 K/UL (ref 4.8–10.8)

## 2022-08-31 DIAGNOSIS — E03.9 ACQUIRED HYPOTHYROIDISM: Primary | ICD-10-CM

## 2022-08-31 RX ORDER — LEVOTHYROXINE SODIUM 0.1 MG/1
100 TABLET ORAL DAILY
Qty: 30 TABLET | Refills: 5 | Status: SHIPPED | OUTPATIENT
Start: 2022-08-31 | End: 2022-10-03 | Stop reason: SDUPTHER

## 2022-08-31 NOTE — TELEPHONE ENCOUNTER
----- Message from [address] [facility],[address], DO sent at 8/30/2022  7:28 PM CDT -----  Lipids are controlled. Metabolic profile shows you to be slightly dehydrated. If possible, try to back off slightly on diuretics but increase water consumption. This is a delicate balance but we do need more fluid on board. Hemoglobin A1c is 6.2 which indicates excellent blood sugar control over the past 3 months. Thyroid continues to be on the low side. Recommend increasing Synthroid to 100 mcg daily. Recheck TSH and free T4 in 4 to 6 weeks. There is no significant protein excretion in urine. BNP is slightly higher than it was 2 years ago, but significantly lower than what it was 3 years ago. Your WBC, (infection fighting ability) Hgb and Hct, (oxygen carrying cells) are normal; as is your percentage of each cell type.   Eosinophils are mildly elevated which is suggestive of allergies

## 2022-09-01 ENCOUNTER — OFFICE VISIT (OUTPATIENT)
Dept: PRIMARY CARE CLINIC | Age: 72
End: 2022-09-01
Payer: MEDICARE

## 2022-09-01 VITALS
BODY MASS INDEX: 29.82 KG/M2 | HEIGHT: 71 IN | SYSTOLIC BLOOD PRESSURE: 102 MMHG | WEIGHT: 213 LBS | DIASTOLIC BLOOD PRESSURE: 68 MMHG | HEART RATE: 80 BPM | TEMPERATURE: 97.7 F | OXYGEN SATURATION: 97 %

## 2022-09-01 DIAGNOSIS — I10 PRIMARY HYPERTENSION: ICD-10-CM

## 2022-09-01 DIAGNOSIS — N18.32 TYPE 2 DIABETES MELLITUS WITH STAGE 3B CHRONIC KIDNEY DISEASE, WITH LONG-TERM CURRENT USE OF INSULIN (HCC): ICD-10-CM

## 2022-09-01 DIAGNOSIS — I25.10 CORONARY ARTERY DISEASE INVOLVING NATIVE CORONARY ARTERY OF NATIVE HEART WITHOUT ANGINA PECTORIS: ICD-10-CM

## 2022-09-01 DIAGNOSIS — D69.6 THROMBOCYTOPENIA, UNSPECIFIED (HCC): ICD-10-CM

## 2022-09-01 DIAGNOSIS — Z79.4 TYPE 2 DIABETES MELLITUS WITH STAGE 3B CHRONIC KIDNEY DISEASE, WITH LONG-TERM CURRENT USE OF INSULIN (HCC): ICD-10-CM

## 2022-09-01 DIAGNOSIS — E11.22 TYPE 2 DIABETES MELLITUS WITH STAGE 3B CHRONIC KIDNEY DISEASE, WITH LONG-TERM CURRENT USE OF INSULIN (HCC): ICD-10-CM

## 2022-09-01 DIAGNOSIS — E78.5 HYPERLIPIDEMIA WITH TARGET LDL LESS THAN 100: ICD-10-CM

## 2022-09-01 DIAGNOSIS — I50.22 CHRONIC SYSTOLIC CONGESTIVE HEART FAILURE (HCC): ICD-10-CM

## 2022-09-01 DIAGNOSIS — E03.9 ACQUIRED HYPOTHYROIDISM: Primary | ICD-10-CM

## 2022-09-01 PROCEDURE — G8417 CALC BMI ABV UP PARAM F/U: HCPCS | Performed by: PEDIATRICS

## 2022-09-01 PROCEDURE — 3044F HG A1C LEVEL LT 7.0%: CPT | Performed by: PEDIATRICS

## 2022-09-01 PROCEDURE — 1036F TOBACCO NON-USER: CPT | Performed by: PEDIATRICS

## 2022-09-01 PROCEDURE — 1123F ACP DISCUSS/DSCN MKR DOCD: CPT | Performed by: PEDIATRICS

## 2022-09-01 PROCEDURE — 2022F DILAT RTA XM EVC RTNOPTHY: CPT | Performed by: PEDIATRICS

## 2022-09-01 PROCEDURE — 99214 OFFICE O/P EST MOD 30 MIN: CPT | Performed by: PEDIATRICS

## 2022-09-01 PROCEDURE — 3017F COLORECTAL CA SCREEN DOC REV: CPT | Performed by: PEDIATRICS

## 2022-09-01 PROCEDURE — G8427 DOCREV CUR MEDS BY ELIG CLIN: HCPCS | Performed by: PEDIATRICS

## 2022-09-01 ASSESSMENT — ENCOUNTER SYMPTOMS
CHEST TIGHTNESS: 0
NAUSEA: 0
COUGH: 0
DIARRHEA: 0
VOMITING: 0
SHORTNESS OF BREATH: 1
ABDOMINAL PAIN: 0
SORE THROAT: 0
BACK PAIN: 0
WHEEZING: 0

## 2022-09-01 NOTE — PROGRESS NOTES
[address] [facility], [address]  Phone [phone]   Fax [phone]      OFFICE VISIT: 2022    [facility]-: 1950      [facility] [place]  Reason For Visit:  Lenny Gama is a 67 y.o. Diabetes (Blood sugars running 120-130's), Discuss Labs, and Health Maintenance (Diabetic Eye Exam )      Patient presents for routine follow-up and diabetes management. He also would like to discuss his labs. He had blood work performed on 2022. His labs are displayed below. Diabetes Mellitus Type 2  Diet compliance:  compliant most of the time  Nutrition Consultation Needed:  no  Medication:              Metformin 1000 mg twice daily  Medication compliance: There was some question as to whether he should be on it during his hospitalization. The hospitalist recommended that he stop it (jardiance)  Weight trend: Weight is up 18 pounds in the past 6 months  Current exercise: He is trying to exercise as much as he can  Checking: Periodically  Home blood sugar records: fasting range: Controlled when he checks. Low BG:  no  Eye exam current (within one year): yes  Checking Feet regularly:  yes -no sores  ACE/ARB:  Ramipril 1.25mg daily  Tobacco history: He  reports that he quit smoking about 13 years ago. His smoking use included cigars and cigarettes. He has a 30.00 pack-year smoking history.  He has never used smokeless tobacco.    Lab Results   Component Value Date    LABA1C 6.2 (H) 2022    LABA1C 6.5 (H) 2021    LABA1C 5.4 2020     Lab Results   Component Value Date    LABMICR <1.20 2022    CREATININE 2.1 (H) 2022       Hypertension:   BP today was   BP Readings from Last 1 Encounters:   22 102/68      Recent BP readings:    BP Readings from Last 3 Encounters:   22 102/68   22 110/68   22 114/68     Medication              Toprol XL 25 mg 1/2 tablets (37.5 mg daily)              Ramipril 1.25 mg daily Spironolactone 25 mg daily       Medication compliance:  compliant most of the time  Home blood pressure monitoring: Yes - controlled very well. He is adherent to a low sodium diet. Symptoms: none  Laboratory:  Lab Results   Component Value Date    BUN 54 (H) 08/30/2022    CREATININE 2.1 (H) 08/30/2022       Hyperlipidemia:   Medication:   rosuvastatin (Crestor) and OTC Fish Oil  Low Fat, Low Choleterol Diet:  yes - he tries  Myalgias or GI upset: no  The patient exercises intermittently. Laboratory:    Lab Results   Component Value Date    CHOL 135 (L) 08/30/2022    TRIG 77 08/30/2022    HDL 30 (L) 08/30/2022    LDLCALC 90 08/30/2022    LDLDIRECT 111 02/14/2013      Lab Results   Component Value Date    ALT 7 08/30/2022    AST 13 08/30/2022       Congestive heart failure:  Type: Ischemic cardiomyopathy, systolic, (HFr EF)  Primary rhythm: Sinus rhythm  Medication:              Beta-blockade:            Toprol-XL 37.5 mg twice daily              R AAS therapy:            Ramipril 1.25 mg daily              Diuretics:                     Zaroxolyn 2.5 mg daily                                                  Demadex 100 mg every other day daily              Aldosterone inhibition: Aldactone 25 mg daily              Additional meds:         None  Symptoms: Doing well and presently compensated  Cardiology follow-up: He follows with cardiology down in [address] [place] [address] pro BNP: 3471 (8/30/2022)  Most recent echo: Done down in [place].   Report is unavailable        Coronary Artery Disease:  Medications:              Beta-blockade:           Toprol-XL 37.5 mg twice daily              RAAS Therapy:           Ramipril 1.25 mg daily              Lipid Management:     Crestor 40 mg nightly              Platelet Inhibition:        Plavix 75 mg daily and aspirin 325 mg daily              Anti-anginal:                Nitroglycerin 0.4 mg as needed  Symptoms: He denies any chest pain  Nitroglycerin use: He has never used nitro in the recent past  Cardiology follow-up: [place], [place]  Additional studies: As above        CKD 3:  He is not on any nephrotoxic drugs at this time. He is on RAAS therapy  No recent microalbumin measurement   most recent BUN and creatinine were  54 and 2.1 respectively with a GFR of 31   CO2 = 22  K = 4.4  NA = 130  Present diuretics including Demadex 200 mg once daily  Zaroxolyn 2.5 mg daily only on prn.         height is 5' 11\" (1.803 m) and weight is 213 lb (96.6 kg). His temporal temperature is 97.7 °F (36.5 °C). His blood pressure is 102/68 and his pulse is 80. His oxygen saturation is 97%. Body mass index is 29.71 kg/m². I have reviewed the following with the Mr. Didier Vera   __________________________________________________________________________  Lipids are controlled. Metabolic profile shows you to be slightly dehydrated. If possible, try to back off slightly on diuretics but increase water consumption. This is a delicate balance but we do need more fluid on board. Hemoglobin A1c is 6.2 which indicates excellent blood sugar control over the past 3 months. Thyroid continues to be on the low side. Recommend increasing Synthroid to 100 mcg daily. Recheck TSH and free T4 in 4 to 6 weeks. There is no significant protein excretion in urine. BNP is slightly higher than it was 2 years ago, but significantly lower than what it was 3 years ago. Your WBC, (infection fighting ability) Hgb and Hct, (oxygen carrying cells) are normal; as is your percentage of each cell type.   Eosinophils are mildly elevated which is suggestive of allergies  ________________________________________________________________________      Lab Review  Orders Only on 08/30/2022   Component Date Value    TSH 08/30/2022 6.430 (A)    T4 Free 08/30/2022 1.41     Microalbumin, Random Uri* 08/30/2022 <1.20     Creatinine, Ur 08/30/2022 30.7     Microalbumin Creatinine * 08/30/2022 see below     Cholesterol, Total 08/30/2022 135 (A)    Triglycerides 08/30/2022 77     HDL 08/30/2022 30 (A)    LDL Calculated 08/30/2022 90     Hemoglobin A1C 08/30/2022 6.2 (A)    Pro-BNP 08/30/2022 3,471 (A)    Sodium 08/30/2022 130 (A)    Potassium 08/30/2022 4.4     Chloride 08/30/2022 90 (A)    CO2 08/30/2022 22     Anion Gap 08/30/2022 18     Glucose 08/30/2022 133 (A)    BUN 08/30/2022 54 (A)    Creatinine 08/30/2022 2.1 (A)    GFR Non- 08/30/2022 31 (A)    GFR  08/30/2022 38 (A)    Calcium 08/30/2022 9.5     Total Protein 08/30/2022 7.6     Albumin 08/30/2022 4.3     Total Bilirubin 08/30/2022 1.1     Alkaline Phosphatase 08/30/2022 95     ALT 08/30/2022 7     AST 08/30/2022 13     WBC 08/30/2022 7.5     RBC 08/30/2022 4.25 (A)    Hemoglobin 08/30/2022 13.8 (A)    Hematocrit 08/30/2022 42.9     MCV 08/30/2022 100.9 (A)    MCH 08/30/2022 32.5 (A)    MCHC 08/30/2022 32.2 (A)    RDW 08/30/2022 17.4 (A)    Platelets 53/93/7535 120 (A)    MPV 08/30/2022 11.1     Neutrophils % 08/30/2022 61.0     Lymphocytes % 08/30/2022 15.6 (A)    Monocytes % 08/30/2022 13.4 (A)    Eosinophils % 08/30/2022 8.1 (A)    Basophils % 08/30/2022 1.5 (A)    Neutrophils Absolute 08/30/2022 4.6     Immature Granulocytes # 08/30/2022 0.0     Lymphocytes Absolute 08/30/2022 1.2     Monocytes Absolute 08/30/2022 1.00 (A)    Eosinophils Absolute 08/30/2022 0.60     Basophils Absolute 08/30/2022 0.10      Copies of these are in the chart. Current Outpatient Medications   Medication Sig Dispense Refill    levothyroxine (SYNTHROID) 100 MCG tablet Take 1 tablet by mouth daily 30 tablet 5    spironolactone (ALDACTONE) 25 MG tablet Take 1 tablet by mouth 2 times daily (Patient taking differently: Take 50 mg by mouth daily) 90 tablet 3    ramipril (ALTACE) 1.25 MG capsule Take 1 capsule by mouth in the morning. 90 capsule 3    cetirizine (ZYRTEC) 10 MG tablet Take 10 mg by mouth in the morning.       potassium chloride (KLOR-CON M) 20 MEQ extended release tablet Take 1 tablet by mouth 2 times daily TAKE 1 TABLET BY MOUTH TWICE DAILY 180 tablet 3    metFORMIN (GLUCOPHAGE) 1000 MG tablet Take 1 tablet by mouth 2 times daily (with meals) 180 tablet 3    traZODone (DESYREL) 100 MG tablet Take 1.5 tablets by mouth nightly 135 tablet 3    metoprolol succinate (TOPROL XL) 25 MG extended release tablet Take 1.5 tablets by mouth daily 1.5 tablet daily. 45 tablet 11    torsemide (DEMADEX) 100 MG tablet Take 2 tablets by mouth every day 180 tablet 3    triamcinolone (KENALOG) 0.1 % cream APPLY TOPICALLY TWICE A DAY 80 g 5    rosuvastatin (CRESTOR) 40 MG tablet Take 1 tablet by mouth every evening 90 tablet 3    metOLazone (ZAROXOLYN) 2.5 MG tablet Take 1 tablet by mouth daily (Patient taking differently: Take 2.5 mg by mouth every other day) 90 tablet 3    Multiple Vitamins-Minerals (THERAPEUTIC MULTIVITAMIN-MINERALS) tablet Take 1 tablet by mouth daily      magnesium oxide (MAG-OX) 400 MG tablet Take 400 mg by mouth daily      clopidogrel (PLAVIX) 75 MG tablet Take 1 tablet by mouth daily 90 tablet 3    nitroGLYCERIN (NITROLINGUAL) 0.4 MG/SPRAY 0.4 mg spray Place 1 spray under the tongue every 5 minutes as needed for Chest pain 1 Bottle 0    Omega-3 Fatty Acids (FISH OIL) 1000 MG CAPS Take 3,000 mg by mouth daily      aspirin 81 MG EC tablet Take 81 mg by mouth daily       Misc. Devices (RAISED TOILET SEAT/LOCK & ARMS) MISC Use with existing toilet to facilitate transfers. 1 each 0    diphenhydrAMINE (BENADRYL) 25 MG tablet Take 50 mg by mouth nightly as needed for Itching (Patient not taking: No sig reported)       No current facility-administered medications for this visit.        Allergies: Codeine     Past Medical History:   Diagnosis Date    Arthritis     Basilar artery stenosis     CAD (coronary artery disease) 1997 2009    MI    Cancer ([facility] [address]     SKIN 6YRS AGO    Carotid artery stenosis     bilateral    Cerebral artery occlusion with cerebral infarction [facility])     TIA due to hypotension    CHF (congestive heart failure) (HCC)     Dieulafoy lesion of stomach     Heel spur     Hyperlipidemia     Hypertension     Palliative care patient 10/17/2018    Sleep apnea     no c-pap    Squamous cell cancer of skin of earlobe, left     SCC    Type II or unspecified type diabetes mellitus without mention of complication, not stated as uncontrolled     Vertebral artery insufficiency        Family History   Problem Relation Age of Onset    Heart Disease Father     High Blood Pressure Father     Other Father         Esophageal Varices       Past Surgical History:   Procedure Laterality Date    ANGIOPLASTY Bilateral     basilar artery angioplasty    CARDIAC CATHETERIZATION  2018    [place]    CARDIAC DEFIBRILLATOR PLACEMENT      [facility]    CARPAL TUNNEL RELEASE      bilateral    CATARACT REMOVAL Bilateral     CHOLECYSTECTOMY      COLONOSCOPY N/A 2021    Dr Melani Kerns-Diverticular disease-AP x 3, 3 yr recall    CORONARY ARTERY BYPASS GRAFT      3v    HEEL SPUR SURGERY      left    OR EGD TRANSORAL BIOPSY SINGLE/MULTIPLE N/A 10/16/2018    Dr Melani Kerns-w/control of bleeding-Active bleeding in the stomach from Dieulafoy lesion    PTCA  2009 [place]    with stents    TONSILLECTOMY         Social History     Tobacco Use    Smoking status: Former     Packs/day: 1.00     Years: 30.00     Pack years: 30.00     Types: Cigars, Cigarettes     Quit date: 3/3/2009     Years since quittin.5    Smokeless tobacco: Never   Substance Use Topics    Alcohol use: No     Alcohol/week: 0.0 standard drinks        Review of Systems   Constitutional:  Positive for fatigue. Negative for chills and fever (he is limited in his exercise capacity). HENT:  Negative for congestion, ear pain and sore throat. Eyes:  Negative for visual disturbance. Respiratory:  Positive for shortness of breath (occasional orthopnea ). Negative for cough, chest tightness and wheezing.     Cardiovascular: Positive for leg swelling (Very rarely). Negative for chest pain and palpitations. Gastrointestinal:  Negative for abdominal pain, diarrhea, nausea and vomiting. Endocrine: Negative for polyuria. Genitourinary:  Negative for frequency and urgency. Musculoskeletal:  Positive for arthralgias (right shoulder since his fall). Negative for back pain and neck pain. Skin:  Negative for rash. Recent skin cancer removed from his right hand. Neurological:  Positive for weakness (generalized). Negative for dizziness and headaches. Psychiatric/Behavioral:  Negative for self-injury. The patient is not nervous/anxious. Physical Exam  Constitutional:       General: He is not in acute distress. Appearance: He is well-developed and normal weight. HENT:      Head: Normocephalic and atraumatic. Right Ear: Hearing, tympanic membrane, ear canal and external ear normal. No middle ear effusion. Left Ear: Hearing, tympanic membrane, ear canal and external ear normal.      Nose: Nose normal. No mucosal edema (mild) or rhinorrhea (thin, clear). Mouth/Throat:      Mouth: Mucous membranes are moist.      Pharynx: Oropharynx is clear. No posterior oropharyngeal erythema. Eyes:      General: No scleral icterus. Extraocular Movements: Extraocular movements intact. Conjunctiva/sclera: Conjunctivae normal.      Pupils: Pupils are equal, round, and reactive to light. Neck:      Thyroid: No thyromegaly. Vascular: No carotid bruit or JVD. Cardiovascular:      Rate and Rhythm: Normal rate and regular rhythm. No extrasystoles are present. Chest Wall: PMI is not displaced. Pulses:           Dorsalis pedis pulses are 0 on the right side and 0 on the left side. Heart sounds: S1 normal and S2 normal. Murmur heard. No friction rub. No gallop. No S3 sounds. Comments: Feet are slightly cyanotic. Pulmonary:      Effort: Pulmonary effort is normal. No respiratory distress. Breath sounds: No decreased breath sounds, wheezing, rhonchi or rales. Abdominal:      General: Bowel sounds are normal.      Palpations: Abdomen is soft. Tenderness: There is no abdominal tenderness. There is no guarding or rebound. Genitourinary:     Comments: Exam deferred  Musculoskeletal:         General: No swelling or tenderness. Normal range of motion. Cervical back: Normal range of motion and neck supple. Right lower leg: No edema. Left lower leg: No edema. Lymphadenopathy:      Cervical: No cervical adenopathy. Skin:     General: Skin is warm and dry. Capillary Refill: Capillary refill takes less than 2 seconds. Findings: No rash. Comments: Bandage on dorsum of right hand   Neurological:      General: No focal deficit present. Mental Status: He is alert and oriented to person, place, and time. Sensory: No sensory deficit (no numbness or tingling). Psychiatric:         Mood and Affect: Mood normal.         Behavior: Behavior normal.         ASSESSMENT      ICD-10-CM    1. Acquired hypothyroidism  E03.9 T4, Free     TSH      2. Chronic systolic congestive heart failure (HCC)  I50.22 Brain Natriuretic Peptide      3. Primary hypertension  I10 CBC with Auto Differential     Comprehensive Metabolic Panel     Microalbumin / Creatinine Urine Ratio      4. Hyperlipidemia with target LDL less than 100  E78.5 Lipid Panel      5. Type 2 diabetes mellitus with stage 3b chronic kidney disease, with long-term current use of insulin (HCC)  E11.22 CBC with Auto Differential    N18.32 Comprehensive Metabolic Panel    E52.7 Microalbumin / Creatinine Urine Ratio      6. Coronary artery disease involving native coronary artery of native heart without angina pectoris  I25.10 Brain Natriuretic Peptide     CBC with Auto Differential     Comprehensive Metabolic Panel     Microalbumin / Creatinine Urine Ratio     Lipid Panel      7.  Thrombocytopenia, unspecified  D69.6 PLAN    1. Acquired hypothyroidism  We are going to increase his thyroid medication slightly by bumping up Synthroid to 100 mcg instead of 88 mcg. This will hopefully give him a little bit more energy. May also facilitate better metabolism and slight improvement in congestive heart failure. This may allow us to slightly liberalize his water consumption but maintaining minimal sodium intake  - T4, Free; Future  - TSH; Future    2. Chronic systolic congestive heart failure (HCC)  Clinically, he is stable from a heart failure standpoint. He is markedly short of breath with any type of exertion. He is in a delicate fluid balance right now with a BNP showing fluid overload status but prerenal azotemia on his metabolic profile. We are going to try to liberalize his fluids slightly with increased water consumption. He is to be very judicious about his sodium intake in order to prevent fluid overload but at the same time enhancing his plasma volume slightly to prevent renal loss. - Brain Natriuretic Peptide; Future    3. Primary hypertension  Blood pressure is appropriately controlled on present medication regimen. Continue same  - CBC with Auto Differential; Future  - Comprehensive Metabolic Panel; Future  - Microalbumin / Creatinine Urine Ratio; Future    4. Hyperlipidemia with target LDL less than 100  I would like his LDL to be less than 100. He is presently at 80. PCSK9 medications are cost prohibitive at present. We will continue with present medication management and try to moderate diet as tolerated  - Lipid Panel; Future    5. Type 2 diabetes mellitus with stage 3b chronic kidney disease, with long-term current use of insulin ([facility])  Blood sugars are excellently controlled on present medication regimen. We did see a bump in his creatinine and a decrease in GFR this last laboratory survey. We are going to try to combat that by increasing his fluids slightly.   This is an easier mechanism than decreasing diuretics. We will try to maintain that delicate fluid balance as best we can  - CBC with Auto Differential; Future  - Comprehensive Metabolic Panel; Future  - Microalbumin / Creatinine Urine Ratio; Future    6. Coronary artery disease involving native coronary artery of native heart without angina pectoris  Controlling risk factors as well as we can. As noted above I would like to have his lipids slightly better controlled, but that is not feasible at this time. He will try to manage this with diet and exercise as tolerated  - Brain Natriuretic Peptide; Future  - CBC with Auto Differential; Future  - Comprehensive Metabolic Panel; Future  - Microalbumin / Creatinine Urine Ratio; Future  - Lipid Panel; Future    7. Thrombocytopenia, unspecified  We will follow this over time. Is also on Plavix and aspirin as platelet inhibitors. We do need to watch bleeding. He does have a significant amount of bruises on his extremities, but no other evidence of pathologic bleeding. We will continue with present medication regimen. Continue to follow platelets and bleeding function (subjectively)    Orders Placed This Encounter   Procedures    Brain Natriuretic Peptide    CBC with Auto Differential    Comprehensive Metabolic Panel    Microalbumin / Creatinine Urine Ratio    Lipid Panel    T4, Free    TSH          Return in about 3 months (around 12/1/2022) for 30. This was an in house visit.

## 2022-10-03 DIAGNOSIS — E03.9 ACQUIRED HYPOTHYROIDISM: ICD-10-CM

## 2022-10-03 RX ORDER — LEVOTHYROXINE SODIUM 0.1 MG/1
100 TABLET ORAL DAILY
Qty: 90 TABLET | Refills: 3 | Status: SHIPPED | OUTPATIENT
Start: 2022-10-03

## 2022-10-03 NOTE — TELEPHONE ENCOUNTER
Received fax from pharmacy requesting refill on pts medication(s). Pt was last seen in office on 9/1/2022  and has a follow up scheduled for 12/1/2022. Will send request to  Pharmacy  for patient.      Requested Prescriptions     Signed Prescriptions Disp Refills    levothyroxine (SYNTHROID) 100 MCG tablet 90 tablet 3     Sig: Take 1 tablet by mouth daily     Authorizing Provider: Huber Palomo     Ordering User: Alexandre Butts

## 2022-11-02 DIAGNOSIS — E78.2 MIXED HYPERLIPIDEMIA: ICD-10-CM

## 2022-11-02 RX ORDER — ROSUVASTATIN CALCIUM 40 MG/1
40 TABLET, COATED ORAL EVERY EVENING
Qty: 90 TABLET | Refills: 3 | Status: SHIPPED | OUTPATIENT
Start: 2022-11-02

## 2022-11-02 NOTE — TELEPHONE ENCOUNTER
Received fax from pharmacy requesting refill on pts medication(s). Pt was last seen in office on 9/1/2022  and has a follow up scheduled for 12/1/2022. Will send request to  Dr. Andra Camp  for authorization.      Requested Prescriptions     Pending Prescriptions Disp Refills    rosuvastatin (CRESTOR) 40 MG tablet [Pharmacy Med Name: ROSUVASTATIN TABS 40MG] 90 tablet 3     Sig: Take 1 tablet by mouth every evening

## 2022-11-08 DIAGNOSIS — I10 PRIMARY HYPERTENSION: ICD-10-CM

## 2022-11-08 DIAGNOSIS — I50.22 CHRONIC SYSTOLIC CONGESTIVE HEART FAILURE (HCC): ICD-10-CM

## 2022-11-08 RX ORDER — TORSEMIDE 100 MG/1
TABLET ORAL
Qty: 180 TABLET | Refills: 3 | Status: SHIPPED | OUTPATIENT
Start: 2022-11-08

## 2022-11-08 NOTE — TELEPHONE ENCOUNTER
Received fax from pharmacy requesting refill on pts medication(s). Pt was last seen in office on 9/1/2022  and has a follow up scheduled for 12/1/2022. Will send request to  Dr. Vishnu Arenas  for patient.      Requested Prescriptions     Pending Prescriptions Disp Refills    torsemide (DEMADEX) 100 MG tablet [Pharmacy Med Name: TORSEMIDE TABS 100MG] 180 tablet 3     Sig: TAKE 2 TABLETS DAILY

## 2022-11-28 DIAGNOSIS — I10 PRIMARY HYPERTENSION: ICD-10-CM

## 2022-11-28 DIAGNOSIS — I50.22 CHRONIC SYSTOLIC CONGESTIVE HEART FAILURE (HCC): ICD-10-CM

## 2022-11-28 DIAGNOSIS — I25.10 CORONARY ARTERY DISEASE INVOLVING NATIVE CORONARY ARTERY OF NATIVE HEART WITHOUT ANGINA PECTORIS: ICD-10-CM

## 2022-11-28 RX ORDER — METOPROLOL SUCCINATE 25 MG/1
25 TABLET, EXTENDED RELEASE ORAL DAILY
Qty: 45 TABLET | Refills: 11 | Status: SHIPPED | OUTPATIENT
Start: 2022-11-28

## 2022-11-28 NOTE — TELEPHONE ENCOUNTER
Received fax from pharmacy requesting refill on pts medication(s). Pt was last seen in office on 9/1/2022  and has a follow up scheduled for 12/1/2022. Will send request to  Dr. Arianna Batista  for authorization.      Requested Prescriptions     Pending Prescriptions Disp Refills    metoprolol succinate (TOPROL XL) 25 MG extended release tablet [Pharmacy Med Name: METOPROLOL SUCCINATE ER TABS 25MG] 45 tablet 11     Sig: TAKE ONE AND ONE-HALF TABLETS DAILY

## 2022-11-29 DIAGNOSIS — E03.9 ACQUIRED HYPOTHYROIDISM: ICD-10-CM

## 2022-11-29 RX ORDER — LEVOTHYROXINE SODIUM 0.07 MG/1
75 TABLET ORAL DAILY
Qty: 90 TABLET | Refills: 3 | OUTPATIENT
Start: 2022-11-29

## 2022-11-29 NOTE — TELEPHONE ENCOUNTER
Received fax from pharmacy requesting refill on pts medication(s). Pt was last seen in office on 9/1/2022  and has a follow up scheduled for 12/1/2022. Will send request to  Amanda Pitts  for authorization.      Requested Prescriptions     Pending Prescriptions Disp Refills    levothyroxine (SYNTHROID) 75 MCG tablet [Pharmacy Med Name: L-THYROXINE TABS 75MCG] 90 tablet 3     Sig: TAKE 1 TABLET DAILY

## 2022-12-01 ENCOUNTER — OFFICE VISIT (OUTPATIENT)
Dept: PRIMARY CARE CLINIC | Age: 72
End: 2022-12-01
Payer: MEDICARE

## 2022-12-01 VITALS
HEIGHT: 71 IN | DIASTOLIC BLOOD PRESSURE: 74 MMHG | OXYGEN SATURATION: 97 % | BODY MASS INDEX: 29.68 KG/M2 | TEMPERATURE: 97.1 F | HEART RATE: 74 BPM | WEIGHT: 212 LBS | SYSTOLIC BLOOD PRESSURE: 106 MMHG

## 2022-12-01 DIAGNOSIS — I10 PRIMARY HYPERTENSION: ICD-10-CM

## 2022-12-01 DIAGNOSIS — N18.32 TYPE 2 DIABETES MELLITUS WITH STAGE 3B CHRONIC KIDNEY DISEASE, WITH LONG-TERM CURRENT USE OF INSULIN (HCC): Primary | ICD-10-CM

## 2022-12-01 DIAGNOSIS — Z79.4 TYPE 2 DIABETES MELLITUS WITH STAGE 3B CHRONIC KIDNEY DISEASE, WITH LONG-TERM CURRENT USE OF INSULIN (HCC): ICD-10-CM

## 2022-12-01 DIAGNOSIS — N18.32 STAGE 3B CHRONIC KIDNEY DISEASE (HCC): ICD-10-CM

## 2022-12-01 DIAGNOSIS — I50.22 CHRONIC SYSTOLIC CONGESTIVE HEART FAILURE (HCC): ICD-10-CM

## 2022-12-01 DIAGNOSIS — E11.22 TYPE 2 DIABETES MELLITUS WITH STAGE 3B CHRONIC KIDNEY DISEASE, WITH LONG-TERM CURRENT USE OF INSULIN (HCC): Primary | ICD-10-CM

## 2022-12-01 DIAGNOSIS — Z79.4 TYPE 2 DIABETES MELLITUS WITH STAGE 3B CHRONIC KIDNEY DISEASE, WITH LONG-TERM CURRENT USE OF INSULIN (HCC): Primary | ICD-10-CM

## 2022-12-01 DIAGNOSIS — E78.2 MIXED HYPERLIPIDEMIA: ICD-10-CM

## 2022-12-01 DIAGNOSIS — N18.32 TYPE 2 DIABETES MELLITUS WITH STAGE 3B CHRONIC KIDNEY DISEASE, WITH LONG-TERM CURRENT USE OF INSULIN (HCC): ICD-10-CM

## 2022-12-01 DIAGNOSIS — I25.10 CORONARY ARTERY DISEASE INVOLVING NATIVE CORONARY ARTERY OF NATIVE HEART WITHOUT ANGINA PECTORIS: ICD-10-CM

## 2022-12-01 DIAGNOSIS — E11.22 TYPE 2 DIABETES MELLITUS WITH STAGE 3B CHRONIC KIDNEY DISEASE, WITH LONG-TERM CURRENT USE OF INSULIN (HCC): ICD-10-CM

## 2022-12-01 LAB
ALBUMIN SERPL-MCNC: 4.4 G/DL (ref 3.5–5.2)
ALP BLD-CCNC: 92 U/L (ref 40–130)
ALT SERPL-CCNC: 8 U/L (ref 5–41)
ANION GAP SERPL CALCULATED.3IONS-SCNC: 11 MMOL/L (ref 7–19)
AST SERPL-CCNC: 10 U/L (ref 5–40)
BASOPHILS ABSOLUTE: 0.1 K/UL (ref 0–0.2)
BASOPHILS RELATIVE PERCENT: 1.2 % (ref 0–1)
BILIRUB SERPL-MCNC: 0.6 MG/DL (ref 0.2–1.2)
BUN BLDV-MCNC: 49 MG/DL (ref 8–23)
CALCIUM SERPL-MCNC: 9.4 MG/DL (ref 8.8–10.2)
CHLORIDE BLD-SCNC: 101 MMOL/L (ref 98–111)
CHOLESTEROL, TOTAL: 132 MG/DL (ref 160–199)
CO2: 19 MMOL/L (ref 22–29)
CREAT SERPL-MCNC: 1.8 MG/DL (ref 0.5–1.2)
CREATININE URINE: 93.4 MG/DL (ref 4.2–622)
EOSINOPHILS ABSOLUTE: 0.7 K/UL (ref 0–0.6)
EOSINOPHILS RELATIVE PERCENT: 10.3 % (ref 0–5)
GFR SERPL CREATININE-BSD FRML MDRD: 39 ML/MIN/{1.73_M2}
GLUCOSE BLD-MCNC: 105 MG/DL (ref 74–109)
HBA1C MFR BLD: 5.9 % (ref 4–6)
HCT VFR BLD CALC: 38.7 % (ref 42–52)
HDLC SERPL-MCNC: 32 MG/DL (ref 55–121)
HEMOGLOBIN: 12.2 G/DL (ref 14–18)
IMMATURE GRANULOCYTES #: 0 K/UL
LDL CHOLESTEROL CALCULATED: 84 MG/DL
LYMPHOCYTES ABSOLUTE: 1 K/UL (ref 1.1–4.5)
LYMPHOCYTES RELATIVE PERCENT: 13.7 % (ref 20–40)
MCH RBC QN AUTO: 32 PG (ref 27–31)
MCHC RBC AUTO-ENTMCNC: 31.5 G/DL (ref 33–37)
MCV RBC AUTO: 101.6 FL (ref 80–94)
MICROALBUMIN UR-MCNC: <1.2 MG/DL (ref 0–19)
MICROALBUMIN/CREAT UR-RTO: NORMAL MG/G
MONOCYTES ABSOLUTE: 1 K/UL (ref 0–0.9)
MONOCYTES RELATIVE PERCENT: 14.1 % (ref 0–10)
NEUTROPHILS ABSOLUTE: 4.3 K/UL (ref 1.5–7.5)
NEUTROPHILS RELATIVE PERCENT: 60.1 % (ref 50–65)
PDW BLD-RTO: 16.4 % (ref 11.5–14.5)
PLATELET # BLD: 119 K/UL (ref 130–400)
PMV BLD AUTO: 10 FL (ref 9.4–12.4)
POTASSIUM SERPL-SCNC: 5.7 MMOL/L (ref 3.5–5)
RBC # BLD: 3.81 M/UL (ref 4.7–6.1)
SODIUM BLD-SCNC: 131 MMOL/L (ref 136–145)
T4 FREE: 1.32 NG/DL (ref 0.93–1.7)
TOTAL PROTEIN: 7.9 G/DL (ref 6.6–8.7)
TRIGL SERPL-MCNC: 81 MG/DL (ref 0–149)
TSH SERPL DL<=0.05 MIU/L-ACNC: 4.53 UIU/ML (ref 0.27–4.2)
URIC ACID, SERUM: 8.4 MG/DL (ref 3.4–7)
WBC # BLD: 7.2 K/UL (ref 4.8–10.8)

## 2022-12-01 PROCEDURE — 1123F ACP DISCUSS/DSCN MKR DOCD: CPT | Performed by: PEDIATRICS

## 2022-12-01 PROCEDURE — 3044F HG A1C LEVEL LT 7.0%: CPT | Performed by: PEDIATRICS

## 2022-12-01 PROCEDURE — 2022F DILAT RTA XM EVC RTNOPTHY: CPT | Performed by: PEDIATRICS

## 2022-12-01 PROCEDURE — 3074F SYST BP LT 130 MM HG: CPT | Performed by: PEDIATRICS

## 2022-12-01 PROCEDURE — 3017F COLORECTAL CA SCREEN DOC REV: CPT | Performed by: PEDIATRICS

## 2022-12-01 PROCEDURE — 3078F DIAST BP <80 MM HG: CPT | Performed by: PEDIATRICS

## 2022-12-01 PROCEDURE — G8427 DOCREV CUR MEDS BY ELIG CLIN: HCPCS | Performed by: PEDIATRICS

## 2022-12-01 PROCEDURE — 1036F TOBACCO NON-USER: CPT | Performed by: PEDIATRICS

## 2022-12-01 PROCEDURE — 99214 OFFICE O/P EST MOD 30 MIN: CPT | Performed by: PEDIATRICS

## 2022-12-01 PROCEDURE — G8484 FLU IMMUNIZE NO ADMIN: HCPCS | Performed by: PEDIATRICS

## 2022-12-01 PROCEDURE — G8417 CALC BMI ABV UP PARAM F/U: HCPCS | Performed by: PEDIATRICS

## 2022-12-01 ASSESSMENT — ENCOUNTER SYMPTOMS
CHEST TIGHTNESS: 0
SHORTNESS OF BREATH: 1
VOMITING: 0
WHEEZING: 0
DIARRHEA: 0
ABDOMINAL PAIN: 0
COUGH: 0
BACK PAIN: 0
NAUSEA: 0
SORE THROAT: 0

## 2022-12-01 NOTE — PROGRESS NOTES
Sergeiisrraelmaykel  [address] [place], [address]  Phone [phone]   Fax [phone]      OFFICE VISIT: 2022    Elliott Rosario-: 1950      [facility]  Reason For Visit:  Melanie Garcia is a 67 y.o.     3 Month Follow-Up (Bowels have been runny the past few weeks, he thought it was caused by the jardiance but after stopping for a few days no change.), Skin Problem (Bilateral arms itching for the past few weeks. No rash), and Health Maintenance (Flu shot at [facility])      Patient presents on follow-up for multiple health issues. Present concerns:  He has had some loose stools for the past few weeks. No clear etiology. He also complains of an itching on bilateral arms also for the past few weeks. No rashes evident. Diabetes Mellitus Type 2  Diet compliance:  compliant most of the time  Nutrition Consultation Needed:  no  Medication:              Metformin 1000 mg twice daily   Jardiance 10mg daily  Medication compliance: There was some question as to whether he should be on it during his hospitalization. The hospitalist recommended that he stop it (jardiance)  Weight trend: Weight is up 18 pounds in the past 6 months  Current exercise: He is trying to exercise as much as he can  Checking: Periodically  Home blood sugar records: fasting range: Controlled when he checks. Low BG:  no  Eye exam current (within one year): yes  Checking Feet regularly:  yes -no sores  ACE/ARB:  Ramipril 1.25mg daily  Tobacco history: He  reports that he quit smoking about 13 years ago. His smoking use included cigars and cigarettes. He has a 30.00 pack-year smoking history.  He has never used smokeless tobacco.    Lab Results   Component Value Date    LABA1C 6.2 (H) 2022    LABA1C 6.5 (H) 2021    LABA1C 5.4 2020     Lab Results   Component Value Date    LABMICR <1.20 2022    CREATININE 2.1 (H) 2022       Hypertension:   BP today was   BP Readings from Last 1 Encounters: 12/01/22 106/74      Recent BP readings:    BP Readings from Last 3 Encounters:   12/01/22 106/74   09/01/22 102/68   03/01/22 110/68     Medication              Toprol XL 25 mg 1/2 tablets (37.5 mg daily)              Ramipril 1.25 mg daily              Spironolactone 25 mg daily           Medication compliance:  compliant most of the time  Home blood pressure monitoring: Yes - controlled. He is adherent to a low sodium diet. Symptoms: none  Laboratory:  Lab Results   Component Value Date    BUN 54 (H) 08/30/2022    CREATININE 2.1 (H) 08/30/2022       Hyperlipidemia:   Medication:   rosuvastatin (Crestor) and OTC Fish Oil  Low Fat, Low Choleterol Diet:  yes -he tries   Myalgias or GI upset: no  The patient exercises intermittently. Laboratory:    Lab Results   Component Value Date    CHOL 135 (L) 08/30/2022    TRIG 77 08/30/2022    HDL 30 (L) 08/30/2022    LDLCALC 90 08/30/2022    LDLDIRECT 111 02/14/2013      Lab Results   Component Value Date    ALT 7 08/30/2022    AST 13 08/30/2022       Congestive heart failure:  Type: Ischemic cardiomyopathy, systolic, (HFr EF)  Primary rhythm: Sinus rhythm  Medication:              Beta-blockade:            Toprol-XL 37.5 mg twice daily              R AAS therapy:            Ramipril 1.25 mg daily              Diuretics:                     Zaroxolyn 2.5 mg daily                                                  Demadex 100 mg every other day daily              Aldosterone inhibition: Aldactone 25 mg daily              Additional meds:         None  Symptoms: Doing well and presently compensated  Cardiology follow-up: He follows with cardiology down in [address] [place] [address] pro BNP: 3471 (8/30/2022)  Most recent echo: Done down in [place].   Report is unavailable        Coronary Artery Disease:  Medications:              Beta-blockade:           Toprol-XL 37.5 mg twice daily              RAAS Therapy:           Ramipril 1.25 mg daily              Lipid Management: Crestor 40 mg nightly              Platelet Inhibition:        Plavix 75 mg daily and aspirin 325 mg daily              Anti-anginal:                Nitroglycerin 0.4 mg as needed  Symptoms: He denies any chest pain  Nitroglycerin use: He has never used nitro in the recent past  Cardiology follow-up: [place], [place]  Additional studies: As above        CKD 3:  He is not on any nephrotoxic drugs at this time. He is on RAAS therapy  No recent microalbumin measurement   most recent BUN and creatinine were  54 and 2.1 respectively with a GFR of 31   CO2 = 22  K = 4.4  NA = 130  Present diuretics including Demadex 200 mg once daily  Zaroxolyn 2.5 mg daily only on prn.          height is 5' 11\" (1.803 m) and weight is 212 lb (96.2 kg). His temporal temperature is 97.1 °F (36.2 °C). His blood pressure is 106/74 and his pulse is 74. His oxygen saturation is 97%. Body mass index is 29.57 kg/m².     I have reviewed the following with the Mr. Petros Barrera   Lab Review  Orders Only on 08/30/2022   Component Date Value    TSH 08/30/2022 6.430 (A)     T4 Free 08/30/2022 1.41     Microalbumin, Random Uri* 08/30/2022 <1.20     Creatinine, Ur 08/30/2022 30.7     Microalbumin Creatinine * 08/30/2022 see below     Cholesterol, Total 08/30/2022 135 (A)     Triglycerides 08/30/2022 77     HDL 08/30/2022 30 (A)     LDL Calculated 08/30/2022 90     Hemoglobin A1C 08/30/2022 6.2 (A)     Pro-BNP 08/30/2022 3,471 (A)     Sodium 08/30/2022 130 (A)     Potassium 08/30/2022 4.4     Chloride 08/30/2022 90 (A)     CO2 08/30/2022 22     Anion Gap 08/30/2022 18     Glucose 08/30/2022 133 (A)     BUN 08/30/2022 54 (A)     Creatinine 08/30/2022 2.1 (A)     GFR Non- 08/30/2022 31 (A)     GFR  08/30/2022 38 (A)     Calcium 08/30/2022 9.5     Total Protein 08/30/2022 7.6     Albumin 08/30/2022 4.3     Total Bilirubin 08/30/2022 1.1     Alkaline Phosphatase 08/30/2022 95     ALT 08/30/2022 7     AST 08/30/2022 13     WBC 08/30/2022 7.5     RBC 08/30/2022 4.25 (A)     Hemoglobin 08/30/2022 13.8 (A)     Hematocrit 08/30/2022 42.9     MCV 08/30/2022 100.9 (A)     MCH 08/30/2022 32.5 (A)     MCHC 08/30/2022 32.2 (A)     RDW 08/30/2022 17.4 (A)     Platelets 82/91/5688 120 (A)     MPV 08/30/2022 11.1     Neutrophils % 08/30/2022 61.0     Lymphocytes % 08/30/2022 15.6 (A)     Monocytes % 08/30/2022 13.4 (A)     Eosinophils % 08/30/2022 8.1 (A)     Basophils % 08/30/2022 1.5 (A)     Neutrophils Absolute 08/30/2022 4.6     Immature Granulocytes # 08/30/2022 0.0     Lymphocytes Absolute 08/30/2022 1.2     Monocytes Absolute 08/30/2022 1.00 (A)     Eosinophils Absolute 08/30/2022 0.60     Basophils Absolute 08/30/2022 0.10      Copies of these are in the chart. Current Outpatient Medications   Medication Sig Dispense Refill    metoprolol succinate (TOPROL XL) 25 MG extended release tablet Take 1 tablet by mouth daily 45 tablet 11    torsemide (DEMADEX) 100 MG tablet TAKE 2 TABLETS DAILY 180 tablet 3    rosuvastatin (CRESTOR) 40 MG tablet Take 1 tablet by mouth every evening 90 tablet 3    levothyroxine (SYNTHROID) 100 MCG tablet Take 1 tablet by mouth daily 90 tablet 3    spironolactone (ALDACTONE) 25 MG tablet Take 1 tablet by mouth 2 times daily (Patient taking differently: Take 50 mg by mouth daily) 90 tablet 3    ramipril (ALTACE) 1.25 MG capsule Take 1 capsule by mouth in the morning. 90 capsule 3    cetirizine (ZYRTEC) 10 MG tablet Take 10 mg by mouth in the morning. potassium chloride (KLOR-CON M) 20 MEQ extended release tablet Take 1 tablet by mouth 2 times daily TAKE 1 TABLET BY MOUTH TWICE DAILY 180 tablet 3    metFORMIN (GLUCOPHAGE) 1000 MG tablet Take 1 tablet by mouth 2 times daily (with meals) 180 tablet 3    traZODone (DESYREL) 100 MG tablet Take 1.5 tablets by mouth nightly 135 tablet 3    Misc. Devices (RAISED TOILET SEAT/LOCK & ARMS) MISC Use with existing toilet to facilitate transfers.  1 each 0    triamcinolone (KENALOG) 0.1 % cream APPLY TOPICALLY TWICE A DAY 80 g 5    metOLazone (ZAROXOLYN) 2.5 MG tablet Take 1 tablet by mouth daily (Patient taking differently: Take 2.5 mg by mouth every other day) 90 tablet 3    Multiple Vitamins-Minerals (THERAPEUTIC MULTIVITAMIN-MINERALS) tablet Take 1 tablet by mouth daily      magnesium oxide (MAG-OX) 400 MG tablet Take 400 mg by mouth daily      clopidogrel (PLAVIX) 75 MG tablet Take 1 tablet by mouth daily 90 tablet 3    nitroGLYCERIN (NITROLINGUAL) 0.4 MG/SPRAY 0.4 mg spray Place 1 spray under the tongue every 5 minutes as needed for Chest pain 1 Bottle 0    Omega-3 Fatty Acids (FISH OIL) 1000 MG CAPS Take 3,000 mg by mouth daily      aspirin 81 MG EC tablet Take 81 mg by mouth daily        No current facility-administered medications for this visit.        Allergies: Codeine     Past Medical History:   Diagnosis Date    Arthritis     Basilar artery stenosis     CAD (coronary artery disease) 1997 2009    MI    Cancer ([facility] [address]     SKIN 6YRS AGO    Carotid artery stenosis     bilateral    Cerebral artery occlusion with cerebral infarction [facility])     TIA due to hypotension    CHF (congestive heart failure) ([facility])     Dieulafoy lesion of stomach     Heel spur     Hyperlipidemia     Hypertension     Palliative care patient 10/17/2018    Sleep apnea     no c-pap    Squamous cell cancer of skin of earlobe, left     SCC    Type II or unspecified type diabetes mellitus without mention of complication, not stated as uncontrolled     Vertebral artery insufficiency        Family History   Problem Relation Age of Onset    Heart Disease Father     High Blood Pressure Father     Other Father         Esophageal Varices       Past Surgical History:   Procedure Laterality Date    ANGIOPLASTY Bilateral     basilar artery angioplasty    CARDIAC CATHETERIZATION  11/30/2018    [place]    CARDIAC DEFIBRILLATOR PLACEMENT      [facility]    CARPAL TUNNEL RELEASE      bilateral    CATARACT REMOVAL Bilateral CHOLECYSTECTOMY      COLONOSCOPY N/A 2021     [facility] Room disease-AP x 3, 3 yr recall    CORONARY ARTERY BYPASS GRAFT      3v    HEEL SPUR SURGERY      left    [place] EGD TRANSORAL BIOPSY SINGLE/MULTIPLE N/A 10/16/2018    Dr Alexei Kerns-w/control of bleeding-Active bleeding in the stomach from Dieulafoy lesion    PTCA   [place]    with stents    TONSILLECTOMY         Social History     Tobacco Use    Smoking status: Former     Packs/day: 1.00     Years: 30.00     Pack years: 30.00     Types: Cigars, Cigarettes     Quit date: 3/3/2009     Years since quittin.7    Smokeless tobacco: Never   Substance Use Topics    Alcohol use: No     Alcohol/week: 0.0 standard drinks        Review of Systems   Constitutional:  Positive for fatigue. Negative for chills and fever (he is limited in his exercise capacity). HENT:  Negative for congestion, ear pain and sore throat. Eyes:  Negative for visual disturbance. Respiratory:  Positive for shortness of breath (occasional orthopnea ). Negative for cough, chest tightness and wheezing. Cardiovascular:  Positive for leg swelling (Very rarely). Negative for chest pain and palpitations. Gastrointestinal:  Negative for abdominal pain, diarrhea, nausea and vomiting. Endocrine: Negative for polyuria. Genitourinary:  Negative for frequency and urgency. Musculoskeletal:  Positive for arthralgias (right shoulder since his fall). Negative for back pain and neck pain. Skin:  Negative for rash. Recent skin cancer removed from his right hand. Neurological:  Positive for weakness (generalized). Negative for dizziness and headaches. Psychiatric/Behavioral:  Negative for self-injury. The patient is not nervous/anxious. Physical Exam  Constitutional:       General: He is not in acute distress. Appearance: He is well-developed and normal weight. HENT:      Head: Normocephalic and atraumatic.       Right Ear: Hearing, tympanic membrane, ear canal and external ear normal. No middle ear effusion. Left Ear: Hearing, tympanic membrane, ear canal and external ear normal.      Nose: Nose normal. No mucosal edema (mild) or rhinorrhea (thin, clear). Mouth/Throat:      Mouth: Mucous membranes are moist.      Pharynx: Oropharynx is clear. No posterior oropharyngeal erythema. Eyes:      General: No scleral icterus. Extraocular Movements: Extraocular movements intact. Conjunctiva/sclera: Conjunctivae normal.      Pupils: Pupils are equal, round, and reactive to light. Neck:      Thyroid: No thyromegaly. Vascular: No carotid bruit or JVD. Cardiovascular:      Rate and Rhythm: Normal rate and regular rhythm. No extrasystoles are present. Chest Wall: PMI is not displaced. Pulses:           Dorsalis pedis pulses are 0 on the right side and 0 on the left side. Heart sounds: S1 normal and S2 normal. Murmur heard. No friction rub. No gallop. No S3 sounds. Comments: Feet are slightly cyanotic. Pulmonary:      Effort: Pulmonary effort is normal. No respiratory distress. Breath sounds: No decreased breath sounds, wheezing, rhonchi or rales. Abdominal:      General: Bowel sounds are normal.      Palpations: Abdomen is soft. Tenderness: There is no abdominal tenderness. There is no guarding or rebound. Genitourinary:     Comments: Exam deferred  Musculoskeletal:         General: No swelling or tenderness. Normal range of motion. Cervical back: Normal range of motion and neck supple. Right lower leg: No edema. Left lower leg: No edema. Lymphadenopathy:      Cervical: No cervical adenopathy. Skin:     General: Skin is warm and dry. Capillary Refill: Capillary refill takes less than 2 seconds. Findings: No rash. Comments: Bandage on dorsum of right hand   Neurological:      General: No focal deficit present.       Mental Status: He is alert and oriented to person, place, and time. Sensory: No sensory deficit (no numbness or tingling). Psychiatric:         Mood and Affect: Mood normal.         Behavior: Behavior normal.         ASSESSMENT      ICD-10-CM    1. Type 2 diabetes mellitus with stage 3b chronic kidney disease, with long-term current use of insulin ([facility])  E11.22 Comprehensive Metabolic Panel    S31.31 Hemoglobin A1C    Z79.4 Microalbumin / Creatinine Urine Ratio     T4, Free     TSH      2. Primary hypertension  I10 CBC with Auto Differential     Comprehensive Metabolic Panel     Microalbumin / Creatinine Urine Ratio      3. Mixed hyperlipidemia  E78.2 Lipid Panel      4. Chronic systolic congestive heart failure (HCC)  I50.22       5. Coronary artery disease involving native coronary artery of native heart without angina pectoris  I25.10       6. Stage 3b chronic kidney disease ([facility])  N18.32 Uric Acid            PLAN    1. Type 2 diabetes mellitus with stage 3b chronic kidney disease, with long-term current use of insulin ([facility])  We are going to restart Jardiance and this will likely bring blood sugars into range where we need to be in order to prevent complications due to diabetes and endorgan damage  - Comprehensive Metabolic Panel; Future  - Hemoglobin A1C; Future  - Microalbumin / Creatinine Urine Ratio; Future  - T4, Free; Future  - TSH; Future    2. Primary hypertension  Excellently controlled on present medication regimen. Continue same  We will need to watch for orthostasis with Jardiance  - CBC with Auto Differential; Future  - Comprehensive Metabolic Panel; Future  - Microalbumin / Creatinine Urine Ratio; Future    3. Mixed hyperlipidemia  Excellently controlled on present medication regimen. Continue the same clinically compensated on present medication regimen  - Lipid Panel; Future    4. Chronic systolic congestive heart failure (HCC)  Clinically compensated on present medication regimen  Continue as present    5.  Coronary artery disease involving native coronary artery of native heart without angina pectoris  Stable without any anginal symptoms    6. Stage 3b chronic kidney disease ([facility] [address]  Follow serial labs  - Uric Acid; Future  Rule out uremic frost as etiology for itching  Orders Placed This Encounter   Procedures    CBC with Auto Differential    Comprehensive Metabolic Panel    Hemoglobin A1C    Lipid Panel    Microalbumin / Creatinine Urine Ratio    Uric Acid    T4, Free    TSH          Return in about 3 months (around 3/1/2023) for 30. This was an in-house visit.

## 2022-12-05 DIAGNOSIS — L29.9 ITCH: Primary | ICD-10-CM

## 2022-12-05 DIAGNOSIS — M10.9 GOUT, UNSPECIFIED CAUSE, UNSPECIFIED CHRONICITY, UNSPECIFIED SITE: ICD-10-CM

## 2022-12-05 NOTE — TELEPHONE ENCOUNTER
Pt aware and voiced understanding. Stated he does not have any significant symptoms for thyroid. He does have gout symptoms though and is open to medication to lower his uric acid and will use the cream for the itching.

## 2022-12-05 NOTE — TELEPHONE ENCOUNTER
----- Message from [address] [facility],[address], DO sent at 12/2/2022  8:07 AM CST -----  Thyroid is on the low side of normal.  As long as you are not having significant symptoms of hypothyroidism, we do not have to make any adjustments. (fatigue, weitght gain, hair loss, constipation, ankle edema (non-pitting), cold intolerance, dry skin, etc.)  If you are having symptoms, we could increase your Synthroid to 112 mcg daily, #30 with 11 refills. If we do make this change, we would want to check thyroid in 4 to 6 weeks. Hemoglobin A1c is 5.9 which is excellent. Metabolic profile shows a metabolic acidosis. Potassium is also elevated. This is a new finding. I am reluctant to prescribe a medication just for this. I would like to recheck a metabolic profile in 1 to 2 weeks and see if this remains abnormal.  If it does, we would need to add sodium bicarbonate to your medication regimen. This is basically baking soda in the form of a pill. We can recheck metabolic profile to see before we just add that medication. Rest of the metabolic profile is stable. Kidney function has actually improved from last several evaluations. Lipids are controlled. Uric acid is elevated but not to the degree that I can blame the itching on that. We could add a medication to lower your uric acid. I would definitely recommend this if you are having gout symptoms. If you are not, we can simply follow this over time. Recommend triamcinolone cream for the itch. We can dispense this in a 400+ gram tub. There is no significant protein excretion in the urine. CBC shows a mild anemia but is otherwise unremarkable.

## 2022-12-06 RX ORDER — ALLOPURINOL 100 MG/1
100 TABLET ORAL DAILY
Qty: 30 TABLET | Refills: 5 | Status: SHIPPED | OUTPATIENT
Start: 2022-12-06

## 2023-01-04 DIAGNOSIS — M10.9 GOUT, UNSPECIFIED CAUSE, UNSPECIFIED CHRONICITY, UNSPECIFIED SITE: ICD-10-CM

## 2023-01-04 RX ORDER — ALLOPURINOL 100 MG/1
100 TABLET ORAL DAILY
Qty: 90 TABLET | Refills: 3 | Status: SHIPPED | OUTPATIENT
Start: 2023-01-04

## 2023-01-04 NOTE — TELEPHONE ENCOUNTER
Received fax from mail order pharmacy requesting a 90 day fill on pts medication(s). Pt was last seen in office on 12/1/2022  and has a follow up scheduled for 3/1/2023. Will send request to  Pharmacy  for patient.      Requested Prescriptions     Signed Prescriptions Disp Refills    allopurinol (ZYLOPRIM) 100 MG tablet 90 tablet 3     Sig: Take 1 tablet by mouth daily     Authorizing Provider: David Alexandre     Ordering User: Andreina Finn

## 2023-01-18 DIAGNOSIS — G47.00 INSOMNIA, UNSPECIFIED TYPE: ICD-10-CM

## 2023-01-19 RX ORDER — TRAZODONE HYDROCHLORIDE 100 MG/1
150 TABLET ORAL NIGHTLY
Qty: 135 TABLET | Refills: 3 | Status: SHIPPED | OUTPATIENT
Start: 2023-01-19

## 2023-01-19 NOTE — TELEPHONE ENCOUNTER
Received fax from pharmacy requesting refill on pts medication(s). Pt was last seen in office on 12/1/2022  and has a follow up scheduled for Visit date not found. Will send request to  Dr. Myrtle Zhang  for authorization.      Requested Prescriptions     Pending Prescriptions Disp Refills    traZODone (DESYREL) 100 MG tablet [Pharmacy Med Name: TRAZODONE HCL TABS 100MG] 135 tablet 3     Sig: Take 1.5 tablets by mouth nightly

## 2023-02-01 RX ORDER — SPIRONOLACTONE 25 MG/1
50 TABLET ORAL DAILY
Qty: 180 TABLET | Refills: 3 | Status: SHIPPED | OUTPATIENT
Start: 2023-02-01

## 2023-02-01 NOTE — TELEPHONE ENCOUNTER
Received fax from pharmacy requesting refill on pts medication(s). Pt was last seen in office on 12/1/2022  and has a follow up scheduled for 3/1/23. Will send request to  Dr. Jacobo  for authorization.     Requested Prescriptions     Pending Prescriptions Disp Refills    spironolactone (ALDACTONE) 25 MG tablet [Pharmacy Med Name: SPIRONOLACTONE TABS 25MG] 180 tablet 3     Sig: Take 2 tablets by mouth daily

## 2023-02-22 ENCOUNTER — HOSPITAL ENCOUNTER (INPATIENT)
Age: 73
LOS: 2 days | Discharge: HOME | End: 2023-02-24
Attending: EMERGENCY MEDICINE | Admitting: INTERNAL MEDICINE
Payer: MEDICARE

## 2023-02-22 ENCOUNTER — ANESTHESIA (OUTPATIENT)
Dept: ENDOSCOPY | Age: 73
End: 2023-02-22
Payer: MEDICARE

## 2023-02-22 ENCOUNTER — ANESTHESIA EVENT (OUTPATIENT)
Dept: ENDOSCOPY | Age: 73
End: 2023-02-22
Payer: MEDICARE

## 2023-02-22 DIAGNOSIS — E87.6 HYPOKALEMIA: ICD-10-CM

## 2023-02-22 DIAGNOSIS — K92.2 UPPER GI BLEED: Primary | ICD-10-CM

## 2023-02-22 DIAGNOSIS — I50.22 CHRONIC SYSTOLIC CONGESTIVE HEART FAILURE (HCC): ICD-10-CM

## 2023-02-22 DIAGNOSIS — D64.9 SYMPTOMATIC ANEMIA: ICD-10-CM

## 2023-02-22 DIAGNOSIS — I10 PRIMARY HYPERTENSION: ICD-10-CM

## 2023-02-22 PROBLEM — R57.1 HYPOVOLEMIC SHOCK (HCC): Status: ACTIVE | Noted: 2023-02-22

## 2023-02-22 LAB
ABO/RH: NORMAL
ALBUMIN SERPL-MCNC: 4.2 G/DL (ref 3.5–5.2)
ALP BLD-CCNC: 72 U/L (ref 40–130)
ALT SERPL-CCNC: 9 U/L (ref 5–41)
ANION GAP SERPL CALCULATED.3IONS-SCNC: 20 MMOL/L (ref 7–19)
ANTIBODY SCREEN: NORMAL
AST SERPL-CCNC: 20 U/L (ref 5–40)
BASOPHILS ABSOLUTE: 0.1 K/UL (ref 0–0.2)
BASOPHILS RELATIVE PERCENT: 0.9 % (ref 0–1)
BILIRUB SERPL-MCNC: 0.4 MG/DL (ref 0.2–1.2)
BUN BLDV-MCNC: 158 MG/DL (ref 8–23)
CALCIUM SERPL-MCNC: 9 MG/DL (ref 8.8–10.2)
CHLORIDE BLD-SCNC: 86 MMOL/L (ref 98–111)
CO2: 17 MMOL/L (ref 22–29)
CREAT SERPL-MCNC: 2.6 MG/DL (ref 0.5–1.2)
EOSINOPHILS ABSOLUTE: 0.2 K/UL (ref 0–0.6)
EOSINOPHILS RELATIVE PERCENT: 1.8 % (ref 0–5)
GFR SERPL CREATININE-BSD FRML MDRD: 25 ML/MIN/{1.73_M2}
GLUCOSE BLD-MCNC: 166 MG/DL (ref 70–99)
GLUCOSE BLD-MCNC: 168 MG/DL (ref 74–109)
HCT VFR BLD CALC: 23.4 % (ref 42–52)
HCT VFR BLD CALC: 25.2 % (ref 42–52)
HEMOGLOBIN: 7.9 G/DL (ref 14–18)
HEMOGLOBIN: 8.3 G/DL (ref 14–18)
IMMATURE GRANULOCYTES #: 0.1 K/UL
INR BLD: 1.32 (ref 0.88–1.18)
LIPASE: 66 U/L (ref 13–60)
LYMPHOCYTES ABSOLUTE: 0.9 K/UL (ref 1.1–4.5)
LYMPHOCYTES RELATIVE PERCENT: 8.3 % (ref 20–40)
MCH RBC QN AUTO: 31.2 PG (ref 27–31)
MCHC RBC AUTO-ENTMCNC: 32.9 G/DL (ref 33–37)
MCV RBC AUTO: 94.7 FL (ref 80–94)
MONOCYTES ABSOLUTE: 1.1 K/UL (ref 0–0.9)
MONOCYTES RELATIVE PERCENT: 10.8 % (ref 0–10)
NEUTROPHILS ABSOLUTE: 8.2 K/UL (ref 1.5–7.5)
NEUTROPHILS RELATIVE PERCENT: 77.6 % (ref 50–65)
PDW BLD-RTO: 16.5 % (ref 11.5–14.5)
PERFORMED ON: ABNORMAL
PLATELET # BLD: 175 K/UL (ref 130–400)
PMV BLD AUTO: 10.8 FL (ref 9.4–12.4)
POTASSIUM SERPL-SCNC: 5.3 MMOL/L (ref 3.5–5)
PROTHROMBIN TIME: 16.4 SEC (ref 12–14.6)
RBC # BLD: 2.66 M/UL (ref 4.7–6.1)
SARS-COV-2, NAAT: NOT DETECTED
SODIUM BLD-SCNC: 123 MMOL/L (ref 136–145)
TOTAL PROTEIN: 7.6 G/DL (ref 6.6–8.7)
WBC # BLD: 10.5 K/UL (ref 4.8–10.8)

## 2023-02-22 PROCEDURE — C9113 INJ PANTOPRAZOLE SODIUM, VIA: HCPCS | Performed by: INTERNAL MEDICINE

## 2023-02-22 PROCEDURE — 3700000000 HC ANESTHESIA ATTENDED CARE: Performed by: INTERNAL MEDICINE

## 2023-02-22 PROCEDURE — 36430 TRANSFUSION BLD/BLD COMPNT: CPT

## 2023-02-22 PROCEDURE — 76937 US GUIDE VASCULAR ACCESS: CPT

## 2023-02-22 PROCEDURE — 2580000003 HC RX 258: Performed by: EMERGENCY MEDICINE

## 2023-02-22 PROCEDURE — 82962 GLUCOSE BLOOD TEST: CPT

## 2023-02-22 PROCEDURE — 7100000001 HC PACU RECOVERY - ADDTL 15 MIN: Performed by: INTERNAL MEDICINE

## 2023-02-22 PROCEDURE — 86850 RBC ANTIBODY SCREEN: CPT

## 2023-02-22 PROCEDURE — 36410 VNPNXR 3YR/> PHY/QHP DX/THER: CPT

## 2023-02-22 PROCEDURE — 85610 PROTHROMBIN TIME: CPT

## 2023-02-22 PROCEDURE — 2709999900 HC NON-CHARGEABLE SUPPLY: Performed by: INTERNAL MEDICINE

## 2023-02-22 PROCEDURE — 36415 COLL VENOUS BLD VENIPUNCTURE: CPT

## 2023-02-22 PROCEDURE — C1889 IMPLANT/INSERT DEVICE, NOC: HCPCS | Performed by: INTERNAL MEDICINE

## 2023-02-22 PROCEDURE — 96365 THER/PROPH/DIAG IV INF INIT: CPT

## 2023-02-22 PROCEDURE — 2580000003 HC RX 258: Performed by: NURSE ANESTHETIST, CERTIFIED REGISTERED

## 2023-02-22 PROCEDURE — 96374 THER/PROPH/DIAG INJ IV PUSH: CPT

## 2023-02-22 PROCEDURE — 3700000001 HC ADD 15 MINUTES (ANESTHESIA): Performed by: INTERNAL MEDICINE

## 2023-02-22 PROCEDURE — 83690 ASSAY OF LIPASE: CPT

## 2023-02-22 PROCEDURE — 3609013000 HC EGD TRANSORAL CONTROL BLEEDING ANY METHOD: Performed by: INTERNAL MEDICINE

## 2023-02-22 PROCEDURE — 86923 COMPATIBILITY TEST ELECTRIC: CPT

## 2023-02-22 PROCEDURE — 43255 EGD CONTROL BLEEDING ANY: CPT | Performed by: INTERNAL MEDICINE

## 2023-02-22 PROCEDURE — C1751 CATH, INF, PER/CENT/MIDLINE: HCPCS

## 2023-02-22 PROCEDURE — 87635 SARS-COV-2 COVID-19 AMP PRB: CPT

## 2023-02-22 PROCEDURE — 80053 COMPREHEN METABOLIC PANEL: CPT

## 2023-02-22 PROCEDURE — 6360000002 HC RX W HCPCS: Performed by: INTERNAL MEDICINE

## 2023-02-22 PROCEDURE — 85025 COMPLETE CBC W/AUTO DIFF WBC: CPT

## 2023-02-22 PROCEDURE — 7100000000 HC PACU RECOVERY - FIRST 15 MIN: Performed by: INTERNAL MEDICINE

## 2023-02-22 PROCEDURE — 93005 ELECTROCARDIOGRAM TRACING: CPT | Performed by: EMERGENCY MEDICINE

## 2023-02-22 PROCEDURE — 2500000003 HC RX 250 WO HCPCS: Performed by: NURSE ANESTHETIST, CERTIFIED REGISTERED

## 2023-02-22 PROCEDURE — 2000000000 HC ICU R&B

## 2023-02-22 PROCEDURE — 86901 BLOOD TYPING SEROLOGIC RH(D): CPT

## 2023-02-22 PROCEDURE — 99285 EMERGENCY DEPT VISIT HI MDM: CPT

## 2023-02-22 PROCEDURE — C9113 INJ PANTOPRAZOLE SODIUM, VIA: HCPCS | Performed by: EMERGENCY MEDICINE

## 2023-02-22 PROCEDURE — P9040 RBC LEUKOREDUCED IRRADIATED: HCPCS

## 2023-02-22 PROCEDURE — 6360000002 HC RX W HCPCS: Performed by: NURSE ANESTHETIST, CERTIFIED REGISTERED

## 2023-02-22 PROCEDURE — 05HC33Z INSERTION OF INFUSION DEVICE INTO LEFT BASILIC VEIN, PERCUTANEOUS APPROACH: ICD-10-PCS | Performed by: INTERNAL MEDICINE

## 2023-02-22 PROCEDURE — 6360000002 HC RX W HCPCS: Performed by: EMERGENCY MEDICINE

## 2023-02-22 PROCEDURE — 85014 HEMATOCRIT: CPT

## 2023-02-22 PROCEDURE — 2580000003 HC RX 258: Performed by: INTERNAL MEDICINE

## 2023-02-22 PROCEDURE — 0W3P8ZZ CONTROL BLEEDING IN GASTROINTESTINAL TRACT, VIA NATURAL OR ARTIFICIAL OPENING ENDOSCOPIC: ICD-10-PCS | Performed by: INTERNAL MEDICINE

## 2023-02-22 PROCEDURE — 86900 BLOOD TYPING SEROLOGIC ABO: CPT

## 2023-02-22 PROCEDURE — 85018 HEMOGLOBIN: CPT

## 2023-02-22 DEVICE — WORKING LENGTH 235CM, WORKING CHANNEL 2.8MM
Type: IMPLANTABLE DEVICE | Status: FUNCTIONAL
Brand: RESOLUTION 360 CLIP

## 2023-02-22 RX ORDER — IPRATROPIUM BROMIDE AND ALBUTEROL SULFATE 2.5; .5 MG/3ML; MG/3ML
1 SOLUTION RESPIRATORY (INHALATION)
Status: DISCONTINUED | OUTPATIENT
Start: 2023-02-22 | End: 2023-02-22 | Stop reason: HOSPADM

## 2023-02-22 RX ORDER — DROPERIDOL 2.5 MG/ML
0.62 INJECTION, SOLUTION INTRAMUSCULAR; INTRAVENOUS
Status: DISCONTINUED | OUTPATIENT
Start: 2023-02-22 | End: 2023-02-22 | Stop reason: HOSPADM

## 2023-02-22 RX ORDER — LANOLIN ALCOHOL/MO/W.PET/CERES
400 CREAM (GRAM) TOPICAL DAILY
Status: DISCONTINUED | OUTPATIENT
Start: 2023-02-22 | End: 2023-02-24 | Stop reason: HOSPADM

## 2023-02-22 RX ORDER — ACETAMINOPHEN 325 MG/1
650 TABLET ORAL EVERY 6 HOURS PRN
Status: DISCONTINUED | OUTPATIENT
Start: 2023-02-22 | End: 2023-02-24 | Stop reason: HOSPADM

## 2023-02-22 RX ORDER — METOPROLOL SUCCINATE 25 MG/1
25 TABLET, EXTENDED RELEASE ORAL DAILY
Status: DISCONTINUED | OUTPATIENT
Start: 2023-02-22 | End: 2023-02-24 | Stop reason: HOSPADM

## 2023-02-22 RX ORDER — SODIUM CHLORIDE 9 MG/ML
INJECTION, SOLUTION INTRAVENOUS PRN
Status: DISCONTINUED | OUTPATIENT
Start: 2023-02-22 | End: 2023-02-22 | Stop reason: HOSPADM

## 2023-02-22 RX ORDER — INSULIN LISPRO 100 [IU]/ML
0-4 INJECTION, SOLUTION INTRAVENOUS; SUBCUTANEOUS NIGHTLY
Status: DISCONTINUED | OUTPATIENT
Start: 2023-02-22 | End: 2023-02-24 | Stop reason: HOSPADM

## 2023-02-22 RX ORDER — FUROSEMIDE 10 MG/ML
20 INJECTION INTRAMUSCULAR; INTRAVENOUS
Status: ACTIVE | OUTPATIENT
Start: 2023-02-22 | End: 2023-02-23

## 2023-02-22 RX ORDER — ONDANSETRON 2 MG/ML
INJECTION INTRAMUSCULAR; INTRAVENOUS PRN
Status: DISCONTINUED | OUTPATIENT
Start: 2023-02-22 | End: 2023-02-22 | Stop reason: SDUPTHER

## 2023-02-22 RX ORDER — INSULIN LISPRO 100 [IU]/ML
0-4 INJECTION, SOLUTION INTRAVENOUS; SUBCUTANEOUS
Status: DISCONTINUED | OUTPATIENT
Start: 2023-02-22 | End: 2023-02-24 | Stop reason: HOSPADM

## 2023-02-22 RX ORDER — SODIUM CHLORIDE 0.9 % (FLUSH) 0.9 %
5-40 SYRINGE (ML) INJECTION PRN
Status: DISCONTINUED | OUTPATIENT
Start: 2023-02-22 | End: 2023-02-22 | Stop reason: HOSPADM

## 2023-02-22 RX ORDER — MORPHINE SULFATE 2 MG/ML
2 INJECTION, SOLUTION INTRAMUSCULAR; INTRAVENOUS EVERY 5 MIN PRN
Status: DISCONTINUED | OUTPATIENT
Start: 2023-02-22 | End: 2023-02-22 | Stop reason: HOSPADM

## 2023-02-22 RX ORDER — ROSUVASTATIN CALCIUM 20 MG/1
40 TABLET, COATED ORAL EVERY EVENING
Status: DISCONTINUED | OUTPATIENT
Start: 2023-02-22 | End: 2023-02-24 | Stop reason: HOSPADM

## 2023-02-22 RX ORDER — OCTREOTIDE ACETATE 50 UG/ML
50 INJECTION, SOLUTION INTRAVENOUS; SUBCUTANEOUS ONCE
Status: COMPLETED | OUTPATIENT
Start: 2023-02-22 | End: 2023-02-22

## 2023-02-22 RX ORDER — MEPERIDINE HYDROCHLORIDE 25 MG/ML
12.5 INJECTION INTRAMUSCULAR; INTRAVENOUS; SUBCUTANEOUS EVERY 5 MIN PRN
Status: DISCONTINUED | OUTPATIENT
Start: 2023-02-22 | End: 2023-02-22 | Stop reason: HOSPADM

## 2023-02-22 RX ORDER — NITROGLYCERIN 0.4 MG/1
0.4 TABLET SUBLINGUAL EVERY 5 MIN PRN
Status: DISCONTINUED | OUTPATIENT
Start: 2023-02-22 | End: 2023-02-24 | Stop reason: HOSPADM

## 2023-02-22 RX ORDER — LORAZEPAM 2 MG/ML
0.5 INJECTION INTRAMUSCULAR
Status: DISCONTINUED | OUTPATIENT
Start: 2023-02-22 | End: 2023-02-22 | Stop reason: HOSPADM

## 2023-02-22 RX ORDER — ACETAMINOPHEN 650 MG/1
650 SUPPOSITORY RECTAL EVERY 6 HOURS PRN
Status: DISCONTINUED | OUTPATIENT
Start: 2023-02-22 | End: 2023-02-24 | Stop reason: HOSPADM

## 2023-02-22 RX ORDER — RAMIPRIL 1.25 MG/1
1.25 CAPSULE ORAL DAILY
Status: DISCONTINUED | OUTPATIENT
Start: 2023-02-22 | End: 2023-02-22

## 2023-02-22 RX ORDER — M-VIT,TX,IRON,MINS/CALC/FOLIC 27MG-0.4MG
1 TABLET ORAL DAILY
Status: DISCONTINUED | OUTPATIENT
Start: 2023-02-22 | End: 2023-02-24 | Stop reason: HOSPADM

## 2023-02-22 RX ORDER — LABETALOL HYDROCHLORIDE 5 MG/ML
10 INJECTION, SOLUTION INTRAVENOUS
Status: DISCONTINUED | OUTPATIENT
Start: 2023-02-22 | End: 2023-02-22 | Stop reason: HOSPADM

## 2023-02-22 RX ORDER — PROPOFOL 10 MG/ML
INJECTION, EMULSION INTRAVENOUS PRN
Status: DISCONTINUED | OUTPATIENT
Start: 2023-02-22 | End: 2023-02-22 | Stop reason: SDUPTHER

## 2023-02-22 RX ORDER — DIPHENHYDRAMINE HYDROCHLORIDE 50 MG/ML
12.5 INJECTION INTRAMUSCULAR; INTRAVENOUS
Status: DISCONTINUED | OUTPATIENT
Start: 2023-02-22 | End: 2023-02-22 | Stop reason: HOSPADM

## 2023-02-22 RX ORDER — SODIUM CHLORIDE, SODIUM LACTATE, POTASSIUM CHLORIDE, CALCIUM CHLORIDE 600; 310; 30; 20 MG/100ML; MG/100ML; MG/100ML; MG/100ML
INJECTION, SOLUTION INTRAVENOUS CONTINUOUS PRN
Status: DISCONTINUED | OUTPATIENT
Start: 2023-02-22 | End: 2023-02-22 | Stop reason: SDUPTHER

## 2023-02-22 RX ORDER — 0.9 % SODIUM CHLORIDE 0.9 %
500 INTRAVENOUS SOLUTION INTRAVENOUS ONCE
Status: COMPLETED | OUTPATIENT
Start: 2023-02-22 | End: 2023-02-22

## 2023-02-22 RX ORDER — PHENYLEPHRINE HYDROCHLORIDE 10 MG/ML
INJECTION INTRAVENOUS PRN
Status: DISCONTINUED | OUTPATIENT
Start: 2023-02-22 | End: 2023-02-22 | Stop reason: SDUPTHER

## 2023-02-22 RX ORDER — SODIUM CHLORIDE 9 MG/ML
INJECTION, SOLUTION INTRAVENOUS PRN
Status: DISCONTINUED | OUTPATIENT
Start: 2023-02-22 | End: 2023-02-23 | Stop reason: ALTCHOICE

## 2023-02-22 RX ORDER — LISINOPRIL 10 MG/1
5 TABLET ORAL DAILY
Status: DISCONTINUED | OUTPATIENT
Start: 2023-02-23 | End: 2023-02-24 | Stop reason: HOSPADM

## 2023-02-22 RX ORDER — SODIUM CHLORIDE 0.9 % (FLUSH) 0.9 %
5-40 SYRINGE (ML) INJECTION EVERY 12 HOURS SCHEDULED
Status: DISCONTINUED | OUTPATIENT
Start: 2023-02-22 | End: 2023-02-22 | Stop reason: HOSPADM

## 2023-02-22 RX ORDER — METOCLOPRAMIDE HYDROCHLORIDE 5 MG/ML
10 INJECTION INTRAMUSCULAR; INTRAVENOUS
Status: DISCONTINUED | OUTPATIENT
Start: 2023-02-22 | End: 2023-02-22 | Stop reason: HOSPADM

## 2023-02-22 RX ORDER — METOLAZONE 2.5 MG/1
2.5 TABLET ORAL DAILY
Status: DISCONTINUED | OUTPATIENT
Start: 2023-02-22 | End: 2023-02-24 | Stop reason: HOSPADM

## 2023-02-22 RX ORDER — PANTOPRAZOLE SODIUM 40 MG/10ML
80 INJECTION, POWDER, LYOPHILIZED, FOR SOLUTION INTRAVENOUS ONCE
Status: COMPLETED | OUTPATIENT
Start: 2023-02-22 | End: 2023-02-22

## 2023-02-22 RX ORDER — LIDOCAINE HYDROCHLORIDE 10 MG/ML
INJECTION, SOLUTION INFILTRATION; PERINEURAL PRN
Status: DISCONTINUED | OUTPATIENT
Start: 2023-02-22 | End: 2023-02-22 | Stop reason: SDUPTHER

## 2023-02-22 RX ORDER — TORSEMIDE 100 MG/1
100 TABLET ORAL 2 TIMES DAILY
Status: DISCONTINUED | OUTPATIENT
Start: 2023-02-22 | End: 2023-02-24 | Stop reason: HOSPADM

## 2023-02-22 RX ORDER — CETIRIZINE HYDROCHLORIDE 10 MG/1
10 TABLET ORAL DAILY
Status: DISCONTINUED | OUTPATIENT
Start: 2023-02-22 | End: 2023-02-24 | Stop reason: HOSPADM

## 2023-02-22 RX ORDER — MORPHINE SULFATE 4 MG/ML
4 INJECTION, SOLUTION INTRAMUSCULAR; INTRAVENOUS EVERY 5 MIN PRN
Status: DISCONTINUED | OUTPATIENT
Start: 2023-02-22 | End: 2023-02-22 | Stop reason: HOSPADM

## 2023-02-22 RX ORDER — LEVOTHYROXINE SODIUM 0.1 MG/1
100 TABLET ORAL DAILY
Status: DISCONTINUED | OUTPATIENT
Start: 2023-02-22 | End: 2023-02-24 | Stop reason: HOSPADM

## 2023-02-22 RX ORDER — SPIRONOLACTONE 25 MG/1
50 TABLET ORAL DAILY
Status: DISCONTINUED | OUTPATIENT
Start: 2023-02-22 | End: 2023-02-24 | Stop reason: HOSPADM

## 2023-02-22 RX ADMIN — ONDANSETRON 4 MG: 2 INJECTION INTRAMUSCULAR; INTRAVENOUS at 16:25

## 2023-02-22 RX ADMIN — PHENYLEPHRINE HYDROCHLORIDE 100 MCG: 10 INJECTION INTRAVENOUS at 16:09

## 2023-02-22 RX ADMIN — PHENYLEPHRINE HYDROCHLORIDE 100 MCG: 10 INJECTION INTRAVENOUS at 16:15

## 2023-02-22 RX ADMIN — SODIUM CHLORIDE 8 MG/HR: 9 INJECTION, SOLUTION INTRAVENOUS at 22:09

## 2023-02-22 RX ADMIN — SODIUM CHLORIDE 500 ML: 9 INJECTION, SOLUTION INTRAVENOUS at 13:01

## 2023-02-22 RX ADMIN — PROPOFOL 90 MG: 10 INJECTION, EMULSION INTRAVENOUS at 16:00

## 2023-02-22 RX ADMIN — PHENYLEPHRINE HYDROCHLORIDE 100 MCG: 10 INJECTION INTRAVENOUS at 16:03

## 2023-02-22 RX ADMIN — PHENYLEPHRINE HYDROCHLORIDE 100 MCG: 10 INJECTION INTRAVENOUS at 16:11

## 2023-02-22 RX ADMIN — OCTREOTIDE ACETATE 50 MCG: 50 INJECTION, SOLUTION INTRAVENOUS; SUBCUTANEOUS at 13:53

## 2023-02-22 RX ADMIN — LIDOCAINE HYDROCHLORIDE 40 MG: 10 INJECTION, SOLUTION INFILTRATION; PERINEURAL at 16:00

## 2023-02-22 RX ADMIN — PHENYLEPHRINE HYDROCHLORIDE 100 MCG: 10 INJECTION INTRAVENOUS at 16:19

## 2023-02-22 RX ADMIN — PANTOPRAZOLE SODIUM 80 MG: 40 INJECTION, POWDER, FOR SOLUTION INTRAVENOUS at 12:59

## 2023-02-22 RX ADMIN — SODIUM CHLORIDE, SODIUM LACTATE, POTASSIUM CHLORIDE, AND CALCIUM CHLORIDE: 600; 310; 30; 20 INJECTION, SOLUTION INTRAVENOUS at 15:50

## 2023-02-22 RX ADMIN — SODIUM CHLORIDE 8 MG/HR: 9 INJECTION, SOLUTION INTRAVENOUS at 13:05

## 2023-02-22 ASSESSMENT — PAIN - FUNCTIONAL ASSESSMENT: PAIN_FUNCTIONAL_ASSESSMENT: NONE - DENIES PAIN

## 2023-02-22 NOTE — PROCEDURES
[facility] Vascular Access Team:  MidLine Insertion Procedure Note      Patient: Jhon Alatorre  YOB: 1950   MRN: [id number]  Room: 02/02-A     Attending Physician - Amelie Clement DO  Ordering Physician - Amelie Clement DO    Diagnosis:   No admission diagnoses are documented for this encounter. Procedure(s): Insertion of 5.5 Lao Dual Lumen Power Midline    Indication(s):   Poor Venous Access, Critical Gtts, and Physician/Patient Preference - pt states PICC line previous GI bleed and is a difficult venous access. Date of Procedure: 2/22/2023   Start Time: 1300  End Time: Shira    Procedural Nurse: Josephine Asif RN    Anesthesia: Local Injection <=5 mL 1% Lidocaine without Epinephrine    Estimated Blood Loss (mL): Minimal    Complications: None    Midline Double Lumen 28/65/51 Left Basilic (Active)   Criteria for Appropriate Use Hemodynamically unstable, requiring monitoring lines, vasopressors, or volume resuscitation 02/22/23 1320   Site Assessment Clean, dry & intact 02/22/23 1320   Phlebitis Assessment No symptoms 02/22/23 1320   Infiltration Assessment 0 02/22/23 1320   External Catheter Length (cm) 2 cm 02/22/23 1320   Proximal Lumen Color/Status White;Flushed;Normal saline locked; Blood return noted 02/22/23 1320   Distal Lumen Color/Status Yellow; Flushed;Normal saline locked; Blood return noted 02/22/23 1320   Date of Last Dressing Change 02/22/23 02/22/23 1320   Dressing Type Transparent w/CHG gel;Securing device 02/22/23 1320   Dressing Status New dressing applied;Clean, dry & intact 02/22/23 1320   Dressing Intervention New 02/22/23 1320         Findings:   1. Successful insertion of Midline. 2. Midline is ready to be used. Please change tubing prior to using the new Midline. Make sure to label, date and use alcohol caps on new tubing and alcohol caps on unused ports. Detailed Description of Procedure:   Informed consent was obtained for the procedure.  Risks of hemorrhage and adverse drug reaction were discussed. Pt states he's on Plavix currently. The Left Basilic vein was visualized using the ultrasound and deemed a suitable vessel. The area was prepped with Chlorhexidine and draped in sterile fashion using full max barrier draping. The area was anesthetized with 3 cc's of 1% Lidocaine. The vein was accessed using US guidance. The guidewire was advanced through the needle to secure the vein. The needle was removed and a peel-a-way Sheath was placed over the guidewire. Guidewire was removed with tip intact. The 5.5 Surinamese Dual Lumen Power Midline was  left untrimmed at 15 cm and inserted into the Sheath. The peel-a-way sheath was removed. Approximately 2 cm exposed. All lumens had brisk blood return and was flushed with 20 cc of NS. The Midline was secured using a securement device and a Biopatch was placed over the insertion site. A Tegaderm was placed over the securement device and insertion site. Dressing was dated and initialed with external measurement marked. Patient did tolerate procedure well.         Electronically signed by Onur Cruz RN on 2/22/2023 at 1:30 PM

## 2023-02-22 NOTE — ANESTHESIA POSTPROCEDURE EVALUATION
Department of Anesthesiology  Postprocedure Note    Patient: Daisy Ferreira  MRN: [id number]  [place]: 1950  Date of evaluation: 2/22/2023      Procedure Summary     Date: 02/22/23 Room / Location: [address]    Anesthesia Start: 1550 Anesthesia Stop:     Procedure: EGD CONTROL HEMORRHAGE Diagnosis:       Anemia, unspecified type      (Melanotic stool. Anemia.)    Surgeons: Pako Sun MD Responsible Provider: JOSE ELIAS Sandhu CRNA    Anesthesia Type: general, TIVA ASA Status: 4          Anesthesia Type: No value filed.     Delroy Phase I:      Delroy Phase II:        Anesthesia Post Evaluation    Patient location during evaluation: PACU  Patient participation: complete - patient participated  Level of consciousness: awake and alert  Pain score: 0  Airway patency: patent  Nausea & Vomiting: no nausea and no vomiting  Complications: no  Cardiovascular status: blood pressure returned to baseline  Respiratory status: acceptable and nasal cannula  Hydration status: stable  Comments: BP 96/72   Pulse 86   Temp 97 °F (36.1 °C) (Temporal)   Resp 12   Ht 5' 11\" (1.803 m)   Wt 212 lb (96.2 kg)   SpO2 99%   BMI 29.57 kg/m²

## 2023-02-22 NOTE — ANESTHESIA PRE PROCEDURE
Department of Anesthesiology  Preprocedure Note       Name:  Anand Christian   Age:  67 y.o.  :  1950                                          MRN:  [id number]         Date:  2023      Surgeon: Mara Herrera): Crow Lomax MD    Procedure: Procedure(s):  EGD DIAGNOSTIC ONLY    Medications prior to admission:   Prior to Admission medications    Medication Sig Start Date End Date Taking? Authorizing Provider   spironolactone (ALDACTONE) 25 MG tablet Take 2 tablets by mouth daily 23   B Roddie Seeds, DO   traZODone (DESYREL) 100 MG tablet Take 1.5 tablets by mouth nightly 23   B Roddie Seeds, DO   allopurinol (ZYLOPRIM) 100 MG tablet Take 1 tablet by mouth daily 23   B Roddie Seeds, DO   nystatin-triamcinolone [facility]) 640513-8.1 UNIT/GM-% cream Apply topically 2 times daily. 22   B Devantedie Seeds, DO   metoprolol succinate (TOPROL XL) 25 MG extended release tablet Take 1 tablet by mouth daily 22   B Roddie Seeds, DO   torsemide (DEMADEX) 100 MG tablet TAKE 2 TABLETS DAILY 22   Brandy Colder, APRJOY   rosuvastatin (CRESTOR) 40 MG tablet Take 1 tablet by mouth every evening 22   Brandy Colder, APRN   levothyroxine (SYNTHROID) 100 MCG tablet Take 1 tablet by mouth daily 10/3/22   B Roddie Seeds, DO   ramipril (ALTACE) 1.25 MG capsule Take 1 capsule by mouth in the morning. 8/15/22   B Roddie Seeds, DO   cetirizine (ZYRTEC) 10 MG tablet Take 10 mg by mouth in the morning. Historical Provider, MD   potassium chloride (KLOR-CON M) 20 MEQ extended release tablet Take 1 tablet by mouth 2 times daily TAKE 1 TABLET BY MOUTH TWICE DAILY 22   Brandy Coldmitzi, APRJOY   metFORMIN (GLUCOPHAGE) 1000 MG tablet Take 1 tablet by mouth 2 times daily (with meals) 22   B Roddie Seeds, DO   Misc. Devices (RAISED TOILET SEAT/LOCK & ARMS) MISC Use with existing toilet to facilitate transfers.  22   ROSS Xavier, DO triamcinolone (KENALOG) 0.1 % cream APPLY TOPICALLY TWICE A DAY 8/30/21   B Miladys Xavier, DO   metOLazone (ZAROXOLYN) 2.5 MG tablet Take 1 tablet by mouth daily  Patient taking differently: Take 2.5 mg by mouth every other day 10/30/20   B Miladys Xavier, DO   Multiple Vitamins-Minerals (THERAPEUTIC MULTIVITAMIN-MINERALS) tablet Take 1 tablet by mouth daily    Historical Provider, MD   magnesium oxide (MAG-OX) 400 MG tablet Take 400 mg by mouth daily    Historical Provider, MD   clopidogrel (PLAVIX) 75 MG tablet Take 1 tablet by mouth daily 10/18/18   B Miladys Xavier DO   nitroGLYCERIN (NITROLINGUAL) 0.4 MG/SPRAY 0.4 mg spray Place 1 spray under the tongue every 5 minutes as needed for Chest pain 10/27/17   B Miladys Xavier, DO   Omega-3 Fatty Acids (FISH OIL) 1000 MG CAPS Take 3,000 mg by mouth daily    Historical Provider, MD   aspirin 81 MG EC tablet Take 81 mg by mouth daily     Historical Provider, MD       Current medications:    Current Facility-Administered Medications   Medication Dose Route Frequency Provider Last Rate Last Admin    pantoprazole (PROTONIX) 80 mg in sodium chloride 0.9 % 100 mL infusion  8 mg/hr IntraVENous Continuous Eli Piper, DO 10 mL/hr at 02/22/23 1305 8 mg/hr at 02/22/23 1305    0.9 % sodium chloride infusion   IntraVENous PRN Sal Castañeda DO        cetirizine (ZYRTEC) tablet 10 mg  10 mg Oral Daily Yessenia Morris MD        levothyroxine (SYNTHROID) tablet 100 mcg  100 mcg Oral Daily Yessenia Morris MD        [Held by provider] torsemide [facility]) tablet 100 mg  100 mg Oral BID Yessenia Morris MD        [Held by provider] spironolactone (ALDACTONE) tablet 50 mg  50 mg Oral Daily Pinkyt MD Alexandra        rosuvastatin (CRESTOR) tablet 40 mg  40 mg Oral QPM Yessenia Morris MD        [facility] ramipril (ALTACE) capsule 1.25 mg  1.25 mg Oral Daily Everrett MD Alexandra        insulin lispro (HUMALOG) injection vial 0-4 Units  0-4 Units SubCUTAneous TID WC Dmitriy Riley MD        insulin lispro (HUMALOG) injection vial 0-4 Units  0-4 Units SubCUTAneous Nightly Dmitriy Riley MD        magnesium oxide (MAG-OX) tablet 400 mg  400 mg Oral Daily Dmitriy Riley MD        [Held by provider] metOLazone (ZAROXOLYN) tablet 2.5 mg  2.5 mg Oral Daily Dmitriy Riley MD        [Held by provider] metoprolol succinate (TOPROL XL) extended release tablet 25 mg  25 mg Oral Daily Dmitriy Riley MD        nitroGLYCERIN (NITROSTAT) SL tablet 0.4 mg  0.4 mg SubLINGual Q5 Min PRN Dmitriy Riley MD        therapeutic multivitamin-minerals 1 tablet  1 tablet Oral Daily Dmitriy Riley MD        acetaminophen (TYLENOL) tablet 650 mg  650 mg Oral Q6H PRN Dmitriy Riley MD        Or   Shelva Kae acetaminophen (TYLENOL) suppository 650 mg  650 mg Rectal Q6H PRN Dmitriy Riley MD         Current Outpatient Medications   Medication Sig Dispense Refill    spironolactone (ALDACTONE) 25 MG tablet Take 2 tablets by mouth daily 180 tablet 3    traZODone (DESYREL) 100 MG tablet Take 1.5 tablets by mouth nightly 135 tablet 3    allopurinol (ZYLOPRIM) 100 MG tablet Take 1 tablet by mouth daily 90 tablet 3    nystatin-triamcinolone (MYCOLOG II) 981992-1.1 UNIT/GM-% cream Apply topically 2 times daily. 60 g 0    metoprolol succinate (TOPROL XL) 25 MG extended release tablet Take 1 tablet by mouth daily 45 tablet 11    torsemide (DEMADEX) 100 MG tablet TAKE 2 TABLETS DAILY 180 tablet 3    rosuvastatin (CRESTOR) 40 MG tablet Take 1 tablet by mouth every evening 90 tablet 3    levothyroxine (SYNTHROID) 100 MCG tablet Take 1 tablet by mouth daily 90 tablet 3    ramipril (ALTACE) 1.25 MG capsule Take 1 capsule by mouth in the morning. 90 capsule 3    cetirizine (ZYRTEC) 10 MG tablet Take 10 mg by mouth in the morning.       potassium chloride (KLOR-CON M) 20 MEQ extended release tablet Take 1 tablet by mouth 2 times daily TAKE 1 TABLET BY MOUTH TWICE DAILY 180 tablet 3    metFORMIN (GLUCOPHAGE) 1000 MG tablet Take 1 tablet by mouth 2 times daily (with meals) 180 tablet 3    Misc. Devices (RAISED TOILET SEAT/LOCK & ARMS) MISC Use with existing toilet to facilitate transfers. 1 each 0    triamcinolone (KENALOG) 0.1 % cream APPLY TOPICALLY TWICE A DAY 80 g 5    metOLazone (ZAROXOLYN) 2.5 MG tablet Take 1 tablet by mouth daily (Patient taking differently: Take 2.5 mg by mouth every other day) 90 tablet 3    Multiple Vitamins-Minerals (THERAPEUTIC MULTIVITAMIN-MINERALS) tablet Take 1 tablet by mouth daily      magnesium oxide (MAG-OX) 400 MG tablet Take 400 mg by mouth daily      clopidogrel (PLAVIX) 75 MG tablet Take 1 tablet by mouth daily 90 tablet 3    nitroGLYCERIN (NITROLINGUAL) 0.4 MG/SPRAY 0.4 mg spray Place 1 spray under the tongue every 5 minutes as needed for Chest pain 1 Bottle 0    Omega-3 Fatty Acids (FISH OIL) 1000 MG CAPS Take 3,000 mg by mouth daily      aspirin 81 MG EC tablet Take 81 mg by mouth daily          Allergies:     Allergies   Allergen Reactions    Codeine Itching       Problem List:    Patient Active Problem List   Diagnosis Code    Carotid artery occlusion without infarction I65.29    CAD (coronary artery disease) I25.10    HTN (hypertension) I10    Hyperlipidemia with target LDL less than 100 E78.5    Chronic systolic congestive heart failure (HCC) I50.22    GI bleed K92.2    NSAID long-term use Z79.1    Hypotension due to hypovolemia I95.89, E86.1    Acute blood loss anemia D62    Chest pain R07.9    Dieulafoy lesion of stomach K31.82    Pleural effusion, bilateral J90    Bilateral carotid artery stenosis I65.23    Syncope and collapse R55    Acute renal failure superimposed on stage 3 chronic kidney disease (HCC) N17.9, N18.30    Hyponatremia E87.1    TIA (transient ischemic attack) G45.9    Weakness R53.1    Visual disturbance H53.9    Acquired hypothyroidism E03.9    Atherosclerosis of native artery of both lower extremities with intermittent claudication ([facility] [address] I70.213    Stage 3b chronic kidney disease ([facility]) N18.32    Type 2 diabetes mellitus with chronic kidney disease E11.22    Fatigue R53.83    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    Carotid arterial disease ([facility]) I77.9    Mixed hyperlipidemia E78.2    Calculus of kidney N20.0    Tinea corporis B35.4    Pure hypercholesterolemia E78.00    Perforation of tympanic membrane H72.90    Malignant neoplasm of skin C44.90    LBBB (left bundle branch block) I44.7    ICD (implantable cardioverter-defibrillator) in place Z95.810    Thrombocytopenia, unspecified D69.6    Anemia D64.9    Hypovolemic shock ([facility]) R57.1       Past Medical History:        Diagnosis Date    Arthritis     Basilar artery stenosis     CAD (coronary artery disease) 1997 2009    MI    Cancer ([facility] [address]     SKIN 6YRS AGO    Carotid artery stenosis     bilateral    Cerebral artery occlusion with cerebral infarction ([facility] [address]     TIA due to hypotension    CHF (congestive heart failure) ([facility])     Dieulafoy lesion of stomach     Heel spur     Hyperlipidemia     Hypertension     Palliative care patient 10/17/2018    Sleep apnea     no c-pap    Squamous cell cancer of skin of earlobe, left     SCC    Type II or unspecified type diabetes mellitus without mention of complication, not stated as uncontrolled     Vertebral artery insufficiency        Past Surgical History:        Procedure Laterality Date    ANGIOPLASTY Bilateral     basilar artery angioplasty    CARDIAC CAT[place]  11[place]/2018    [place]    CARDIAC DEFIBRILLATOR PLACEMENT      medtronic    CARPAL TUNNEL RELEASE      bilateral    CATARACT REMOVAL Bilateral     CHOLECYSTECTOMY      COLONOSCOPY N/A 07/06/2021    Dr Celia Kerns-Diverticular disease-AP x 3, 3 yr recall    CORONARY ARTERY BYPASS GRAFT  1997    3v    HEEL SPUR SURGERY      left    OH EGD TRANSORAL BIOPSY SINGLE/MULTIPLE N/A 10/16/2018    Dr Celia Kerns-w/control of bleeding-Active bleeding in the stomach from Dieulafoy lesion    PTCA  2009 [place]    with stents    TONSILLECTOMY         Social History:    Social History     Tobacco Use    Smoking status: Former     Packs/day: 1.00     Years: 30.00     Pack years: 30.00     Types: Cigars, Cigarettes     Quit date: 3/3/2009     Years since quittin.9    Smokeless tobacco: Never   Substance Use Topics    Alcohol use: No     Alcohol/week: 0.0 standard drinks                                Counseling given: Not Answered      Vital Signs (Current):   Vitals:    23 1254 23 1304 23 1334 23 1404   BP: (!) 80/51 (!) 68/50 (!) 78/53 (!) 82/53   Pulse: 77 81 82 90   Resp: 18 19 18 18   Temp:       TempSrc:       SpO2: 100% (!) 85% 100% 100%   Weight:       Height:                                                  BP Readings from Last 3 Encounters:   23 (!) 82/53   22 106/74   22 102/68       NPO Status:                                                                                 BMI:   Wt Readings from Last 3 Encounters:   23 212 lb (96.2 kg)   22 212 lb (96.2 kg)   22 213 lb (96.6 kg)     Body mass index is 29.57 kg/m².     CBC:   Lab Results   Component Value Date/Time    WBC 10.5 2023 12:05 PM    RBC 2.66 2023 12:05 PM    HGB 8.3 2023 12:05 PM    HCT 25.2 2023 12:05 PM    HCT 38.7 2011 01:30 PM    MCV 94.7 2023 12:05 PM    RDW 16.5 2023 12:05 PM     2023 12:05 PM     2011 01:30 PM       CMP:   Lab Results   Component Value Date/Time     2023 12:05 PM     2011 01:30 PM    K 5.3 2023 12:05 PM    K 3.7 2021 07:59 PM    K 4.9 2011 01:30 PM    CL 86 2023 12:05 PM     2011 01:30 PM    CO2 17 2023 12:05 PM     2023 12:05 PM    CREATININE 2.6 2023 12:05 PM    CREATININE 1.0 2011 01:30 PM    GFRAA 38 2022 08:12 AM    LABGLOM 25 02/22/2023 12:05 PM    GLUCOSE 168 02/22/2023 12:05 PM    PROT 7.6 02/22/2023 12:05 PM    PROT 6.8 02/13/2013 06:52 PM    CALCIUM 9.0 02/22/2023 12:05 PM    BILITOT 0.4 02/22/2023 12:05 PM    ALKPHOS 72 02/22/2023 12:05 PM    ALKPHOS 42 09/06/2011 01:30 PM    AST 20 02/22/2023 12:05 PM    ALT 9 02/22/2023 12:05 PM       POC Tests: No results for input(s): POCGLU, POCNA, POCK, POCCL, POCBUN, POCHEMO, POCHCT in the last 72 hours. Coags:   Lab Results   Component Value Date/Time    PROTIME 16.4 02/22/2023 12:05 PM    PROTIME 11.56 09/06/2011 01:30 PM    INR 1.32 02/22/2023 12:05 PM    APTT 34.0 10/23/2019 01:35 PM       HCG (If Applicable): No results found for: PREGTESTUR, PREGSERUM, HCG, HCGQUANT     ABGs: No results found for: PHART, PO2ART, OMQ1JEU, UMX4XLC, BEART, L0AZWFVH     Type & Screen (If Applicable):  No results found for: LABABO, LABRH    Drug/Infectious Status (If Applicable):  No results found for: HIV, HEPCAB    COVID-19 Screening (If Applicable):   Lab Results   Component Value Date/Time    COVID19 Not Detected 02/22/2023 12:37 PM           Anesthesia Evaluation  Patient summary reviewed  Airway: Mallampati: II  TM distance: >3 FB   Neck ROM: full  Comment: History of difficult intubation per chart  Mouth opening: < 3 FB   Dental:    (+) upper dentures      Pulmonary:normal exam    (+) sleep apnea:                             Cardiovascular:  Exercise tolerance: no interval change,   (+) hypertension:, pacemaker (last shcok 10/2023): AICD, CAD:, CHF:, hyperlipidemia         Beta Blocker:  Dose within 24 Hrs      ROS comment: EF approx 20 % via echo     Neuro/Psych:   (+) CVA:,             GI/Hepatic/Renal:   (+) renal disease: CRI,          ROS comment: H/o dieulafoy lesion . Endo/Other:    (+) Diabetes, hypothyroidism, blood dyscrasia (Plavix): anticoagulation therapy:., .                 Abdominal:             Vascular:   + PVD, aortic or cerebral, .        Other Findings:           Anesthesia Plan      general and TIVA     ASA 4       Induction: intravenous.      Anesthetic plan and risks discussed with patient.                  NPO since 1730 2/21/2023      JOSE ELIAS Knox - ASHLY   2/22/2023

## 2023-02-22 NOTE — CARE COORDINATION
Case Management Assessment  Initial Evaluation    Date/Time of Evaluation: 2/22/2023 3:11 PM  Assessment Completed by: Michelle Sandoval    If patient is discharged prior to next notation, then this note serves as note for discharge by case management. Patient Name: Maria Isabel Wong                   YOB: 1950  Diagnosis: Hypovolemic shock ([facility] [address] [R57.1]                   Date / Time: 2/22/2023 11:46 AM    Patient Admission Status: Inpatient   Readmission Risk (Low < 19, Mod (19-27), High > 27): Readmission Risk Score: 18    Current PCP: Jose Vogel, DO  PCP verified by CM? Yes    Chart Reviewed: Yes      History Provided by: Patient  Patient Orientation: Alert and Oriented    Patient Cognition: Alert    Hospitalization in the last 30 days (Readmission):  No    If yes, Readmission Assessment in CM Navigator will be completed. Advance Directives:      Code Status: Full Code   Patient's Primary Decision Maker is: Legal Next of Kin (Wife or son)    Primary Decision Maker: Solomon Ordoñez - Spouse - [phone]    Secondary Decision Maker: Cong Rosario - Child - [phone]    Discharge Planning:    Patient lives with: Spouse/Significant Other Type of Home: House  Primary Care Giver: Self  Patient Support Systems include: Spouse/Significant Other   Current Financial resources: (P) Medicare, [place] ()  Current community resources: None  Current services prior to admission: C-pap, Durable Medical Equipment, Oxygen Therapy (Does not use C-Pap machine, concentrator at night)            Current DME: Oxygen Therapy (Comment), Cane, Cpap, Shower Chair, Walker, Wheelchair, Bedside Commode, Bedside Table            Type of Home Care services:  None (Pt. denies needs)    ADLS  Prior functional level: Independent in ADLs/IADLs  Current functional level: Independent in ADLs/IADLs    PT AM-PAC:   /24  OT AM-PAC:   /24    Family can provide assistance at DC:  Yes  Would you like Case Management to discuss the discharge plan with any other family members/significant others, and if so, who?  Yes (Wife or son)  Plans to Return to Present Housing: Yes   Potential Assistance needed at discharge: N/A            Potential DME:    Patient expects to discharge to: [address] [place] [address] for transportation at discharge: Family    Financial    Payor: MEDICARE / Plan: MEDICARE PART A AND B / Product Type: *No Product type* /     Does insurance require precert for SNF: No    Potential assistance Purchasing Medications: No (Pt. denies needs)  Meds-to-Beds request:        María Treviño #[id number] [facility], [phone] [facility] -  [phone]  [facility] [address] [address]  Phone: [phone] Fax: [phone] [address] [facility] [address] [phone]  F [phone] [address] [address]  Phone: [phone] Fax: [phone]      Notes:    Factors facilitating achievement of predicted outcomes: Family support, Caregiver support, Motivated, Cooperative, Pleasant, Sense of humor, Good insight into deficits, Has needed Durable Medical Equipment at home, and Home is wheelchair accessible    Barriers to discharge: Pain and Lower extremity weakness    The Plan for Transition of Care is related to the following treatment goals of Hypovolemic shock ([facility] [address] [R57.1]      Kiel Cobian  Case Management Department

## 2023-02-22 NOTE — H&P
[address]    Patient: Anthon Moment   : 1950   MRN: [id number]  Code Status: Full Code  PCP: Norah Nicolas DO  Date of Service: 2023    Chief Complaint:   Dark stools    History of Present Illness:   75-year-old male with past medical history as listed below presented to ER with dark stools which started yesterday. Prior history of GI bleed. Takes naproxen regularly. Prescribed aspirin/Plavix but has not taken these agents in some time. Denies abdominal pain, fever, chills, or chest pain. Patient states his SBP runs in the upper 90s/low 100s. Complains of mild fatigue. No further complaints. Admitted to critical care unit.     Review of Systems:   A comprehensive review of systems was negative except for: Constitutional: positive for fatigue  Gastrointestinal: positive for dark stools    Past Medical History:     Past Medical History:   Diagnosis Date    Arthritis     Basilar artery stenosis     CAD (coronary artery disease) 1997    MI    Cancer ([facility] [address]     SKIN 6YRS AGO    Carotid artery stenosis     bilateral    Cerebral artery occlusion with cerebral infarction ([facility] [address]     TIA due to hypotension    CHF (congestive heart failure) (HCC)     Dieulafoy lesion of stomach     Heel spur     Hyperlipidemia     Hypertension     Palliative care patient 10/17/2018    Sleep apnea     no c-pap    Squamous cell cancer of skin of earlobe, left     SCC    Type II or unspecified type diabetes mellitus without mention of complication, not stated as uncontrolled     Vertebral artery insufficiency          Past Surgical History:     Past Surgical History:   Procedure Laterality Date    ANGIOPLASTY Bilateral     basilar artery angioplasty    CARDIAC CATHETERIZATION  2018    [place]    CARDIAC DEFIBRILLATOR PLACEMENT      [facility]    CARPAL TUNNEL RELEASE      bilateral    CATARACT REMOVAL Bilateral     CHOLECYSTECTOMY      COLONOSCOPY N/A 2021    Dr Romero Course disease-AP x 3, 3 yr recall    CORONARY ARTERY BYPASS GRAFT      3v    HEEL SPUR SURGERY      left    HI EGD TRANSORAL BIOPSY SINGLE/MULTIPLE N/A 10/16/2018    Dr Jelly Kerns-w/control of bleeding-Active bleeding in the stomach from Dieulafoy lesion    PTCA   [facility]    with stents    TONSILLECTOMY          Family History:     Family History   Problem Relation Age of Onset    Heart Disease Father     High Blood Pressure Father     Other Father         Esophageal Varices        Social History:     Social History     Socioeconomic History    Marital status:      Spouse name: None    Number of children: None    Years of education: None    Highest education level: None   Tobacco Use    Smoking status: Former     Packs/day: 1.00     Years: 30.00     Pack years: 30.00     Types: Cigars, Cigarettes     Quit date: 3/3/2009     Years since quittin.9    Smokeless tobacco: Never   Vaping Use    Vaping Use: Never used   Substance and Sexual Activity    Alcohol use: No     Alcohol/week: 0.0 standard drinks    Drug use: No     Social Determinants of Health     Financial Resource Strain: Low Risk     Difficulty of Paying Living Expenses: Not hard at all   Food Insecurity: No Food Insecurity    Worried About Running Out of Food in the Last Year: Never true    Ran Out of Food in the Last Year: Never true   Physical Activity: Insufficiently Active    Days of Exercise per Week: 2 days    Minutes of Exercise per Session: 30 min       Prior to Admission Medications:   Medications Prior to Admission: spironolactone (ALDACTONE) 25 MG tablet, Take 2 tablets by mouth daily  traZODone (DESYREL) 100 MG tablet, Take 1.5 tablets by mouth nightly  allopurinol (ZYLOPRIM) 100 MG tablet, Take 1 tablet by mouth daily  nystatin-triamcinolone (MYCOLOG II) 440071-3.1 UNIT/GM-% cream, Apply topically 2 times daily.   metoprolol succinate (TOPROL XL) 25 MG extended release tablet, Take 1 tablet by mouth daily  torsemide (DEMADEX) 100 MG tablet, TAKE 2 TABLETS DAILY  rosuvastatin (CRESTOR) 40 MG tablet, Take 1 tablet by mouth every evening  levothyroxine (SYNTHROID) 100 MCG tablet, Take 1 tablet by mouth daily  ramipril (ALTACE) 1.25 MG capsule, Take 1 capsule by mouth in the morning. cetirizine (ZYRTEC) 10 MG tablet, Take 10 mg by mouth in the morning. potassium chloride (KLOR-CON M) 20 MEQ extended release tablet, Take 1 tablet by mouth 2 times daily TAKE 1 TABLET BY MOUTH TWICE DAILY  metFORMIN (GLUCOPHAGE) 1000 MG tablet, Take 1 tablet by mouth 2 times daily (with meals)  Misc. Devices (RAISED TOILET SEAT/LOCK & ARMS) MISC, Use with existing toilet to facilitate transfers. triamcinolone (KENALOG) 0.1 % cream, APPLY TOPICALLY TWICE A DAY  metOLazone (ZAROXOLYN) 2.5 MG tablet, Take 1 tablet by mouth daily (Patient taking differently: Take 2.5 mg by mouth every other day)  Multiple Vitamins-Minerals (THERAPEUTIC MULTIVITAMIN-MINERALS) tablet, Take 1 tablet by mouth daily  magnesium oxide (MAG-OX) 400 MG tablet, Take 400 mg by mouth daily  clopidogrel (PLAVIX) 75 MG tablet, Take 1 tablet by mouth daily  nitroGLYCERIN (NITROLINGUAL) 0.4 MG/SPRAY 0.4 mg spray, Place 1 spray under the tongue every 5 minutes as needed for Chest pain  Omega-3 Fatty Acids (FISH OIL) 1000 MG CAPS, Take 3,000 mg by mouth daily  aspirin 81 MG EC tablet, Take 81 mg by mouth daily      Allergies:      Allergies   Allergen Reactions    Codeine Itching         Physical Exam:   BP (!) 65/30   Pulse 88   Temp (P) 97.2 °F (36.2 °C) (Temporal)   Resp 16   Ht 5' 11\" (1.803 m)   Wt 212 lb (96.2 kg)   SpO2 100%   BMI 29.57 kg/m²     General: no acute distress  HEENT: normocephalic  Neck: supple, symmetrical, trachea midline   Lungs: clear to auscultation bilaterally   Cardiovascular: s1 and s2 normal  Abdomen: soft, positive bowel sounds, nontender  Extremities: no edema or cyanosis   Neuro: aaox3, no focal deficits     Recent Results (from the past 72 hour(s))   CBC with Auto Differential    Collection Time: 02/22/23 12:05 PM   Result Value Ref Range    WBC 10.5 4.8 - 10.8 K/uL    RBC 2.66 (L) 4.70 - 6.10 M/uL    Hemoglobin 8.3 (L) 14.0 - 18.0 g/dL    Hematocrit 25.2 (L) 42.0 - 52.0 %    MCV 94.7 (H) 80.0 - 94.0 fL    MCH 31.2 (H) 27.0 - 31.0 pg    MCHC 32.9 (L) 33.0 - 37.0 g/dL    RDW 16.5 (H) 11.5 - 14.5 %    Platelets 914 767 - 507 K/uL    MPV 10.8 9.4 - 12.4 fL    Neutrophils % 77.6 (H) 50.0 - 65.0 %    Lymphocytes % 8.3 (L) 20.0 - 40.0 %    Monocytes % 10.8 (H) 0.0 - 10.0 %    Eosinophils % 1.8 0.0 - 5.0 %    Basophils % 0.9 0.0 - 1.0 %    Neutrophils Absolute 8.2 (H) 1.5 - 7.5 K/uL    Immature Granulocytes # 0.1 K/uL    Lymphocytes Absolute 0.9 (L) 1.1 - 4.5 K/uL    Monocytes Absolute 1.10 (H) 0.00 - 0.90 K/uL    Eosinophils Absolute 0.20 0.00 - 0.60 K/uL    Basophils Absolute 0.10 0.00 - 0.20 K/uL   CMP    Collection Time: 02/22/23 12:05 PM   Result Value Ref Range    Sodium 123 (L) 136 - 145 mmol/L    Potassium 5.3 (H) 3.5 - 5.0 mmol/L    Chloride 86 (L) 98 - 111 mmol/L    CO2 17 (L) 22 - 29 mmol/L    Anion Gap 20 (H) 7 - 19 mmol/L    Glucose 168 (H) 74 - 109 mg/dL     (H) 8 - 23 mg/dL    Creatinine 2.6 (H) 0.5 - 1.2 mg/dL    Est, Glom Filt Rate 25 (A) >60    Calcium 9.0 8.8 - 10.2 mg/dL    Total Protein 7.6 6.6 - 8.7 g/dL    Albumin 4.2 3.5 - 5.2 g/dL    Total Bilirubin 0.4 0.2 - 1.2 mg/dL    Alkaline Phosphatase 72 40 - 130 U/L    ALT 9 5 - 41 U/L    AST 20 5 - 40 U/L   Lipase    Collection Time: 02/22/23 12:05 PM   Result Value Ref Range    Lipase 66 (H) 13 - 60 U/L   Protime-INR    Collection Time: 02/22/23 12:05 PM   Result Value Ref Range    Protime 16.4 (H) 12.0 - 14.6 sec    INR 1.32 (H) 0.88 - 1.18   TYPE AND SCREEN    Collection Time: 02/22/23 12:16 PM   Result Value Ref Range    ABO/Rh O POS     Antibody Screen NEG    PREPARE RBC (CROSSMATCH), 2 Units    Collection Time: 02/22/23 12:16 PM   Result Value Ref Range Product Code Blood Bank [id number]     Description Blood Bank Red Blood Cells, Irradiated, Leuko-reduced     Unit Number [id number]     Dispense Status Blood Bank selected     Product Code Blood Bank [id number]     Description Blood Bank Red Blood Cells, Apheresis, Leuko-reduced     Unit Number [id number]     Dispense Status Blood Bank selected    COVID-19, Rapid    Collection Time: 02/22/23 12:37 PM    Specimen: Nasopharyngeal Swab   Result Value Ref Range    SARS-CoV-2, NAAT Not Detected Not Detected   Hemoglobin and Hematocrit    Collection Time: 02/22/23  5:41 PM   Result Value Ref Range    Hemoglobin 7.9 (L) 14.0 - 18.0 g/dL    Hematocrit 23.4 (L) 42.0 - 52.0 %   POCT Glucose    Collection Time: 02/22/23  7:44 PM   Result Value Ref Range    POC Glucose 166 (H) 70 - 99 mg/dl    Performed on [employer]        No intake/output data recorded. No results found.     Assessment and Plan:   GI bleed  Acute blood loss anemia  TONY/CKD 3    Takes naproxen regularly  Prescribed aspirin/Plavix but noncompliant  Prior history of GI bleed  GI following  S/p EGD 2/22/2023, report on file  Meds per GI  Patient states SBP normally upper 90s/low 100s  Follow hemoglobin  Further transfusions as warranted  Judicious IVF  SCDs for DVT prophylaxis  Discussed treatment plan with patient/wife/RN    Total critical care time: 70 minutes  Total time spent managing the care of this patient: 70 minutes    Madyson Pedraza MD  2/22/2023 8:06 PM

## 2023-02-22 NOTE — PROCEDURES
Endoscopic Procedure Note    Patient: Jose E Wang : 1950  [facility] Rec#: [id number] Acc#: [id number]     Primary Care Provider MIKE Harrison DO    Referring Provider: Dr. Luis Johnston    Endoscopist: Chris Decker MD    Date of Procedure: 2023    Pre-Op Diagnosis: Rule out peptic ulcer disease. Post-Op Diagnosis: 1. Multiple gastric ulcers with erosions. 2.  Fragile gastric mucosa with oozing of blood. 3.  Gastric antral ulcer with active bleeding/fresh blood clot. 4.  Widely patent Schatzki ring. Procedure(s):  EGD CONTROL HEMORRHAGE      Indications:    Melanotic stool. Anemia. Anesthesia:  Sedation was administered by anesthesia who monitored the patient during the procedure. Estimated Blood Loss: none    Procedure:   After reviewing the patient's chart and obtaining informed consent, the patient was placed in the left lateral decubitus position. A forward-viewing Olympus endoscope was lubricated and inserted through the mouth into the oropharynx. Under direct visualization, the upper esophagus was intubated. The scope was advanced to the level of the second portion of duodenum without any difficulty. Scope was slowly withdrawn with careful inspection of the mucosal surfaces. The scope was retroflexed for inspection of the gastric fundus and incisura. The patient tolerated the procedure well. The scope was removed. Findings:     Esophagus: The esophageal mucosa was normal.  There was no evidence of inflammation, ulceration or any masses. No evidence of any Shah's esophagus or hiatal hernia. A widely patent ringlike structure was noted at the gastroesophageal junction. This finding consistent with a widely patent Schatzki ring. No esophageal varices are noted. The scope was passed without difficulty in the stomach. Stomach: The gastric mucosa in the lower body and the antrum noted to be inflamed with multiple superficial erosions.   A relatively large ulcer measuring about 1 cm was noted on the lesser curvature side in the gastric antral area. Some oozing was noted along with a fresh blood clot. Small amount of free blood was noted in the gastric antral area. Retroflexion was done. Upper part of gastric body and fundus also appears to be somewhat chronically inflamed, edematous and granular appearance. The mucosa of the gastric body and antrum was quite fragile. Slight touch of the scope was oozing from different places. No obvious angiodysplastic lesions were noted. No evidence of any significant hiatal hernia or gastric varices were noted. Duodenum: Duodenal bulb, first and second portion was normal.  There was no evidence of any ulceration, inflammation or any masses. Small amount of fresh blood was also noted in the duodenum. The scope was slowly withdrawn in the stomach. The ulcerated area with a blood clot was watched carefully. The clot was adherent. I decided to inject 1 is to 10,000 strength of epinephrine. About 4 cc of epinephrine was injected with good blanching result. I then decided to place 2 clip at the base of this ulcer approximating the margins and to include the vessel. No further bleeding was noted. The scope was then slowly withdrawn. Multiple pictures taken. Specimens: None. Complications: None    Impression:  1. Multiple gastric ulcers with erosions. 2.  Fragile gastric mucosa with oozing of blood. 3.  Gastric antral ulcer with active bleeding/fresh blood clot. Status post 4 cc of epinephrine and 2 Hemoclip application. 4.  Widely patent Schatzki ring. Recommendations:    1. Antireflux measures. 2.  I will resume patient on clear liquid diet and advance tomorrow as tolerated and if there is no further bleeding. 3.  Follow hemoglobin hematocrit and transfuse packed red blood cell on as-needed basis. 4.  Continue pantoprazole 40 mg IV twice a day.   5.  Check H. pylori serology or stool antigen and treat if positive.       Ky Zapata MD  2/22/2023, 4:18 PM

## 2023-02-22 NOTE — ED PROVIDER NOTES
[facility] EMERGENCY DEPT  EMERGENCY DEPARTMENT ENCOUNTER      Pt Name: Phani Venegas  MRN: [id number]  Evansgfdion 1950  Date of evaluation: 2/22/2023  Provider: Leodan Donato, [address] [place] [address]       Chief Complaint   Patient presents with    Rectal Bleeding     Pt arrived to the ed with c/o black tarry stools. Onset yesterday. Pt has hx of GI bleed. HISTORY OF PRESENT ILLNESS   (Location/Symptom, Timing/Onset, Context/Setting, Quality, Duration, Modifying Factors, Severity)  Note limiting factors. Phani Venegas is a 67 y.o. male who presents to the emergency department     Patient is a 24-year-old male who presents the emergency department complaining of melena. Says that he has a history of an upper GI bleed in 2018 which required clipping. He says he has had some fatigue over the last few days. Yesterday he noted black tarry stool. He says he has had frequent black tarry stools since yesterday. He says he has not taken Plavix for the last 3 months. He takes a baby aspirin occasionally. He takes naproxen every night. He drinks 1 coffee a day. Denies any blood thinners he denies any abdominal pain. He had nausea yesterday but denies any today. The patient denies any other complaints/concerns at this time. SBP noted to be in the 70s. Updated patient and wife on plan for workup including, CBC, type/screen, protonix, IV fluids. Wife voices concern and says the patient was fluid overloaded last admit in 2018. Patient reports history of CHF. Pt stats his normal SBP is upper 90s/low 100s. They are agreeable with 500cc NS bolus to start. The history is provided by the patient and the spouse. Nursing Notes were reviewed. REVIEW OF SYSTEMS    (2-9 systems for level 4, 10 or more for level 5)     Review of Systems    Except as noted above the remainder of the review of systems was reviewed and negative.        PAST MEDICAL HISTORY     Past Medical History:   Diagnosis Date Arthritis     Basilar artery stenosis     CAD (coronary artery disease) 1997 2009    MI    Cancer [facility])     SKIN 6YRS AGO    Carotid artery stenosis     bilateral    Cerebral artery occlusion with cerebral infarction [facility])     TIA due to hypotension    CHF (congestive heart failure) (HCC)     Dieulafoy lesion of stomach     Heel spur     Hyperlipidemia     Hypertension     Palliative care patient 10/17/2018    Sleep apnea     no c-pap    Squamous cell cancer of skin of earlobe, left     SCC    Type II or unspecified type diabetes mellitus without mention of complication, not stated as uncontrolled     Vertebral artery insufficiency          SURGICAL HISTORY       Past Surgical History:   Procedure Laterality Date    ANGIOPLASTY Bilateral     basilar artery angioplasty    CARDIAC CATHETERIZATION  11/30/2018    [place]    CARDIAC DEFIBRILLATOR PLACEMENT      medtronic    CARPAL TUNNEL RELEASE      bilateral    CATARACT REMOVAL Bilateral     CHOLECYSTECTOMY      COLONOSCOPY N/A 07/06/2021    Dr Holden Kerns-Diverticular disease-AP x 3, 3 yr recall    CORONARY ARTERY BYPASS GRAFT  1997    3v    HEEL SPUR SURGERY      left    [facility] TRANSORAL BIOPSY SINGLE/MULTIPLE N/A 10/16/2018    Dr Holden Kerns-w/control of bleeding-Active bleeding in the stomach from Dieulafoy lesion    PTCA  2009 [facility]    with stents    TONSILLECTOMY           CURRENT MEDICATIONS       Previous Medications    ALLOPURINOL (ZYLOPRIM) 100 MG TABLET    Take 1 tablet by mouth daily    ASPIRIN 81 MG EC TABLET    Take 81 mg by mouth daily     CETIRIZINE (ZYRTEC) 10 MG TABLET    Take 10 mg by mouth in the morning.     CLOPIDOGREL (PLAVIX) 75 MG TABLET    Take 1 tablet by mouth daily    LEVOTHYROXINE (SYNTHROID) 100 MCG TABLET    Take 1 tablet by mouth daily    MAGNESIUM OXIDE (MAG-OX) 400 MG TABLET    Take 400 mg by mouth daily    METFORMIN (GLUCOPHAGE) 1000 MG TABLET    Take 1 tablet by mouth 2 times daily (with meals)    METOLAZONE (ZAROXOLYN) 2.5 MG TABLET Take 1 tablet by mouth daily    METOPROLOL SUCCINATE (TOPROL XL) 25 MG EXTENDED RELEASE TABLET    Take 1 tablet by mouth daily    MISC. DEVICES (RAISED TOILET SEAT/LOCK & ARMS) MISC    Use with existing toilet to facilitate transfers. MULTIPLE VITAMINS-MINERALS (THERAPEUTIC MULTIVITAMIN-MINERALS) TABLET    Take 1 tablet by mouth daily    NITROGLYCERIN (NITROLINGUAL) 0.4 MG/SPRAY 0.4 MG SPRAY    Place 1 spray under the tongue every 5 minutes as needed for Chest pain    NYSTATIN-TRIAMCINOLONE (MYCOLOG II) 488995-0.1 UNIT/GM-% CREAM    Apply topically 2 times daily. OMEGA-3 FATTY ACIDS (FISH OIL) 1000 MG CAPS    Take 3,000 mg by mouth daily    POTASSIUM CHLORIDE (KLOR-CON M) 20 MEQ EXTENDED RELEASE TABLET    Take 1 tablet by mouth 2 times daily TAKE 1 TABLET BY MOUTH TWICE DAILY    RAMIPRIL (ALTACE) 1.25 MG CAPSULE    Take 1 capsule by mouth in the morning. ROSUVASTATIN (CRESTOR) 40 MG TABLET    Take 1 tablet by mouth every evening    SPIRONOLACTONE (ALDACTONE) 25 MG TABLET    Take 2 tablets by mouth daily    TORSEMIDE (DEMADEX) 100 MG TABLET    TAKE 2 TABLETS DAILY    TRAZODONE (DESYREL) 100 MG TABLET    Take 1.5 tablets by mouth nightly    TRIAMCINOLONE (KENALOG) 0.1 % CREAM    APPLY TOPICALLY TWICE A DAY       ALLERGIES     Codeine    FAMILY HISTORY       Family History   Problem Relation Age of Onset    Heart Disease Father     High Blood Pressure Father     Other Father         Esophageal Varices          SOCIAL HISTORY       Social History     Socioeconomic History    Marital status:      Spouse name: None    Number of children: None    Years of education: None    Highest education level: None   Tobacco Use    Smoking status: Former     Packs/day: 1.00     Years: 30.00     Pack years: 30.00     Types: Cigars, Cigarettes     Quit date: 3/3/2009     Years since quittin.9    Smokeless tobacco: Never   Vaping Use    Vaping Use: Never used   Substance and Sexual Activity    Alcohol use:  No Alcohol/week: 0.0 standard drinks    Drug use: No     Social Determinants of Health     Financial Resource Strain: Low Risk     Difficulty of Paying Living Expenses: Not hard at all   Food Insecurity: No Food Insecurity    Worried About Running Out of Food in the Last Year: Never true    Ran Out of Food in the Last Year: Never true   Physical Activity: Insufficiently Active    Days of Exercise per Week: 2 days    Minutes of Exercise per Session: 30 min       SCREENINGS         [place]ck[place] Coma Scale  Eye Opening: Spontaneous  Best Verbal Response: Oriented  Best Motor Response: Obeys commands  Elena Coma Scale Score: 15                     CIWA Assessment  BP: (!) 87/46  Heart Rate: 79                 PHYSICAL EXAM    (up to 7 for level 4, 8 or more for level 5)     ED Triage Vitals [02/22/23 1142]   BP Temp Temp Source Heart Rate Resp SpO2 Height Weight   (!) 87/46 97.3 °F (36.3 °C) Oral 79 16 100 % 5' 11\" (1.803 m) 212 lb (96.2 kg)       Physical Exam  Vital signs and nursing notes reviewed    General:  Awake, alert, NAD. Obese. HEENT: Normocephalic, atraumatic. EOMI. Mucous membranes are moist. No visible drainge from ears or nose. No conjunctival pallor. Neck:  Normal ROM. No meningismus. Cardiovascular:  Regular rate and regular rhythm. No appreciable murmurs. Radial pulses are 2+. No cyanosis. Lungs/Chest: No respiratory distress. There are no audible wheezes. No appreciable rales, rhonchi or wheezes. No stridor. Speaking in full sentences. Equal chest rise with inspiration. No accessory muscle usage. Abdomen: Soft, nontender, non distended. No rebound, gurading, rigidity. Back: No CVA tenderness. Rectal exam:  Nurse chaperone present for exam.  No abnormal skin color changes. No abnormal lesions. Single gloved lubricated finger inserted into rectum. No palpable fissures, hemorrhoids or masses. No tenderness. Stool color is melanotic, no gross blood. Extremities: Normal ROM, no edema.  No calf tenderness. No appreciable joint swelling. Skin:  Warm, dry. No visible rashes. No cyanosis, erythema or pallor. Neurologic:  Awake, alert, oriented to person, place, time, situation. Speech is clear. Psychiatric: Normal interaction and behavior. DIAGNOSTIC RESULTS     EKG: All EKG's are interpreted by the Emergency Department Physician who either signs or Co-signs this chart in the absence of a cardiologist.    EKG: interpreted by me 212 paced rhythm 88 no hyperacute T waves. No prior EKG available for comparison.            ED BEDSIDE ULTRASOUND:   Performed by ED Physician - none    LABS:  Labs Reviewed   CBC WITH AUTO DIFFERENTIAL - Abnormal; Notable for the following components:       Result Value    RBC 2.66 (*)     Hemoglobin 8.3 (*)     Hematocrit 25.2 (*)     MCV 94.7 (*)     MCH 31.2 (*)     MCHC 32.9 (*)     RDW 16.5 (*)     Neutrophils % 77.6 (*)     Lymphocytes % 8.3 (*)     Monocytes % 10.8 (*)     Neutrophils Absolute 8.2 (*)     Lymphocytes Absolute 0.9 (*)     Monocytes Absolute 1.10 (*)     All other components within normal limits   COMPREHENSIVE METABOLIC PANEL - Abnormal; Notable for the following components:    Sodium 123 (*)     Potassium 5.3 (*)     Chloride 86 (*)     CO2 17 (*)     Anion Gap 20 (*)     Glucose 168 (*)      (*)     Creatinine 2.6 (*)     Est, Glom Filt Rate 25 (*)     All other components within normal limits   LIPASE - Abnormal; Notable for the following components:    Lipase 66 (*)     All other components within normal limits   PROTIME-INR - Abnormal; Notable for the following components:    Protime 16.4 (*)     INR 1.32 (*)     All other components within normal limits   COVID-19, RAPID   URINALYSIS   HEMOGLOBIN AND HEMATOCRIT   HEMOGLOBIN AND HEMATOCRIT   POCT OCCULT BLOOD STOOL NON CA SCREEN   POCT GLUCOSE   POCT GLUCOSE   TYPE AND SCREEN   PREPARE RBC (CROSSMATCH)       All other labs were within normal range or not returned as of this dictation. EMERGENCY DEPARTMENT COURSE and DIFFERENTIAL DIAGNOSIS/MDM:   Vitals:    Vitals:    02/22/23 1142   BP: (!) 87/46   Pulse: 79   Resp: 16   Temp: 97.3 °F (36.3 °C)   TempSrc: Oral   SpO2: 100%   Weight: 212 lb (96.2 kg)   Height: 5' 11\" (1.803 m)           Medical Decision Making  Amount and/or Complexity of Data Reviewed  Labs: ordered. ECG/medicine tests: ordered. Risk  Prescription drug management. Decision regarding hospitalization. Patient is a 49-year-old male who presents the emergency department complaining of melena. Says that he has a history of an upper GI bleed in 2018 which required clipping. He says he has had some fatigue over the last few days. Yesterday he noted black tarry stool. He says he has had frequent black tarry stools since yesterday. He says he has not taken Plavix for the last 3 months. He takes a baby aspirin occasionally. He takes naproxen every night. He drinks 1 coffee a day. Denies any blood thinners he denies any abdominal pain. He had nausea yesterday but denies any today. The patient denies any other complaints/concerns at this time. SBP noted to be in the 70s. Updated patient and wife on plan for workup including, CBC, type/screen, protonix, IV fluids. Wife voices concern and says the patient was fluid overloaded last admit in 2018. Patient reports history of CHF. Pt stats his normal SBP is upper 90s/low 100s. They are agreeable with 500cc NS bolus to start. Results reviewed sodium is low 123. Chloride low at 86. IV fluid bolus infusing. Potassium 5.3 without hyperacute T waves. Pain was elevated at 2.6. . INR was elevated at 1.3. Blood cell count within normal limits. Hemoglobin was low at 8.3. Continues to be hypotensive. I have ordered units of packed red blood cells. Patient was given Protonix bolus and infusion as well as a bolus of octreotide.   I discussed the case with the GI provider on-call as above and he evaluated the patient at bedside and plans to take to EGD. Case was discussed with Dr. Nuria Rushing the critical care physician on-call. He will admit the patient to the ICU and assume care at this time. I discussed results plan of care with the patient and his wife at bedside. They were agreeable with plan. Questions answered. REASSESSMENT     ED Course as of 02/22/23 1554   Wed Feb 22, 2023   1221 Melanotic stool, occult positive [JS]   1315 Recheck: SBP 68, IV fluids infusion, midline being placed. Discussed results. Will order PRBCs. Pt agreeable. [JS]   36 Consult: discussed case with Dr. Juanita Copeland  - he is aware of case, will consult and likely take to EGD today [JS]   [id number] Consult: Discussed with Dr. Ragini Saucedo in ED - he has seen patient. He will d/w anesthesia and plan to take to EGD. Admit critical care physician. [JS]   7423 Consult: Discussed with Dr. Nuria Rushing critical care physician - he is aware of patient case. Will admit and assume care at this time. [JS]   8863 Recheck: SBP 77 wife at bedside. Updated on results and plan of care. [JS]      ED Course User Index  [JS] Cortney Paige,          CRITICAL CARE TIME   Total Critical Care time was 40 minutes, excluding separately reportable procedures. There was a high probability of clinically significant/life threatening deterioration in the patient's condition which required my urgent intervention. CONSULTS:  GI  Critical care physician      PROCEDURES:  Unless otherwise noted below, none     Procedures        FINAL IMPRESSION      1. Upper GI bleed    2. Symptomatic anemia          DISPOSITION/PLAN   DISPOSITION  ICU admit/EGD      PATIENT REFERRED TO:  No follow-up provider specified. DISCHARGE MEDICATIONS:  New Prescriptions    No medications on file     Controlled Substances Monitoring:     No flowsheet data found.     (Please note that portions of this note were completed with a voice recognition program.  Efforts were made to edit the dictations but occasionally words are mis-transcribed.)    Barb Monsalve DO (electronically signed)  Attending Emergency Physician           Barb Monsalve DO  02/22/23 0633

## 2023-02-22 NOTE — ADDENDUM NOTE
Addendum  created 02/22/23 1643 by Aramis Mercedes MD    Order list changed, Order sets accessed, Pharmacy for encounter modified

## 2023-02-22 NOTE — CONSULTS
Pt Name: Jose E Wang  MRN: [id number]  [id number]  YOB: 1950  Admit Date: 2/22/2023 11:46 AM  Date of evaluation: 2/22/2023  Primary Care Physician: Sandra Parrish DO   02/02-A       Requesting Provider: Dr. Rusty Gonzales    Indication: Melena. Anemia. History:  The patient is a 67 y.o. male admitted to the hospital for melanotic stool. According the patient starting yesterday afternoon he started having some dark and black looking stool. He has several bowel movements since then. His last black and tarry looking stool was early this morning around 530. He has at least 6 bowel movement. He denies any acute GI symptoms. He was noted to be anemic while he was in the hospital.  He claims that he has similar episode in 2018 and at that time endoscopy revealed Dieulafoy lesion which was clipped. Lately he is taking naproxen on a daily basis. He denies any heartburns or regurgitation a regular basis. No nausea or vomiting. No dysphagia odynophagia. No abdominal pain. No cramping, gas, bloating or distention. Some constipation but denies any diarrhea. No fresh blood per rectum.       Past Medical History:        Diagnosis Date    Arthritis     Basilar artery stenosis     CAD (coronary artery disease) 1997 2009    MI    Cancer ([facility] [address]     SKIN 6YRS AGO    Carotid artery stenosis     bilateral    Cerebral artery occlusion with cerebral infarction [facility])     TIA due to hypotension    CHF (congestive heart failure) (HCC)     Dieulafoy lesion of stomach     Heel spur     Hyperlipidemia     Hypertension     Palliative care patient 10/17/2018    Sleep apnea     no c-pap    Squamous cell cancer of skin of earlobe, left     SCC    Type II or unspecified type diabetes mellitus without mention of complication, not stated as uncontrolled     Vertebral artery insufficiency      Past Surgical History:        Procedure Laterality Date    ANGIOPLASTY Bilateral     basilar artery angioplasty    CARDIAC CATHETERIZATION  11/30/2018    [place]    CARDIAC DEFIBRILLATOR PLACEMENT      medtronic    CARPAL TUNNEL RELEASE      bilateral    CATARACT REMOVAL Bilateral     CHOLECYSTECTOMY      COLONOSCOPY N/A 07/06/2021    Dr Herring Rank disease-AP x 3, 3 yr recall    CORONARY ARTERY BYPASS GRAFT  1997    3v    HEEL SPUR SURGERY      left    [facility] EGD TRANSORAL BIOPSY SINGLE/MULTIPLE N/A 10/16/2018    Dr Hannah Kerns-w/control of bleeding-Active bleeding in the stomach from Dieulafoy lesion    PTCA  2009 [facility]    with stents    TONSILLECTOMY       Allergies:  Codeine  Home Meds:  Prior to Admission medications    Medication Sig Start Date End Date Taking? Authorizing Provider   spironolactone (ALDACTONE) 25 MG tablet Take 2 tablets by mouth daily 2/1/23   ROSS Danielle DO   traZODone (DESYREL) 100 MG tablet Take 1.5 tablets by mouth nightly 1/19/23   ROSS Danielle DO   allopurinol (ZYLOPRIM) 100 MG tablet Take 1 tablet by mouth daily 1/4/23   ROSS Danielle DO   nystatin-triamcinolone [facility]) 335654-9.1 UNIT/GM-% cream Apply topically 2 times daily. 12/6/22   ROSS Danielle DO   metoprolol succinate (TOPROL XL) 25 MG extended release tablet Take 1 tablet by mouth daily 11/28/22   ROSS Danielle DO   torsemide (DEMADEX) 100 MG tablet TAKE 2 TABLETS DAILY 11/8/22   Percilla Line, APRN   rosuvastatin (CRESTOR) 40 MG tablet Take 1 tablet by mouth every evening 11/2/22   Percilla Line, APRN   levothyroxine (SYNTHROID) 100 MCG tablet Take 1 tablet by mouth daily 10/3/22   ROSS Danielle DO   ramipril (ALTACE) 1.25 MG capsule Take 1 capsule by mouth in the morning. 8/15/22   ROSS Danielle DO   cetirizine (ZYRTEC) 10 MG tablet Take 10 mg by mouth in the morning.     Historical Provider, MD   potassium chloride (KLOR-CON M) 20 MEQ extended release tablet Take 1 tablet by mouth 2 times daily TAKE 1 TABLET BY MOUTH TWICE DAILY 2/25/22   Niki Hutchinson JOSE ELIAS Arango   metFORMIN (GLUCOPHAGE) 1000 MG tablet Take 1 tablet by mouth 2 times daily (with meals) 22   ROSS Lanier DO   Misc. Devices (RAISED TOILET SEAT/LOCK & ARMS) MISC Use with existing toilet to facilitate transfers. 22   ROSS Lanier DO   triamcinolone (KENALOG) 0.1 % cream APPLY TOPICALLY TWICE A DAY 21   ROSS Lanier DO   metOLazone (ZAROXOLYN) 2.5 MG tablet Take 1 tablet by mouth daily  Patient taking differently: Take 2.5 mg by mouth every other day 10/30/20   ROSS Lanier DO   Multiple Vitamins-Minerals (THERAPEUTIC MULTIVITAMIN-MINERALS) tablet Take 1 tablet by mouth daily    Historical Provider, MD   magnesium oxide (MAG-OX) 400 MG tablet Take 400 mg by mouth daily    Historical Provider, MD   clopidogrel (PLAVIX) 75 MG tablet Take 1 tablet by mouth daily 10/18/18   ROSS Lanier DO   nitroGLYCERIN (NITROLINGUAL) 0.4 MG/SPRAY 0.4 mg spray Place 1 spray under the tongue every 5 minutes as needed for Chest pain 10/27/17   ROSS Lanier DO   Omega-3 Fatty Acids (FISH OIL) 1000 MG CAPS Take 3,000 mg by mouth daily    Historical Provider, MD   aspirin 81 MG EC tablet Take 81 mg by mouth daily     Historical Provider, MD        Current Meds:           pantoprozole (PROTONIX) infusion 8 mg/hr (23 1305)    sodium chloride           Social History:   Social History     Socioeconomic History    Marital status:      Spouse name: Not on file    Number of children: Not on file    Years of education: Not on file    Highest education level: Not on file   Occupational History    Not on file   Tobacco Use    Smoking status: Former     Packs/day: 1.00     Years: 30.00     Pack years: 30.00     Types: Cigars, Cigarettes     Quit date: 3/3/2009     Years since quittin.9    Smokeless tobacco: Never   Vaping Use    Vaping Use: Never used   Substance and Sexual Activity    Alcohol use: No     Alcohol/week: 0.0 standard drinks    Drug use:  No Sexual activity: Not on file   Other Topics Concern    Not on file   Social History Narrative    Not on file     Social Determinants of Health     Financial Resource Strain: Low Risk     Difficulty of Paying Living Expenses: Not hard at all   Food Insecurity: No Food Insecurity    Worried About Running Out of Food in the Last Year: Never true    Ran Out of Food in the Last Year: Never true   Transportation Needs: Not on file   Physical Activity: Insufficiently Active    Days of Exercise per Week: 2 days    Minutes of Exercise per Session: 30 min   Stress: Not on file   Social Connections: Not on file   Intimate Partner Violence: Not on file   Housing Stability: Not on file       Family History:   Family History   Problem Relation Age of Onset    Heart Disease Father     High Blood Pressure Father     Other Father         Esophageal Varices         ROS:  Fatigue, tired and dizziness. Physical Exam:  BP (!) 73/51   Pulse 77   Temp 97.3 °F (36.3 °C) (Oral)   Resp 13   Ht 5' 11\" (1.803 m)   Wt 212 lb (96.2 kg)   SpO2 100%   BMI 29.57 kg/m²     General appearance: alert and cooperative with exam, appears stated age, no acute distress   Head: normal cephalic, atraumatic. EOMI bilaterally, no neck lymphadenopathy appreciated, no carotid bruits noted  Lungs: Clear to percussion and auscultation. No wheezing or rales. Heart: S1 S2 are audible. No added sound. Abdomen: Soft, non distended and non tender. No hepatosplenomegaly. Bowel sounds are audible. No masses palpable. Skin: warm, dry, no obvious rash, non-jaundice  Extremities: No cyanosis or clubbing or peripheral edema noted. Dorsalis pedis pulses were intact.         Labs:     Recent Labs     02/22/23  1205   WBC 10.5   RBC 2.66*   HGB 8.3*   HCT 25.2*   MCV 94.7*   MCH 31.2*   MCHC 32.9*        Recent Labs     02/22/23  1205   *   K 5.3*   ANIONGAP 20*   CL 86*   CO2 17*   *   CREATININE 2.6*   GLUCOSE 168*   CALCIUM 9.0     No results for input(s): MG, PHOS in the last 72 hours. Recent Labs     02/22/23  1205   AST 20   ALT 9   BILITOT 0.4   ALKPHOS 72     HgBA1c:  No components found for: HGBA1C  FLP:    Lab Results   Component Value Date/Time    TRIG 81 12/01/2022 09:02 AM    HDL 32 12/01/2022 09:02 AM    LDLCALC 84 12/01/2022 09:02 AM    LDLDIRECT 111 02/14/2013 06:18 AM     TSH:    Lab Results   Component Value Date/Time    TSH 4.530 12/01/2022 09:02 AM     Troponin T: No results for input(s): TROPONINI in the last 72 hours. INR:   Recent Labs     02/22/23  1205   INR 1.32*       Recent Labs     02/22/23  1205   LIPASE 66*       Radiology:    No results found. Assessment:  Patient present with symptoms of melanotic stool along with decrease in hemoglobin hematocrit. Most likely etiology is NSAID induced gastritis or ulceration. Dieulafoy lesion is again cannot be ruled out. In my opinion upper GI endoscopy examination is indicated. Impression:  1. Melena. 2.  Anemia. 3.  NSAID use. Plan:  1. Differential diagnosis patient's symptoms discussed at length with him. 2.  Follow hemoglobin hematocrit and transfuse packed red blood cells on as-needed basis. 3.  Continue pantoprazole. 4.  I will schedule patient upper GI endoscopy and based on EGD finding further recommendation be done. 5.  Patient advised to have complete abstinence from nonsteroidal anti-inflammatory drugs.     Giovani Maria MD  2/22/2023, 2:11 PM

## 2023-02-23 LAB
ALBUMIN SERPL-MCNC: 3.8 G/DL (ref 3.5–5.2)
ALP BLD-CCNC: 62 U/L (ref 40–130)
ALT SERPL-CCNC: 8 U/L (ref 5–41)
ANION GAP SERPL CALCULATED.3IONS-SCNC: 17 MMOL/L (ref 7–19)
AST SERPL-CCNC: 18 U/L (ref 5–40)
BASOPHILS ABSOLUTE: 0.1 K/UL (ref 0–0.2)
BASOPHILS RELATIVE PERCENT: 1 % (ref 0–1)
BILIRUB SERPL-MCNC: 1 MG/DL (ref 0.2–1.2)
BLOOD BANK DISPENSE STATUS: NORMAL
BLOOD BANK DISPENSE STATUS: NORMAL
BLOOD BANK PRODUCT CODE: NORMAL
BLOOD BANK PRODUCT CODE: NORMAL
BPU ID: NORMAL
BPU ID: NORMAL
BUN BLDV-MCNC: 137 MG/DL (ref 8–23)
CALCIUM SERPL-MCNC: 8.6 MG/DL (ref 8.8–10.2)
CHLORIDE BLD-SCNC: 91 MMOL/L (ref 98–111)
CO2: 16 MMOL/L (ref 22–29)
CREAT SERPL-MCNC: 2.6 MG/DL (ref 0.5–1.2)
DESCRIPTION BLOOD BANK: NORMAL
DESCRIPTION BLOOD BANK: NORMAL
EOSINOPHILS ABSOLUTE: 0.3 K/UL (ref 0–0.6)
EOSINOPHILS RELATIVE PERCENT: 3.7 % (ref 0–5)
GFR SERPL CREATININE-BSD FRML MDRD: 25 ML/MIN/{1.73_M2}
GLUCOSE BLD-MCNC: 152 MG/DL (ref 74–109)
GLUCOSE BLD-MCNC: 172 MG/DL (ref 70–99)
GLUCOSE BLD-MCNC: 225 MG/DL (ref 70–99)
HCT VFR BLD CALC: 24.3 % (ref 42–52)
HCT VFR BLD CALC: 25.8 % (ref 42–52)
HCT VFR BLD CALC: 26 % (ref 42–52)
HEMOGLOBIN: 8.5 G/DL (ref 14–18)
HEMOGLOBIN: 8.6 G/DL (ref 14–18)
HEMOGLOBIN: 8.7 G/DL (ref 14–18)
IMMATURE GRANULOCYTES #: 0.1 K/UL
LYMPHOCYTES ABSOLUTE: 0.9 K/UL (ref 1.1–4.5)
LYMPHOCYTES RELATIVE PERCENT: 10 % (ref 20–40)
MAGNESIUM: 2.4 MG/DL (ref 1.6–2.4)
MCH RBC QN AUTO: 32 PG (ref 27–31)
MCHC RBC AUTO-ENTMCNC: 35 G/DL (ref 33–37)
MCV RBC AUTO: 91.4 FL (ref 80–94)
MONOCYTES ABSOLUTE: 1.2 K/UL (ref 0–0.9)
MONOCYTES RELATIVE PERCENT: 13 % (ref 0–10)
NEUTROPHILS ABSOLUTE: 6.6 K/UL (ref 1.5–7.5)
NEUTROPHILS RELATIVE PERCENT: 71.4 % (ref 50–65)
PDW BLD-RTO: 16.3 % (ref 11.5–14.5)
PERFORMED ON: ABNORMAL
PERFORMED ON: ABNORMAL
PLATELET # BLD: 136 K/UL (ref 130–400)
PMV BLD AUTO: 9.9 FL (ref 9.4–12.4)
POTASSIUM SERPL-SCNC: 5 MMOL/L (ref 3.5–5)
RBC # BLD: 2.66 M/UL (ref 4.7–6.1)
SODIUM BLD-SCNC: 124 MMOL/L (ref 136–145)
TOTAL PROTEIN: 6.3 G/DL (ref 6.6–8.7)
WBC # BLD: 9.2 K/UL (ref 4.8–10.8)

## 2023-02-23 PROCEDURE — 85018 HEMOGLOBIN: CPT

## 2023-02-23 PROCEDURE — 85014 HEMATOCRIT: CPT

## 2023-02-23 PROCEDURE — 99232 SBSQ HOSP IP/OBS MODERATE 35: CPT | Performed by: INTERNAL MEDICINE

## 2023-02-23 PROCEDURE — 85025 COMPLETE CBC W/AUTO DIFF WBC: CPT

## 2023-02-23 PROCEDURE — 80053 COMPREHEN METABOLIC PANEL: CPT

## 2023-02-23 PROCEDURE — 1210000000 HC MED SURG R&B

## 2023-02-23 PROCEDURE — 83735 ASSAY OF MAGNESIUM: CPT

## 2023-02-23 PROCEDURE — 6370000000 HC RX 637 (ALT 250 FOR IP): Performed by: INTERNAL MEDICINE

## 2023-02-23 PROCEDURE — 2700000000 HC OXYGEN THERAPY PER DAY

## 2023-02-23 PROCEDURE — 82962 GLUCOSE BLOOD TEST: CPT

## 2023-02-23 RX ORDER — PANTOPRAZOLE SODIUM 40 MG/1
40 TABLET, DELAYED RELEASE ORAL
Status: DISCONTINUED | OUTPATIENT
Start: 2023-02-24 | End: 2023-02-24 | Stop reason: HOSPADM

## 2023-02-23 RX ADMIN — CETIRIZINE HYDROCHLORIDE 10 MG: 10 TABLET, FILM COATED ORAL at 07:27

## 2023-02-23 RX ADMIN — MULTIPLE VITAMINS W/ MINERALS TAB 1 TABLET: TAB at 07:26

## 2023-02-23 RX ADMIN — INSULIN LISPRO 2 UNITS: 100 INJECTION, SOLUTION INTRAVENOUS; SUBCUTANEOUS at 16:25

## 2023-02-23 RX ADMIN — Medication 400 MG: at 07:27

## 2023-02-23 RX ADMIN — ROSUVASTATIN CALCIUM 40 MG: 20 TABLET, COATED ORAL at 16:25

## 2023-02-23 RX ADMIN — LEVOTHYROXINE SODIUM 100 MCG: 0.1 TABLET ORAL at 07:26

## 2023-02-23 NOTE — PROGRESS NOTES
When confirming pts Code status, pt expressed wishes to not be intubated if scenario does occur. Verified with Edis Hernandez RN. Messaged Dr. Mariola Joyce and updated pts Code status.      Electronically signed by Dina Garibay RN on 2/22/2023 at 9:51 PM

## 2023-02-23 NOTE — CONSULTS
Palliative Care: For support and goals of care. Pleasant 68 y/o gentleman, known to Palliative Care. Presented with GI bleed. He is s/p EGD with control of bleeding. Pt will move to the floor today and expects to discharge home tomorrow if stable. Encouragement and support provided. Past Medical History:        Past Medical History:   Diagnosis Date    Arthritis     Basilar artery stenosis     CAD (coronary artery disease) 1997 2009    MI    Cancer ([facility] [address]     SKIN 6YRS AGO    Carotid artery stenosis     bilateral    Cerebral artery occlusion with cerebral infarction [facility])     TIA due to hypotension    CHF (congestive heart failure) ([facility])     Dieulafoy lesion of stomach     Heel spur     Hyperlipidemia     Hypertension     Palliative care patient 10/17/2018    Sleep apnea     no c-pap    Squamous cell cancer of skin of earlobe, left     SCC    Type II or unspecified type diabetes mellitus without mention of complication, not stated as uncontrolled     Vertebral artery insufficiency        Advance Directives:    limited code  ACP note completed     Pain/Other Symptoms:   Pt denies pain or soa at this time              Plan:  Continue medical treatments and monitor hgb. Patient/family discussion r/t goals:  Pt will return home with his wife. He continues to work a couple days a week.     Palliative Performance Scale:  >70        Electronically signed by Carissa Queen RN on 2/23/2023 at 11:26 AM

## 2023-02-23 NOTE — PLAN OF CARE
Problem: Chronic Conditions and Co-morbidities  Goal: Patient's chronic conditions and co-morbidity symptoms are monitored and maintained or improved  2/23/2023 0932 by Neda Avalos RN  Outcome: Progressing  2/23/2023 0932 by Neda Avalos RN  Outcome: Progressing  Flowsheets (Taken 2/23/2023 0800)  Care Plan - Patient's Chronic Conditions and Co-Morbidity Symptoms are Monitored and Maintained or Improved: Monitor and assess patient's chronic conditions and comorbid symptoms for stability, deterioration, or improvement     Problem: Discharge Planning  Goal: Discharge to home or other facility with appropriate resources  2/23/2023 0932 by Neda Colin RN  Outcome: Progressing  2/23/2023 0932 by Neda Avalos RN  Outcome: Progressing  Flowsheets (Taken 2/23/2023 0800)  Discharge to home or other facility with appropriate resources: Identify barriers to discharge with patient and caregiver     Problem: Safety - Adult  Goal: Free from fall injury  2/23/2023 0932 by Neda Colin RN  Outcome: Progressing  2/23/2023 0932 by Neda Avalos RN  Outcome: Progressing  Flowsheets (Taken 2/23/2023 0900)  Free From Fall Injury: Instruct family/caregiver on patient safety     Problem: ABCDS Injury Assessment  Goal: Absence of physical injury  2/23/2023 0932 by Neda Avalos RN  Outcome: Progressing  2/23/2023 0932 by Neda Avalos RN  Outcome: Progressing     Problem: Skin/Tissue Integrity  Goal: Absence of new skin breakdown  Description: 1. Monitor for areas of redness and/or skin breakdown  2. Assess vascular access sites hourly  3. Every 4-6 hours minimum:  Change oxygen saturation probe site  4. Every 4-6 hours:  If on nasal continuous positive airway pressure, respiratory therapy assess nares and determine need for appliance change or resting period.   2/23/2023 0932 by Neda Avalos RN  Outcome: Progressing  2/23/2023 0932 by Neda Avalos RN  Outcome: Progressing

## 2023-02-23 NOTE — PROGRESS NOTES
Pt and wife states that his blood pressure normally runs in the 54'Q systolic. Occasionally 90's, but that is high and 60's is the lower side.       Electronically signed by Hemalatha Campos RN on 2/22/2023 at 7:11 PM

## 2023-02-23 NOTE — PLAN OF CARE
Problem: Chronic Conditions and Co-morbidities  Goal: Patient's chronic conditions and co-morbidity symptoms are monitored and maintained or improved  Outcome: Progressing  Flowsheets (Taken 2/23/2023 0800)  Care Plan - Patient's Chronic Conditions and Co-Morbidity Symptoms are Monitored and Maintained or Improved: Monitor and assess patient's chronic conditions and comorbid symptoms for stability, deterioration, or improvement     Problem: Discharge Planning  Goal: Discharge to home or other facility with appropriate resources  Outcome: Progressing  Flowsheets (Taken 2/23/2023 0800)  Discharge to home or other facility with appropriate resources: Identify barriers to discharge with patient and caregiver     Problem: Safety - Adult  Goal: Free from fall injury  Outcome: Progressing  Flowsheets (Taken 2/23/2023 0900)  Free From Fall Injury: Instruct family/caregiver on patient safety     Problem: ABCDS Injury Assessment  Goal: Absence of physical injury  Outcome: Progressing     Problem: Skin/Tissue Integrity  Goal: Absence of new skin breakdown  Description: 1. Monitor for areas of redness and/or skin breakdown  2. Assess vascular access sites hourly  3. Every 4-6 hours minimum:  Change oxygen saturation probe site  4. Every 4-6 hours:  If on nasal continuous positive airway pressure, respiratory therapy assess nares and determine need for appliance change or resting period.   Outcome: Progressing

## 2023-02-23 NOTE — PROGRESS NOTES
GI  - PROGRESS NOTE    Subjective:   Admit Date: 2/22/2023  PCP: Edy Preston DO    Patient being seen for: Gastrointestinal bleeding. Anemia. HPI: Patient is feeling much better. He has no nausea and vomiting. No abdominal pain. He is tolerating liquid diet. No further bowel movements since yesterday. No hematochezia melanotic stool.         Medications:  Scheduled Meds:   [START ON 2/24/2023] pantoprazole  40 mg Oral QAM AC    cetirizine  10 mg Oral Daily    levothyroxine  100 mcg Oral Daily    [Held by provider] torsemide  100 mg Oral BID    [Held by provider] spironolactone  50 mg Oral Daily    rosuvastatin  40 mg Oral QPM    insulin lispro  0-4 Units SubCUTAneous TID WC    insulin lispro  0-4 Units SubCUTAneous Nightly    magnesium oxide  400 mg Oral Daily    [Held by provider] metOLazone  2.5 mg Oral Daily    [Held by provider] metoprolol succinate  25 mg Oral Daily    therapeutic multivitamin-minerals  1 tablet Oral Daily    [Held by provider] lisinopril  5 mg Oral Daily       Continuous Infusions:    PRN Meds:.nitroGLYCERIN, acetaminophen **OR** acetaminophen, furosemide      Labs:     Recent Labs     02/22/23  1205 02/22/23  1741 02/23/23  0005 02/23/23  0300   WBC 10.5  --   --  9.2   RBC 2.66*  --   --  2.66*   HGB 8.3* 7.9* 8.7* 8.5*   HCT 25.2* 23.4* 25.8* 24.3*   MCV 94.7*  --   --  91.4   MCH 31.2*  --   --  32.0*   MCHC 32.9*  --   --  35.0     --   --  136     Recent Labs     02/22/23  1205 02/23/23  0300   * 124*   K 5.3* 5.0   ANIONGAP 20* 17   CL 86* 91*   CO2 17* 16*   * 137*   CREATININE 2.6* 2.6*   GLUCOSE 168* 152*   CALCIUM 9.0 8.6*     Recent Labs     02/23/23  0300   MG 2.4     Recent Labs     02/22/23  1205 02/23/23  0300   AST 20 18   ALT 9 8   BILITOT 0.4 1.0   ALKPHOS 72 62     HgBA1c:  No components found for: HGBA1C  FLP:    Lab Results   Component Value Date/Time    TRIG 81 12/01/2022 09:02 AM HDL 32 12/01/2022 09:02 AM    LDLCALC 84 12/01/2022 09:02 AM    LDLDIRECT 111 02/14/2013 06:18 AM     TSH:    Lab Results   Component Value Date/Time    TSH 4.530 12/01/2022 09:02 AM     Troponin T: No results for input(s): TROPONINI in the last 72 hours. INR:   Recent Labs     02/22/23  1205   INR 1.32*       Recent Labs     02/22/23  1205   LIPASE 66*     -----------------------------------------------------------------  RAD:     EGD    Result Date: 2/22/2023  No dictation           Physical Exam:     BP (!) 97/55   Pulse 92   Temp 97.3 °F (36.3 °C) (Temporal)   Resp 14   Ht 5' 11\" (1.803 m)   Wt 221 lb 3.2 oz (100.3 kg)   SpO2 100%   BMI 30.85 kg/m²     General appearance: alert and cooperative with exam, appears stated age, no acute distress   Head: normal cephalic, atraumatic. EOMI bilaterally, no neck lymphadenopathy appreciated, no carotid bruits noted  Lungs: Clear to percussion and auscultation. No wheezing or rales. Heart: S1 S2 are audible. No added sound. Abdomen: Soft, non distended and non tender. No hepatosplenomegaly. Bowel sounds are audible. No masses palpable. Skin: warm, dry, no obvious rash, non-jaundice  Extremities: No cyanosis or clubbing or peripheral edema noted. Dorsalis pedis pulses were intact. Asssessment:   Patient admitted to the hospital yesterday with melanotic stool and significant anemia. He was taking nonsteroidal anti-inflammatory drugs. Endoscopy yesterday revealed multiple ulcerations, 1 with blood clot which was treated with epinephrine and hemoclips. His hemoglobin remained stable. He has no new GI symptoms. No further melena. Impression:   1. Melena. 2.  Anemia. 3.  Multiple gastric ulcers and gastritis with bleeding, status post epinephrine injection and Hemoclip application. 4.  NSAID use      Plan:   1. Endoscopy finding was again discussed with the patient in the presence of his wife. 2.  Advance diet as tolerated.   3.  Protonix 40 mg twice daily for 4 to 6 weeks and then decrease to once a day. Patient vies to take this medicine most likely rest of the life because of his recurrent bleeding. 4.  I again advised him not to take any nonsteroidal anti-inflammatory drugs. 5.  If he remains clinically stable and his hemoglobin remained stable he can be discharged home tomorrow. 6.  I will follow him up on as-needed basis. 7. In my opinion, he should be checked for Helicobacter pylori infection.   Marlon Euceda MD  2/23/2023, 11:36 AM

## 2023-02-23 NOTE — ACP (ADVANCE CARE PLANNING)
Advance Care Planning     Advance Care Planning Activator (Inpatient)  Conversation Note      Date of ACP Conversation: 2/23/2023     Talked with pt and his wife is present    ACP Activator: Redd Hernandez Decision Maker:     Current Designated Health Care Decision Maker:     Primary Decision Maker: Noelle Rowan - Spouse - [phone]    Secondary Decision Maker: Cong Rosario - Génesis - [phone]      Care Preferences    Ventilation: \"If you were in your present state of health and suddenly became very ill and were unable to breathe on your own, what would your preference be about the use of a ventilator (breathing machine) if it were available to you? \"      Would the patient desire the use of ventilator (breathing machine)?:    NO    Pt voiced to nursing that he does not want to be intubated. Resuscitation  \"CPR works best to restart the heart when there is a sudden event, like a heart attack, in someone who is otherwise healthy. Unfortunately, CPR does not typically restart the heart for people who have serious health conditions or who are very sick. \"    \"In the event your heart stopped as a result of an underlying serious health condition, would you want attempts to be made to restart your heart (answer \"yes\" for attempt to resuscitate) or would you prefer a natural death (answer \"no\" for do not attempt to resuscitate)? \"   YES           Conversation Outcomes:  ACP discussion completed
Implemented All Universal Safety Interventions:  [place] to call system. Call bell, personal items and telephone within reach. Instruct patient to call for assistance. Room bathroom lighting operational. Non-slip footwear when patient is off stretcher. Physically safe environment: no spills, clutter or unnecessary equipment. Stretcher in lowest position, wheels locked, appropriate side rails in place.

## 2023-02-23 NOTE — PROGRESS NOTES
Patient arrived via stretcher. Patient alert and orientated. Lungs are clear. No pain at this time. No nausea.  Wife at bedside

## 2023-02-23 NOTE — PROGRESS NOTES
Hospitalist Progress Note  [facility]     Patient: Vi Level  : 1950  MRN: [id number]  Code Status: Partial Code    Hospital Day: 1   Date of Service: 2023    Subjective:   Patient seen and examined. Sitting in chair. No current complaints.     Past Medical History:   Diagnosis Date    Arthritis     Basilar artery stenosis     CAD (coronary artery disease) 1997    MI    Cancer ([address]     SKIN 6YRS AGO    Carotid artery stenosis     bilateral    Cerebral artery occlusion with cerebral infarction [facility])     TIA due to hypotension    CHF (congestive heart failure) (HCC)     Dieulafoy lesion of stomach     Heel spur     Hyperlipidemia     Hypertension     Palliative care patient 10/17/2018    Sleep apnea     no c-pap    Squamous cell cancer of skin of earlobe, left     SCC    Type II or unspecified type diabetes mellitus without mention of complication, not stated as uncontrolled     Vertebral artery insufficiency        Past Surgical History:   Procedure Laterality Date    ANGIOPLASTY Bilateral     basilar artery angioplasty    CARDIAC CATHETERIZATION  2018    [facility]    CARDIAC DEFIBRILLATOR PLACEMENT      [facility]    CARPAL TUNNEL RELEASE      bilateral    CATARACT REMOVAL Bilateral     CHOLECYSTECTOMY      COLONOSCOPY N/A 2021    Dr Jeffery Stratton disease-AP x 3, 3 yr recall    CORONARY ARTERY BYPASS GRAFT      3v    HEEL SPUR SURGERY      left    DC EGD TRANSORAL BIOPSY SINGLE/MULTIPLE N/A 10/16/2018    Dr Holden Kerns-w/control of bleeding-Active bleeding in the stomach from Dieulafoy lesion    PTCA  2009 [place]    with stents    TONSILLECTOMY      UPPER GASTROINTESTINAL ENDOSCOPY N/A 2023    EGD CONTROL HEMORRHAGE performed by Christiano Montiel MD at [address] [facility]       Family History   Problem Relation Age of Onset    Heart Disease Father     High Blood Pressure Father     Other Father         Esophageal Varices       Social History Socioeconomic History    Marital status:      Spouse name: Not on file    Number of children: Not on file    Years of education: Not on file    Highest education level: Not on file   Occupational History    Not on file   Tobacco Use    Smoking status: Former     Packs/day: 1.00     Years: 30.00     Pack years: 30.00     Types: Cigars, Cigarettes     Quit date: 3/3/2009     Years since quittin.9    Smokeless tobacco: Never   Vaping Use    Vaping Use: Never used   Substance and Sexual Activity    Alcohol use: No     Alcohol/week: 0.0 standard drinks    Drug use: No    Sexual activity: Not on file   Other Topics Concern    Not on file   Social History Narrative    Not on file     Social Determinants of Health     Financial Resource Strain: Low Risk     Difficulty of Paying Living Expenses: Not hard at all   Food Insecurity: No Food Insecurity    Worried About Running Out of Food in the Last Year: Never true    [address] in the Last Year: Never true   Transportation Needs: Not on file   Physical Activity: Insufficiently Active    Days of Exercise per Week: 2 days    Minutes of Exercise per Session: 30 min   Stress: Not on file   Social Connections: Not on file   Intimate Partner Violence: Not on file   Housing Stability: Not on file       Current Facility-Administered Medications   Medication Dose Route Frequency Provider Last Rate Last Admin    pantoprazole (PROTONIX) 80 mg in sodium chloride 0.9 % 100 mL infusion  8 mg/hr IntraVENous Continuous Yina Capellan MD 10 mL/hr at 23 8 mg/hr at 23    cetirizine (ZYRTEC) tablet 10 mg  10 mg Oral Daily Yina Capellan MD   10 mg at 23 7979    levothyroxine (SYNTHROID) tablet 100 mcg  100 mcg Oral Daily Yina Capellan MD   100 mcg at 23 0789    [Held by provider] torsemide (DEMADEX) tablet 100 mg  100 mg Oral BID Yina Capellan MD        [facility] by provider] spironolactone (ALDACTONE) tablet 50 mg  50 mg Oral Daily Tasha Kent MD        rosuvastatin (CRESTOR) tablet 40 mg  40 mg Oral QPM Tasha Kent MD        insulin lispro (HUMALOG) injection vial 0-4 Units  0-4 Units SubCUTAneous TID WC Tasha Kent MD        insulin lispro (HUMALOG) injection vial 0-4 Units  0-4 Units SubCUTAneous Nightly Tasha Kent MD        magnesium oxide (MAG-OX) tablet 400 mg  400 mg Oral Daily Tasha Kent MD   400 mg at 02/23/23 5964    [Held by provider] metOLazone (ZAROXOLYN) tablet 2.5 mg  2.5 mg Oral Daily Tasha Kent MD        [facility] by provider] metoprolol succinate (TOPROL XL) extended release tablet 25 mg  25 mg Oral Daily Tasha Kent MD        nitroGLYCERIN (NITROSTAT) SL tablet 0.4 mg  0.4 mg SubLINGual Q5 Min PRN Tasha Kent MD        therapeutic multivitamin-minerals 1 tablet  1 tablet Oral Daily Tasha Kent MD   1 tablet at 02/23/23 1499    acetaminophen (TYLENOL) tablet 650 mg  650 mg Oral Q6H PRN Tasha Kent MD        Or    acetaminophen (TYLENOL) suppository 650 mg  650 mg Rectal Q6H PRN Tasha Kent MD        [Held by provider] lisinopril (PRINIVIL;ZESTRIL) tablet 5 mg  5 mg Oral Daily Dmitriy Riley MD        furosemide (LASIX) injection 20 mg  20 mg IntraVENous Once PRN Dmitriy Riley MD             pantoprozole (PROTONIX) infusion 8 mg/hr (02/22/23 2209)        Objective:   BP (!) 87/49   Pulse 93   Temp 97.3 °F (36.3 °C) (Temporal)   Resp 13   Ht 5' 11\" (1.803 m)   Wt 221 lb 3.2 oz (100.3 kg)   SpO2 100%   BMI 30.85 kg/m²     General: no acute distress  HEENT: normocephalic  Neck: supple, symmetrical, trachea midline   Lungs: clear to auscultation bilaterally   Cardiovascular: s1 and s2 normal  Abdomen: soft, positive bowel sounds, nontender  Extremities: no edema or cyanosis   Neuro: aaox3, no focal deficits    Recent Labs     02/22/23  1205 02/22/23  1741 02/23/23  0005 02/23/23  0300   WBC 10.5  --   --  9.2   RBC 2.66*  --   --  2.66*   HGB 8.3* 7.9* 8.7* 8.5*   HCT 25.2* 23.4* 25.8* 24.3*   MCV 94.7*  --   --  91.4   MCH 31.2*  --   --  32.0*   MCHC 32.9*  --   --  35.0     --   --  136     Recent Labs     02/22/23  1205 02/23/23  0300   * 124*   K 5.3* 5.0   ANIONGAP 20* 17   CL 86* 91*   CO2 17* 16*   * 137*   CREATININE 2.6* 2.6*   GLUCOSE 168* 152*   CALCIUM 9.0 8.6*     Recent Labs     02/23/23  0300   MG 2.4     Recent Labs     02/22/23  1205 02/23/23  0300   AST 20 18   ALT 9 8   BILITOT 0.4 1.0   ALKPHOS 72 62     No results for input(s): PH, PO2, PCO2, HCO3, BE, O2SAT in the last 72 hours. No results for input(s): TROPONINI in the last 72 hours. Recent Labs     02/22/23  1205   INR 1.32*     No results for input(s): LACTA in the last 72 hours.       Intake/Output Summary (Last 24 hours) at 2/23/2023 1017  Last data filed at 2/23/2023 0800  Gross per 24 hour   Intake 2466.05 ml   Output 2925 ml   Net -458.95 ml       EGD    Result Date: 2/22/2023  No dictation      Assessment and Plan:   GI bleed  Acute blood loss anemia  TONY/CKD 3     Takes naproxen regularly  Prescribed aspirin/Plavix but noncompliant  Prior history of GI bleed  GI following  S/p EGD 2/22/2023, report on file  Meds per GI  Patient states SBP normally upper 90s/low 100s  Follow hemoglobin  Further transfusions as warranted  Judicious IVF  SCDs for DVT prophylaxis  Discussed treatment plan with patient/wife/RN    Total critical care time: 41 minutes    Dominic Hirsch MD   2/23/2023 10:17 AM

## 2023-02-24 VITALS
HEIGHT: 71 IN | BODY MASS INDEX: 30.97 KG/M2 | SYSTOLIC BLOOD PRESSURE: 89 MMHG | DIASTOLIC BLOOD PRESSURE: 54 MMHG | OXYGEN SATURATION: 100 % | WEIGHT: 221.2 LBS | TEMPERATURE: 97.5 F | HEART RATE: 90 BPM | RESPIRATION RATE: 18 BRPM

## 2023-02-24 LAB
ANION GAP SERPL CALCULATED.3IONS-SCNC: 12 MMOL/L (ref 7–19)
BASOPHILS ABSOLUTE: 0.1 K/UL (ref 0–0.2)
BASOPHILS RELATIVE PERCENT: 1.2 % (ref 0–1)
BUN BLDV-MCNC: 88 MG/DL (ref 8–23)
CALCIUM SERPL-MCNC: 9.1 MG/DL (ref 8.8–10.2)
CHLORIDE BLD-SCNC: 96 MMOL/L (ref 98–111)
CO2: 20 MMOL/L (ref 22–29)
CREAT SERPL-MCNC: 1.8 MG/DL (ref 0.5–1.2)
EOSINOPHILS ABSOLUTE: 0.8 K/UL (ref 0–0.6)
EOSINOPHILS RELATIVE PERCENT: 8.3 % (ref 0–5)
GFR SERPL CREATININE-BSD FRML MDRD: 39 ML/MIN/{1.73_M2}
GLUCOSE BLD-MCNC: 148 MG/DL (ref 74–109)
GLUCOSE BLD-MCNC: 155 MG/DL (ref 70–99)
GLUCOSE BLD-MCNC: 251 MG/DL (ref 70–99)
HCT VFR BLD CALC: 25.9 % (ref 42–52)
HEMOGLOBIN: 8.7 G/DL (ref 14–18)
IMMATURE GRANULOCYTES #: 0.1 K/UL
LYMPHOCYTES ABSOLUTE: 0.8 K/UL (ref 1.1–4.5)
LYMPHOCYTES RELATIVE PERCENT: 8.3 % (ref 20–40)
MAGNESIUM: 2.2 MG/DL (ref 1.6–2.4)
MCH RBC QN AUTO: 31.6 PG (ref 27–31)
MCHC RBC AUTO-ENTMCNC: 33.6 G/DL (ref 33–37)
MCV RBC AUTO: 94.2 FL (ref 80–94)
MONOCYTES ABSOLUTE: 1.4 K/UL (ref 0–0.9)
MONOCYTES RELATIVE PERCENT: 14.3 % (ref 0–10)
NEUTROPHILS ABSOLUTE: 6.8 K/UL (ref 1.5–7.5)
NEUTROPHILS RELATIVE PERCENT: 67.2 % (ref 50–65)
PDW BLD-RTO: 16.7 % (ref 11.5–14.5)
PERFORMED ON: ABNORMAL
PERFORMED ON: ABNORMAL
PLATELET # BLD: 150 K/UL (ref 130–400)
PMV BLD AUTO: 10.9 FL (ref 9.4–12.4)
POTASSIUM SERPL-SCNC: 4.7 MMOL/L (ref 3.5–5)
RBC # BLD: 2.75 M/UL (ref 4.7–6.1)
SODIUM BLD-SCNC: 128 MMOL/L (ref 136–145)
WBC # BLD: 10 K/UL (ref 4.8–10.8)

## 2023-02-24 PROCEDURE — 85025 COMPLETE CBC W/AUTO DIFF WBC: CPT

## 2023-02-24 PROCEDURE — 82962 GLUCOSE BLOOD TEST: CPT

## 2023-02-24 PROCEDURE — 6370000000 HC RX 637 (ALT 250 FOR IP): Performed by: INTERNAL MEDICINE

## 2023-02-24 PROCEDURE — 83735 ASSAY OF MAGNESIUM: CPT

## 2023-02-24 PROCEDURE — 94760 N-INVAS EAR/PLS OXIMETRY 1: CPT

## 2023-02-24 PROCEDURE — 80048 BASIC METABOLIC PNL TOTAL CA: CPT

## 2023-02-24 RX ORDER — POTASSIUM CHLORIDE 750 MG/1
10 TABLET, EXTENDED RELEASE ORAL DAILY
Qty: 30 TABLET | Refills: 3 | Status: SHIPPED | OUTPATIENT
Start: 2023-02-24

## 2023-02-24 RX ORDER — SPIRONOLACTONE 25 MG/1
25 TABLET ORAL DAILY
Qty: 30 TABLET | Refills: 3 | Status: SHIPPED | OUTPATIENT
Start: 2023-02-24

## 2023-02-24 RX ORDER — PANTOPRAZOLE SODIUM 40 MG/1
40 TABLET, DELAYED RELEASE ORAL
Qty: 30 TABLET | Refills: 3 | Status: SHIPPED | OUTPATIENT
Start: 2023-04-07 | End: 2023-03-01

## 2023-02-24 RX ORDER — PANTOPRAZOLE SODIUM 40 MG/1
40 TABLET, DELAYED RELEASE ORAL 2 TIMES DAILY
Qty: 84 TABLET | Refills: 0 | Status: SHIPPED | OUTPATIENT
Start: 2023-02-24 | End: 2023-04-07

## 2023-02-24 RX ADMIN — PANTOPRAZOLE SODIUM 40 MG: 40 TABLET, DELAYED RELEASE ORAL at 05:21

## 2023-02-24 RX ADMIN — CETIRIZINE HYDROCHLORIDE 10 MG: 10 TABLET, FILM COATED ORAL at 09:01

## 2023-02-24 RX ADMIN — Medication 400 MG: at 09:01

## 2023-02-24 RX ADMIN — MULTIPLE VITAMINS W/ MINERALS TAB 1 TABLET: TAB at 09:01

## 2023-02-24 RX ADMIN — LEVOTHYROXINE SODIUM 100 MCG: 0.1 TABLET ORAL at 09:01

## 2023-02-24 RX ADMIN — INSULIN LISPRO 2 UNITS: 100 INJECTION, SOLUTION INTRAVENOUS; SUBCUTANEOUS at 12:40

## 2023-02-24 NOTE — CARE COORDINATION
02/24/23 1325   IMM Letter   IMM Letter given to Patient/Family/Significant other/Guardian/POA/by: Pili Maldonado presented Pt with IMM letter. IMM Letter date given: 02/24/23   IMM Letter time given: 46   Sw presented Pt with IMM letter. Sw explained Pt rights with IMM letter. No other  questions or concerns at this time.   Electronically signed by LICHA Pena on 2/24/2023 at 1:25 PM

## 2023-02-24 NOTE — PLAN OF CARE
Problem: Chronic Conditions and Co-morbidities  Goal: Patient's chronic conditions and co-morbidity symptoms are monitored and maintained or improved  Outcome: Completed     Problem: Discharge Planning  Goal: Discharge to home or other facility with appropriate resources  Outcome: Completed     Problem: Safety - Adult  Goal: Free from fall injury  Outcome: Completed     Problem: ABCDS Injury Assessment  Goal: Absence of physical injury  Outcome: Completed     Problem: Skin/Tissue Integrity  Goal: Absence of new skin breakdown  Description: 1. Monitor for areas of redness and/or skin breakdown  2. Assess vascular access sites hourly  3. Every 4-6 hours minimum:  Change oxygen saturation probe site  4. Every 4-6 hours:  If on nasal continuous positive airway pressure, respiratory therapy assess nares and determine need for appliance change or resting period.   Outcome: Completed

## 2023-02-24 NOTE — DISCHARGE SUMMARY
Hospitalist Discharge Summary  [facility]    Patient: Espinoza Grimm  : 1950  MRN: [id number]  Code Status: Partial Code  PCP: Hamzah Rey DO  Attending: Herbie Alston MD  Admission Date: 2023  Discharge Date: 2023    Discharge Medications:     Current Discharge Medication List             Details   ! ! pantoprazole (PROTONIX) 40 MG tablet Take 1 tablet by mouth in the morning and at bedtime  Qty: 84 tablet, Refills: 0      !! pantoprazole (PROTONIX) 40 MG tablet Take 1 tablet by mouth every morning (before breakfast)  Qty: 30 tablet, Refills: 3       !! - Potential duplicate medications found. Please discuss with provider. Details   spironolactone (ALDACTONE) 25 MG tablet Take 1 tablet by mouth daily  Qty: 30 tablet, Refills: 3      torsemide 40 MG TABS Take 40 mg by mouth daily  Qty: 30 tablet, Refills: 3    Associated Diagnoses: Primary hypertension; Chronic systolic congestive heart failure (HCC)      potassium chloride (KLOR-CON M) 10 MEQ extended release tablet Take 1 tablet by mouth daily  Qty: 30 tablet, Refills: 3    Associated Diagnoses: Hypokalemia                Details   allopurinol (ZYLOPRIM) 100 MG tablet Take 1 tablet by mouth daily  Qty: 90 tablet, Refills: 3    Associated Diagnoses: Gout, unspecified cause, unspecified chronicity, unspecified site      nystatin-triamcinolone (MYCOLOG II) 350614-8.1 UNIT/GM-% cream Apply topically 2 times daily.   Qty: 60 g, Refills: 0    Associated Diagnoses: Itch; Gout, unspecified cause, unspecified chronicity, unspecified site      metoprolol succinate (TOPROL XL) 25 MG extended release tablet Take 1 tablet by mouth daily  Qty: 45 tablet, Refills: 11    Associated Diagnoses: Primary hypertension; Coronary artery disease involving native coronary artery of native heart without angina pectoris; Chronic systolic congestive heart failure (HCC)      rosuvastatin (CRESTOR) 40 MG tablet Take 1 tablet by mouth every evening  Qty: 90 tablet, Refills: 3    Associated Diagnoses: Mixed hyperlipidemia      levothyroxine (SYNTHROID) 100 MCG tablet Take 1 tablet by mouth daily  Qty: 90 tablet, Refills: 3    Associated Diagnoses: Acquired hypothyroidism      ramipril (ALTACE) 1.25 MG capsule Take 1 capsule by mouth in the morning. Qty: 90 capsule, Refills: 3      cetirizine (ZYRTEC) 10 MG tablet Take 10 mg by mouth in the morning. metFORMIN (GLUCOPHAGE) 1000 MG tablet Take 1 tablet by mouth 2 times daily (with meals)  Qty: 180 tablet, Refills: 3    Associated Diagnoses: Type 2 diabetes mellitus without complication, without long-term current use of insulin (HCC)      Misc. Devices (RAISED TOILET SEAT/LOCK & ARMS) MISC Use with existing toilet to facilitate transfers.   Qty: 1 each, Refills: 0      triamcinolone (KENALOG) 0.1 % cream APPLY TOPICALLY TWICE A DAY  Qty: 80 g, Refills: 5    Associated Diagnoses: Dermatitis      Multiple Vitamins-Minerals (THERAPEUTIC MULTIVITAMIN-MINERALS) tablet Take 1 tablet by mouth daily      magnesium oxide (MAG-OX) 400 MG tablet Take 400 mg by mouth daily      nitroGLYCERIN (NITROLINGUAL) 0.4 MG/SPRAY 0.4 mg spray Place 1 spray under the tongue every 5 minutes as needed for Chest pain  Qty: 1 Bottle, Refills: 0    Associated Diagnoses: Coronary artery disease involving native heart without angina pectoris, unspecified vessel or lesion type      Omega-3 Fatty Acids (FISH OIL) 1000 MG CAPS Take 3,000 mg by mouth daily            Discharge Instructions:   Recommended Follow Up:  Mile John MD  [place] [address]  [phone]    Schedule an appointment as soon as possible for a visit      Sharad Reynoso, [address] [place] [address]  [phone]    Schedule an appointment as soon as possible for a visit      [facility] EMERGENCY DEPT  [facility]  [phone]  Go to  As needed    Anant Car MD  [address] [place] [address] Rd  [address] [facility]    Schedule an appointment as soon as possible for a visit  EDMAR Spring,   [address] [facility]  [phone]    Schedule an appointment as soon as possible for a visit  Hospital follow-up/H. pylori testing    Future Appointments Scheduled at Time of Discharge:  Future Appointments   Date Time Provider Ella Cloud   3/1/2023  2:00 PM [address] [facility],[address], [facility]   3/15/2023  9:20 AM Theora Pill, [address] Course:   GI bleed resolved  Acute blood loss anemia resolved  TONY resolved  CKD 3     Takes naproxen regularly counseled on cessation  Prescribed aspirin/Plavix but noncompliant  Prior history of GI bleed  GI following  S/p EGD 2/22/2023, report on file  Meds per GI  PRBC transfused earlier in admission  Hemoglobin stable  GI states patient stable for discharge  Outpatient GI follow-up ordered  Patient states SBP normally upper 90s  Home medication regimen optimized in this regard  Outpatient cardiology follow-up ordered  Outpatient renal follow-up ordered  Patient/wife politely adamant for discharge  They are aware to pursue H. pylori testing with PCP    Patient clinically back to baseline  Patient denies any complaints  Patient/wife politely adamant for discharge  GI states patient stable for discharge  Patient medically stable for discharge  Patient/wife aware to follow-up with his outpatient providers  Patient/wife aware to return to ER immediately with any concerning signs or symptoms    Discharge Exam:   General: no acute distress  HEENT: normocephalic  Neck: supple, symmetrical, trachea midline   Lungs: clear to auscultation bilaterally   Cardiovascular: s1 and s2 normal  Abdomen: soft, positive bowel sounds, nontender  Extremities: no edema or cyanosis   Neuro: aaox3, no focal deficits    Recent Labs     02/22/23  1205 02/22/23  1741 02/23/23  0300 02/23/23  1118 02/24/23  0949 WBC 10.5  --  9.2  --  10.0   HGB 8.3*   < > 8.5* 8.6* 8.7*     --  136  --  150    < > = values in this interval not displayed. Recent Labs     02/22/23  1205 02/23/23  0300 02/24/23  0753   * 124* 128*   K 5.3* 5.0 4.7   CL 86* 91* 96*   CO2 17* 16* 20*   * 137* 88*   CREATININE 2.6* 2.6* 1.8*   GLUCOSE 168* 152* 148*     Recent Labs     02/22/23  1205 02/23/23  0300   AST 20 18   ALT 9 8   BILITOT 0.4 1.0   ALKPHOS 72 62     Troponin T: No results for input(s): TROPONINI in the last 72 hours. Pro-BNP: No results for input(s): BNP in the last 72 hours. INR:   Recent Labs     02/22/23  1205   INR 1.32*     UA:No results for input(s): NITRITE, COLORU, PHUR, LABCAST, WBCUA, RBCUA, MUCUS, TRICHOMONAS, YEAST, BACTERIA, CLARITYU, SPECGRAV, LEUKOCYTESUR, UROBILINOGEN, BILIRUBINUR, BLOODU, GLUCOSEU, AMORPHOUS in the last 72 hours. A1C: No results for input(s): LABA1C in the last 72 hours. ABG:No results for input(s): PHART, FVG9MGZ, PO2ART, VOM4ULR, BEART, HGBAE, F4CBAUEG, CARBOXHGBART in the last 72 hours.      Discharge Disposition: Jun Loredo MD  2/24/2023 2:34 PM

## 2023-02-25 LAB
EKG P AXIS: NORMAL DEGREES
EKG P-R INTERVAL: NORMAL MS
EKG Q-T INTERVAL: 404 MS
EKG QRS DURATION: 136 MS
EKG QTC CALCULATION (BAZETT): 453 MS
EKG T AXIS: NORMAL DEGREES

## 2023-02-25 PROCEDURE — 93010 ELECTROCARDIOGRAM REPORT: CPT | Performed by: INTERNAL MEDICINE

## 2023-02-27 ENCOUNTER — TELEPHONE (OUTPATIENT)
Dept: FAMILY MEDICINE CLINIC | Age: 73
End: 2023-02-27

## 2023-02-27 ENCOUNTER — CARE COORDINATION (OUTPATIENT)
Dept: CASE MANAGEMENT | Age: 73
End: 2023-02-27

## 2023-02-27 NOTE — TELEPHONE ENCOUNTER
Care Transitions Initial Follow Up Call    Outreach made within 2 business days of discharge: Yes    Patient: Mykel Servin Patient : 1950   MRN: [id number]  Reason for Admission: There are no discharge diagnoses documented for the most recent discharge. Discharge Date: 23       Spoke with: pt    Discharge department/facility: [facility] Interactive Patient Contact:  Was patient able to fill all prescriptions: Yes  Was patient instructed to bring all medications to the follow-up visit: Yes  Is patient taking all medications as directed in the discharge summary?  Yes  Does patient understand their discharge instructions: Yes  Does patient have questions or concerns that need addressed prior to 7-14 day follow up office visit: no    Scheduled appointment with PCP within 7-14 days    Follow Up  Future Appointments   Date Time Provider Ella Cloud   3/1/2023  2:00  [address] [facility]   3/15/2023  9:20 AM JOSE ELIAS [place] [facility]

## 2023-02-27 NOTE — CARE COORDINATION
[facility] Care Transitions Initial Follow Up Call    Call within 2 business days of discharge: Yes    Patient Current Location:  [place]    Attempted to make contact with patient/caregiver for an initial Care Transitions Coordination follow up call post discharge from the hospital without success.  Unable to leave a message regarding intent of call and call back information.  Call was forwarded to an unidentifiable voice messaging system.  CTN will follow up again at a later time. Any previously scheduled hospital follow up appointments as noted.    Patient: Dony Rosario Patient : 1950   MRN: [id number]  Reason for Admission: Symptomatic Anemia, GI Bleed  Discharge Date: 23 RARS: Readmission Risk Score: 18      Last Discharge [facility] Facility       Date Complaint Diagnosis Description Type Department Provider    23 Rectal Bleeding Upper GI bleed ... ED to Hosp-Admission (Discharged) (ADMITTED) [facility] Colton Suazo MD; Eli Piper,...            Was this an external facility discharge? No     Non-face-to-face services provided:  Reviewed encounter information for continuity of care prior to follow up Care Transitions Coordination phone call - chart notes, consults, progress notes, test results, med list, appointments, AVS, other information.    Follow Up  Future Appointments   Date Time Provider Department Center   3/1/2023  2:00 PM DO Kaela Jack  [facility]   3/15/2023  9:20 AM JOSE ELIAS Bolanos [facility]     Plan for next call: Second attempt at first contact following discharge from the hospital 2023.    Rebeca Reagan RN

## 2023-02-28 ENCOUNTER — CARE COORDINATION (OUTPATIENT)
Dept: CASE MANAGEMENT | Age: 73
End: 2023-02-28

## 2023-02-28 NOTE — CARE COORDINATION
[facility] Care Transitions Initial Follow Up Call    Call within 2 business days of discharge: Yes    Patient Current Location:  [address] [place], [place] [address]    Attempted to make contact with patient/caregiver for an initial Care Transitions Coordination follow up call post discharge from the hospital without success. Unable to leave a message regarding intent of call and call back information. Call was forwarded to an unidentifiable voice messaging system. As this repeated attempt to make contact was unsuccessful, will disengage at this time. Any previously scheduled hospital follow up appointments as noted. Patient: Caryle Finn Patient : 1950   MRN: [id number]  Reason for Admission: GI Bleed  Discharge Date: 23 RARS: Readmission Risk Score: 18      Last Discharge [address]       Date Complaint Diagnosis Description Type Department Provider    23 Rectal Bleeding Upper GI bleed . .. ED to Hosp-Admission (Discharged) (ADMITTED) [facility] Jodi Beverly MD; Mateo Ferguson,... Was this an external facility discharge? No     Non-face-to-face services provided:  Reviewed encounter information for continuity of care prior to follow up Care Transitions Coordination phone call - chart notes, consults, progress notes, test results, med list, appointments, AVS, other information.     Follow Up  Future Appointments   Date Time Provider Ella Cloud   3/1/2023  2:00 PM DO Kaela Briggs [facility]   3/15/2023  9:20 AM JOSE ELIAS [place] [address] [facility]       Ambika Ramirez RN

## 2023-02-28 NOTE — PROGRESS NOTES
Physician Progress Note      Carlos Pugh  CSN #:                  [id number]  :                       1950  ADMIT DATE:       2023 11:46 AM  DISCH DATE:        2023 4:40 PM  RESPONDING  PROVIDER #:        Rony Shrestha MD          QUERY TEXT:    Pt admitted with GI bleed. Noted documentation of multiple gastric ulcers and   gastritis with bleeding. consultant. If possible, please document in   progress notes and discharge summary:    The medical record reflects the following:  Risk Factors:  Gastrointestinal bleeding, widley patent schatzki ring. Clinical Indicators: Multiple gastric ulcers with erosions, fragile gastric   mucosa with oozing blood, gastric antral ulcer with active bleeding fresh   blood clot. Treatment:  EGD - 4 cc of epinephrine and two hemoclips applications. labs,   Clear liquid diet, Protonix IV 40 mg b.i.d. Thank you  Kiya Hastings RN, BSN, [facility]  [phone]  Options provided:  -- GI bleed due to Gastric ulcers confirmed present on admission  -- GI bleed due to gastric ulcers confirmed not present on admission  -- GI bleed due to gastric ulcers ruled out  -- Defer to GI  consultant documentation regarding gastric bleeding  -- Other - I will add my own diagnosis  -- Disagree - Not applicable / Not valid  -- Disagree - Clinically unable to determine / Unknown  -- Refer to Clinical Documentation Reviewer    PROVIDER RESPONSE TEXT:    I defer to GI consultant regarding documentation of GI bleed due to gastric   ulcers.     Query created by: Brijesh Monday on 2023 8:16 AM      Electronically signed by:  Rony Shrestha MD 2023 8:18 PM

## 2023-03-01 ENCOUNTER — OFFICE VISIT (OUTPATIENT)
Dept: FAMILY MEDICINE CLINIC | Age: 73
End: 2023-03-01

## 2023-03-01 VITALS
OXYGEN SATURATION: 96 % | DIASTOLIC BLOOD PRESSURE: 62 MMHG | SYSTOLIC BLOOD PRESSURE: 98 MMHG | HEIGHT: 71 IN | HEART RATE: 74 BPM | TEMPERATURE: 98.3 F | WEIGHT: 226 LBS | BODY MASS INDEX: 31.64 KG/M2

## 2023-03-01 DIAGNOSIS — I10 PRIMARY HYPERTENSION: ICD-10-CM

## 2023-03-01 DIAGNOSIS — K25.4 GASTROINTESTINAL HEMORRHAGE ASSOCIATED WITH GASTRIC ULCER: Primary | ICD-10-CM

## 2023-03-01 DIAGNOSIS — E11.22 TYPE 2 DIABETES MELLITUS WITH STAGE 3B CHRONIC KIDNEY DISEASE, WITH LONG-TERM CURRENT USE OF INSULIN (HCC): ICD-10-CM

## 2023-03-01 DIAGNOSIS — N18.32 STAGE 3B CHRONIC KIDNEY DISEASE (HCC): ICD-10-CM

## 2023-03-01 DIAGNOSIS — N18.32 TYPE 2 DIABETES MELLITUS WITH STAGE 3B CHRONIC KIDNEY DISEASE, WITH LONG-TERM CURRENT USE OF INSULIN (HCC): ICD-10-CM

## 2023-03-01 DIAGNOSIS — Z79.4 TYPE 2 DIABETES MELLITUS WITH STAGE 3B CHRONIC KIDNEY DISEASE, WITH LONG-TERM CURRENT USE OF INSULIN (HCC): ICD-10-CM

## 2023-03-01 DIAGNOSIS — G89.29 CHRONIC LEFT SHOULDER PAIN: ICD-10-CM

## 2023-03-01 DIAGNOSIS — E78.5 HYPERLIPIDEMIA WITH TARGET LDL LESS THAN 100: ICD-10-CM

## 2023-03-01 DIAGNOSIS — I25.10 CORONARY ARTERY DISEASE INVOLVING NATIVE CORONARY ARTERY OF NATIVE HEART WITHOUT ANGINA PECTORIS: ICD-10-CM

## 2023-03-01 DIAGNOSIS — I70.213 ATHEROSCLEROSIS OF NATIVE ARTERY OF BOTH LOWER EXTREMITIES WITH INTERMITTENT CLAUDICATION (HCC): ICD-10-CM

## 2023-03-01 DIAGNOSIS — M15.9 PRIMARY OSTEOARTHRITIS INVOLVING MULTIPLE JOINTS: ICD-10-CM

## 2023-03-01 DIAGNOSIS — D69.6 THROMBOCYTOPENIA, UNSPECIFIED (HCC): ICD-10-CM

## 2023-03-01 DIAGNOSIS — Z09 HOSPITAL DISCHARGE FOLLOW-UP: ICD-10-CM

## 2023-03-01 DIAGNOSIS — I50.22 CHRONIC SYSTOLIC CONGESTIVE HEART FAILURE (HCC): ICD-10-CM

## 2023-03-01 DIAGNOSIS — M25.512 CHRONIC LEFT SHOULDER PAIN: ICD-10-CM

## 2023-03-01 RX ORDER — FERROUS SULFATE 325(65) MG
325 TABLET ORAL 2 TIMES DAILY
Qty: 60 TABLET | Refills: 5 | Status: SHIPPED | OUTPATIENT
Start: 2023-03-01

## 2023-03-01 RX ORDER — TRAZODONE HYDROCHLORIDE 150 MG/1
150 TABLET ORAL NIGHTLY
COMMUNITY

## 2023-03-01 RX ORDER — METOLAZONE 2.5 MG/1
2.5 TABLET ORAL DAILY
COMMUNITY

## 2023-03-01 RX ORDER — TRAMADOL HYDROCHLORIDE 50 MG/1
50 TABLET ORAL EVERY 8 HOURS PRN
Qty: 90 TABLET | Refills: 0 | Status: SHIPPED | OUTPATIENT
Start: 2023-03-01 | End: 2023-03-06

## 2023-03-01 ASSESSMENT — PATIENT HEALTH QUESTIONNAIRE - PHQ9
SUM OF ALL RESPONSES TO PHQ9 QUESTIONS 1 & 2: 0
SUM OF ALL RESPONSES TO PHQ QUESTIONS 1-9: 0
2. FEELING DOWN, DEPRESSED OR HOPELESS: 0
SUM OF ALL RESPONSES TO PHQ QUESTIONS 1-9: 0
1. LITTLE INTEREST OR PLEASURE IN DOING THINGS: 0

## 2023-03-01 ASSESSMENT — ENCOUNTER SYMPTOMS
SHORTNESS OF BREATH: 1
CHEST TIGHTNESS: 0
NAUSEA: 0
WHEEZING: 0
DIARRHEA: 0
BACK PAIN: 0
VOMITING: 0
COUGH: 0
SORE THROAT: 0
ABDOMINAL PAIN: 0

## 2023-03-01 NOTE — PROGRESS NOTES
[address] [facility], [address]  Phone [phone]   Fax [phone]      OFFICE VISIT: 3/1/2023    Lu Rosario-: 1950      HPI  Reason For Visit:  Jake Hogan is a 67 y.o. Follow-Up from Hospital (Presented to [facility] on 23 with dark colored stools. He was admitted to the ICU for GI bleed. They completed an EGD on 2023 and found multiple gastric ulcers with erosions. He is still very fatigued, they would also like to discuss medications as \"they screwed them up in the hospital\")    Patient presents on hospital follow-up. He was admitted to [facility] on 2023 and discharged on 2023. Physical diagnosis was GI bleed with gastric ulcers and multiple erosions. height is 5' 11\" (1.803 m) and weight is 226 lb (102.5 kg). His temporal temperature is 98.3 °F (36.8 °C). His blood pressure is 98/62 and his pulse is 74. His oxygen saturation is 96%. Body mass index is 31.52 kg/m².     I have reviewed the following with the Mr. Frank Nuñez   Lab Review  Admission on 2023, Discharged on 2023   Component Date Value    SARS-CoV-2, NAAT 2023 Not Detected     WBC 2023 10.5     RBC 2023 2.66 (A)     Hemoglobin 2023 8.3 (A)     Hematocrit 2023 25.2 (A)     MCV 2023 94.7 (A)     MCH 2023 31.2 (A)     MCHC 2023 32.9 (A)     RDW 2023 16.5 (A)     Platelets  175     MPV 2023 10.8     Neutrophils % 2023 77.6 (A)     Lymphocytes % 2023 8.3 (A)     Monocytes % 2023 10.8 (A)     Eosinophils % 2023 1.8     Basophils % 2023 0.9     Neutrophils Absolute 2023 8.2 (A)     Immature Granulocytes # 2023 0.1     Lymphocytes Absolute 2023 0.9 (A)     Monocytes Absolute 2023 1.10 (A)     Eosinophils Absolute 2023 0.20     Basophils Absolute 2023 0.10     Sodium 2023 123 (A)     Potassium 2023 5.3 (A)     Chloride 02/22/2023 86 (A)     CO2 02/22/2023 17 (A)     Anion Gap 02/22/2023 20 (A)     Glucose 02/22/2023 168 (A)     BUN 02/22/2023 158 (A)     Creatinine 02/22/2023 2.6 (A)     Est, Glom Filt Rate 02/22/2023 25 (A)     Calcium 02/22/2023 9.0     Total Protein 02/22/2023 7.6     Albumin 02/22/2023 4.2     Total Bilirubin 02/22/2023 0.4     Alkaline Phosphatase 02/22/2023 72     ALT 02/22/2023 9     AST 02/22/2023 20     Lipase 02/22/2023 66 (A)     Protime 02/22/2023 16.4 (A)     INR 02/22/2023 1.32 (A)     ABO/Rh 02/22/2023 O POS     Antibody Screen 02/22/2023 NEG     QRS Duration 02/22/2023 136     Q-T Interval 02/22/2023 404     QTc Calculation (Bazett) 02/22/2023 453     T Axis 02/22/2023 undef     Product Code Blood Bank 02/22/2023 [id number]     Description Blood Bank 02/22/2023 Red Blood Cells, Irradiated, Leuko-reduced     Unit Number 02/22/2023 [id number]     Dispense Status Blood Ba* 02/22/2023 transfused     Product Code Blood Bank 02/22/2023 [id number]     Description Blood Bank 02/22/2023 Red Blood Cells, Apheresis, Leuko-reduced     Unit Number 02/22/2023 [id number]     Dispense Status Blood Ba* 02/22/2023 released     Hemoglobin 02/22/2023 7.9 (A)     Hematocrit 02/22/2023 23.4 (A)     Magnesium 02/23/2023 2.4     Hemoglobin 02/23/2023 8.7 (A)     Hematocrit 02/23/2023 25.8 (A)     POC Glucose 02/22/2023 166 (A)     Performed on 02/22/2023 AccuChek     WBC 02/23/2023 9.2     RBC 02/23/2023 2.66 (A)     Hemoglobin 02/23/2023 8.5 (A)     Hematocrit 02/23/2023 24.3 (A)     MCV 02/23/2023 91.4     MCH 02/23/2023 32.0 (A)     MCHC 02/23/2023 35.0     RDW 02/23/2023 16.3 (A)     Platelets 86/59/4507 136     MPV 02/23/2023 9.9     Neutrophils % 02/23/2023 71.4 (A)     Lymphocytes % 02/23/2023 10.0 (A)     Monocytes % 02/23/2023 13.0 (A)     Eosinophils % 02/23/2023 3.7     Basophils % 02/23/2023 1.0     Neutrophils Absolute 02/23/2023 6.6     Immature Granulocytes # 02/23/2023 0.1 Lymphocytes Absolute 02/23/2023 0.9 (A)     Monocytes Absolute 02/23/2023 1.20 (A)     Eosinophils Absolute 02/23/2023 0.30     Basophils Absolute 02/23/2023 0.10     Sodium 02/23/2023 124 (A)     Potassium 02/23/2023 5.0     Chloride 02/23/2023 91 (A)     CO2 02/23/2023 16 (A)     Anion Gap 02/23/2023 17     Glucose 02/23/2023 152 (A)     BUN 02/23/2023 137 (A)     Creatinine 02/23/2023 2.6 (A)     Est, Glom Filt Rate 02/23/2023 25 (A)     Calcium 02/23/2023 8.6 (A)     Total Protein 02/23/2023 6.3 (A)     Albumin 02/23/2023 3.8     Total Bilirubin 02/23/2023 1.0     Alkaline Phosphatase 02/23/2023 62     ALT 02/23/2023 8     AST 02/23/2023 18     Hemoglobin 02/23/2023 8.6 (A)     Hematocrit 02/23/2023 26.0 (A)     POC Glucose 02/23/2023 225 (A)     Performed on 02/23/2023 AccuChek     Sodium 02/24/2023 128 (A)     Potassium 02/24/2023 4.7     Chloride 02/24/2023 96 (A)     CO2 02/24/2023 20 (A)     Anion Gap 02/24/2023 12     Glucose 02/24/2023 148 (A)     BUN 02/24/2023 88 (A)     Creatinine 02/24/2023 1.8 (A)     Est, Glom Filt Rate 02/24/2023 39 (A)     Calcium 02/24/2023 9.1     WBC 02/24/2023 10.0     RBC 02/24/2023 2.75 (A)     Hemoglobin 02/24/2023 8.7 (A)     Hematocrit 02/24/2023 25.9 (A)     MCV 02/24/2023 94.2 (A)     MCH 02/24/2023 31.6 (A)     MCHC 02/24/2023 33.6     RDW 02/24/2023 16.7 (A)     Platelets 39/16/6680 150     MPV 02/24/2023 10.9     Neutrophils % 02/24/2023 67.2 (A)     Lymphocytes % 02/24/2023 8.3 (A)     Monocytes % 02/24/2023 14.3 (A)     Eosinophils % 02/24/2023 8.3 (A)     Basophils % 02/24/2023 1.2 (A)     Neutrophils Absolute 02/24/2023 6.8     Immature Granulocytes # 02/24/2023 0.1     Lymphocytes Absolute 02/24/2023 0.8 (A)     Monocytes Absolute 02/24/2023 1.40 (A)     Eosinophils Absolute 02/24/2023 0.80 (A)     Basophils Absolute 02/24/2023 0.10     Magnesium 02/24/2023 2.2     POC Glucose 02/23/2023 172 (A)     Performed on 02/23/2023 AccuChek     POC Glucose 02/24/2023 155 (A)     Performed on 02/24/2023 AccuChek     POC Glucose 02/24/2023 251 (A)     Performed on 02/24/2023 AccuChek    Orders Only on 12/01/2022   Component Date Value    WBC 12/01/2022 7.2     RBC 12/01/2022 3.81 (A)     Hemoglobin 12/01/2022 12.2 (A)     Hematocrit 12/01/2022 38.7 (A)     MCV 12/01/2022 101.6 (A)     MCH 12/01/2022 32.0 (A)     MCHC 12/01/2022 31.5 (A)     RDW 12/01/2022 16.4 (A)     Platelets 98/54/8690 119 (A)     MPV 12/01/2022 10.0     Neutrophils % 12/01/2022 60.1     Lymphocytes % 12/01/2022 13.7 (A)     Monocytes % 12/01/2022 14.1 (A)     Eosinophils % 12/01/2022 10.3 (A)     Basophils % 12/01/2022 1.2 (A)     Neutrophils Absolute 12/01/2022 4.3     Immature Granulocytes # 12/01/2022 0.0     Lymphocytes Absolute 12/01/2022 1.0 (A)     Monocytes Absolute 12/01/2022 1.00 (A)     Eosinophils Absolute 12/01/2022 0.70 (A)     Basophils Absolute 12/01/2022 0.10     Sodium 12/01/2022 131 (A)     Potassium 12/01/2022 5.7 (A)     Chloride 12/01/2022 101     CO2 12/01/2022 19 (A)     Anion Gap 12/01/2022 11     Glucose 12/01/2022 105     BUN 12/01/2022 49 (A)     Creatinine 12/01/2022 1.8 (A)     Est, Glom Filt Rate 12/01/2022 39 (A)     Calcium 12/01/2022 9.4     Total Protein 12/01/2022 7.9     Albumin 12/01/2022 4.4     Total Bilirubin 12/01/2022 0.6     Alkaline Phosphatase 12/01/2022 92     ALT 12/01/2022 8     AST 12/01/2022 10     Hemoglobin A1C 12/01/2022 5.9     Cholesterol, Total 12/01/2022 132 (A)     Triglycerides 12/01/2022 81     HDL 12/01/2022 32 (A)     LDL Calculated 12/01/2022 84     Microalbumin, Random Uri* 12/01/2022 <1.20     Creatinine, Ur 12/01/2022 93.4     Microalbumin Creatinine * 12/01/2022 see below     Uric Acid, Serum 12/01/2022 8.4 (A)     T4 Free 12/01/2022 1.32     TSH 12/01/2022 4.530 (A)      Copies of these are in the chart.     Current Outpatient Medications   Medication Sig Dispense Refill    metOLazone (ZAROXOLYN) 2.5 MG tablet Take 2.5 mg by mouth daily traZODone (DESYREL) 150 MG tablet Take 150 mg by mouth nightly      traMADol (ULTRAM) 50 MG tablet Take 1 tablet by mouth every 8 hours as needed for Pain for up to 5 days. Intended supply: 5 days. Take lowest dose possible to manage pain Max Daily Amount: 150 mg 90 tablet 0    ferrous sulfate (IRON 325) 325 (65 Fe) MG tablet Take 1 tablet by mouth 2 times daily 60 tablet 5    pantoprazole (PROTONIX) 40 MG tablet Take 1 tablet by mouth in the morning and at bedtime 84 tablet 0    spironolactone (ALDACTONE) 25 MG tablet Take 1 tablet by mouth daily 30 tablet 3    torsemide 40 MG TABS Take 40 mg by mouth daily (Patient taking differently: Take 200 mg by mouth every other day) 30 tablet 3    potassium chloride (KLOR-CON M) 10 MEQ extended release tablet Take 1 tablet by mouth daily 30 tablet 3    allopurinol (ZYLOPRIM) 100 MG tablet Take 1 tablet by mouth daily 90 tablet 3    nystatin-triamcinolone (MYCOLOG II) 854076-2.1 UNIT/GM-% cream Apply topically 2 times daily. 60 g 0    metoprolol succinate (TOPROL XL) 25 MG extended release tablet Take 1 tablet by mouth daily 45 tablet 11    rosuvastatin (CRESTOR) 40 MG tablet Take 1 tablet by mouth every evening 90 tablet 3    levothyroxine (SYNTHROID) 100 MCG tablet Take 1 tablet by mouth daily 90 tablet 3    ramipril (ALTACE) 1.25 MG capsule Take 1 capsule by mouth in the morning. 90 capsule 3    cetirizine (ZYRTEC) 10 MG tablet Take 10 mg by mouth in the morning. metFORMIN (GLUCOPHAGE) 1000 MG tablet Take 1 tablet by mouth 2 times daily (with meals) 180 tablet 3    Misc. Devices (RAISED TOILET SEAT/LOCK & ARMS) MISC Use with existing toilet to facilitate transfers.  1 each 0    triamcinolone (KENALOG) 0.1 % cream APPLY TOPICALLY TWICE A DAY 80 g 5    Multiple Vitamins-Minerals (THERAPEUTIC MULTIVITAMIN-MINERALS) tablet Take 1 tablet by mouth daily      nitroGLYCERIN (NITROLINGUAL) 0.4 MG/SPRAY 0.4 mg spray Place 1 spray under the tongue every 5 minutes as needed for Chest pain 1 Bottle 0    Omega-3 Fatty Acids (FISH OIL) 1000 MG CAPS Take 3,000 mg by mouth daily       No current facility-administered medications for this visit.        Allergies: Codeine     Past Medical History:   Diagnosis Date    Arthritis     Basilar artery stenosis     CAD (coronary artery disease) 1997 2009    MI    Cancer ([address]     SKIN 6YRS AGO    Carotid artery stenosis     bilateral    Cerebral artery occlusion with cerebral infarction [facility])     TIA due to hypotension    CHF (congestive heart failure) (HCC)     Dieulafoy lesion of stomach     Heel spur     Hyperlipidemia     Hypertension     Palliative care patient 10/17/2018    Sleep apnea     no c-pap    Squamous cell cancer of skin of earlobe, left     SCC    Type II or unspecified type diabetes mellitus without mention of complication, not stated as uncontrolled     Vertebral artery insufficiency        Family History   Problem Relation Age of Onset    Heart Disease Father     High Blood Pressure Father     Other Father         Esophageal Varices       Past Surgical History:   Procedure Laterality Date    ANGIOPLASTY Bilateral     basilar artery angioplasty    CARDIAC CATHETERIZATION  11/30/2018    [place]    CARDIAC DEFIBRILLATOR PLACEMENT      medtronic    CARPAL TUNNEL RELEASE Bilateral     CATARACT REMOVAL Bilateral     CHOLECYSTECTOMY      COLONOSCOPY N/A 07/06/2021    Dr Ruperto Mcnulty disease-AP x 3, 3 yr recall    CORONARY ARTERY BYPASS GRAFT  1997    3v    HEEL SPUR SURGERY Left     OR EGD TRANSORAL BIOPSY SINGLE/MULTIPLE N/A 10/16/2018    Dr Janine Kerns-w/control of bleeding-Active bleeding in the stomach from Dieulafoy lesion    PTCA  2009 [place]    with stents    TONSILLECTOMY      UPPER GASTROINTESTINAL ENDOSCOPY N/A 02/22/2023    Dr Domo Kaur gastric ulcers w/erosions, fragile gastric mucosa w/oozing of blood, gastric antral ulcer w/active bleeding/fresh blood clot, widely patent Schatzki ring       Social History Tobacco Use    Smoking status: Former     Packs/day: 1.00     Years: 30.00     Pack years: 30.00     Types: Cigars, Cigarettes     Quit date: 3/3/2009     Years since quittin.0    Smokeless tobacco: Never   Substance Use Topics    Alcohol use: No     Alcohol/week: 0.0 standard drinks        Review of Systems   Constitutional:  Positive for fatigue. Negative for chills and fever (he is limited in his exercise capacity). HENT:  Negative for congestion, ear pain and sore throat. Eyes:  Negative for visual disturbance. Respiratory:  Positive for shortness of breath (occasional orthopnea ). Negative for cough, chest tightness and wheezing. Cardiovascular:  Positive for leg swelling (Very rarely). Negative for chest pain and palpitations. Gastrointestinal:  Negative for abdominal pain, diarrhea, nausea and vomiting. Endocrine: Negative for polyuria. Genitourinary:  Negative for frequency and urgency. Musculoskeletal:  Positive for arthralgias (right shoulder since his fall). Negative for back pain and neck pain. Skin:  Negative for rash. Recent skin cancer removed from his right hand. Neurological:  Positive for weakness (generalized). Negative for dizziness and headaches. Psychiatric/Behavioral:  Negative for self-injury. The patient is not nervous/anxious. Physical Exam  Constitutional:       General: He is not in acute distress. Appearance: He is well-developed and normal weight. HENT:      Head: Normocephalic and atraumatic. Right Ear: Hearing, tympanic membrane, ear canal and external ear normal. No middle ear effusion. Left Ear: Hearing, tympanic membrane, ear canal and external ear normal.      Nose: Nose normal. No mucosal edema (mild) or rhinorrhea (thin, clear). Mouth/Throat:      Mouth: Mucous membranes are moist.      Pharynx: Oropharynx is clear. No posterior oropharyngeal erythema. Eyes:      General: No scleral icterus.      Extraocular Movements: Extraocular movements intact. Conjunctiva/sclera: Conjunctivae normal.      Pupils: Pupils are equal, round, and reactive to light. Neck:      Thyroid: No thyromegaly. Vascular: No carotid bruit or JVD. Cardiovascular:      Rate and Rhythm: Normal rate and regular rhythm. No extrasystoles are present. Chest Wall: PMI is not displaced. Pulses:           Dorsalis pedis pulses are 0 on the right side and 0 on the left side. Heart sounds: S1 normal and S2 normal. Murmur heard. No friction rub. No gallop. No S3 sounds. Comments: Feet are slightly cyanotic. Pulmonary:      Effort: Pulmonary effort is normal. No respiratory distress. Breath sounds: No decreased breath sounds, wheezing, rhonchi or rales. Abdominal:      General: Bowel sounds are normal.      Palpations: Abdomen is soft. Tenderness: There is no abdominal tenderness. There is no guarding or rebound. Genitourinary:     Comments: Exam deferred  Musculoskeletal:         General: No swelling or tenderness. Normal range of motion. Cervical back: Normal range of motion and neck supple. Right lower leg: No edema. Left lower leg: No edema. Lymphadenopathy:      Cervical: No cervical adenopathy. Skin:     General: Skin is warm and dry. Capillary Refill: Capillary refill takes less than 2 seconds. Findings: No rash. Comments: Bandage on dorsum of right hand   Neurological:      General: No focal deficit present. Mental Status: He is alert and oriented to person, place, and time. Sensory: No sensory deficit (no numbness or tingling). Psychiatric:         Mood and Affect: Mood normal.         Behavior: Behavior normal.         ASSESSMENT      ICD-10-CM    1. Gastrointestinal hemorrhage associated with gastric ulcer  K25.4       2.  Type 2 diabetes mellitus with stage 3b chronic kidney disease, with long-term current use of insulin ([facility])  E11.22     N18.32     Z79.4 3. Atherosclerosis of native artery of both lower extremities with intermittent claudication ([address]  I70.213       4. Thrombocytopenia, unspecified ([address]  D69.6       5. Stage 3b chronic kidney disease (HCC)  N18.32       6. Primary hypertension  I10       7. Hyperlipidemia with target LDL less than 100  E78.5       8. Coronary artery disease involving native coronary artery of native heart without angina pectoris  I25.10       9. Chronic systolic congestive heart failure (HCC)  I50.22       10. Hospital discharge follow-up  Z09 IN DISCHARGE MEDS RECONCILED W/ CURRENT OUTPATIENT MED LIST      11. Chronic left shoulder pain  M25.512 traMADol (ULTRAM) 50 MG tablet    G89.29       12. Primary osteoarthritis involving multiple joints  M15.9 traMADol (ULTRAM) 50 MG tablet            PLAN    1. Gastrointestinal hemorrhage associated with gastric ulcer  Recommend continue with twice daily proton pump inhibitor. We will avoid nonsteroidal anti-inflammatory medications completely    2. Type 2 diabetes mellitus with stage 3b chronic kidney disease, with long-term current use of insulin ([address]  He is on appropriate diabetes medication regimen with excellent control. Continue same    3. Atherosclerosis of native artery of both lower extremities with intermittent claudication (HCC)  No recent claudication symptoms. Continue present management    4. Thrombocytopenia, unspecified ([address]  Follow serially over time. Avoid platelet inhibiting medications    5. Stage 3b chronic kidney disease ([address]  Renal function is stable and back to baseline  He is not interested in nephrology to consult at this time    6. Primary hypertension  Blood pressure is excellently controlled at present    7. Hyperlipidemia with target LDL less than 100  Lipids are appropriately controlled on present regimen    8.  Coronary artery disease involving native coronary artery of native heart without angina pectoris  Stable without any anginal symptoms    9. Chronic systolic congestive heart failure (HCC)  Clinically compensated at present once she has resumed use baseline diuretic use schedule    Orders Placed This Encounter   Procedures    TX DISCHARGE MEDS RECONCILED W/ CURRENT OUTPATIENT MED LIST          Return in 3 months (on 6/1/2023) for 30. Post-Discharge Transitional Care  Follow Up      Eneida Freed   YOB: 1950    Date of Office Visit:  3/1/2023  Date of Hospital Admission: 2/22/23  Date of Hospital Discharge: 2/24/23  Risk of hospital readmission (high >=14%. Medium >=10%) :Readmission Risk Score: 18      Care management risk score Rising risk (score 2-5) and Complex Care (Scores >=6): No Risk Score On File     Non face to face  following discharge, date last encounter closed (first attempt may have been earlier): 02/28/2023    Call initiated 2 business days of discharge: Yes    ASSESSMENT/PLAN:   Gastrointestinal hemorrhage associated with gastric ulcer  Type 2 diabetes mellitus with stage 3b chronic kidney disease, with long-term current use of insulin (HCC)  Atherosclerosis of native artery of both lower extremities with intermittent claudication (HCC)  Thrombocytopenia, unspecified (HCC)  Stage 3b chronic kidney disease ([facility]  Primary hypertension  Hyperlipidemia with target LDL less than 100  Coronary artery disease involving native coronary artery of native heart without angina pectoris  Chronic systolic congestive heart failure [facility])  Hospital discharge follow-up  -     TX DISCHARGE MEDS RECONCILED W/ CURRENT OUTPATIENT MED LIST  Chronic left shoulder pain  -     traMADol (ULTRAM) 50 MG tablet; Take 1 tablet by mouth every 8 hours as needed for Pain for up to 5 days. Intended supply: 5 days. Take lowest dose possible to manage pain Max Daily Amount: 150 mg, Disp-90 tablet, R-0Normal  Primary osteoarthritis involving multiple joints  -     traMADol (ULTRAM) 50 MG tablet;  Take 1 tablet by mouth every 8 hours as needed for Pain for up to 5 days. Intended supply: 5 days. Take lowest dose possible to manage pain Max Daily Amount: 150 mg, Disp-90 tablet, R-0Normal    Medical Decision Making: high complexity  Return in 3 months (on 6/1/2023) for 30. On this date 3/1/2023 I have spent 20 minutes reviewing previous notes, test results and face to face with the patient discussing the diagnosis and importance of compliance with the treatment plan as well as documenting on the day of the visit. Subjective:   HPI:  Follow up of Hospital problems/diagnosis(es):  Patient presented to [facility] emergency department with melanotic stools. He was not having any significant GI symptoms but had a previous incidence of GI bleed with similar symptoms/presentation. On evaluation in the emergency department he was found to be anemic. He was also hyperkalemic but this may have been secondary to blood. He was admitted to the hospital.  He was started on twice daily Protonix. EGD was performed which showed multiple gastric ulcers. He was continued on his regular medications with the exception of twice daily Protonix and decreased spironolactone dose. His potassium supplementation however was continued. He also discontinued his Zaroxolyn which he takes on a as needed basis. He did have a transient bump in his creatinine, but this came back down to baseline at the time of discharge. Inpatient course: Discharge summary reviewed- see chart. Interval history/Current status:   Since his discharge, he notes he is feeling much better since he has gone back on his home regimen of medications. He is back on his baseline Demadex dose as well. He continues to feel extremely fatigued. He is not on any iron therapy  Once he went back on his home diuretic regimen his volume status has dramatically improved.   Shortly after discharge he was markedly volume overloaded and gained 12 pounds of water weight. Patient Active Problem List   Diagnosis    Carotid artery occlusion without infarction    CAD (coronary artery disease)    HTN (hypertension)    Hyperlipidemia with target LDL less than 100    Chronic systolic congestive heart failure (HCC)    GI bleed    NSAID long-term use    Hypotension due to hypovolemia    Acute blood loss anemia    Chest pain    Dieulafoy lesion of stomach    Pleural effusion, bilateral    Bilateral carotid artery stenosis    Syncope and collapse    Acute renal failure superimposed on stage 3 chronic kidney disease (HCC)    Hyponatremia    TIA (transient ischemic attack)    Weakness    Visual disturbance    Acquired hypothyroidism    Atherosclerosis of native artery of both lower extremities with intermittent claudication (HCC)    Stage 3b chronic kidney disease (HCC)    Type 2 diabetes mellitus with chronic kidney disease    Fatigue    Vertebrobasilar artery syndrome    Carotid arterial disease (HCC)    Mixed hyperlipidemia    Calculus of kidney    Tinea corporis    Pure hypercholesterolemia    Perforation of tympanic membrane    Malignant neoplasm of skin    LBBB (left bundle branch block)    ICD (implantable cardioverter-defibrillator) in place    Thrombocytopenia, unspecified    Symptomatic anemia    Hypovolemic shock ([facility]       Medications listed as ordered at the time of discharge from hospital     Medication List            Accurate as of March 1, 2023 11:59 PM. If you have any questions, ask your nurse or doctor. START taking these medications      ferrous sulfate 325 (65 Fe) MG tablet  Commonly known as: IRON 325  Take 1 tablet by mouth 2 times daily  Started by: MIKE Wilhelm DO     traMADol 50 MG tablet  Commonly known as: Ultram  Take 1 tablet by mouth every 8 hours as needed for Pain for up to 5 days. Intended supply: 5 days. Take lowest dose possible to manage pain Max Daily Amount: 150 mg  Started by: MIKE Wilhelm DO CHANGE how you take these medications      Torsemide 40 MG Tabs  Take 40 mg by mouth daily  What changed:   how much to take  when to take this            CONTINUE taking these medications      allopurinol 100 MG tablet  Commonly known as: ZYLOPRIM  Take 1 tablet by mouth daily     cetirizine 10 MG tablet  Commonly known as: ZYRTEC     fish oil 1000 MG capsule     levothyroxine 100 MCG tablet  Commonly known as: SYNTHROID  Take 1 tablet by mouth daily     metFORMIN 1000 MG tablet  Commonly known as: GLUCOPHAGE  Take 1 tablet by mouth 2 times daily (with meals)     metOLazone 2.5 MG tablet  Commonly known as: ZAROXOLYN     metoprolol succinate 25 MG extended release tablet  Commonly known as: TOPROL XL  Take 1 tablet by mouth daily     nitroGLYCERIN 0.4 MG/SPRAY 0.4 mg spray  Commonly known as: NITROLINGUAL  Place 1 spray under the tongue every 5 minutes as needed for Chest pain     nystatin-triamcinolone 670181-4.1 UNIT/GM-% cream  Commonly known as: MYCOLOG II  Apply topically 2 times daily. pantoprazole 40 MG tablet  Commonly known as: PROTONIX  Take 1 tablet by mouth in the morning and at bedtime     potassium chloride 10 MEQ extended release tablet  Commonly known as: KLOR-CON M  Take 1 tablet by mouth daily     Raised Toilet Seat/Lock & Arms Misc  Use with existing toilet to facilitate transfers. ramipril 1.25 MG capsule  Commonly known as: ALTACE  Take 1 capsule by mouth in the morning.      rosuvastatin 40 MG tablet  Commonly known as: CRESTOR  Take 1 tablet by mouth every evening     spironolactone 25 MG tablet  Commonly known as: ALDACTONE  Take 1 tablet by mouth daily     therapeutic multivitamin-minerals tablet     traZODone 150 MG tablet  Commonly known as: DESYREL     triamcinolone 0.1 % cream  Commonly known as: KENALOG  APPLY TOPICALLY TWICE A DAY               Where to Get Your Medications        These medications were sent to [facility] #[id number] - [id number] [facility] [place] [phone] [place] [phone]  [facility] [address] , [place] [phone]      Phone: [phone]   ferrous sulfate 325 (65 Fe) MG tablet  traMADol 50 MG tablet           Medications marked \"taking\" at this time  Outpatient Medications Marked as Taking for the 3/1/23 encounter (Office Visit) with B Frankey Locket, DO   Medication Sig Dispense Refill    metOLazone (ZAROXOLYN) 2.5 MG tablet Take 2.5 mg by mouth daily      traZODone (DESYREL) 150 MG tablet Take 150 mg by mouth nightly      traMADol (ULTRAM) 50 MG tablet Take 1 tablet by mouth every 8 hours as needed for Pain for up to 5 days. Intended supply: 5 days. Take lowest dose possible to manage pain Max Daily Amount: 150 mg 90 tablet 0    ferrous sulfate (IRON 325) 325 (65 Fe) MG tablet Take 1 tablet by mouth 2 times daily 60 tablet 5    pantoprazole (PROTONIX) 40 MG tablet Take 1 tablet by mouth in the morning and at bedtime 84 tablet 0    spironolactone (ALDACTONE) 25 MG tablet Take 1 tablet by mouth daily 30 tablet 3    torsemide 40 MG TABS Take 40 mg by mouth daily (Patient taking differently: Take 200 mg by mouth every other day) 30 tablet 3    potassium chloride (KLOR-CON M) 10 MEQ extended release tablet Take 1 tablet by mouth daily 30 tablet 3    allopurinol (ZYLOPRIM) 100 MG tablet Take 1 tablet by mouth daily 90 tablet 3    nystatin-triamcinolone (MYCOLOG II) 999633-3.1 UNIT/GM-% cream Apply topically 2 times daily. 60 g 0    metoprolol succinate (TOPROL XL) 25 MG extended release tablet Take 1 tablet by mouth daily 45 tablet 11    rosuvastatin (CRESTOR) 40 MG tablet Take 1 tablet by mouth every evening 90 tablet 3    levothyroxine (SYNTHROID) 100 MCG tablet Take 1 tablet by mouth daily 90 tablet 3    ramipril (ALTACE) 1.25 MG capsule Take 1 capsule by mouth in the morning. 90 capsule 3    cetirizine (ZYRTEC) 10 MG tablet Take 10 mg by mouth in the morning.       metFORMIN (GLUCOPHAGE) 1000 MG tablet Take 1 tablet by mouth 2 times daily (with meals) 180 tablet 3    Misc. Devices (RAISED TOILET SEAT/LOCK & ARMS) MISC Use with existing toilet to facilitate transfers. 1 each 0    triamcinolone (KENALOG) 0.1 % cream APPLY TOPICALLY TWICE A DAY 80 g 5    Multiple Vitamins-Minerals (THERAPEUTIC MULTIVITAMIN-MINERALS) tablet Take 1 tablet by mouth daily      nitroGLYCERIN (NITROLINGUAL) 0.4 MG/SPRAY 0.4 mg spray Place 1 spray under the tongue every 5 minutes as needed for Chest pain 1 Bottle 0    Omega-3 Fatty Acids (FISH OIL) 1000 MG CAPS Take 3,000 mg by mouth daily          Medications patient taking as of now reconciled against medications ordered at time of hospital discharge: Yes    A comprehensive review of systems was negative except for what was noted in the HPI. Objective:    BP 98/62 (Site: Left Upper Arm, Position: Sitting, Cuff Size: Large Adult)   Pulse 74   Temp 98.3 °F (36.8 °C) (Temporal)   Ht 5' 11\" (1.803 m)   Wt 226 lb (102.5 kg)   SpO2 96%   BMI 31.52 kg/m²   Physical exam as documented above      An electronic signature was used to authenticate this note. --MIKE Llanos, DO    This was an in-house visit

## 2023-03-15 ENCOUNTER — OFFICE VISIT (OUTPATIENT)
Dept: GASTROENTEROLOGY | Age: 73
End: 2023-03-15
Payer: MEDICARE

## 2023-03-15 VITALS
HEART RATE: 60 BPM | WEIGHT: 229 LBS | SYSTOLIC BLOOD PRESSURE: 135 MMHG | BODY MASS INDEX: 32.06 KG/M2 | OXYGEN SATURATION: 85 % | HEIGHT: 71 IN | DIASTOLIC BLOOD PRESSURE: 80 MMHG

## 2023-03-15 DIAGNOSIS — K27.4 GASTROINTESTINAL HEMORRHAGE ASSOCIATED WITH PEPTIC ULCER: ICD-10-CM

## 2023-03-15 DIAGNOSIS — R11.0 NAUSEA: ICD-10-CM

## 2023-03-15 DIAGNOSIS — K25.0 ACUTE GASTRIC ULCER WITH HEMORRHAGE: ICD-10-CM

## 2023-03-15 DIAGNOSIS — K27.4 GASTROINTESTINAL HEMORRHAGE ASSOCIATED WITH PEPTIC ULCER: Primary | ICD-10-CM

## 2023-03-15 LAB
HCT VFR BLD AUTO: 28.4 % (ref 42–52)
HGB BLD-MCNC: 8.9 G/DL (ref 14–18)

## 2023-03-15 PROCEDURE — 3075F SYST BP GE 130 - 139MM HG: CPT | Performed by: NURSE PRACTITIONER

## 2023-03-15 PROCEDURE — G8417 CALC BMI ABV UP PARAM F/U: HCPCS | Performed by: NURSE PRACTITIONER

## 2023-03-15 PROCEDURE — 99214 OFFICE O/P EST MOD 30 MIN: CPT | Performed by: NURSE PRACTITIONER

## 2023-03-15 PROCEDURE — G8427 DOCREV CUR MEDS BY ELIG CLIN: HCPCS | Performed by: NURSE PRACTITIONER

## 2023-03-15 PROCEDURE — 3017F COLORECTAL CA SCREEN DOC REV: CPT | Performed by: NURSE PRACTITIONER

## 2023-03-15 PROCEDURE — G8484 FLU IMMUNIZE NO ADMIN: HCPCS | Performed by: NURSE PRACTITIONER

## 2023-03-15 PROCEDURE — 1123F ACP DISCUSS/DSCN MKR DOCD: CPT | Performed by: NURSE PRACTITIONER

## 2023-03-15 PROCEDURE — 1036F TOBACCO NON-USER: CPT | Performed by: NURSE PRACTITIONER

## 2023-03-15 PROCEDURE — 1111F DSCHRG MED/CURRENT MED MERGE: CPT | Performed by: NURSE PRACTITIONER

## 2023-03-15 PROCEDURE — 3079F DIAST BP 80-89 MM HG: CPT | Performed by: NURSE PRACTITIONER

## 2023-03-15 RX ORDER — OMEPRAZOLE 20 MG/1
20 CAPSULE, DELAYED RELEASE ORAL DAILY
Qty: 30 CAPSULE | Refills: 11 | Status: SHIPPED | OUTPATIENT
Start: 2023-03-15

## 2023-03-15 RX ORDER — ONDANSETRON 4 MG/1
4 TABLET, FILM COATED ORAL EVERY 6 HOURS PRN
Qty: 20 TABLET | Refills: 2 | Status: SHIPPED | OUTPATIENT
Start: 2023-03-15

## 2023-03-15 RX ORDER — TRAMADOL HYDROCHLORIDE 50 MG/1
50 TABLET ORAL EVERY 6 HOURS PRN
COMMUNITY

## 2023-03-15 RX ORDER — EMPAGLIFLOZIN 10 MG/1
10 TABLET, FILM COATED ORAL DAILY
COMMUNITY
Start: 2023-01-18

## 2023-03-15 RX ORDER — SUCRALFATE 1 G/1
1 TABLET ORAL 4 TIMES DAILY
Qty: 120 TABLET | Refills: 3 | Status: SHIPPED | OUTPATIENT
Start: 2023-03-15

## 2023-03-15 ASSESSMENT — ENCOUNTER SYMPTOMS
SHORTNESS OF BREATH: 0
ABDOMINAL PAIN: 0
DIARRHEA: 0
BLOOD IN STOOL: 0
ANAL BLEEDING: 0
ABDOMINAL DISTENTION: 0
CONSTIPATION: 0
RECTAL PAIN: 0
CHOKING: 0
COUGH: 0
TROUBLE SWALLOWING: 0
NAUSEA: 0
VOMITING: 0

## 2023-03-15 NOTE — PATIENT INSTRUCTIONS
Upper GI Endoscopy: Before Your Procedure    What is an upper GI endoscopy? An upper gastrointestinal (or GI) endoscopy is a test that allows your doctor to look at the inside of your esophagus, stomach, and the first part of your small intestine, called the duodenum. The esophagus is the tube that carries food to your stomach. The doctor uses a thin, lighted tube that bends. It is called an endoscope, or scope. The doctor puts the tip of the scope in your mouth and gently moves it down your throat. The scope is a flexible video camera. The doctor looks at a monitor (like a TV set or a computer screen) as he or she moves the scope. A doctor may do this procedure to look for ulcers, tumors, infection, or bleeding. It also can be used to look for signs of acid backing up into your esophagus. This is called gastroesophageal reflux disease, or GERD. The doctor can use the scope to take a sample of tissue for study (a biopsy). The doctor also can use the scope to take out growths or stop bleeding. How do you prepare for the procedure? Procedures can be stressful. This information will help you understand what you can expect. And it will help you safely prepare for your procedure. Preparing for the procedure    Do not eat or drink anything for 6 to 8 hours before the test. An empty stomach helps your doctor see your stomach clearly during the test. It also reduces your chances of vomiting. If you vomit, there is a small risk that the vomit could enter your lungs. (This is called aspiration.) If the test is done in an emergency, a tube may be inserted through your nose or mouth to empty your stomach. Do not take sucralfate (Carafate) or antacids on the day of the test. These medicines can make it hard for your doctor to see your upper GI tract. If your doctor tells you to, stop taking iron supplements 7 to 14 days before the test.     Be sure you have someone to take you home.  Anesthesia and pain medicine will make it unsafe for you to drive or get home on your own. Understand exactly what procedure is planned, along with the risks, benefits, and other options. Tell your doctor ALL the medicines, vitamins, supplements, and herbal remedies you take. Some may increase the risk of problems during your procedure. Your doctor will tell you if you should stop taking any of them before the procedure and how soon to do it. If you take a medicine that prevents blood clots, your doctor may tell you to stop taking it before your procedure. Or your doctor may tell you to keep taking it. (These medicines include aspirin and other blood thinners.) Make sure that you understand exactly what your doctor wants you to do. Make sure your doctor and the hospital have a copy of your advance directive. If you don't have one, you may want to prepare one. It lets others know your health care wishes. It's a good thing to have before any type of surgery or procedure. What happens on the day of the procedure? Follow the instructions exactly about when to stop eating and drinking. If you don't, your procedure may be canceled. If your doctor told you to take your medicines on the day of the procedure, take them with only a sip of water. Take a bath or shower before you come in for your procedure. Do not apply lotions, perfumes, deodorants, or nail polish. Take off all jewelry and piercings. And take out contact lenses, if you wear them. At the hospital or surgery center   Bring a picture ID. The test may take 15 to 30 minutes. The doctor may spray medicine on the back of your throat to numb it. You also will get medicine to prevent pain and to relax you. You will lie on your left side. The doctor will put the scope in your mouth and toward the back of your throat. The doctor will tell you when to swallow. This helps the scope move down your throat. You will be able to breathe normally.  The doctor will move the scope down your esophagus into your stomach. The doctor also may look at the duodenum. If your doctor wants to take a sample of tissue for a biopsy, he or she may use small surgical tools, which are put into the scope, to cut off some tissue. You will not feel a biopsy, if one is taken. The doctor also can use the tools to stop bleeding or to do other treatments, if needed. You will stay at the hospital or surgery center for 1 to 2 hours until the medicine you were given wears off. What happens after an upper GI endoscopy? After the test, you may belch and feel bloated for a while. You may have a tickling, dry throat or mouth. You may feel a bit hoarse, and you may have a mild sore throat. These symptoms may last several days. Throat lozenges and warm saltwater gargles can help relieve the throat symptoms. Ask your doctor when you can drive again. Your doctor will tell you when you can go back to your usual diet and activities. Don't drink alcohol for 12 to 24 hours after the test.   When should you call your doctor? You have questions or concerns. You don't understand how to prepare for your procedure. You become ill before the procedure (such as fever, flu, or a cold). You need to reschedule or have changed your mind about having the procedure. Where can you learn more? Go to http://www.woods.com/ and enter P790 to learn more about \"Upper GI Endoscopy: Before Your Procedure. \"  Current as of: June 6, 2022               Content Version: 13.5  © 2006-2022 [employer]. Care instructions adapted under license by [facility] ([facility]). If you have questions about a medical condition or this instruction, always ask your healthcare professional. [place] [address] any warranty or liability for your use of this information.

## 2023-03-15 NOTE — PROGRESS NOTES
Subjective:     Patient ID: Paulette Poon is a 67 y.o. male  PCP: Jose Luis Bradford DO  Referring Provider: No ref. provider found    HPI  Patient presents to the office today with the following complaints: Follow-up      Patient seen in the office today from follow up after hospitalization for upper GI bleed. Reports he is still feeling fatigued, reports his stools are normal colored now versus the black stools he was having when he went to the hospital and denies seeing any blood. Reports he is having nausea and he can not relate the nausea to eating or not eating, he thinks it may be related to his medication. EGD 2/22/2023: (Full report in Epic)  Findings:   Esophagus: The esophageal mucosa was normal.  There was no evidence of inflammation, ulceration or any masses. No evidence of any Shah's esophagus or hiatal hernia. A widely patent ringlike structure was noted at the gastroesophageal junction. This finding consistent with a widely patent Schatzki ring. No esophageal varices are noted. The scope was passed without difficulty in the stomach. Stomach: The gastric mucosa in the lower body and the antrum noted to be inflamed with multiple superficial erosions. A relatively large ulcer measuring about 1 cm was noted on the lesser curvature side in the gastric antral area. Some oozing was noted along with a fresh blood clot. Small amount of free blood was noted in the gastric antral area. Retroflexion was done. Upper part of gastric body and fundus also appears to be somewhat chronically inflamed, edematous and granular appearance. The mucosa of the gastric body and antrum was quite fragile. Slight touch of the scope was oozing from different places. No obvious angiodysplastic lesions were noted. No evidence of any significant hiatal hernia or gastric varices were noted.    Duodenum: Duodenal bulb, first and second portion was normal.  There was no evidence of any ulceration, inflammation or any masses. Small amount of fresh blood was also noted in the duodenum. The scope was slowly withdrawn in the stomach. The ulcerated area with a blood clot was watched carefully. The clot was adherent. I decided to inject 1 is to 10,000 strength of epinephrine. About 4 cc of epinephrine was injected with good blanching result. I then decided to place 2 clip at the base of this ulcer approximating the margins and to include the vessel. No further bleeding was noted. The scope was then slowly withdrawn. Multiple pictures taken. Specimens: None. Complications: None  Impression:  1. Multiple gastric ulcers with erosions. 2.  Fragile gastric mucosa with oozing of blood. 3.  Gastric antral ulcer with active bleeding/fresh blood clot. Status post 4 cc of epinephrine and 2 Hemoclip application. 4.  Widely patent Schatzki ring. Recommendations:    1. Antireflux measures. 2.  I will resume patient on clear liquid diet and advance tomorrow as tolerated and if there is no further bleeding. 3.  Follow hemoglobin hematocrit and transfuse packed red blood cell on as-needed basis. 4.  Continue pantoprazole 40 mg IV twice a day. 5.  Check H. pylori serology or stool antigen and treat if positive. Assessment:     1. Gastrointestinal hemorrhage associated with peptic ulcer  -     Hemoglobin and Hematocrit; Future  2. Nausea  3. Acute gastric ulcer with hemorrhage         Plan:   RX's  given for Omeprazole 20mg daily  Carafate 1gm QID, and Zofran 4mg PRN   Repeat egd in 3 months to check healing of ulcer.    Diatherix kit given to patient to test for HPylori  H&H ordered today   Orders  Orders Placed This Encounter   Procedures    Hemoglobin and Hematocrit     Standing Status:   Future     Number of Occurrences:   1     Standing Expiration Date:   3/15/2024     Medications  Orders Placed This Encounter   Medications    ondansetron (ZOFRAN) 4 MG tablet     Sig: Take 1 tablet by mouth every 6 hours as needed for Nausea     Dispense:  20 tablet     Refill:  2    sucralfate (CARAFATE) 1 GM tablet     Sig: Take 1 tablet by mouth 4 times daily     Dispense:  120 tablet     Refill:  3    omeprazole (PRILOSEC) 20 MG delayed release capsule     Sig: Take 1 capsule by mouth Daily Take first thing in the morning on an empty stomach.      Dispense:  30 capsule     Refill:  11         Patient History:     Past Medical History:   Diagnosis Date    Arthritis     Basilar artery stenosis     CAD (coronary artery disease) 1997 2009    MI    Cancer ([facility] [address]     SKIN 6YRS AGO    Carotid artery stenosis     bilateral    Cerebral artery occlusion with cerebral infarction (HCC)     TIA due to hypotension    CHF (congestive heart failure) (HCC)     Dieulafoy lesion of stomach     Heel spur     Hyperlipidemia     Hypertension     Palliative care patient 10/17/2018    Sleep apnea     no c-pap    Squamous cell cancer of skin of earlobe, left     SCC    Type II or unspecified type diabetes mellitus without mention of complication, not stated as uncontrolled     Vertebral artery insufficiency        Past Surgical History:   Procedure Laterality Date    ANGIOPLASTY Bilateral     basilar artery angioplasty    CARDIAC CATHETERIZATION  11/30/2018    [facility]    CARDIAC DEFIBRILLATOR PLACEMENT      medtronic    CARPAL TUNNEL RELEASE Bilateral     CATARACT REMOVAL Bilateral     CHOLECYSTECTOMY      COLONOSCOPY N/A 07/06/2021    Dr Merrick Kenrs-Diverticular disease-AP x 3, 3 yr recall    CORONARY ARTERY BYPASS GRAFT  1997    3v    HEEL SPUR SURGERY Left     IN EGD TRANSORAL BIOPSY SINGLE/MULTIPLE N/A 10/16/2018    Dr Merrick Kerns-w/control of bleeding-Active bleeding in the stomach from Dieulafoy lesion    PTCA  2009 [facility]    with stents    TONSILLECTOMY      UPPER GASTROINTESTINAL ENDOSCOPY N/A 02/22/2023    Dr Willy Koyanagi gastric ulcers w/erosions, fragile gastric mucosa w/oozing of blood, gastric antral ulcer w/active bleeding/fresh blood clot, widely patent Schatzki ring       Family History   Problem Relation Age of Onset    Heart Disease Father     High Blood Pressure Father     Other Father         Esophageal Varices    Colon Cancer Neg Hx     Colon Polyps Neg Hx        Social History     Socioeconomic History    Marital status:    Tobacco Use    Smoking status: Former     Packs/day: 1.00     Years: 30.00     Pack years: 30.00     Types: Cigars, Cigarettes     Quit date: 3/3/2009     Years since quittin.0    Smokeless tobacco: Never   Vaping Use    Vaping Use: Never used   Substance and Sexual Activity    Alcohol use: No     Alcohol/week: 0.0 standard drinks    Drug use: No     Social Determinants of Health     Financial Resource Strain: Low Risk     Difficulty of Paying Living Expenses: Not hard at all   Food Insecurity: No Food Insecurity    Worried About Running Out of Food in the Last Year: Never true    Ran Out of Food in the Last Year: Never true   Physical Activity: Insufficiently Active    Days of Exercise per Week: 2 days    Minutes of Exercise per Session: 30 min       Current Outpatient Medications   Medication Sig Dispense Refill    JARDIANCE 10 MG tablet Take 10 mg by mouth daily      traMADol (ULTRAM) 50 MG tablet Take 50 mg by mouth every 6 hours as needed for Pain. Acetaminophen (TYLENOL PO) Take 500 mg by mouth as needed (takes this with Tramadol)      ondansetron (ZOFRAN) 4 MG tablet Take 1 tablet by mouth every 6 hours as needed for Nausea 20 tablet 2    sucralfate (CARAFATE) 1 GM tablet Take 1 tablet by mouth 4 times daily 120 tablet 3    omeprazole (PRILOSEC) 20 MG delayed release capsule Take 1 capsule by mouth Daily Take first thing in the morning on an empty stomach.  30 capsule 11    metOLazone (ZAROXOLYN) 2.5 MG tablet Take 2.5 mg by mouth daily      traZODone (DESYREL) 150 MG tablet Take 150 mg by mouth nightly      spironolactone (ALDACTONE) 25 MG tablet Take 1 tablet by mouth daily 30 tablet 3 torsemide 40 MG TABS Take 40 mg by mouth daily (Patient taking differently: Take 200 mg by mouth every other day) 30 tablet 3    potassium chloride (KLOR-CON M) 10 MEQ extended release tablet Take 1 tablet by mouth daily 30 tablet 3    allopurinol (ZYLOPRIM) 100 MG tablet Take 1 tablet by mouth daily 90 tablet 3    nystatin-triamcinolone (MYCOLOG II) 192265-2.1 UNIT/GM-% cream Apply topically 2 times daily. 60 g 0    metoprolol succinate (TOPROL XL) 25 MG extended release tablet Take 1 tablet by mouth daily 45 tablet 11    rosuvastatin (CRESTOR) 40 MG tablet Take 1 tablet by mouth every evening 90 tablet 3    levothyroxine (SYNTHROID) 100 MCG tablet Take 1 tablet by mouth daily 90 tablet 3    ramipril (ALTACE) 1.25 MG capsule Take 1 capsule by mouth in the morning. 90 capsule 3    cetirizine (ZYRTEC) 10 MG tablet Take 10 mg by mouth in the morning.      Misc. Devices (RAISED TOILET SEAT/LOCK & ARMS) MISC Use with existing toilet to facilitate transfers. 1 each 0    triamcinolone (KENALOG) 0.1 % cream APPLY TOPICALLY TWICE A DAY 80 g 5    Multiple Vitamins-Minerals (THERAPEUTIC MULTIVITAMIN-MINERALS) tablet Take 1 tablet by mouth daily      nitroGLYCERIN (NITROLINGUAL) 0.4 MG/SPRAY 0.4 mg spray Place 1 spray under the tongue every 5 minutes as needed for Chest pain 1 Bottle 0    Omega-3 Fatty Acids (FISH OIL) 1000 MG CAPS Take 3,000 mg by mouth daily      ferrous sulfate (IRON 325) 325 (65 Fe) MG tablet Take 1 tablet by mouth 2 times daily (Patient not taking: Reported on 3/15/2023) 60 tablet 5    pantoprazole (PROTONIX) 40 MG tablet Take 1 tablet by mouth in the morning and at bedtime (Patient not taking: Reported on 3/15/2023) 84 tablet 0     No current facility-administered medications for this visit.       Allergies   Allergen Reactions    Codeine Itching       Review of Systems   Constitutional:  Positive for fatigue. Negative for activity change, appetite change, fever and unexpected weight change.   HENT:   Negative for trouble swallowing. Respiratory:  Negative for cough, choking and shortness of breath. Cardiovascular:  Negative for chest pain. Gastrointestinal:  Negative for abdominal distention, abdominal pain, anal bleeding, blood in stool, constipation, diarrhea, nausea, rectal pain and vomiting. Allergic/Immunologic: Negative for food allergies. All other systems reviewed and are negative. Objective:     /80 (Site: Left Upper Arm)   Pulse 60   Ht 5' 11\" (1.803 m)   Wt 229 lb (103.9 kg)   SpO2 (!) 85%   BMI 31.94 kg/m²     Physical Exam  Vitals reviewed. Constitutional:       General: He is not in acute distress. Appearance: He is well-developed. HENT:      Head: Normocephalic and atraumatic. Right Ear: External ear normal.      Left Ear: External ear normal.      Nose: Nose normal.   Eyes:      General: No scleral icterus. Right eye: No discharge. Left eye: No discharge. Conjunctiva/sclera: Conjunctivae normal.      Pupils: Pupils are equal, round, and reactive to light. Cardiovascular:      Rate and Rhythm: Normal rate and regular rhythm. Heart sounds: Normal heart sounds. No murmur heard. Pulmonary:      Effort: Pulmonary effort is normal. No respiratory distress. Breath sounds: Normal breath sounds. No wheezing or rales. Abdominal:      General: Bowel sounds are normal. There is no distension. Palpations: Abdomen is soft. There is no mass. Tenderness: There is no abdominal tenderness. There is no guarding or rebound. Musculoskeletal:         General: Normal range of motion. Cervical back: Normal range of motion and neck supple. Skin:     General: Skin is warm and dry. Coloration: Skin is not pale. Neurological:      Mental Status: He is alert and oriented to person, place, and time.    Psychiatric:         Behavior: Behavior normal.

## 2023-03-16 NOTE — PROGRESS NOTES
Amarary, but cannot send to pool from this format. He is now about 1 month out from EGD. He does not need Carafate! Please make sure that he does not take this as it could interfere with medications essential for his health and disease treatment. AGA guidelines recommend Carafate for a maximum of 2 weeks following endoscopically proven ulcer. Please forward this to GI as well. Carafate could bind essential medications and be harmful.

## 2023-03-17 ENCOUNTER — TELEPHONE (OUTPATIENT)
Dept: FAMILY MEDICINE CLINIC | Age: 73
End: 2023-03-17

## 2023-03-17 NOTE — TELEPHONE ENCOUNTER
----- Message from Jose Luis Bradford DO sent at 3/17/2023  5:29 AM CDT -----  H/H shows some improvement.

## 2023-04-17 ENCOUNTER — PATIENT MESSAGE (OUTPATIENT)
Dept: FAMILY MEDICINE CLINIC | Age: 73
End: 2023-04-17

## 2023-04-17 DIAGNOSIS — L29.9 SEVERE ITCHING: Primary | ICD-10-CM

## 2023-04-17 RX ORDER — FAMOTIDINE 20 MG/1
20 TABLET, FILM COATED ORAL NIGHTLY PRN
Qty: 30 TABLET | Refills: 11 | Status: SHIPPED | OUTPATIENT
Start: 2023-04-17

## 2023-04-17 NOTE — TELEPHONE ENCOUNTER
Are you still taking Zyrtec daily? This should help some with the itching. We can add famotidine 20 mg at bed time, #[id number]    We should also check a uric acid level to make sure that this is not elevated as hyperuricemia can cause severe itching.

## 2023-04-19 ENCOUNTER — PATIENT MESSAGE (OUTPATIENT)
Dept: FAMILY MEDICINE CLINIC | Age: 73
End: 2023-04-19

## 2023-04-19 DIAGNOSIS — L29.9 SEVERE ITCHING: ICD-10-CM

## 2023-04-19 DIAGNOSIS — G62.9 NEUROPATHY: Primary | ICD-10-CM

## 2023-04-19 DIAGNOSIS — M10.9 GOUT, UNSPECIFIED CAUSE, UNSPECIFIED CHRONICITY, UNSPECIFIED SITE: ICD-10-CM

## 2023-04-19 LAB
ALBUMIN SERPL-MCNC: 4.2 G/DL (ref 3.5–5.2)
ALP SERPL-CCNC: 86 U/L (ref 40–130)
ALT SERPL-CCNC: <5 U/L (ref 5–41)
ANION GAP SERPL CALCULATED.3IONS-SCNC: 19 MMOL/L (ref 7–19)
AST SERPL-CCNC: 7 U/L (ref 5–40)
BILIRUB SERPL-MCNC: 0.5 MG/DL (ref 0.2–1.2)
BUN SERPL-MCNC: 76 MG/DL (ref 8–23)
CALCIUM SERPL-MCNC: 9.1 MG/DL (ref 8.8–10.2)
CHLORIDE SERPL-SCNC: 94 MMOL/L (ref 98–111)
CO2 SERPL-SCNC: 15 MMOL/L (ref 22–29)
CREAT SERPL-MCNC: 3.4 MG/DL (ref 0.5–1.2)
GLUCOSE SERPL-MCNC: 111 MG/DL (ref 74–109)
POTASSIUM SERPL-SCNC: 6.3 MMOL/L (ref 3.5–5)
PROT SERPL-MCNC: 7.1 G/DL (ref 6.6–8.7)
SODIUM SERPL-SCNC: 128 MMOL/L (ref 136–145)
URATE SERPL-MCNC: 7.2 MG/DL (ref 3.4–7)

## 2023-04-19 RX ORDER — GABAPENTIN 100 MG/1
100 CAPSULE ORAL 3 TIMES DAILY
Qty: 90 CAPSULE | Refills: 5 | Status: SHIPPED | OUTPATIENT
Start: 2023-04-19 | End: 2023-10-16

## 2023-04-19 NOTE — TELEPHONE ENCOUNTER
Gabapentin 100 mg  Tab 1 p.o. 3 times daily as needed  Dispense #90 with 5 refills. I recommend starting with milligrams at bedtime only.   For about a week, then gradually introduce more during the day up to 3 times daily if needed

## 2023-04-19 NOTE — TELEPHONE ENCOUNTER
From: Chico Brown  To: Dr. Wilfred Quintana: 4/19/2023 8:53 AM CDT  Subject: Gabapentin    A negrita or so back you prescribed gabapentin for my diabetic neuropathy but I stopped taking it because of the way it made me feel (dizzy). I would like to try it again for my neuropathy and to relieve some of this itching. Shwetha Marx gotten very little sleep the past few nights so somethings gotta give and I no longer drive so I can tolerate the side effects this go around). Can you please reorder it? Thanks!   Janes Baldwin

## 2023-04-20 ENCOUNTER — TELEPHONE (OUTPATIENT)
Dept: FAMILY MEDICINE CLINIC | Age: 73
End: 2023-04-20

## 2023-04-20 DIAGNOSIS — R53.81 PHYSICAL DECONDITIONING: Primary | ICD-10-CM

## 2023-04-20 DIAGNOSIS — E87.5 SERUM POTASSIUM ELEVATED: ICD-10-CM

## 2023-04-20 NOTE — TELEPHONE ENCOUNTER
Pt it is not going to the hospital, he said he has a pacemaker and if something happens he will let it do its job and if not it is what it is. Wife has spoke to him of home health and he is not ready for this at this time wife is going to take care of him and she anticipates they will need a home health care order.

## 2023-04-20 NOTE — TELEPHONE ENCOUNTER
Called patient per Dr. Lorie Anand to advise him to go to ER regarding his most recent CMP. Left voicemail on patients voicemail, called wife and left voicemail. Advised patient to go to ER and please call our office back to ensure they got the message. Will call patient again to discuss.

## 2023-04-20 NOTE — TELEPHONE ENCOUNTER
----- Message from [id number] [facility],[address], DO sent at 4/20/2023  1:32 PM CDT -----  Potassium is dangerously high at 6.3. He is also hyponatremic and hypochloremic. He has metabolic acidosis and acute renal failure. He also has uremia with an elevated BUN at 76. This may be causing his itching due to something called uremic frost.  This can also cause other problems such as uremic pericarditis. Potassium binders are not going to be effective as he is already substantially dehydrated. They would result in further dehydration. This is something that we will absolutely be need to be dealt with in the hospital.  If untreated, this very easily could result in death. Once again, my recommendation would be to go to emergency department anticipating hospital admission.

## 2023-04-20 NOTE — TELEPHONE ENCOUNTER
Pts wife called back wanting to know if this can be treated at home because it is so hard to get him in and out by herself. Advised her that I will ask Dr Bradley Garay about this but if it is he would need labs run again to check levels. She said may need home health  referral to come check labs. She said she will do whatever he recommends but would like to see about treating this at home first. Will check with Dr Bradley Garay and call wife back. Spoke with Dr Bradley Garay and called pts wife back. Pt is sick and dry heaving right now. I advised his wife that he is in renal failure and if he stays at home he will die. He wants to know how they will treat him if he goes to the hospital, I advised IV fluids and medication. She said he is at a point that it is what it is. She said to let her get him settled and they will discuss this and she will give us a call back with what they decide to do.

## 2023-04-20 NOTE — TELEPHONE ENCOUNTER
----- Message from [id number] [facility],[address], DO sent at 4/19/2023  7:19 PM CDT -----  Uric acid level is elevated but not to the point that we can blame the itching on that.

## 2023-04-20 NOTE — TELEPHONE ENCOUNTER
In that case, we need to dramatically back off on the diuretics. Use your breathing as a guide to how much diuretic we need instead of peripheral edema  Absolutely stop any potassium supplementation. If you do have Kayexalate, that would be helpful. We need to increase water consumption but dramatically minimize sodium intake. We can send in the sodium bicarbonate 650 mg, 2 tablets twice daily. Dispense #360 with 2 refills  This would correct the acidosis and help lower the potassium as well. We can order a complete metabolic profile again to see how much progress you are making. Pt No answer from waiting room

## 2023-04-21 RX ORDER — SODIUM POLYSTYRENE SULFONATE 15 G/60ML
15 SUSPENSION ORAL; RECTAL DAILY
Qty: 240 ML | Refills: 3 | Status: SHIPPED | OUTPATIENT
Start: 2023-04-21 | End: 2023-04-24

## 2023-04-21 RX ORDER — SODIUM BICARBONATE 650 MG/1
1300 TABLET ORAL 2 TIMES DAILY
Qty: 360 TABLET | Refills: 2 | Status: SHIPPED | OUTPATIENT
Start: 2023-04-21 | End: 2024-04-20

## 2023-04-21 NOTE — TELEPHONE ENCOUNTER
Called and spoke with pts wife with Dr Bradley Garay and Yola Treviño, recommendations. Sent rx to pharmacy and ordered Home Health for pt.

## 2023-04-24 ENCOUNTER — TELEPHONE (OUTPATIENT)
Dept: FAMILY MEDICINE CLINIC | Age: 73
End: 2023-04-24

## 2023-04-24 NOTE — TELEPHONE ENCOUNTER
Received a call from Mansi Gaming with [facility] reporting that they admitted pt today and wanted to let us know that pt does have a Stage 2 pressure ulcer on his Left Buttock . Wife has been treating this. HH will treat this and cover with Mepilex.

## 2023-04-25 ENCOUNTER — TELEPHONE (OUTPATIENT)
Dept: FAMILY MEDICINE CLINIC | Age: 73
End: 2023-04-25

## 2023-04-25 DIAGNOSIS — R53.81 PHYSICAL DECONDITIONING: Primary | ICD-10-CM

## 2023-04-25 DIAGNOSIS — E87.5 SERUM POTASSIUM ELEVATED: Primary | ICD-10-CM

## 2023-04-25 DIAGNOSIS — M15.9 PRIMARY OSTEOARTHRITIS INVOLVING MULTIPLE JOINTS: ICD-10-CM

## 2023-04-25 DIAGNOSIS — N17.9 ACUTE RENAL FAILURE, UNSPECIFIED ACUTE RENAL FAILURE TYPE (HCC): ICD-10-CM

## 2023-04-25 LAB
ALBUMIN SERPL-MCNC: 3.9 G/DL
ALP BLD-CCNC: 83 U/L
ALT SERPL-CCNC: 5 U/L
ANION GAP SERPL CALCULATED.3IONS-SCNC: 20 MMOL/L
AST SERPL-CCNC: 10 U/L
BILIRUB SERPL-MCNC: 0.6 MG/DL (ref 0.1–1.4)
BUN BLDV-MCNC: 94 MG/DL
CALCIUM SERPL-MCNC: 7.7 MG/DL
CHLORIDE BLD-SCNC: 89 MMOL/L
CO2: 15 MMOL/L
CREAT SERPL-MCNC: 4.7 MG/DL
EGFR: 12
GLUCOSE BLD-MCNC: 167 MG/DL
POTASSIUM SERPL-SCNC: 4.7 MMOL/L
SODIUM BLD-SCNC: 124 MMOL/L
TOTAL PROTEIN: 6.8

## 2023-04-25 NOTE — TELEPHONE ENCOUNTER
Pts wife called, he needs an order for a hospital bed to [facility]. Fax [phone]. He is currently sleeping in a lift chair and sliding out of it.  She can not continue to pull him up

## 2023-04-25 NOTE — TELEPHONE ENCOUNTER
Called patient, spoke with: Spouse regarding the results of the patients most recent labs. I advised Spouse of Dr. Earl Robles recommendations. Spouse did voice understanding    Sent order for hospital bed to [facility] in [place].

## 2023-04-25 NOTE — TELEPHONE ENCOUNTER
----- Message from [id number] [facility],[address], DO sent at 4/25/2023  2:18 PM CDT -----  Need to back off on diuretic as renal function continues to decline. You are now at a level in which we would consider dialysis. Potassium is better, but you still remain acidotic. Ideally this would be corrected in a hospital setting. If you are still unwilling to go to the hospital, you will need to increase your fluids (water), back off on diuretics. You are so dry that your kidneys are unable to filter. Avoid salt as much as possible. Take sodium bicarbonate as previously noted twice daily. We need to turn your kidney function around, and the only real way to do that is with better hydration. The longer you remain with creatinine elevated, the greater the likelihood of something called acute tubular necrosis which further destroys the kidneys. Again I would say that the best way to fix this is in a hospital setting. But I do not want you to be without any attempt at fixing this if you do not go to the hospital.  That is why I I gave you the information above. Recheck metabolic profile in 2 days please.

## 2023-04-25 NOTE — TELEPHONE ENCOUNTER
Please order as requested.   We can also do a metabolic profile on him to make sure that his renal function has improved and acidosis as well as hyperkalemia has improved

## 2023-04-26 ENCOUNTER — TELEPHONE (OUTPATIENT)
Dept: FAMILY MEDICINE CLINIC | Age: 73
End: 2023-04-26

## 2023-04-26 DIAGNOSIS — N18.32 STAGE 3B CHRONIC KIDNEY DISEASE (HCC): ICD-10-CM

## 2023-04-26 DIAGNOSIS — N18.32 TYPE 2 DIABETES MELLITUS WITH STAGE 3B CHRONIC KIDNEY DISEASE, WITH LONG-TERM CURRENT USE OF INSULIN (HCC): ICD-10-CM

## 2023-04-26 DIAGNOSIS — M15.9 PRIMARY OSTEOARTHRITIS INVOLVING MULTIPLE JOINTS: ICD-10-CM

## 2023-04-26 DIAGNOSIS — I70.213 ATHEROSCLEROSIS OF NATIVE ARTERY OF BOTH LOWER EXTREMITIES WITH INTERMITTENT CLAUDICATION (HCC): ICD-10-CM

## 2023-04-26 DIAGNOSIS — N17.9 ACUTE RENAL FAILURE, UNSPECIFIED ACUTE RENAL FAILURE TYPE (HCC): ICD-10-CM

## 2023-04-26 DIAGNOSIS — E11.22 TYPE 2 DIABETES MELLITUS WITH STAGE 3B CHRONIC KIDNEY DISEASE, WITH LONG-TERM CURRENT USE OF INSULIN (HCC): ICD-10-CM

## 2023-04-26 DIAGNOSIS — R53.81 PHYSICAL DECONDITIONING: Primary | ICD-10-CM

## 2023-04-26 DIAGNOSIS — Z79.4 TYPE 2 DIABETES MELLITUS WITH STAGE 3B CHRONIC KIDNEY DISEASE, WITH LONG-TERM CURRENT USE OF INSULIN (HCC): ICD-10-CM

## 2023-04-26 NOTE — TELEPHONE ENCOUNTER
Ellen with [facility] called, she said that pts wife called her this morning and during the night he had a period of unresponsiveness. She called 911 and they feel he has had a stroke. Pts wife wants a referral to Hospice. I already placed referral and faxed it over to them. Araseli Armond was going to call Hospice and have them be looking for referral.        Pts wife also called this morning and said he was foaming at the mouth and unresponsive for about 5 min. He couldn't use his left side or even look to the left. She said that he was able to refuse to go to the hospital. She told him that if he didn't go the hospital she was going to call in hospice and he said that was fine and he wanted that. I advised her that I already spoke with Araseli Leahy this morning and just faxed his referral over. Also that Araseli Leahy was going to call hospice. She was very appreciative.

## (undated) DEVICE — CLIP INT L235CM WRK CHN DIA2.8MM OPN 11MM BRAID CATH ROT BX/10

## (undated) DEVICE — ENDO KIT,LOURDES HOSPITAL: Brand: MEDLINE INDUSTRIES, INC.

## (undated) DEVICE — INJECTION THERAPY NEEDLE CATHETER: Brand: INTERJECT

## (undated) DEVICE — FORCEPS BX L240CM JAW DIA2.4MM ORNG L CAP W/ NDL DISP RAD

## (undated) DEVICE — SNARE POLYP SM AD W13MMXL240CM SHTH DIA2.4MM HEX STIFF